# Patient Record
Sex: FEMALE | Race: WHITE | Employment: OTHER | URBAN - METROPOLITAN AREA
[De-identification: names, ages, dates, MRNs, and addresses within clinical notes are randomized per-mention and may not be internally consistent; named-entity substitution may affect disease eponyms.]

---

## 2017-01-11 ENCOUNTER — TELEPHONE (OUTPATIENT)
Dept: INTERNAL MEDICINE CLINIC | Facility: CLINIC | Age: 77
End: 2017-01-11

## 2017-01-12 NOTE — TELEPHONE ENCOUNTER
Eduardo Gordon  Female, 67year old, 09/09/1940  Last Weight:  237 lb (107.502 kg)  Preferred Phone:  [phone]  Home#:  [phone]  Mobile#:  [phone]  Work#:  None  PCP:  Clayton Jaramillo  Next Appt w/ ME:  None  Next Appt Date:  1/18/17 - [facility] yes - Father age 80    Genetic counseling  no        Follow-Up Care Plan    Need for ongoing (adjuvant) treatment for cancer  no                    Cancer Surveillance or other recommended related tests    [address] How Often  or a change in how well you breathe, pneumonitis (inflammation of the lungs), narrowing of or damage to the esophagus, risk of injury to the heart, and in rare cases, nerve or spinal cord injury, hypothyroidism, tissue injury and scarring to the chest area of your cancer treatment. You can share your copy with any of your doctors or nurses.  However, this is not a detailed or comprehensive record of your care.                              12/23/2016    Dear Irma Sun,    Congratulations on completing yo cancer types over the next few years. The [facility] complies with the American Society of Clinical Oncology ([address]) guidelines.    The goal of the [address] [place] [address] is to facilitate Oncologist-to-Primary Care Physician and provider-to-patient communication.

## 2017-01-18 ENCOUNTER — ANTI-COAG VISIT (OUTPATIENT)
Dept: INTERNAL MEDICINE CLINIC | Facility: CLINIC | Age: 77
End: 2017-01-18

## 2017-01-18 DIAGNOSIS — I26.99 PULMONARY EMBOLISM WITH INFARCTION (HCC): Primary | ICD-10-CM

## 2017-01-18 LAB — INR: 2.1 (ref 2–3)

## 2017-01-18 PROCEDURE — 36416 COLLJ CAPILLARY BLOOD SPEC: CPT

## 2017-01-18 PROCEDURE — 85610 PROTHROMBIN TIME: CPT

## 2017-01-27 ENCOUNTER — TELEPHONE (OUTPATIENT)
Dept: PULMONOLOGY | Facility: CLINIC | Age: 77
End: 2017-01-27

## 2017-01-27 NOTE — TELEPHONE ENCOUNTER
Patient on chronic calendar to have CT chest done February 2017 to follow lung nodule. Patient being followed by Dr. Nathan Sofia for Lung CA. Patient just had a CT chest on 12/09/16. Dr. Amor Cortez ordered follow up CT to be done June 2017.  Please advise if ok to

## 2017-01-30 NOTE — TELEPHONE ENCOUNTER
Verbal order from [address] [place] [address] to cancel CT scan of chest due in Feb 2017. No additional orders given.

## 2017-02-09 ENCOUNTER — PATIENT MESSAGE (OUTPATIENT)
Dept: PULMONOLOGY | Facility: CLINIC | Age: 77
End: 2017-02-09

## 2017-02-09 NOTE — TELEPHONE ENCOUNTER
From: Aleishay Marking  To: Anna Scherer MD  Sent: 2/9/2017 10:12 AM CST  Subject: Non-Urgent Medical Question    My , Wil Tineo, was ordered a c=pap machine from [facility]. They called and said medicare will not pay for another machine.  They sa

## 2017-02-15 ENCOUNTER — ANTI-COAG VISIT (OUTPATIENT)
Dept: INTERNAL MEDICINE CLINIC | Facility: CLINIC | Age: 77
End: 2017-02-15

## 2017-02-15 DIAGNOSIS — I26.99 PULMONARY EMBOLISM WITH INFARCTION (HCC): Primary | ICD-10-CM

## 2017-02-15 LAB — INR: 2 (ref 2–3)

## 2017-02-15 PROCEDURE — 85610 PROTHROMBIN TIME: CPT | Performed by: INTERNAL MEDICINE

## 2017-02-15 PROCEDURE — 36416 COLLJ CAPILLARY BLOOD SPEC: CPT | Performed by: INTERNAL MEDICINE

## 2017-03-01 ENCOUNTER — APPOINTMENT (OUTPATIENT)
Dept: LAB | Age: 77
End: 2017-03-01
Attending: INTERNAL MEDICINE
Payer: MEDICARE

## 2017-03-01 ENCOUNTER — HOSPITAL ENCOUNTER (OUTPATIENT)
Dept: GENERAL RADIOLOGY | Age: 77
Discharge: HOME | End: 2017-03-01
Attending: INTERNAL MEDICINE
Payer: MEDICARE

## 2017-03-01 ENCOUNTER — OFFICE VISIT (OUTPATIENT)
Dept: INTERNAL MEDICINE CLINIC | Facility: CLINIC | Age: 77
End: 2017-03-01

## 2017-03-01 VITALS
SYSTOLIC BLOOD PRESSURE: 127 MMHG | WEIGHT: 238 LBS | HEIGHT: 70 IN | HEART RATE: 78 BPM | BODY MASS INDEX: 34.07 KG/M2 | DIASTOLIC BLOOD PRESSURE: 74 MMHG

## 2017-03-01 DIAGNOSIS — Z86.73 PERSONAL HISTORY OF TRANSIENT ISCHEMIC ATTACK (TIA), AND CEREBRAL INFARCTION WITHOUT RESIDUAL DEFICITS: ICD-10-CM

## 2017-03-01 DIAGNOSIS — E66.09 NON MORBID OBESITY DUE TO EXCESS CALORIES: ICD-10-CM

## 2017-03-01 DIAGNOSIS — Z12.11 SCREENING FOR COLON CANCER: ICD-10-CM

## 2017-03-01 DIAGNOSIS — M79.602 PAIN OF LEFT UPPER EXTREMITY: ICD-10-CM

## 2017-03-01 DIAGNOSIS — I70.0 ATHEROSCLEROSIS OF AORTA (HCC): ICD-10-CM

## 2017-03-01 DIAGNOSIS — D35.02 ADRENAL ADENOMA, LEFT: ICD-10-CM

## 2017-03-01 DIAGNOSIS — Z91.81 AT RISK FOR FALLING: ICD-10-CM

## 2017-03-01 DIAGNOSIS — I26.99 RECURRENT PULMONARY EMBOLI (HCC): ICD-10-CM

## 2017-03-01 DIAGNOSIS — M17.0 PRIMARY OSTEOARTHRITIS OF BOTH KNEES: ICD-10-CM

## 2017-03-01 DIAGNOSIS — J44.9 COPD, MILD (HCC): ICD-10-CM

## 2017-03-01 DIAGNOSIS — Z00.00 ENCOUNTER FOR MEDICARE ANNUAL WELLNESS EXAM: Primary | ICD-10-CM

## 2017-03-01 DIAGNOSIS — E04.1 THYROID NODULE: ICD-10-CM

## 2017-03-01 DIAGNOSIS — M79.602 LEFT ARM PAIN: ICD-10-CM

## 2017-03-01 DIAGNOSIS — K21.9 GASTROESOPHAGEAL REFLUX DISEASE, ESOPHAGITIS PRESENCE NOT SPECIFIED: ICD-10-CM

## 2017-03-01 DIAGNOSIS — I10 ESSENTIAL HYPERTENSION WITH GOAL BLOOD PRESSURE LESS THAN 130/80: ICD-10-CM

## 2017-03-01 DIAGNOSIS — Z85.118 HISTORY OF LUNG CANCER: ICD-10-CM

## 2017-03-01 DIAGNOSIS — R11.0 NAUSEA: ICD-10-CM

## 2017-03-01 DIAGNOSIS — J43.9 PULMONARY EMPHYSEMA, UNSPECIFIED EMPHYSEMA TYPE (HCC): ICD-10-CM

## 2017-03-01 DIAGNOSIS — I27.20 PULMONARY HYPERTENSION (HCC): ICD-10-CM

## 2017-03-01 DIAGNOSIS — Z86.718 HISTORY OF DVT (DEEP VEIN THROMBOSIS): ICD-10-CM

## 2017-03-01 LAB
ALBUMIN SERPL BCP-MCNC: 3.7 G/DL (ref 3.5–4.8)
ALBUMIN/GLOB SERPL: 1.2 {RATIO} (ref 1–2)
ALP SERPL-CCNC: 66 U/L (ref 32–100)
ALT SERPL-CCNC: 15 U/L (ref 14–54)
ANION GAP SERPL CALC-SCNC: 7 MMOL/L (ref 0–18)
AST SERPL-CCNC: 23 U/L (ref 15–41)
BACTERIA UR QL AUTO: NEGATIVE /HPF
BILIRUB SERPL-MCNC: 0.7 MG/DL (ref 0.3–1.2)
BILIRUB UR QL: NEGATIVE
BUN SERPL-MCNC: 15 MG/DL (ref 8–20)
BUN/CREAT SERPL: 10.6 (ref 10–20)
CALCIUM SERPL-MCNC: 9.5 MG/DL (ref 8.5–10.5)
CHLORIDE SERPL-SCNC: 109 MMOL/L (ref 95–110)
CLARITY UR: CLEAR
CO2 SERPL-SCNC: 27 MMOL/L (ref 22–32)
COLOR UR: YELLOW
CREAT SERPL-MCNC: 1.42 MG/DL (ref 0.5–1.5)
ERYTHROCYTE [DISTWIDTH] IN BLOOD BY AUTOMATED COUNT: 16.6 % (ref 11–15)
GLOBULIN PLAS-MCNC: 3 G/DL (ref 2.5–3.7)
GLUCOSE SERPL-MCNC: 89 MG/DL (ref 70–99)
GLUCOSE UR-MCNC: NEGATIVE MG/DL
HCT VFR BLD AUTO: 42.1 % (ref 35–48)
HGB BLD-MCNC: 13.3 G/DL (ref 12–16)
HGB UR QL STRIP.AUTO: NEGATIVE
KETONES UR-MCNC: NEGATIVE MG/DL
MCH RBC QN AUTO: 26.4 PG (ref 27–32)
MCHC RBC AUTO-ENTMCNC: 31.6 G/DL (ref 32–37)
MCV RBC AUTO: 83.4 FL (ref 80–100)
NITRITE UR QL STRIP.AUTO: NEGATIVE
OSMOLALITY UR CALC.SUM OF ELEC: 296 MOSM/KG (ref 275–295)
PH UR: 6 [PH] (ref 5–8)
PLATELET # BLD AUTO: 285 K/UL (ref 140–400)
PMV BLD AUTO: 9.2 FL (ref 7.4–10.3)
POTASSIUM SERPL-SCNC: 4.3 MMOL/L (ref 3.3–5.1)
PROT SERPL-MCNC: 6.7 G/DL (ref 5.9–8.4)
PROT UR-MCNC: NEGATIVE MG/DL
RBC # BLD AUTO: 5.04 M/UL (ref 3.7–5.4)
RBC #/AREA URNS AUTO: 1 /HPF
SODIUM SERPL-SCNC: 143 MMOL/L (ref 136–144)
SP GR UR STRIP: 1.02 (ref 1–1.03)
UROBILINOGEN UR STRIP-ACNC: <2
VIT C UR-MCNC: 40 MG/DL
WBC # BLD AUTO: 5.6 K/UL (ref 4–11)
WBC #/AREA URNS AUTO: 8 /HPF

## 2017-03-01 PROCEDURE — 73060 X-RAY EXAM OF HUMERUS: CPT

## 2017-03-01 PROCEDURE — 99213 OFFICE O/P EST LOW 20 MIN: CPT | Performed by: INTERNAL MEDICINE

## 2017-03-01 PROCEDURE — 73564 X-RAY EXAM KNEE 4 OR MORE: CPT

## 2017-03-01 PROCEDURE — 36415 COLL VENOUS BLD VENIPUNCTURE: CPT

## 2017-03-01 PROCEDURE — 81001 URINALYSIS AUTO W/SCOPE: CPT

## 2017-03-01 PROCEDURE — G0439 PPPS, SUBSEQ VISIT: HCPCS | Performed by: INTERNAL MEDICINE

## 2017-03-01 PROCEDURE — 85027 COMPLETE CBC AUTOMATED: CPT

## 2017-03-01 PROCEDURE — 80053 COMPREHEN METABOLIC PANEL: CPT

## 2017-03-01 NOTE — PROGRESS NOTES
HPI:    Patient ID: Nakita Duke is a 67year old female.     HPI      Left arb biceps pain no injury  Off and anon  More whent sitting in chair or deriving  apin acxcruitoating  goea away when she wedges her arm left sie againt the seat  Left breast layo Multiple Vitamins-Minerals (MULTIVITAMIN ADULT OR) Take 1 tablet by mouth daily. Disp:  Rfl:    acetaminophen (TYLENOL) 325 MG Oral Tab Take 500 mg by mouth every 6 (six) hours as needed for Pain.    Disp:  Rfl:      Allergies:  Hydrocodone             Ra Chest CT 12/916  CONCLUSION:   1. There is history of left upper lobe lung cancer, post left upper lobectomy. There are expected postprocedural changes in the left pulmonary hilum and in the left chest wall/axilla.  There is a small left pleural effusion oncology.     2.  Paroxysmal atrial fibrillation    3.  Recurrent venous thromboembolism status post IVC filter–doing well clinically    Recommendations: Lifelong anticoagulation and patient see me in the office at the six-month interval and contact me prom INTERNAL, CANCELED: ORTHOPEDIC -         INTERNAL        Chronic  Generalized osteoarthritis    S/p hip repleamcne     followup with bladimir      (R11.0) Nausea  Plan: GASTRO - INTERNAL, H. PYLORI STOOL AG, EIA        Referral given    (I10) Essential Annual Wellness visit.     Patient Active Problem List:     History of DVT (deep vein thrombosis)     COPD, mild (HCC)     Obesity     Adenocarcinoma, lung (HCC)     Esophageal reflux     Personal history of transient ischemic attack (TIA), and cerebral inf 1-Yes    Does pain affect your day to day activities?: 0-No     Have you had any memory issues?: 0-No    Fall/Risk Scoring: 3    Scoring Interpretation: 0 - 3 No Risk     Depression Screening (PHQ-2/PHQ-9): Over the LAST 2 WEEKS   Little interest or pleasu INFORMATION:   Past Medical History   Diagnosis Date   • Arthritis    • Thyroid condition    • Osteoarthrosis, unspecified whether generalized or localized, unspecified site    • Obesity, unspecified    • Post-menopausal bleeding    • Hiatal hernia    • Ac (107.956 kg)  07/25/16 : 234 lb (106.142 kg)      > BP Readings from Last 3 Encounters:  03/01/17 : 127/74  12/23/16 : 135/72  12/05/16 : 113/71    See above for detail  Right Eye Visual Acuity: Corrected Left Eye Visual Acuity: Corrected   Right Eye Chart applicable    Pap  Every two years There are no preventive care reminders to display for this patient. Update Health Maintenance if applicable    Chlamydia  Annually if high risk No results found for: CHLAMYDIA No flowsheet data found.     Screening Mammogr visit. No flowsheet data found.       SCREENING SCHEDULE - FEMALE      SCREEN COVERAGE SCHEDULE  (If Indicated) LAST DONE   Dexa If at Risk q2 years Every    Lipids All Patients q5 years LDL CHOLESTEROL (mg/dL)   Date Value   06/18/2016 99      Colonoscopy

## 2017-03-04 ENCOUNTER — APPOINTMENT (OUTPATIENT)
Dept: LAB | Age: 77
End: 2017-03-04
Attending: INTERNAL MEDICINE
Payer: MEDICARE

## 2017-03-04 DIAGNOSIS — R11.0 NAUSEA: ICD-10-CM

## 2017-03-04 PROCEDURE — 87338 HPYLORI STOOL AG IA: CPT

## 2017-03-08 LAB — HELICOBACTER PYLORI AG, FECAL: NEGATIVE

## 2017-03-12 PROBLEM — J43.9 PULMONARY EMPHYSEMA (HCC): Status: ACTIVE | Noted: 2017-03-12

## 2017-03-16 ENCOUNTER — ANTI-COAG VISIT (OUTPATIENT)
Dept: INTERNAL MEDICINE CLINIC | Facility: CLINIC | Age: 77
End: 2017-03-16

## 2017-03-16 DIAGNOSIS — I26.99 PULMONARY EMBOLISM WITH INFARCTION (HCC): Primary | ICD-10-CM

## 2017-03-16 LAB — INR: 2.1 (ref 2–3)

## 2017-03-16 PROCEDURE — 36416 COLLJ CAPILLARY BLOOD SPEC: CPT

## 2017-03-16 PROCEDURE — 85610 PROTHROMBIN TIME: CPT

## 2017-03-23 PROBLEM — M79.89 MASS OF SOFT TISSUE OF LEFT UPPER EXTREMITY: Status: ACTIVE | Noted: 2017-03-23

## 2017-03-23 PROBLEM — M25.562 CHRONIC PAIN OF LEFT KNEE: Status: ACTIVE | Noted: 2017-03-23

## 2017-03-23 PROBLEM — M17.12 PRIMARY OSTEOARTHRITIS OF LEFT KNEE: Status: ACTIVE | Noted: 2017-03-23

## 2017-03-23 PROBLEM — G89.29 CHRONIC PAIN OF LEFT KNEE: Status: ACTIVE | Noted: 2017-03-23

## 2017-03-23 PROBLEM — M79.602 LEFT ARM PAIN: Status: ACTIVE | Noted: 2017-03-23

## 2017-03-31 ENCOUNTER — APPOINTMENT (OUTPATIENT)
Dept: HEMATOLOGY/ONCOLOGY | Facility: HOSPITAL | Age: 77
End: 2017-03-31
Attending: INTERNAL MEDICINE
Payer: MEDICARE

## 2017-04-12 ENCOUNTER — ANTI-COAG VISIT (OUTPATIENT)
Dept: INTERNAL MEDICINE CLINIC | Facility: CLINIC | Age: 77
End: 2017-04-12

## 2017-04-12 DIAGNOSIS — I26.99 PULMONARY EMBOLISM WITH INFARCTION (HCC): Primary | ICD-10-CM

## 2017-04-12 PROCEDURE — 85610 PROTHROMBIN TIME: CPT

## 2017-04-12 PROCEDURE — 36416 COLLJ CAPILLARY BLOOD SPEC: CPT

## 2017-05-01 ENCOUNTER — TELEPHONE (OUTPATIENT)
Dept: HEMATOLOGY/ONCOLOGY | Facility: HOSPITAL | Age: 77
End: 2017-05-01

## 2017-05-01 DIAGNOSIS — C34.92 MALIGNANT NEOPLASM OF LEFT LUNG, UNSPECIFIED PART OF LUNG (HCC): Primary | ICD-10-CM

## 2017-05-01 NOTE — TELEPHONE ENCOUNTER
Tara calling for CT order prior to her visit in June. Please contact the patient once order is placed so patient can schedule appt for CT.  [address] [facility]

## 2017-05-01 NOTE — TELEPHONE ENCOUNTER
Called patient to let her know that the order for CT was placed and to have a few days before next MD visit June 1, 2017.

## 2017-05-02 NOTE — TELEPHONE ENCOUNTER
Pharmacy requesting refill. Warfarin Sodium 5 MG Oral Tab TAKE AS DIRECTED BY ANTICOAGULATION CLINIC: 1.5 tab Tue, Sat.& 1 tab the rest of the week, PO in Evening.  PO Disp: 100 tablet Rfl: 1

## 2017-05-08 NOTE — TELEPHONE ENCOUNTER
Patient called in to follow up on medication refill request. States she's down to one and has requested this since last week. Doesn't want to skip a dose. Please advise.

## 2017-05-09 NOTE — TELEPHONE ENCOUNTER
MMP please advise on refill request.  Pt is current with anticoag clinic, next INR 5/10. Current dose is 5mg daily. Rx pended for signature.

## 2017-05-09 NOTE — TELEPHONE ENCOUNTER
Refill Protocol Appointment Criteria  · Appointment scheduled in the past 12 months or in the next 3 months  Recent Visits       Provider Department Primary Dx    2 months ago Carl Rosenthal MD [facility], [facility], [address], [address] [facility] Encounter for [address]

## 2017-05-10 ENCOUNTER — ANTI-COAG VISIT (OUTPATIENT)
Dept: INTERNAL MEDICINE CLINIC | Facility: CLINIC | Age: 77
End: 2017-05-10

## 2017-05-10 DIAGNOSIS — I26.99 PULMONARY EMBOLISM WITH INFARCTION (HCC): Primary | ICD-10-CM

## 2017-05-10 PROCEDURE — 36416 COLLJ CAPILLARY BLOOD SPEC: CPT

## 2017-05-10 PROCEDURE — 85610 PROTHROMBIN TIME: CPT

## 2017-05-10 RX ORDER — WARFARIN SODIUM 5 MG/1
TABLET ORAL
Qty: 90 TABLET | Refills: 1 | Status: SHIPPED | OUTPATIENT
Start: 2017-05-10 | End: 2017-08-11

## 2017-05-12 RX ORDER — WARFARIN SODIUM 5 MG/1
TABLET ORAL
Qty: 100 TABLET | Refills: 1 | Status: SHIPPED | OUTPATIENT
Start: 2017-05-12 | End: 2017-12-14

## 2017-05-12 NOTE — TELEPHONE ENCOUNTER
Spoke to pharmacy, states that they have already received a prescription. on 5/10. It was filled and picked up by the pt. Pharmacy will disregard the 2nd prescription.

## 2017-05-12 NOTE — TELEPHONE ENCOUNTER
Sent per clinical decision with cosign request from Dr. Addis Landrum. Critical med and pt should not miss dose.

## 2017-05-24 ENCOUNTER — HOSPITAL ENCOUNTER (OUTPATIENT)
Dept: CT IMAGING | Age: 77
Discharge: HOME | End: 2017-05-24
Attending: INTERNAL MEDICINE
Payer: MEDICARE

## 2017-05-24 DIAGNOSIS — C34.92 MALIGNANT NEOPLASM OF LEFT LUNG, UNSPECIFIED PART OF LUNG (HCC): ICD-10-CM

## 2017-05-24 PROCEDURE — 71250 CT THORAX DX C-: CPT | Performed by: INTERNAL MEDICINE

## 2017-06-01 ENCOUNTER — OFFICE VISIT (OUTPATIENT)
Dept: HEMATOLOGY/ONCOLOGY | Facility: HOSPITAL | Age: 77
End: 2017-06-01
Attending: INTERNAL MEDICINE
Payer: MEDICARE

## 2017-06-01 VITALS
HEART RATE: 74 BPM | RESPIRATION RATE: 16 BRPM | SYSTOLIC BLOOD PRESSURE: 127 MMHG | WEIGHT: 239 LBS | HEIGHT: 70 IN | BODY MASS INDEX: 34.22 KG/M2 | TEMPERATURE: 98 F | DIASTOLIC BLOOD PRESSURE: 61 MMHG

## 2017-06-01 DIAGNOSIS — I26.99 BILATERAL PULMONARY EMBOLISM (HCC): Primary | ICD-10-CM

## 2017-06-01 DIAGNOSIS — C34.12 MALIGNANT NEOPLASM OF UPPER LOBE OF LEFT LUNG (HCC): ICD-10-CM

## 2017-06-01 DIAGNOSIS — I82.403 DEEP VEIN THROMBOSIS (DVT) OF BOTH LOWER EXTREMITIES, UNSPECIFIED CHRONICITY, UNSPECIFIED VEIN (HCC): ICD-10-CM

## 2017-06-01 PROCEDURE — 99214 OFFICE O/P EST MOD 30 MIN: CPT | Performed by: INTERNAL MEDICINE

## 2017-06-01 PROCEDURE — G0463 HOSPITAL OUTPT CLINIC VISIT: HCPCS | Performed by: INTERNAL MEDICINE

## 2017-06-01 NOTE — PROGRESS NOTES
Cancer Center Progress Note    Patient Name: Clara Dawson   YOB: 1940   Medical Record Number: [id number]   Attending Physician: Ofelia Nick M.D.        Chief Complaint:  Lung cancer    History of Present Illness:  Cancer history:  69-year- SURGICAL HISTORY      Comment Ear Surgery    OTHER SURGICAL HISTORY  7/13    Comment Neg EM Bx    HIP REPLACEMENT SURGERY Right     FOOT/TOES SURGERY PROC UNLISTED Left     Comment Pt had surgery to straighten toes on left foot.     REMOVAL OF LUNG,LOBECTOM Take 1 tablet by mouth daily. , Disp: , Rfl:   •  acetaminophen (TYLENOL) 325 MG Oral Tab, Take 500 mg by mouth every 6 (six) hours as needed for Pain.  , Disp: , Rfl:     Allergies:    Hydrocodone             Rash  Morphine                Other (See Commen survival advantage in this scenario.   --Recommend following on surveillance with next CT 6 months Dec 2017  --Clinical follow up in 3 months    History of recurrent DVT/PE with multiple episodes in the postoperative setting including recently as noted abov

## 2017-06-06 ENCOUNTER — OFFICE VISIT (OUTPATIENT)
Dept: PULMONOLOGY | Facility: CLINIC | Age: 77
End: 2017-06-06

## 2017-06-06 VITALS
OXYGEN SATURATION: 96 % | DIASTOLIC BLOOD PRESSURE: 70 MMHG | HEART RATE: 67 BPM | RESPIRATION RATE: 19 BRPM | HEIGHT: 70 IN | BODY MASS INDEX: 34.5 KG/M2 | WEIGHT: 241 LBS | SYSTOLIC BLOOD PRESSURE: 114 MMHG

## 2017-06-06 DIAGNOSIS — C34.90 ADENOCARCINOMA, LUNG, UNSPECIFIED LATERALITY (HCC): Primary | ICD-10-CM

## 2017-06-06 PROCEDURE — G0463 HOSPITAL OUTPT CLINIC VISIT: HCPCS | Performed by: INTERNAL MEDICINE

## 2017-06-06 PROCEDURE — 99213 OFFICE O/P EST LOW 20 MIN: CPT | Performed by: INTERNAL MEDICINE

## 2017-06-06 NOTE — PROGRESS NOTES
The patient is 67-year-old female who I know well from prior evaluation who comes in now for follow-up. In general, she is doing well. She is status post left upper lobectomy lepidic adenocarcinoma.   Recent CT scan the chest shows no evidence of recurren

## 2017-06-07 ENCOUNTER — ANTI-COAG VISIT (OUTPATIENT)
Dept: INTERNAL MEDICINE CLINIC | Facility: CLINIC | Age: 77
End: 2017-06-07

## 2017-06-07 DIAGNOSIS — I26.99 PULMONARY EMBOLISM WITH INFARCTION (HCC): Primary | ICD-10-CM

## 2017-06-07 PROCEDURE — 36416 COLLJ CAPILLARY BLOOD SPEC: CPT

## 2017-06-07 PROCEDURE — 85610 PROTHROMBIN TIME: CPT

## 2017-06-28 ENCOUNTER — ANTI-COAG VISIT (OUTPATIENT)
Dept: INTERNAL MEDICINE CLINIC | Facility: CLINIC | Age: 77
End: 2017-06-28

## 2017-06-28 DIAGNOSIS — I26.99 PULMONARY EMBOLISM WITH INFARCTION (HCC): ICD-10-CM

## 2017-06-28 PROCEDURE — G0463 HOSPITAL OUTPT CLINIC VISIT: HCPCS

## 2017-06-28 PROCEDURE — 36416 COLLJ CAPILLARY BLOOD SPEC: CPT

## 2017-06-28 PROCEDURE — 85610 PROTHROMBIN TIME: CPT

## 2017-07-12 ENCOUNTER — ANTI-COAG VISIT (OUTPATIENT)
Dept: INTERNAL MEDICINE CLINIC | Facility: CLINIC | Age: 77
End: 2017-07-12

## 2017-07-12 DIAGNOSIS — I26.99 PULMONARY EMBOLISM WITH INFARCTION (HCC): ICD-10-CM

## 2017-07-12 LAB — INR: 2.4 (ref 2–3)

## 2017-07-12 PROCEDURE — 85610 PROTHROMBIN TIME: CPT

## 2017-07-12 PROCEDURE — 36416 COLLJ CAPILLARY BLOOD SPEC: CPT

## 2017-07-14 ENCOUNTER — OFFICE VISIT (OUTPATIENT)
Dept: DERMATOLOGY CLINIC | Facility: CLINIC | Age: 77
End: 2017-07-14

## 2017-07-14 DIAGNOSIS — L72.0 MILIA: ICD-10-CM

## 2017-07-14 DIAGNOSIS — D23.4 BENIGN NEOPLASM OF SCALP AND SKIN OF NECK: ICD-10-CM

## 2017-07-14 DIAGNOSIS — L57.0 AK (ACTINIC KERATOSIS): Primary | ICD-10-CM

## 2017-07-14 DIAGNOSIS — D23.30 BENIGN NEOPLASM OF SKIN OF FACE: ICD-10-CM

## 2017-07-14 DIAGNOSIS — D23.60 BENIGN NEOPLASM OF SKIN OF UPPER LIMB, INCLUDING SHOULDER, UNSPECIFIED LATERALITY: ICD-10-CM

## 2017-07-14 DIAGNOSIS — Z85.828 HISTORY OF SKIN CANCER: ICD-10-CM

## 2017-07-14 DIAGNOSIS — D23.5 BENIGN NEOPLASM OF SKIN OF TRUNK, EXCEPT SCROTUM: ICD-10-CM

## 2017-07-14 PROCEDURE — 17000 DESTRUCT PREMALG LESION: CPT | Performed by: DERMATOLOGY

## 2017-07-14 PROCEDURE — 99202 OFFICE O/P NEW SF 15 MIN: CPT | Performed by: DERMATOLOGY

## 2017-07-14 RX ORDER — AMMONIUM LACTATE 12 G/100G
1 LOTION TOPICAL 2 TIMES DAILY
Qty: 500 G | Refills: 11 | Status: SHIPPED | OUTPATIENT
Start: 2017-07-14 | End: 2017-08-13

## 2017-07-18 ENCOUNTER — OFFICE VISIT (OUTPATIENT)
Dept: GASTROENTEROLOGY | Facility: CLINIC | Age: 77
End: 2017-07-18

## 2017-07-18 VITALS
WEIGHT: 236 LBS | DIASTOLIC BLOOD PRESSURE: 64 MMHG | HEIGHT: 69 IN | HEART RATE: 73 BPM | SYSTOLIC BLOOD PRESSURE: 101 MMHG | BODY MASS INDEX: 34.96 KG/M2

## 2017-07-18 DIAGNOSIS — K21.00 HIATAL HERNIA WITH GERD AND ESOPHAGITIS: ICD-10-CM

## 2017-07-18 DIAGNOSIS — K52.9 CHRONIC DIARRHEA: ICD-10-CM

## 2017-07-18 DIAGNOSIS — Z12.12 ENCOUNTER FOR COLORECTAL CANCER SCREENING: Primary | ICD-10-CM

## 2017-07-18 DIAGNOSIS — K44.9 HIATAL HERNIA WITH GERD AND ESOPHAGITIS: ICD-10-CM

## 2017-07-18 DIAGNOSIS — Z12.11 ENCOUNTER FOR COLORECTAL CANCER SCREENING: Primary | ICD-10-CM

## 2017-07-18 DIAGNOSIS — R11.0 NAUSEA: ICD-10-CM

## 2017-07-18 PROCEDURE — G0463 HOSPITAL OUTPT CLINIC VISIT: HCPCS | Performed by: INTERNAL MEDICINE

## 2017-07-18 PROCEDURE — 99214 OFFICE O/P EST MOD 30 MIN: CPT | Performed by: INTERNAL MEDICINE

## 2017-07-18 NOTE — PATIENT INSTRUCTIONS
1.  Schedule colonoscopy and EGD on the same day with MAC. Hold warfarin for 5 days prior to the procedure if okay with Dr. Mckay Wood    2. Lactose-free diet, caffeine free diet.     3.  If you are still having diarrhea at the time of the procedures, we tiarra

## 2017-07-18 NOTE — PROGRESS NOTES
HPI:    Patient ID: Tacey Edgard is a 67year old female. HPI    Review of Systems   Constitutional: Negative for activity change, appetite change, chills, diaphoresis, fatigue, fever and unexpected weight change.    HENT: Negative for congestion, ear daily. Disp:  Rfl:    Multiple Vitamins-Minerals (MULTIVITAMIN ADULT OR) Take 1 tablet by mouth daily. Disp:  Rfl:    ammonium lactate 12 % External Lotion Apply 1 Application topically 2 (two) times daily.  Disp: 500 g Rfl: 11   acetaminophen (TYLENOL) 325 tenderness. Lymphadenopathy:     She has no cervical adenopathy. Neurological: She is alert and oriented to person, place, and time. Skin: Skin is warm and dry. No rash noted. She is not diaphoretic. No erythema. No pallor.    Numerous actinic keratos

## 2017-07-18 NOTE — H&P
History of present illness: This is a 76-year-old female patient of Dr. Maite Mishra who presents for colorectal cancer screening as well as several other GI complaints. The patient last underwent colonoscopy and EGD in 2003 per Dr. Bing Gage.   At that sanjeev infection, pain, death, as well as the risks of anesthesia and perforation all leading to prolonged hospitalization, surgical intervention, or even death. I also specifically mentioned the miss rate of colonoscopy of 5-10% in the best of all circumstances.

## 2017-07-19 ENCOUNTER — TELEPHONE (OUTPATIENT)
Dept: GASTROENTEROLOGY | Facility: CLINIC | Age: 77
End: 2017-07-19

## 2017-07-19 DIAGNOSIS — Z12.11 COLON CANCER SCREENING: ICD-10-CM

## 2017-07-19 DIAGNOSIS — K21.9 GASTROESOPHAGEAL REFLUX DISEASE, ESOPHAGITIS PRESENCE NOT SPECIFIED: ICD-10-CM

## 2017-07-19 DIAGNOSIS — K44.9 HIATAL HERNIA: ICD-10-CM

## 2017-07-19 DIAGNOSIS — K52.9 CHRONIC DIARRHEA: Primary | ICD-10-CM

## 2017-07-19 DIAGNOSIS — R11.0 NAUSEA: ICD-10-CM

## 2017-07-19 NOTE — TELEPHONE ENCOUNTER
Scheduled for:  Colonoscopy [phone]  Provider Name: Dr Guillermo Paci  Date:  Fri 9/08/17  Location:  [facility]  Sedation:  MAC  Time:  12:00 noon  Prep: miralax/gatoride  Meds/Allergies Reconciled?:  Fentanyl,hydrocodone, morphine,   Diagnosis with codes:  Chronic

## 2017-07-21 NOTE — TELEPHONE ENCOUNTER
Pt is Warfarin he is scheduled for a colonoscopy/EGD on 9/08/17.   Can we hold warfarin 5 days prior to procedure    em

## 2017-07-30 NOTE — PROGRESS NOTES
Desirae Garg is a 67year old female.   HPI:     CC:  Patient presents with:  Full Skin Exam: patient concerned with multiple skin tags around neck x several years,patient states she had a cancerous lesion removed fron nose 10 years ago,patient on coumad Smokeless tobacco: Never Used                      Alcohol use: Yes           0.5 oz/week     Glasses of wine: 1 per week     Comment: 1-2x/month         Current Outpatient Prescriptions:  ammonium lactate 12 % External Lotion Ap straighten toes on left                foot.   No date: HIP REPLACEMENT SURGERY Right  2015: HIP SURGERY Right      Comment: right total hip arthroplasty  No date: HYSTERECTOMY  No date: LAPAROSCOPIC CHOLECYSTECTOMY  No date:       Comment: x7 (Twins x1 as noted. ROS:  Denies any other systemic complaints. No new or changeing lesions other than noted above. No fevers, chills, night sweats, unusual sun sensitivity. No other skin complaints. History, medications, allergies reviewed as noted. Precancerous nature discussed. Lesions treated with cryo- . Biopsy if not resolved. Biopsy not resolved of the next month return for recheck and biopsy. Patient with history of skin cancer. No evidence of recurrence. No new skin cancer.     On exa

## 2017-08-01 ENCOUNTER — TELEPHONE (OUTPATIENT)
Dept: INTERNAL MEDICINE CLINIC | Facility: CLINIC | Age: 77
End: 2017-08-01

## 2017-08-01 DIAGNOSIS — I26.99 PULMONARY EMBOLISM AND INFARCTION (HCC): Primary | ICD-10-CM

## 2017-08-02 ENCOUNTER — ANTI-COAG VISIT (OUTPATIENT)
Dept: INTERNAL MEDICINE CLINIC | Facility: CLINIC | Age: 77
End: 2017-08-02

## 2017-08-02 DIAGNOSIS — I26.99 PULMONARY EMBOLISM WITH INFARCTION (HCC): ICD-10-CM

## 2017-08-02 DIAGNOSIS — I26.99 PULMONARY EMBOLISM AND INFARCTION (HCC): ICD-10-CM

## 2017-08-04 NOTE — TELEPHONE ENCOUNTER
Pt contacted and reviewed Dr. Yosi Taylor message, below. She repeated instructions to demonstrate understanding. She states she wrote it on her calender. She had no further questions or concerns at this time.     Marilyn sheehan was already notified of the pro

## 2017-08-09 ENCOUNTER — ANTI-COAG VISIT (OUTPATIENT)
Dept: INTERNAL MEDICINE CLINIC | Facility: CLINIC | Age: 77
End: 2017-08-09

## 2017-08-09 DIAGNOSIS — I26.99 PULMONARY EMBOLISM AND INFARCTION (HCC): ICD-10-CM

## 2017-08-09 DIAGNOSIS — I26.99 PULMONARY EMBOLISM WITH INFARCTION (HCC): ICD-10-CM

## 2017-08-09 LAB — INR: 2.6 (ref 2–3)

## 2017-08-09 PROCEDURE — 36416 COLLJ CAPILLARY BLOOD SPEC: CPT

## 2017-08-09 PROCEDURE — 85610 PROTHROMBIN TIME: CPT

## 2017-08-11 ENCOUNTER — OFFICE VISIT (OUTPATIENT)
Dept: DERMATOLOGY CLINIC | Facility: CLINIC | Age: 77
End: 2017-08-11

## 2017-08-11 DIAGNOSIS — D23.60 BENIGN NEOPLASM OF SKIN OF UPPER LIMB, INCLUDING SHOULDER, UNSPECIFIED LATERALITY: ICD-10-CM

## 2017-08-11 DIAGNOSIS — D23.5 BENIGN NEOPLASM OF SKIN OF TRUNK, EXCEPT SCROTUM: ICD-10-CM

## 2017-08-11 DIAGNOSIS — D23.4 BENIGN NEOPLASM OF SCALP AND SKIN OF NECK: ICD-10-CM

## 2017-08-11 DIAGNOSIS — D23.30 BENIGN NEOPLASM OF SKIN OF FACE: ICD-10-CM

## 2017-08-11 DIAGNOSIS — L72.0 MILIA: ICD-10-CM

## 2017-08-11 DIAGNOSIS — L57.0 AK (ACTINIC KERATOSIS): Primary | ICD-10-CM

## 2017-08-11 DIAGNOSIS — Z85.828 HISTORY OF SKIN CANCER: ICD-10-CM

## 2017-08-11 PROCEDURE — 99213 OFFICE O/P EST LOW 20 MIN: CPT | Performed by: DERMATOLOGY

## 2017-08-11 PROCEDURE — G0463 HOSPITAL OUTPT CLINIC VISIT: HCPCS | Performed by: DERMATOLOGY

## 2017-08-11 RX ORDER — FUROSEMIDE 20 MG/1
20 TABLET ORAL
COMMUNITY
Start: 2016-03-31 | End: 2017-08-11

## 2017-08-11 RX ORDER — ESOMEPRAZOLE MAGNESIUM 20 MG/1
1 FOR SUSPENSION ORAL
COMMUNITY
End: 2017-08-11

## 2017-08-21 NOTE — PROGRESS NOTES
Gareth Oneill is a 67year old female. HPI:     CC:  Patient presents with:  Actinic Keratosis: LOV 7/14/2017. Pt presenting for f/u with AKs, previously treated at [address] [place] [address] with cryotherapy. Allergies:  Fentanyl; Hydrocodone; Morphine;  Phenol    HIS Alcohol use: Yes           0.5 oz/week     Glasses of wine: 1 per week     Comment: 1-2x/month         Current Outpatient Prescriptions:  Warfarin Sodium 5 MG Oral Tab TAKE AS DIRECTED BY ANTICOAGULATION CLINIC: 1 tablet by mouth daily Disp: 100 t Comment: parotid gland removed left benigh  No date: OTHER SURGICAL HISTORY      Comment: Removal Skin Mole  No date: OTHER SURGICAL HISTORY      Comment: SAB/ D&C  No date: OTHER SURGICAL HISTORY      Comment: Ear Surgery  7/13: OTHER SURGICAL HISTORY scalp, head, neck, face,nails, hair, external eyes, including conjunctival mucosa, eyelids, lips external ears , arms, digits,palms.      Multiple light to medium brown, well marginated, uniformly pigmented, macules and papules 6 mm and less scattered on ex

## 2017-08-23 NOTE — TELEPHONE ENCOUNTER
Please advise on refill request.  Unable to refill per protocol.    Hypertensive Medications  Protocol Criteria:  · Appointment scheduled in the past 6 months or in the next 3 months  · BMP or CMP in the past 12 months  · Creatinine result < 2  Recent Outpa 109 03/01/2017   CO2 27 03/01/2017   GLOBULIN 3.0 03/01/2017   AGRATIO 1.1 06/03/2016   ANIONGAP 7 03/01/2017   OSMOCAL 296 (H) 03/01/2017

## 2017-08-23 NOTE — TELEPHONE ENCOUNTER
From: Debbie Martínez  Sent: 8/21/2017 8:53 AM CDT  Subject: Medication Renewal Request    Debbie Martínez would like a refill of the following medications:  furosemide 20 MG Oral Tab Kevin Forbes MD]    Preferred pharmacy: [address] [place] [address]  [phone] -

## 2017-08-24 RX ORDER — FUROSEMIDE 20 MG/1
20 TABLET ORAL DAILY
Qty: 90 TABLET | Refills: 1 | Status: SHIPPED
Start: 2017-08-24 | End: 2018-06-02

## 2017-08-25 ENCOUNTER — LAB ENCOUNTER (OUTPATIENT)
Dept: LAB | Age: 77
End: 2017-08-25
Attending: INTERNAL MEDICINE
Payer: MEDICARE

## 2017-08-25 DIAGNOSIS — C34.12 MALIGNANT NEOPLASM OF UPPER LOBE OF LEFT LUNG (HCC): ICD-10-CM

## 2017-08-25 LAB
ALBUMIN SERPL BCP-MCNC: 3.7 G/DL (ref 3.5–4.8)
ALBUMIN/GLOB SERPL: 1.4 {RATIO} (ref 1–2)
ALP SERPL-CCNC: 62 U/L (ref 32–100)
ALT SERPL-CCNC: 15 U/L (ref 14–54)
ANION GAP SERPL CALC-SCNC: 6 MMOL/L (ref 0–18)
AST SERPL-CCNC: 23 U/L (ref 15–41)
BASOPHILS # BLD: 0.1 K/UL (ref 0–0.2)
BASOPHILS NFR BLD: 2 %
BILIRUB SERPL-MCNC: 0.9 MG/DL (ref 0.3–1.2)
BUN SERPL-MCNC: 8 MG/DL (ref 8–20)
BUN/CREAT SERPL: 6.7 (ref 10–20)
CALCIUM SERPL-MCNC: 9.3 MG/DL (ref 8.5–10.5)
CHLORIDE SERPL-SCNC: 108 MMOL/L (ref 95–110)
CO2 SERPL-SCNC: 26 MMOL/L (ref 22–32)
CREAT SERPL-MCNC: 1.2 MG/DL (ref 0.5–1.5)
EOSINOPHIL # BLD: 0.1 K/UL (ref 0–0.7)
EOSINOPHIL NFR BLD: 3 %
ERYTHROCYTE [DISTWIDTH] IN BLOOD BY AUTOMATED COUNT: 16.4 % (ref 11–15)
GLOBULIN PLAS-MCNC: 2.6 G/DL (ref 2.5–3.7)
GLUCOSE SERPL-MCNC: 95 MG/DL (ref 70–99)
HCT VFR BLD AUTO: 45.7 % (ref 35–48)
HGB BLD-MCNC: 15.1 G/DL (ref 12–16)
LYMPHOCYTES # BLD: 1.1 K/UL (ref 1–4)
LYMPHOCYTES NFR BLD: 24 %
MCH RBC QN AUTO: 30.1 PG (ref 27–32)
MCHC RBC AUTO-ENTMCNC: 33 G/DL (ref 32–37)
MCV RBC AUTO: 91.1 FL (ref 80–100)
MONOCYTES # BLD: 0.3 K/UL (ref 0–1)
MONOCYTES NFR BLD: 7 %
NEUTROPHILS # BLD AUTO: 3.2 K/UL (ref 1.8–7.7)
NEUTROPHILS NFR BLD: 65 %
OSMOLALITY UR CALC.SUM OF ELEC: 288 MOSM/KG (ref 275–295)
PLATELET # BLD AUTO: 265 K/UL (ref 140–400)
PMV BLD AUTO: 9 FL (ref 7.4–10.3)
POTASSIUM SERPL-SCNC: 4.7 MMOL/L (ref 3.3–5.1)
PROT SERPL-MCNC: 6.3 G/DL (ref 5.9–8.4)
RBC # BLD AUTO: 5.01 M/UL (ref 3.7–5.4)
SODIUM SERPL-SCNC: 140 MMOL/L (ref 136–144)
WBC # BLD AUTO: 4.8 K/UL (ref 4–11)

## 2017-08-25 PROCEDURE — 80053 COMPREHEN METABOLIC PANEL: CPT

## 2017-08-25 PROCEDURE — 36415 COLL VENOUS BLD VENIPUNCTURE: CPT

## 2017-08-25 PROCEDURE — 85025 COMPLETE CBC W/AUTO DIFF WBC: CPT

## 2017-09-01 ENCOUNTER — OFFICE VISIT (OUTPATIENT)
Dept: DERMATOLOGY CLINIC | Facility: CLINIC | Age: 77
End: 2017-09-01

## 2017-09-01 ENCOUNTER — OFFICE VISIT (OUTPATIENT)
Dept: HEMATOLOGY/ONCOLOGY | Facility: HOSPITAL | Age: 77
End: 2017-09-01
Attending: INTERNAL MEDICINE
Payer: MEDICARE

## 2017-09-01 VITALS
HEART RATE: 71 BPM | HEIGHT: 70 IN | SYSTOLIC BLOOD PRESSURE: 140 MMHG | BODY MASS INDEX: 33.64 KG/M2 | TEMPERATURE: 98 F | DIASTOLIC BLOOD PRESSURE: 63 MMHG | WEIGHT: 235 LBS

## 2017-09-01 DIAGNOSIS — L82.1 SEBORRHEIC KERATOSES: Primary | ICD-10-CM

## 2017-09-01 DIAGNOSIS — C34.12 MALIGNANT NEOPLASM OF UPPER LOBE OF LEFT LUNG (HCC): ICD-10-CM

## 2017-09-01 DIAGNOSIS — I82.403 DEEP VEIN THROMBOSIS (DVT) OF BOTH LOWER EXTREMITIES, UNSPECIFIED CHRONICITY, UNSPECIFIED VEIN (HCC): ICD-10-CM

## 2017-09-01 DIAGNOSIS — L82.0 INFLAMED SEBORRHEIC KERATOSIS: ICD-10-CM

## 2017-09-01 DIAGNOSIS — I26.99 BILATERAL PULMONARY EMBOLISM (HCC): Primary | ICD-10-CM

## 2017-09-01 PROCEDURE — 99214 OFFICE O/P EST MOD 30 MIN: CPT | Performed by: INTERNAL MEDICINE

## 2017-09-01 PROCEDURE — G0463 HOSPITAL OUTPT CLINIC VISIT: HCPCS | Performed by: DERMATOLOGY

## 2017-09-01 PROCEDURE — 99213 OFFICE O/P EST LOW 20 MIN: CPT | Performed by: DERMATOLOGY

## 2017-09-01 PROCEDURE — G0463 HOSPITAL OUTPT CLINIC VISIT: HCPCS | Performed by: INTERNAL MEDICINE

## 2017-09-01 NOTE — PROGRESS NOTES
Cancer Center Progress Note    Patient Name: Bethany Rene   YOB: 1940   Medical Record Number: [id number]   Attending Physician: Shelly Gomez M.D.        Chief Complaint:  Lung cancer    History of Present Illness:  Cancer history:  69-year- HYSTERECTOMY  No date: LAPAROSCOPIC CHOLECYSTECTOMY  No date:       Comment: x7 (Twins x1) Max weight 9 1/2 lbs  No date: OTHER SURGICAL HISTORY      Comment: parotid gland removed left benigh  No date: OTHER SURGICAL HISTORY      Comment: Removal Skin daily., Disp: , Rfl:   •  Multiple Vitamins-Minerals (MULTIVITAMIN ADULT OR), Take 1 tablet by mouth daily. , Disp: , Rfl:   •  acetaminophen (TYLENOL) 325 MG Oral Tab, Take 500 mg by mouth every 6 (six) hours as needed for Pain.  , Disp: , Rfl:     [place] lymph node dissection 6/14/16.  --s/p R0 resection (however close margin noted)  --No definitive evidence that chemotherapy would provide a survival advantage in this scenario.   --Recommend following on surveillance with next CT in 3 months Dec 2017  --Cli

## 2017-09-08 ENCOUNTER — ANESTHESIA EVENT (OUTPATIENT)
Dept: ENDOSCOPY | Facility: HOSPITAL | Age: 77
End: 2017-09-08
Payer: MEDICARE

## 2017-09-08 ENCOUNTER — HOSPITAL ENCOUNTER (OUTPATIENT)
Facility: HOSPITAL | Age: 77
Setting detail: HOSPITAL OUTPATIENT SURGERY
Discharge: HOME | End: 2017-09-08
Attending: INTERNAL MEDICINE | Admitting: INTERNAL MEDICINE
Payer: MEDICARE

## 2017-09-08 ENCOUNTER — SURGERY (OUTPATIENT)
Age: 77
End: 2017-09-08

## 2017-09-08 ENCOUNTER — ANESTHESIA (OUTPATIENT)
Dept: ENDOSCOPY | Facility: HOSPITAL | Age: 77
End: 2017-09-08
Payer: MEDICARE

## 2017-09-08 VITALS
DIASTOLIC BLOOD PRESSURE: 77 MMHG | HEART RATE: 65 BPM | SYSTOLIC BLOOD PRESSURE: 146 MMHG | RESPIRATION RATE: 20 BRPM | BODY MASS INDEX: 34.8 KG/M2 | OXYGEN SATURATION: 98 % | WEIGHT: 235 LBS | HEIGHT: 69 IN

## 2017-09-08 DIAGNOSIS — K57.30 DIVERTICULOSIS OF COLON: ICD-10-CM

## 2017-09-08 DIAGNOSIS — K21.9 HIATAL HERNIA WITH GERD: ICD-10-CM

## 2017-09-08 DIAGNOSIS — K62.1 RECTAL POLYP: ICD-10-CM

## 2017-09-08 DIAGNOSIS — K52.9 CHRONIC DIARRHEA: ICD-10-CM

## 2017-09-08 DIAGNOSIS — Z12.11 SCREENING FOR MALIGNANT NEOPLASM OF COLON: ICD-10-CM

## 2017-09-08 DIAGNOSIS — K44.9 HIATAL HERNIA WITH GERD: ICD-10-CM

## 2017-09-08 DIAGNOSIS — K63.5 POLYP OF CECUM: Primary | ICD-10-CM

## 2017-09-08 DIAGNOSIS — K31.7 GASTRIC POLYP: ICD-10-CM

## 2017-09-08 DIAGNOSIS — K29.50 CHRONIC GASTRITIS WITHOUT BLEEDING, UNSPECIFIED GASTRITIS TYPE: ICD-10-CM

## 2017-09-08 DIAGNOSIS — R11.0 NAUSEA: ICD-10-CM

## 2017-09-08 DIAGNOSIS — K64.8 INTERNAL HEMORRHOIDS WITHOUT COMPLICATION: ICD-10-CM

## 2017-09-08 PROBLEM — Z98.890 S/P COLONOSCOPY WITH POLYPECTOMY: Status: ACTIVE | Noted: 2017-09-08

## 2017-09-08 PROBLEM — K29.70 GASTRITIS: Status: ACTIVE | Noted: 2017-09-08

## 2017-09-08 PROCEDURE — 0DBF8ZX EXCISION OF RIGHT LARGE INTESTINE, VIA NATURAL OR ARTIFICIAL OPENING ENDOSCOPIC, DIAGNOSTIC: ICD-10-PCS | Performed by: INTERNAL MEDICINE

## 2017-09-08 PROCEDURE — 0DBP8ZX EXCISION OF RECTUM, VIA NATURAL OR ARTIFICIAL OPENING ENDOSCOPIC, DIAGNOSTIC: ICD-10-PCS | Performed by: INTERNAL MEDICINE

## 2017-09-08 PROCEDURE — 0DBH8ZX EXCISION OF CECUM, VIA NATURAL OR ARTIFICIAL OPENING ENDOSCOPIC, DIAGNOSTIC: ICD-10-PCS | Performed by: INTERNAL MEDICINE

## 2017-09-08 PROCEDURE — 0DB68ZX EXCISION OF STOMACH, VIA NATURAL OR ARTIFICIAL OPENING ENDOSCOPIC, DIAGNOSTIC: ICD-10-PCS | Performed by: INTERNAL MEDICINE

## 2017-09-08 PROCEDURE — 88312 SPECIAL STAINS GROUP 1: CPT | Performed by: INTERNAL MEDICINE

## 2017-09-08 PROCEDURE — 88305 TISSUE EXAM BY PATHOLOGIST: CPT | Performed by: INTERNAL MEDICINE

## 2017-09-08 PROCEDURE — 0DBG8ZX EXCISION OF LEFT LARGE INTESTINE, VIA NATURAL OR ARTIFICIAL OPENING ENDOSCOPIC, DIAGNOSTIC: ICD-10-PCS | Performed by: INTERNAL MEDICINE

## 2017-09-08 RX ORDER — LIDOCAINE HYDROCHLORIDE 10 MG/ML
INJECTION, SOLUTION EPIDURAL; INFILTRATION; INTRACAUDAL; PERINEURAL AS NEEDED
Status: DISCONTINUED | OUTPATIENT
Start: 2017-09-08 | End: 2017-09-08 | Stop reason: SURG

## 2017-09-08 RX ORDER — NALOXONE HYDROCHLORIDE 0.4 MG/ML
80 INJECTION, SOLUTION INTRAMUSCULAR; INTRAVENOUS; SUBCUTANEOUS AS NEEDED
Status: DISCONTINUED | OUTPATIENT
Start: 2017-09-08 | End: 2017-09-08

## 2017-09-08 RX ORDER — SODIUM CHLORIDE, SODIUM LACTATE, POTASSIUM CHLORIDE, CALCIUM CHLORIDE 600; 310; 30; 20 MG/100ML; MG/100ML; MG/100ML; MG/100ML
INJECTION, SOLUTION INTRAVENOUS CONTINUOUS
Status: DISCONTINUED | OUTPATIENT
Start: 2017-09-08 | End: 2017-09-08

## 2017-09-08 RX ORDER — SODIUM CHLORIDE, SODIUM LACTATE, POTASSIUM CHLORIDE, CALCIUM CHLORIDE 600; 310; 30; 20 MG/100ML; MG/100ML; MG/100ML; MG/100ML
INJECTION, SOLUTION INTRAVENOUS CONTINUOUS PRN
Status: DISCONTINUED | OUTPATIENT
Start: 2017-09-08 | End: 2017-09-08 | Stop reason: SURG

## 2017-09-08 RX ADMIN — LIDOCAINE HYDROCHLORIDE 50 MG: 10 INJECTION, SOLUTION EPIDURAL; INFILTRATION; INTRACAUDAL; PERINEURAL at 12:54:00

## 2017-09-08 RX ADMIN — SODIUM CHLORIDE, SODIUM LACTATE, POTASSIUM CHLORIDE, CALCIUM CHLORIDE: 600; 310; 30; 20 INJECTION, SOLUTION INTRAVENOUS at 13:25:00

## 2017-09-08 RX ADMIN — SODIUM CHLORIDE, SODIUM LACTATE, POTASSIUM CHLORIDE, CALCIUM CHLORIDE: 600; 310; 30; 20 INJECTION, SOLUTION INTRAVENOUS at 12:51:00

## 2017-09-08 NOTE — ANESTHESIA PREPROCEDURE EVALUATION
Anesthesia PreOp Note    HPI:     Maksim Boudreaux is a 67year old female who presents for preoperative consultation requested by: Latoya Braga MD    Date of Surgery: 9/8/2017    Procedure(s):  ESOPHAGOGASTRODUODENOSCOPY (EGD)  COLONOSCOPY No date: COLONOSCOPY  No date: EGD  No date: FOOT SURGERY Bilateral      Comment: Bunion Surgery X6  No date: FOOT/TOES SURGERY PROC UNLISTED Left      Comment: Pt had surgery to straighten toes on left                foot.   No date: HIP REPLACEMENT SURGER Phenol                  Anaphylaxis    Family History   Problem Relation Age of Onset   • Cancer Father 80     Lung  -  smoker   • Alzheimer's Disease Gibson Gomez Mother 80   • Bladder Cancer [OTHER] Mother    • Pancreatic Cancer Gibson Gomez Sister 68   • Myocardi GI/Hepatic/Renal    (+) GERD poorly controlled, bowel prep    Endo/Other      Comments: Remote history of PE  Abdominal  - normal exam             Anesthesia Plan:   ASA:  3  Plan:   MAC  Post-op Pain Management: IV analgesics  Discussed plan with:  Surg

## 2017-09-08 NOTE — BRIEF OP NOTE
Pre-Operative Diagnosis: Chronic diarrhea, GERD, history of hiatal hernia     Post-Operative Diagnosis: r/o microscopic colitis, s/p polypectomy x 2, diverticulosis, internal hemorrhoids; gastritis, gastric polyps, small hiatal hernia   Procedure Perfor

## 2017-09-08 NOTE — ANESTHESIA POSTPROCEDURE EVALUATION
Patient: Dario Dawson    Procedure Summary     Date:  09/08/17 Room / Location:  [address] [place] [address] ENDOSCOPY 05 / [address] [place] [address] ENDOSCOPY    Anesthesia Start:  7573 Anesthesia Stop:  1330    Procedures:       ESOPHAGOGASTRODUODENOSCOPY (EGD) (N/A )      COLONOSCOPY (N/A ) Diag

## 2017-09-08 NOTE — H&P
History & Physical Examination    Patient Name: Parker Rodriguez  MRN: [id number]  [facility]: [id number]  YOB: 1940    Diagnosis: chronic diarrhea, screening, GERD      Prescriptions Prior to Admission:  furosemide 20 MG Oral Tab Take 1 tablet (20 mg SURGERY Right      Comment: right total hip arthroplasty  No date: HYSTERECTOMY  No date: LAPAROSCOPIC CHOLECYSTECTOMY  No date:       Comment: x7 (Twins x1) Max weight 9 1/2 lbs  No date: OTHER SURGICAL HISTORY      Comment: parotid gland removed left

## 2017-09-09 NOTE — OPERATIVE REPORT
[facility]    PATIENT'S NAME: Nate Dumont   ATTENDING PHYSICIAN: Vega Vicente MD   OPERATING PHYSICIAN: Vega Vicente MD   PATIENT ACCOUNT#:   [id number]    LOCATION:  [address] was mild to moderate gastric erythema. Biopsies x4 were taken of the gastric antrum, and in the gastric fundus there were fundic glandular-appearing polyps, several, small, less than 1 cm, and multiple biopsies were taken of the gastric fundus.   Retroflex

## 2017-09-09 NOTE — OPERATIVE REPORT
[facility]    PATIENT'S NAME: Jana Jaimes   ATTENDING PHYSICIAN: Carla Cox MD   OPERATING PHYSICIAN: Carla Cox MD   PATIENT ACCOUNT#:   [id number]    LOCATION:  [address] was removed with cold biopsy forceps. Retroflexion in the rectum showed internal hemorrhoids. There were scattered diverticula in the sigmoid colon without inflammatory changes.     IMPRESSION:    1.   Rule out microscopic colitis status post random biops

## 2017-09-11 NOTE — PROGRESS NOTES
Rj Higgins is a 65year old female. HPI:     CC:  Patient presents with:  Derm Problem: Established pt (LOV 8/11/17) requesting to have skin tag removal. Presents with multiple skin tags to neck. Treated with cryotherapy last visit.          Allergies Smokeless tobacco: Never Used                      Alcohol use: Yes           0.5 oz/week     Glasses of wine: 1 per week     Comment: 1-2x/month         Current Outpatient Prescriptions:  furosemide 20 MG Oral Tab Take 1 CHOLECYSTECTOMY  No date:       Comment: x7 (Twins x1) Max weight 9 1/2 lbs  No date: OTHER SURGICAL HISTORY      Comment: parotid gland removed left benigh  No date: OTHER SURGICAL HISTORY      Comment: Removal Skin Mole  No date: OTHER SURGICAL HISTO medications, allergies reviewed as noted. Physical Examination:     Well-developed well-nourished patient alert oriented in no acute distress.   Exam performed, including scalp, head, neck, face,nails, hair, external eyes, including conjunctival mucos kelsie

## 2017-09-12 ENCOUNTER — TELEPHONE (OUTPATIENT)
Dept: GASTROENTEROLOGY | Facility: CLINIC | Age: 77
End: 2017-09-12

## 2017-09-12 NOTE — TELEPHONE ENCOUNTER
Letter and Recall Colonoscopy for 10 years placed in system. No further action required at this time.

## 2017-09-12 NOTE — TELEPHONE ENCOUNTER
Please send letter and recall colonoscopy for 10 years. Jael Crockett close the encounter. Recall colon in 10 years per.  Colon done 9/08/17  Entered into 10 CLN recall  I mailed out colonoscopy results letter to pt     em

## 2017-09-14 ENCOUNTER — ANTI-COAG VISIT (OUTPATIENT)
Dept: INTERNAL MEDICINE CLINIC | Facility: CLINIC | Age: 77
End: 2017-09-14

## 2017-09-14 DIAGNOSIS — I26.99 PULMONARY EMBOLISM AND INFARCTION (HCC): ICD-10-CM

## 2017-09-14 DIAGNOSIS — I26.99 PULMONARY EMBOLISM WITH INFARCTION (HCC): ICD-10-CM

## 2017-09-14 LAB — INR: 2.9 (ref 2–3)

## 2017-09-14 PROCEDURE — 85610 PROTHROMBIN TIME: CPT

## 2017-09-14 PROCEDURE — 36416 COLLJ CAPILLARY BLOOD SPEC: CPT

## 2017-10-03 ENCOUNTER — HOSPITAL ENCOUNTER (OUTPATIENT)
Dept: ULTRASOUND IMAGING | Age: 77
Discharge: HOME | End: 2017-10-03
Attending: OTOLARYNGOLOGY
Payer: MEDICARE

## 2017-10-03 DIAGNOSIS — R49.0 HOARSENESS: ICD-10-CM

## 2017-10-03 PROCEDURE — 76536 US EXAM OF HEAD AND NECK: CPT | Performed by: OTOLARYNGOLOGY

## 2017-10-11 NOTE — PROGRESS NOTES
Please inform us  Thyroid showing left sided thyroid nodules increased in size recommend FNA to left thyroid nodules 3.2cm and 1.4cm

## 2017-10-11 NOTE — PROGRESS NOTES
Per Alfie Nelson PA-C, called and spoke to pt to inform of test results and recommendations. US Thyroid is showing left sided thyroid nodules have Increased in size.  We recommend FNA to left thyroid nodules 3.2cm and 1.4cm   Pt verbalized understanding an

## 2017-10-12 ENCOUNTER — ANTI-COAG VISIT (OUTPATIENT)
Dept: INTERNAL MEDICINE CLINIC | Facility: CLINIC | Age: 77
End: 2017-10-12

## 2017-10-12 DIAGNOSIS — I26.99 PULMONARY EMBOLISM WITH INFARCTION (HCC): ICD-10-CM

## 2017-10-12 DIAGNOSIS — I26.99 PULMONARY EMBOLISM AND INFARCTION (HCC): ICD-10-CM

## 2017-10-12 PROCEDURE — 36416 COLLJ CAPILLARY BLOOD SPEC: CPT

## 2017-10-12 PROCEDURE — 85610 PROTHROMBIN TIME: CPT

## 2017-10-13 RX ORDER — WARFARIN SODIUM 5 MG/1
TABLET ORAL
Qty: 90 TABLET | Refills: 1 | Status: SHIPPED | OUTPATIENT
Start: 2017-10-13 | End: 2018-04-07

## 2017-10-13 NOTE — TELEPHONE ENCOUNTER
Last refilled on 5/12/17 #100 #1RF  Last INR on 10/12/17 2.7  Next check on 11/8/17  Dosage: To take: 5 mg Take 1 of the 5 mg tablets. To take: 7.5 mg Take 1.5 of the 5 mg tablets.      Refill Protocol Appointment Criteria  · Appointment scheduled in th

## 2017-11-08 ENCOUNTER — ANTI-COAG VISIT (OUTPATIENT)
Dept: INTERNAL MEDICINE CLINIC | Facility: CLINIC | Age: 77
End: 2017-11-08

## 2017-11-08 ENCOUNTER — LAB ENCOUNTER (OUTPATIENT)
Dept: LAB | Age: 77
End: 2017-11-08
Attending: INTERNAL MEDICINE
Payer: MEDICARE

## 2017-11-08 ENCOUNTER — TELEPHONE (OUTPATIENT)
Dept: OTHER | Age: 77
End: 2017-11-08

## 2017-11-08 DIAGNOSIS — I26.99 PULMONARY EMBOLISM AND INFARCTION (HCC): ICD-10-CM

## 2017-11-08 DIAGNOSIS — I26.99 BILATERAL PULMONARY EMBOLISM (HCC): ICD-10-CM

## 2017-11-08 DIAGNOSIS — I26.99 PULMONARY EMBOLISM WITH INFARCTION (HCC): ICD-10-CM

## 2017-11-08 DIAGNOSIS — C34.12 MALIGNANT NEOPLASM OF UPPER LOBE OF LEFT LUNG (HCC): ICD-10-CM

## 2017-11-08 PROCEDURE — 36415 COLL VENOUS BLD VENIPUNCTURE: CPT

## 2017-11-08 PROCEDURE — 85610 PROTHROMBIN TIME: CPT

## 2017-11-08 PROCEDURE — 80053 COMPREHEN METABOLIC PANEL: CPT

## 2017-11-08 PROCEDURE — 85025 COMPLETE CBC W/AUTO DIFF WBC: CPT

## 2017-11-08 NOTE — TELEPHONE ENCOUNTER
Pt is having a biopsy of her thyroid on 11/16 and is required to be off Warfarin for 5 days. Pt needs Dr. Ronni Posada approval to stop Warfarin. Also, how long should pt be off Warfarin s/p procedure? Please advise.

## 2017-11-09 ENCOUNTER — OFFICE VISIT (OUTPATIENT)
Dept: OPHTHALMOLOGY | Facility: CLINIC | Age: 77
End: 2017-11-09

## 2017-11-09 DIAGNOSIS — Z83.518 FAMILY HISTORY OF MACULAR DEGENERATION: ICD-10-CM

## 2017-11-09 DIAGNOSIS — H25.13 AGE-RELATED NUCLEAR CATARACT OF BOTH EYES: Primary | ICD-10-CM

## 2017-11-09 DIAGNOSIS — H43.393 FLOATERS IN VISUAL FIELD, BILATERAL: ICD-10-CM

## 2017-11-09 PROCEDURE — 92004 COMPRE OPH EXAM NEW PT 1/>: CPT | Performed by: OPHTHALMOLOGY

## 2017-11-09 PROCEDURE — 92015 DETERMINE REFRACTIVE STATE: CPT | Performed by: OPHTHALMOLOGY

## 2017-11-09 NOTE — PATIENT INSTRUCTIONS
Age-related nuclear cataract of both eyes  Discussed early cataracts with patient. Told patient that cataracts are age appropriate and they are not surgical at this time. No treatment recommended at this time. New glasses Rx given.      Floaters in visua

## 2017-11-09 NOTE — ASSESSMENT & PLAN NOTE
Discussed early cataracts with patient. Told patient that cataracts are age appropriate and they are not surgical at this time. No treatment recommended at this time. New glasses Rx given.

## 2017-11-09 NOTE — ASSESSMENT & PLAN NOTE
Patient's mother, maternal aunt and two brothers had history of macular degeneration. Informed patient that she has healthy eyes; no macular degeneration found today in patients eyes.

## 2017-11-09 NOTE — PROGRESS NOTES
Jeaneth Townsend is a 65year old female. HPI:     HPI     Pt has been seeing an Optometrist at [facility] every year. Pt states that her glasses are from 2015. Pt denies any blurred vision and is a happy with her glasses.   Pt was told she has the beg Glaucoma Mother    • Alzheimer's Disease Linda Tejada Mother 80   • Bladder Cancer [OTHER] Mother    • Macular degeneration Brother    • Pancreatic Cancer [OTHER] Sister 68   • Macular degeneration Brother    • Myocardial Infarction [OTHER] Brother 71   • Macul PERRL    Left PERRL          Visual Fields       Left Right     Full Full          Extraocular Movement       Right Left     Full, Ortho Full, Ortho          Dilation     Both eyes:  1.0% Mydriacyl and 2.5% Ramesh Synephrine @ 3:55 PM            Slit Lamp and Family history of macular degeneration  Patient's mother, maternal aunt and two brothers had history of macular degeneration. Informed patient that she has healthy eyes; no macular degeneration found today in patients eyes.        No orders of the defi

## 2017-11-09 NOTE — TELEPHONE ENCOUNTER
Received a call from patient requesting to know if provider had responded to her message. Patient made aware that Dr. Missy Ortiz has not responded yet. Message routed to provider for review.      Please reply to alejandro: OCTAVIANO Barber

## 2017-11-10 NOTE — TELEPHONE ENCOUNTER
Pt calling back - informed pt of Dr Pauly Lugo note. Patient verbalized understanding and had no further questions.

## 2017-11-16 ENCOUNTER — HOSPITAL ENCOUNTER (OUTPATIENT)
Dept: ULTRASOUND IMAGING | Facility: HOSPITAL | Age: 77
Discharge: HOME | End: 2017-11-16
Attending: PHYSICIAN ASSISTANT
Payer: MEDICARE

## 2017-11-16 VITALS
HEART RATE: 64 BPM | DIASTOLIC BLOOD PRESSURE: 69 MMHG | SYSTOLIC BLOOD PRESSURE: 131 MMHG | OXYGEN SATURATION: 100 % | RESPIRATION RATE: 16 BRPM

## 2017-11-16 DIAGNOSIS — E04.2 MULTIPLE THYROID NODULES: ICD-10-CM

## 2017-11-16 PROCEDURE — 10022 US FNA THYROID SH(CPT=10022/76942): CPT | Performed by: PHYSICIAN ASSISTANT

## 2017-11-16 PROCEDURE — 88172 CYTP DX EVAL FNA 1ST EA SITE: CPT | Performed by: PHYSICIAN ASSISTANT

## 2017-11-16 PROCEDURE — 76942 ECHO GUIDE FOR BIOPSY: CPT | Performed by: PHYSICIAN ASSISTANT

## 2017-11-16 PROCEDURE — 88177 CYTP FNA EVAL EA ADDL: CPT | Performed by: PHYSICIAN ASSISTANT

## 2017-11-16 PROCEDURE — 88173 CYTOPATH EVAL FNA REPORT: CPT | Performed by: PHYSICIAN ASSISTANT

## 2017-11-20 NOTE — PROGRESS NOTES
Please inform one FNA appears benign one insufficient for diagnosis follow up with DEJAH Arango to further discuss

## 2017-11-24 ENCOUNTER — HOSPITAL ENCOUNTER (OUTPATIENT)
Dept: CT IMAGING | Age: 77
Discharge: HOME | End: 2017-11-24
Attending: INTERNAL MEDICINE
Payer: MEDICARE

## 2017-11-24 DIAGNOSIS — C34.90 ADENOCARCINOMA, LUNG, UNSPECIFIED LATERALITY (HCC): ICD-10-CM

## 2017-11-24 PROCEDURE — 71250 CT THORAX DX C-: CPT | Performed by: INTERNAL MEDICINE

## 2017-11-29 ENCOUNTER — ANTI-COAG VISIT (OUTPATIENT)
Dept: INTERNAL MEDICINE CLINIC | Facility: CLINIC | Age: 77
End: 2017-11-29

## 2017-11-29 DIAGNOSIS — I26.99 PULMONARY EMBOLISM WITH INFARCTION (HCC): ICD-10-CM

## 2017-11-29 DIAGNOSIS — I26.99 PULMONARY EMBOLISM AND INFARCTION (HCC): ICD-10-CM

## 2017-11-29 PROCEDURE — 36416 COLLJ CAPILLARY BLOOD SPEC: CPT

## 2017-11-29 PROCEDURE — 85610 PROTHROMBIN TIME: CPT

## 2017-12-01 DIAGNOSIS — R91.1 PULMONARY NODULE: Primary | ICD-10-CM

## 2017-12-08 ENCOUNTER — OFFICE VISIT (OUTPATIENT)
Dept: HEMATOLOGY/ONCOLOGY | Facility: HOSPITAL | Age: 77
End: 2017-12-08
Attending: INTERNAL MEDICINE
Payer: MEDICARE

## 2017-12-08 VITALS
TEMPERATURE: 98 F | RESPIRATION RATE: 18 BRPM | WEIGHT: 237 LBS | HEART RATE: 74 BPM | SYSTOLIC BLOOD PRESSURE: 149 MMHG | DIASTOLIC BLOOD PRESSURE: 64 MMHG | HEIGHT: 70 IN | BODY MASS INDEX: 33.93 KG/M2

## 2017-12-08 DIAGNOSIS — I82.403 DEEP VEIN THROMBOSIS (DVT) OF BOTH LOWER EXTREMITIES, UNSPECIFIED CHRONICITY, UNSPECIFIED VEIN (HCC): ICD-10-CM

## 2017-12-08 DIAGNOSIS — I26.99 BILATERAL PULMONARY EMBOLISM (HCC): ICD-10-CM

## 2017-12-08 DIAGNOSIS — C34.12 MALIGNANT NEOPLASM OF UPPER LOBE OF LEFT LUNG (HCC): Primary | ICD-10-CM

## 2017-12-08 PROCEDURE — G0463 HOSPITAL OUTPT CLINIC VISIT: HCPCS | Performed by: INTERNAL MEDICINE

## 2017-12-08 PROCEDURE — 99214 OFFICE O/P EST MOD 30 MIN: CPT | Performed by: INTERNAL MEDICINE

## 2017-12-08 NOTE — PROGRESS NOTES
Cancer Center Progress Note    Patient Name: Dorota Gilliam   YOB: 1940   Medical Record Number: [id number]   Attending Physician: Saurabh Headley M.D.        Chief Complaint:  Lung cancer    History of Present Illness:  Cancer history:  67-year- foot.  No date: HIP REPLACEMENT SURGERY Right  2015: HIP SURGERY Right      Comment: right total hip arthroplasty  No date: HYSTERECTOMY  No date: LAPAROSCOPIC CHOLECYSTECTOMY  No date:       Comment: x7 (Twins x1) Max weight 9 1/2 lbs  No date: OTHER Disp: 90 tablet, Rfl: 1  •  furosemide 20 MG Oral Tab, Take 1 tablet (20 mg total) by mouth daily. , Disp: 90 tablet, Rfl: 1  •  Warfarin Sodium 5 MG Oral Tab, TAKE AS DIRECTED BY ANTICOAGULATION CLINIC: 1 tablet by mouth daily, Disp: 100 tablet, Rfl: 1  • 12/02/2016     CMP: No results for input(s): GLU, BUN, CREATSERUM, GFRAA, GFRNAA, CA, ALB, NA, K, CL, CO2, ALKPHO, AST, ALT, BILT, TP in the last 72 hours. Radiology:  CT CHEST 11/24/17  1.  Stable postsurgical changes of left upper lobectomy with left

## 2017-12-12 ENCOUNTER — OFFICE VISIT (OUTPATIENT)
Dept: PULMONOLOGY | Facility: CLINIC | Age: 77
End: 2017-12-12

## 2017-12-12 VITALS
OXYGEN SATURATION: 98 % | HEART RATE: 68 BPM | SYSTOLIC BLOOD PRESSURE: 109 MMHG | DIASTOLIC BLOOD PRESSURE: 69 MMHG | RESPIRATION RATE: 19 BRPM | BODY MASS INDEX: 35.1 KG/M2 | HEIGHT: 69 IN | WEIGHT: 237 LBS

## 2017-12-12 DIAGNOSIS — C34.90 ADENOCARCINOMA OF LUNG, UNSPECIFIED LATERALITY (HCC): Primary | ICD-10-CM

## 2017-12-12 PROCEDURE — G0463 HOSPITAL OUTPT CLINIC VISIT: HCPCS | Performed by: INTERNAL MEDICINE

## 2017-12-12 PROCEDURE — 99213 OFFICE O/P EST LOW 20 MIN: CPT | Performed by: INTERNAL MEDICINE

## 2017-12-12 NOTE — PROGRESS NOTES
The patient is a 53-year-old female who I know well from prior evaluation comes in now noting that she is doing well.   She remains on Coumadin for recurrent venous thromboembolism and her most recent CT scan the chest showed no evidence of recurrent lung c

## 2017-12-18 ENCOUNTER — HOSPITAL ENCOUNTER (OUTPATIENT)
Age: 77
Discharge: HOME | End: 2017-12-18
Attending: FAMILY MEDICINE
Payer: MEDICARE

## 2017-12-18 VITALS
DIASTOLIC BLOOD PRESSURE: 77 MMHG | TEMPERATURE: 98 F | WEIGHT: 230 LBS | BODY MASS INDEX: 34.07 KG/M2 | SYSTOLIC BLOOD PRESSURE: 138 MMHG | HEART RATE: 68 BPM | RESPIRATION RATE: 18 BRPM | HEIGHT: 69 IN | OXYGEN SATURATION: 97 %

## 2017-12-18 DIAGNOSIS — N39.0 ACUTE UTI: Primary | ICD-10-CM

## 2017-12-18 PROCEDURE — 87186 SC STD MICRODIL/AGAR DIL: CPT | Performed by: FAMILY MEDICINE

## 2017-12-18 PROCEDURE — 87086 URINE CULTURE/COLONY COUNT: CPT | Performed by: FAMILY MEDICINE

## 2017-12-18 PROCEDURE — 99202 OFFICE O/P NEW SF 15 MIN: CPT

## 2017-12-18 PROCEDURE — 87088 URINE BACTERIA CULTURE: CPT | Performed by: FAMILY MEDICINE

## 2017-12-18 PROCEDURE — 81002 URINALYSIS NONAUTO W/O SCOPE: CPT

## 2017-12-18 PROCEDURE — 99214 OFFICE O/P EST MOD 30 MIN: CPT

## 2017-12-18 RX ORDER — NITROFURANTOIN 25; 75 MG/1; MG/1
100 CAPSULE ORAL 2 TIMES DAILY
Qty: 14 CAPSULE | Refills: 0 | Status: SHIPPED | OUTPATIENT
Start: 2017-12-18 | End: 2017-12-25

## 2017-12-18 NOTE — ED PROVIDER NOTES
Patient presents with:  Urinary Symptoms (urologic)    HPI:     Tracie Raymundo is a 65year old female who presents with a chief complaint of dysuria, frequency, urgency of urination  Onset of symptoms: 6 days  Denies flank pain, abdominal pain, fevers, c Ketone, Urine Negative Negative mg/dL   Specific Gravity, Urine 1.020 1.005 - 1.030   Blood, Urine Moderate (A) Negative   PH, Urine 6.0 5.0 - 8.0   Protein urine 30 mg/dL (A) Negative mg /dL   Urobilinogen urine 0.2 <2.0 mg/dL   Nitrite Urine Negative N

## 2017-12-21 NOTE — ED NOTES
Patient notified of results and change in prescription. Verbalized understanding.   Cipro called in to [id number] [address] in [address] [place] [address] per patient request

## 2017-12-26 ENCOUNTER — ANTI-COAG VISIT (OUTPATIENT)
Dept: INTERNAL MEDICINE CLINIC | Facility: CLINIC | Age: 77
End: 2017-12-26

## 2017-12-26 DIAGNOSIS — I26.99 PULMONARY EMBOLISM AND INFARCTION (HCC): ICD-10-CM

## 2017-12-26 DIAGNOSIS — I26.99 PULMONARY EMBOLISM WITH INFARCTION (HCC): ICD-10-CM

## 2017-12-26 PROCEDURE — 85610 PROTHROMBIN TIME: CPT

## 2017-12-26 PROCEDURE — 36416 COLLJ CAPILLARY BLOOD SPEC: CPT

## 2018-01-23 ENCOUNTER — ANTI-COAG VISIT (OUTPATIENT)
Dept: INTERNAL MEDICINE CLINIC | Facility: CLINIC | Age: 78
End: 2018-01-23

## 2018-01-23 DIAGNOSIS — I26.99 PULMONARY EMBOLISM AND INFARCTION (HCC): ICD-10-CM

## 2018-01-23 DIAGNOSIS — I26.99 PULMONARY EMBOLISM WITH INFARCTION (HCC): ICD-10-CM

## 2018-01-23 LAB — INR: 2.1 (ref 2–3)

## 2018-01-23 PROCEDURE — 36416 COLLJ CAPILLARY BLOOD SPEC: CPT

## 2018-01-23 PROCEDURE — 85610 PROTHROMBIN TIME: CPT

## 2018-02-16 ENCOUNTER — HOSPITAL ENCOUNTER (OUTPATIENT)
Dept: ULTRASOUND IMAGING | Facility: HOSPITAL | Age: 78
Discharge: HOME | End: 2018-02-16
Attending: ORTHOPAEDIC SURGERY
Payer: MEDICARE

## 2018-02-16 DIAGNOSIS — R09.89 PULSE PRESSURE DECREASE: ICD-10-CM

## 2018-02-16 DIAGNOSIS — R09.89 WEAK PULSE: ICD-10-CM

## 2018-02-16 PROCEDURE — 93923 UPR/LXTR ART STDY 3+ LVLS: CPT | Performed by: ORTHOPAEDIC SURGERY

## 2018-02-20 ENCOUNTER — ANTI-COAG VISIT (OUTPATIENT)
Dept: INTERNAL MEDICINE CLINIC | Facility: CLINIC | Age: 78
End: 2018-02-20

## 2018-02-20 DIAGNOSIS — I26.99 PULMONARY EMBOLISM AND INFARCTION (HCC): ICD-10-CM

## 2018-02-20 DIAGNOSIS — I26.99 PULMONARY EMBOLISM WITH INFARCTION (HCC): ICD-10-CM

## 2018-02-20 PROBLEM — M79.661 RIGHT CALF PAIN: Status: ACTIVE | Noted: 2018-02-20

## 2018-02-20 PROBLEM — M25.561 ACUTE PAIN OF RIGHT KNEE: Status: ACTIVE | Noted: 2017-03-23

## 2018-02-20 LAB — INR: 2.2 (ref 2–3)

## 2018-02-20 PROCEDURE — 36416 COLLJ CAPILLARY BLOOD SPEC: CPT

## 2018-02-20 PROCEDURE — 85610 PROTHROMBIN TIME: CPT

## 2018-02-28 ENCOUNTER — HOSPITAL ENCOUNTER (OUTPATIENT)
Dept: MRI IMAGING | Facility: HOSPITAL | Age: 78
Discharge: HOME | End: 2018-02-28
Attending: ORTHOPAEDIC SURGERY
Payer: MEDICARE

## 2018-02-28 ENCOUNTER — HOSPITAL ENCOUNTER (OUTPATIENT)
Dept: GENERAL RADIOLOGY | Facility: HOSPITAL | Age: 78
Discharge: HOME | End: 2018-02-28
Attending: ORTHOPAEDIC SURGERY
Payer: MEDICARE

## 2018-02-28 DIAGNOSIS — M25.561 ACUTE PAIN OF RIGHT KNEE: ICD-10-CM

## 2018-02-28 DIAGNOSIS — M79.661 RIGHT CALF PAIN: ICD-10-CM

## 2018-02-28 PROCEDURE — 73562 X-RAY EXAM OF KNEE 3: CPT | Performed by: ORTHOPAEDIC SURGERY

## 2018-02-28 PROCEDURE — 73721 MRI JNT OF LWR EXTRE W/O DYE: CPT | Performed by: ORTHOPAEDIC SURGERY

## 2018-02-28 PROCEDURE — 73590 X-RAY EXAM OF LOWER LEG: CPT | Performed by: ORTHOPAEDIC SURGERY

## 2018-02-28 PROCEDURE — 73718 MRI LOWER EXTREMITY W/O DYE: CPT | Performed by: ORTHOPAEDIC SURGERY

## 2018-03-06 PROBLEM — M25.561 RIGHT KNEE PAIN: Status: ACTIVE | Noted: 2017-03-23

## 2018-03-06 PROBLEM — M23.91 ACUTE INTERNAL DERANGEMENT OF RIGHT KNEE: Status: ACTIVE | Noted: 2018-03-06

## 2018-03-06 PROBLEM — S83.241A ACUTE MEDIAL MENISCUS TEAR OF RIGHT KNEE: Status: ACTIVE | Noted: 2018-03-06

## 2018-03-20 ENCOUNTER — ANTI-COAG VISIT (OUTPATIENT)
Dept: INTERNAL MEDICINE CLINIC | Facility: CLINIC | Age: 78
End: 2018-03-20

## 2018-03-20 DIAGNOSIS — I26.99 PULMONARY EMBOLISM AND INFARCTION (HCC): ICD-10-CM

## 2018-03-20 DIAGNOSIS — I26.99 PULMONARY EMBOLISM WITH INFARCTION (HCC): ICD-10-CM

## 2018-03-20 LAB
INR: 2.3 (ref 0.8–1.2)
TEST STRIP EXPIRATION DATE: ABNORMAL DATE

## 2018-03-20 PROCEDURE — 85610 PROTHROMBIN TIME: CPT

## 2018-03-20 PROCEDURE — 36416 COLLJ CAPILLARY BLOOD SPEC: CPT

## 2018-03-23 ENCOUNTER — OFFICE VISIT (OUTPATIENT)
Dept: HEMATOLOGY/ONCOLOGY | Facility: HOSPITAL | Age: 78
End: 2018-03-23
Attending: INTERNAL MEDICINE
Payer: MEDICARE

## 2018-03-23 VITALS
HEIGHT: 70 IN | WEIGHT: 234 LBS | HEART RATE: 78 BPM | TEMPERATURE: 98 F | SYSTOLIC BLOOD PRESSURE: 160 MMHG | BODY MASS INDEX: 33.5 KG/M2 | DIASTOLIC BLOOD PRESSURE: 67 MMHG | RESPIRATION RATE: 18 BRPM

## 2018-03-23 DIAGNOSIS — C34.12 MALIGNANT NEOPLASM OF UPPER LOBE OF LEFT LUNG (HCC): Primary | ICD-10-CM

## 2018-03-23 DIAGNOSIS — I82.403 DEEP VEIN THROMBOSIS (DVT) OF BOTH LOWER EXTREMITIES, UNSPECIFIED CHRONICITY, UNSPECIFIED VEIN (HCC): ICD-10-CM

## 2018-03-23 DIAGNOSIS — I26.99 BILATERAL PULMONARY EMBOLISM (HCC): ICD-10-CM

## 2018-03-23 PROCEDURE — 99214 OFFICE O/P EST MOD 30 MIN: CPT | Performed by: INTERNAL MEDICINE

## 2018-03-23 NOTE — PROGRESS NOTES
Cancer Center Progress Note    Patient Name: Eric Navarro   YOB: 1940   Medical Record Number: [id number]   Attending Physician: Adelaida Ewing M.D.        Chief Complaint:  Lung cancer    History of Present Illness:  Cancer history:  67-year- foot.  No date: HIP REPLACEMENT SURGERY Right  2015: HIP SURGERY Right      Comment: right total hip arthroplasty  No date: HYSTERECTOMY  No date: LAPAROSCOPIC CHOLECYSTECTOMY  No date:       Comment: x7 (Twins x1) Max weight 9 1/2 lbs  No date: OTHER Disp: 90 tablet, Rfl: 1  •  furosemide 20 MG Oral Tab, Take 1 tablet (20 mg total) by mouth daily. , Disp: 90 tablet, Rfl: 1  •  Esomeprazole Magnesium 20 MG Oral Capsule Delayed Release, Take 1 capsule (20 mg total) by mouth every morning before breakfast. changes of left upper lobectomy with left lung volume loss. No suspicious new or enlarging lung nodule/mass.   2. Scattered sub-4 mm lateral pulmonary micronodules, unchanged, and likely benign/incidental.  3. Multifocal chronic subpleural reticular scarrin

## 2018-03-27 PROBLEM — M17.11 PRIMARY OSTEOARTHRITIS OF RIGHT KNEE: Status: ACTIVE | Noted: 2018-03-27

## 2018-04-04 ENCOUNTER — TELEPHONE (OUTPATIENT)
Dept: INTERNAL MEDICINE CLINIC | Facility: CLINIC | Age: 78
End: 2018-04-04

## 2018-04-04 NOTE — TELEPHONE ENCOUNTER
Pt is requesting a early appt for a px for her surgery that she is having on 5/9 next available date isn't until 5/21        Please advise

## 2018-04-09 ENCOUNTER — TELEPHONE (OUTPATIENT)
Dept: RADIATION ONCOLOGY | Facility: HOSPITAL | Age: 78
End: 2018-04-09

## 2018-04-09 RX ORDER — WARFARIN SODIUM 5 MG/1
TABLET ORAL
Qty: 90 TABLET | Refills: 1 | Status: SHIPPED | OUTPATIENT
Start: 2018-04-09 | End: 2018-09-11

## 2018-04-09 NOTE — TELEPHONE ENCOUNTER
Patient letting Dr. Real Vidal know she is having knee replacement by Dr. Sammi Araujo at [address] [place] [address] on May 9, 2018.

## 2018-04-16 NOTE — PLAN OF CARE
Vascular & Interventional Radiology   Pre-procedure Instructions      Eric Navarro  [id number]  59/1940  65year old      · You are scheduled to have a IVC Filter Placement on 5/8/18  · Do NOT eat or drink anything after Midnight  · Do NOT take the fol

## 2018-04-24 ENCOUNTER — ANTI-COAG VISIT (OUTPATIENT)
Dept: INTERNAL MEDICINE CLINIC | Facility: CLINIC | Age: 78
End: 2018-04-24

## 2018-04-24 ENCOUNTER — TELEPHONE (OUTPATIENT)
Dept: HEMATOLOGY/ONCOLOGY | Facility: HOSPITAL | Age: 78
End: 2018-04-24

## 2018-04-24 DIAGNOSIS — I26.99 PULMONARY EMBOLISM WITH INFARCTION (HCC): ICD-10-CM

## 2018-04-24 DIAGNOSIS — I26.99 PULMONARY EMBOLISM AND INFARCTION (HCC): ICD-10-CM

## 2018-04-24 PROCEDURE — 36416 COLLJ CAPILLARY BLOOD SPEC: CPT

## 2018-04-24 PROCEDURE — 85610 PROTHROMBIN TIME: CPT

## 2018-04-24 NOTE — TELEPHONE ENCOUNTER
Caroline Buenrostro called and said she told Dr. Ashley Restrepo a couple weeks ago that she was having a knee replacement surgery on 5/9/17, but the surgery is now cancel.  Her  is not well so she is dealing with that, she will inform the doctor when the surgery has been

## 2018-05-08 ENCOUNTER — HOSPITAL ENCOUNTER (OUTPATIENT)
Dept: INTERVENTIONAL RADIOLOGY/VASCULAR | Facility: HOSPITAL | Age: 78
Discharge: HOME | End: 2018-05-08
Attending: RADIOLOGY
Payer: MEDICARE

## 2018-05-22 ENCOUNTER — ANTI-COAG VISIT (OUTPATIENT)
Dept: INTERNAL MEDICINE CLINIC | Facility: CLINIC | Age: 78
End: 2018-05-22

## 2018-05-22 DIAGNOSIS — I26.99 PULMONARY EMBOLISM WITH INFARCTION (HCC): ICD-10-CM

## 2018-05-22 DIAGNOSIS — I26.99 PULMONARY EMBOLISM AND INFARCTION (HCC): ICD-10-CM

## 2018-05-22 PROCEDURE — 85610 PROTHROMBIN TIME: CPT

## 2018-05-22 PROCEDURE — 36416 COLLJ CAPILLARY BLOOD SPEC: CPT

## 2018-05-23 ENCOUNTER — TELEPHONE (OUTPATIENT)
Dept: PULMONOLOGY | Facility: CLINIC | Age: 78
End: 2018-05-23

## 2018-06-03 RX ORDER — FUROSEMIDE 20 MG/1
TABLET ORAL
Qty: 90 TABLET | Refills: 0 | Status: SHIPPED | OUTPATIENT
Start: 2018-06-03 | End: 2018-09-11

## 2018-06-04 NOTE — TELEPHONE ENCOUNTER
3 month refill was approved/sent by MD. Corinna Dickerson call pt to set up f/u appt before next refill is due.

## 2018-06-05 ENCOUNTER — HOSPITAL ENCOUNTER (OUTPATIENT)
Dept: CT IMAGING | Age: 78
Discharge: HOME | End: 2018-06-05
Attending: INTERNAL MEDICINE
Payer: MEDICARE

## 2018-06-05 DIAGNOSIS — C34.12 MALIGNANT NEOPLASM OF UPPER LOBE OF LEFT LUNG (HCC): ICD-10-CM

## 2018-06-05 PROCEDURE — 71250 CT THORAX DX C-: CPT | Performed by: INTERNAL MEDICINE

## 2018-06-12 ENCOUNTER — OFFICE VISIT (OUTPATIENT)
Dept: PULMONOLOGY | Facility: CLINIC | Age: 78
End: 2018-06-12

## 2018-06-12 VITALS
BODY MASS INDEX: 33.93 KG/M2 | WEIGHT: 237 LBS | SYSTOLIC BLOOD PRESSURE: 118 MMHG | RESPIRATION RATE: 18 BRPM | DIASTOLIC BLOOD PRESSURE: 76 MMHG | OXYGEN SATURATION: 95 % | HEART RATE: 71 BPM | HEIGHT: 70 IN

## 2018-06-12 DIAGNOSIS — C34.90 ADENOCARCINOMA OF LUNG, UNSPECIFIED LATERALITY (HCC): Primary | ICD-10-CM

## 2018-06-12 PROCEDURE — 99213 OFFICE O/P EST LOW 20 MIN: CPT | Performed by: INTERNAL MEDICINE

## 2018-06-12 PROCEDURE — G0463 HOSPITAL OUTPT CLINIC VISIT: HCPCS | Performed by: INTERNAL MEDICINE

## 2018-06-12 NOTE — PROGRESS NOTES
The patient is a 39-year-old female who I know well from prior evaluation and comes in now for follow-up. She notes that she is doing well. She has some mild shortness breath with stair climbing.   She had a repeat CT scan of the chest a few days ago jerry

## 2018-06-19 ENCOUNTER — ANTI-COAG VISIT (OUTPATIENT)
Dept: INTERNAL MEDICINE CLINIC | Facility: CLINIC | Age: 78
End: 2018-06-19

## 2018-06-19 DIAGNOSIS — I26.99 PULMONARY EMBOLISM WITH INFARCTION (HCC): ICD-10-CM

## 2018-06-19 DIAGNOSIS — I26.99 PULMONARY EMBOLISM AND INFARCTION (HCC): ICD-10-CM

## 2018-06-19 PROCEDURE — 85610 PROTHROMBIN TIME: CPT

## 2018-06-19 PROCEDURE — 36416 COLLJ CAPILLARY BLOOD SPEC: CPT

## 2018-06-22 ENCOUNTER — OFFICE VISIT (OUTPATIENT)
Dept: HEMATOLOGY/ONCOLOGY | Facility: HOSPITAL | Age: 78
End: 2018-06-22
Attending: INTERNAL MEDICINE
Payer: MEDICARE

## 2018-06-22 VITALS
SYSTOLIC BLOOD PRESSURE: 129 MMHG | BODY MASS INDEX: 33.64 KG/M2 | DIASTOLIC BLOOD PRESSURE: 58 MMHG | TEMPERATURE: 98 F | HEIGHT: 70 IN | HEART RATE: 74 BPM | WEIGHT: 235 LBS | RESPIRATION RATE: 16 BRPM

## 2018-06-22 DIAGNOSIS — C34.12 MALIGNANT NEOPLASM OF UPPER LOBE OF LEFT LUNG (HCC): Primary | ICD-10-CM

## 2018-06-22 DIAGNOSIS — I26.99 BILATERAL PULMONARY EMBOLISM (HCC): ICD-10-CM

## 2018-06-22 DIAGNOSIS — I82.403 DEEP VEIN THROMBOSIS (DVT) OF BOTH LOWER EXTREMITIES, UNSPECIFIED CHRONICITY, UNSPECIFIED VEIN (HCC): ICD-10-CM

## 2018-06-22 PROCEDURE — 99214 OFFICE O/P EST MOD 30 MIN: CPT | Performed by: INTERNAL MEDICINE

## 2018-06-22 NOTE — PROGRESS NOTES
Cancer Center Progress Note    Patient Name: Perla Taylor   YOB: 1940   Medical Record Number: [id number]   Attending Physician: Eric Wynne M.D.        Chief Complaint:  Lung cancer    History of Present Illness:  Cancer history:  67-year- foot.  No date: HIP REPLACEMENT SURGERY Right  2015: HIP SURGERY Right      Comment: right total hip arthroplasty  No date: HYSTERECTOMY  No date: LAPAROSCOPIC CHOLECYSTECTOMY  No date:       Comment: x7 (Twins x1) Max weight 9 1/2 lbs  No date: OTHER Tab, TAKE AS DIRECTED BY ANTICOAGULATION CLINIC.  TAKE ONE TABLET BY MOUTH DAILY IN THE EVENING, Disp: 90 tablet, Rfl: 1  •  Esomeprazole Magnesium 20 MG Oral Capsule Delayed Release, Take 1 capsule (20 mg total) by mouth every morning before breakfast., Modesta Dillon lobectomy surgical changes with residual left hemithorax scarring and trace left effusion. No significant change in appearance. 2. New groundglass nodule in the right apex, favored to be infectious or inflammatory.  This can be reassessed at anticipated

## 2018-07-17 ENCOUNTER — ANTI-COAG VISIT (OUTPATIENT)
Dept: INTERNAL MEDICINE CLINIC | Facility: CLINIC | Age: 78
End: 2018-07-17

## 2018-07-17 DIAGNOSIS — I26.99 PULMONARY EMBOLISM AND INFARCTION (HCC): ICD-10-CM

## 2018-07-17 DIAGNOSIS — I26.99 PULMONARY EMBOLISM WITH INFARCTION (HCC): ICD-10-CM

## 2018-07-17 LAB — INR: 2.9 (ref 2–3)

## 2018-08-06 ENCOUNTER — TELEPHONE (OUTPATIENT)
Dept: INTERNAL MEDICINE CLINIC | Facility: CLINIC | Age: 78
End: 2018-08-06

## 2018-08-06 DIAGNOSIS — T81.718A IATROGENIC PULMONARY EMBOLISM AND INFARCTION, INITIAL ENCOUNTER (HCC): Primary | ICD-10-CM

## 2018-08-06 DIAGNOSIS — I26.99 IATROGENIC PULMONARY EMBOLISM AND INFARCTION, INITIAL ENCOUNTER (HCC): Primary | ICD-10-CM

## 2018-08-14 ENCOUNTER — ANTI-COAG VISIT (OUTPATIENT)
Dept: INTERNAL MEDICINE CLINIC | Facility: CLINIC | Age: 78
End: 2018-08-14
Payer: MEDICARE

## 2018-08-14 DIAGNOSIS — I26.99 PULMONARY EMBOLISM WITH INFARCTION (HCC): ICD-10-CM

## 2018-08-14 DIAGNOSIS — I26.99 PULMONARY EMBOLISM AND INFARCTION (HCC): ICD-10-CM

## 2018-08-14 DIAGNOSIS — I26.99 IATROGENIC PULMONARY EMBOLISM AND INFARCTION, INITIAL ENCOUNTER (HCC): ICD-10-CM

## 2018-08-14 DIAGNOSIS — T81.718A IATROGENIC PULMONARY EMBOLISM AND INFARCTION, INITIAL ENCOUNTER (HCC): ICD-10-CM

## 2018-08-14 LAB — INR: 2.6 (ref 2–3)

## 2018-08-17 ENCOUNTER — HOSPITAL ENCOUNTER (OUTPATIENT)
Dept: ULTRASOUND IMAGING | Age: 78
Discharge: HOME | End: 2018-08-17
Attending: OTOLARYNGOLOGY
Payer: MEDICARE

## 2018-08-17 DIAGNOSIS — E04.1 THYROID NODULE: ICD-10-CM

## 2018-08-17 PROCEDURE — 76536 US EXAM OF HEAD AND NECK: CPT | Performed by: OTOLARYNGOLOGY

## 2018-08-18 NOTE — PROGRESS NOTES
Us thyroid showing multiple nodules appear stable vs slightly smaller keep follow up for exam and further discuss

## 2018-09-11 ENCOUNTER — ANTI-COAG VISIT (OUTPATIENT)
Dept: INTERNAL MEDICINE CLINIC | Facility: CLINIC | Age: 78
End: 2018-09-11
Payer: MEDICARE

## 2018-09-11 ENCOUNTER — OFFICE VISIT (OUTPATIENT)
Dept: INTERNAL MEDICINE CLINIC | Facility: CLINIC | Age: 78
End: 2018-09-11
Payer: MEDICARE

## 2018-09-11 VITALS
DIASTOLIC BLOOD PRESSURE: 64 MMHG | HEIGHT: 70 IN | BODY MASS INDEX: 33.79 KG/M2 | TEMPERATURE: 98 F | WEIGHT: 236 LBS | HEART RATE: 73 BPM | SYSTOLIC BLOOD PRESSURE: 135 MMHG

## 2018-09-11 DIAGNOSIS — I26.99 PULMONARY EMBOLISM AND INFARCTION (HCC): ICD-10-CM

## 2018-09-11 DIAGNOSIS — M17.11 PRIMARY OSTEOARTHRITIS OF RIGHT KNEE: ICD-10-CM

## 2018-09-11 DIAGNOSIS — M17.0 PRIMARY OSTEOARTHRITIS OF BOTH KNEES: ICD-10-CM

## 2018-09-11 DIAGNOSIS — I26.99 IATROGENIC PULMONARY EMBOLISM AND INFARCTION, INITIAL ENCOUNTER (HCC): ICD-10-CM

## 2018-09-11 DIAGNOSIS — I26.99 PULMONARY EMBOLISM WITH INFARCTION (HCC): ICD-10-CM

## 2018-09-11 DIAGNOSIS — I10 ESSENTIAL HYPERTENSION WITH GOAL BLOOD PRESSURE LESS THAN 130/80: Primary | ICD-10-CM

## 2018-09-11 DIAGNOSIS — N18.30 STAGE 3 CHRONIC KIDNEY DISEASE (HCC): ICD-10-CM

## 2018-09-11 DIAGNOSIS — T81.718A IATROGENIC PULMONARY EMBOLISM AND INFARCTION, INITIAL ENCOUNTER (HCC): ICD-10-CM

## 2018-09-11 DIAGNOSIS — Z86.718 HISTORY OF DVT (DEEP VEIN THROMBOSIS): ICD-10-CM

## 2018-09-11 LAB — INR: 2.7 (ref 2–3)

## 2018-09-11 PROCEDURE — 99214 OFFICE O/P EST MOD 30 MIN: CPT | Performed by: INTERNAL MEDICINE

## 2018-09-11 PROCEDURE — G0463 HOSPITAL OUTPT CLINIC VISIT: HCPCS | Performed by: INTERNAL MEDICINE

## 2018-09-11 PROCEDURE — 85610 PROTHROMBIN TIME: CPT

## 2018-09-11 PROCEDURE — 36416 COLLJ CAPILLARY BLOOD SPEC: CPT

## 2018-09-11 RX ORDER — WARFARIN SODIUM 5 MG/1
TABLET ORAL
Qty: 90 TABLET | Refills: 1 | Status: SHIPPED | OUTPATIENT
Start: 2018-09-11 | End: 2019-03-18

## 2018-09-11 RX ORDER — CHOLESTYRAMINE LIGHT 4 G/5.7G
4 POWDER, FOR SUSPENSION ORAL DAILY PRN
Qty: 30 EACH | Refills: 2 | Status: SHIPPED | OUTPATIENT
Start: 2018-09-11 | End: 2018-09-13

## 2018-09-11 NOTE — PROGRESS NOTES
HPI:    Patient ID: Perla Taylor is a 77year old female.     HPI    Follow up      BP normal    Have not taken furosemide for 4 days    /64 (BP Location: Right arm, Patient Position: Sitting, Cuff Size: large)   Pulse 73   Temp 97.7 °F (36.5 °C) ( 90 tablet Rfl: 1   Esomeprazole Magnesium 20 MG Oral Capsule Delayed Release Take 1 capsule (20 mg total) by mouth every morning before breakfast. Disp: 30 capsule Rfl: 0   Multiple Vitamins-Minerals (MULTIVITAMIN ADULT OR) Take 1 tablet by mouth daily.  Kanchan Medina Age of Onset   • Cancer Father 80        Lung  -  smoker   • Macular degeneration Mother    • Glaucoma Mother    • Other (Alzheimer's Disease) Mother 80   • Other (Bladder Cancer) Mother    • Macular degeneration Brother    • Other (Pancreatic Cancer) Sist HORMONE, LIPID PANEL, CBC,         PLATELET; NO DIFFERENTIAL, COMP METABOLIC PANEL        (14), URINE MICROSCOPIC W REFLEX CULTURE        /64 (BP Location: Right arm, Patient Position: Sitting, Cuff Size: large)   Pulse 73   Temp 97.7 °F (36.5 °C) (O

## 2018-09-11 NOTE — PATIENT INSTRUCTIONS
Component      Latest Ref Rng & Units 8/25/2017 3/1/2017 12/2/2016   Glucose      70 - 99 mg/dL 95 89 68 (L)   Sodium      136 - 144 mmol/L 140 143 137   Potassium      3.3 - 5.1 mmol/L 4.7 4.3 4.7   Chloride      95 - 110 mmol/L 108 109 104   Carbon Dioxi

## 2018-09-12 ENCOUNTER — APPOINTMENT (OUTPATIENT)
Dept: LAB | Age: 78
End: 2018-09-12
Attending: INTERNAL MEDICINE
Payer: MEDICARE

## 2018-09-12 DIAGNOSIS — I10 ESSENTIAL HYPERTENSION WITH GOAL BLOOD PRESSURE LESS THAN 130/80: ICD-10-CM

## 2018-09-12 LAB
ALBUMIN SERPL BCP-MCNC: 3.5 G/DL (ref 3.5–4.8)
ALBUMIN/GLOB SERPL: 1.2 {RATIO} (ref 1–2)
ALP SERPL-CCNC: 57 U/L (ref 32–100)
ALT SERPL-CCNC: 15 U/L (ref 14–54)
ANION GAP SERPL CALC-SCNC: 6 MMOL/L (ref 0–18)
AST SERPL-CCNC: 23 U/L (ref 15–41)
BILIRUB SERPL-MCNC: 0.8 MG/DL (ref 0.3–1.2)
BUN SERPL-MCNC: 13 MG/DL (ref 8–20)
BUN/CREAT SERPL: 12.1 (ref 10–20)
CALCIUM SERPL-MCNC: 9.1 MG/DL (ref 8.5–10.5)
CHLORIDE SERPL-SCNC: 108 MMOL/L (ref 95–110)
CHOLEST SERPL-MCNC: 171 MG/DL (ref 110–200)
CO2 SERPL-SCNC: 23 MMOL/L (ref 22–32)
CREAT SERPL-MCNC: 1.07 MG/DL (ref 0.5–1.5)
ERYTHROCYTE [DISTWIDTH] IN BLOOD BY AUTOMATED COUNT: 15.1 % (ref 11–15)
GLOBULIN PLAS-MCNC: 2.9 G/DL (ref 2.5–3.7)
GLUCOSE SERPL-MCNC: 87 MG/DL (ref 70–99)
HCT VFR BLD AUTO: 45.3 % (ref 35–48)
HDLC SERPL-MCNC: 48 MG/DL
HGB BLD-MCNC: 15.1 G/DL (ref 12–16)
LDLC SERPL CALC-MCNC: 106 MG/DL (ref 0–99)
MCH RBC QN AUTO: 32.2 PG (ref 27–32)
MCHC RBC AUTO-ENTMCNC: 33.3 G/DL (ref 32–37)
MCV RBC AUTO: 96.9 FL (ref 80–100)
NONHDLC SERPL-MCNC: 123 MG/DL
OSMOLALITY UR CALC.SUM OF ELEC: 283 MOSM/KG (ref 275–295)
PATIENT FASTING: YES
PLATELET # BLD AUTO: 255 K/UL (ref 140–400)
PMV BLD AUTO: 9 FL (ref 7.4–10.3)
POTASSIUM SERPL-SCNC: 4.4 MMOL/L (ref 3.3–5.1)
PROT SERPL-MCNC: 6.4 G/DL (ref 5.9–8.4)
RBC # BLD AUTO: 4.67 M/UL (ref 3.7–5.4)
RBC #/AREA URNS AUTO: <1 /HPF
SODIUM SERPL-SCNC: 137 MMOL/L (ref 136–144)
TRIGL SERPL-MCNC: 86 MG/DL (ref 1–149)
TSH SERPL-ACNC: 3.67 UIU/ML (ref 0.45–5.33)
WBC # BLD AUTO: 5 K/UL (ref 4–11)
WBC #/AREA URNS AUTO: 2 /HPF

## 2018-09-12 PROCEDURE — 80061 LIPID PANEL: CPT

## 2018-09-12 PROCEDURE — 36415 COLL VENOUS BLD VENIPUNCTURE: CPT

## 2018-09-12 PROCEDURE — 80053 COMPREHEN METABOLIC PANEL: CPT

## 2018-09-12 PROCEDURE — 81015 MICROSCOPIC EXAM OF URINE: CPT

## 2018-09-12 PROCEDURE — 84443 ASSAY THYROID STIM HORMONE: CPT

## 2018-09-12 PROCEDURE — 85027 COMPLETE CBC AUTOMATED: CPT

## 2018-10-09 ENCOUNTER — ANTI-COAG VISIT (OUTPATIENT)
Dept: INTERNAL MEDICINE CLINIC | Facility: CLINIC | Age: 78
End: 2018-10-09
Payer: MEDICARE

## 2018-10-09 DIAGNOSIS — I26.99 PULMONARY EMBOLISM AND INFARCTION (HCC): ICD-10-CM

## 2018-10-09 DIAGNOSIS — Z51.81 ENCOUNTER FOR THERAPEUTIC DRUG MONITORING: Primary | ICD-10-CM

## 2018-10-09 DIAGNOSIS — Z79.01 LONG TERM (CURRENT) USE OF ANTICOAGULANTS: ICD-10-CM

## 2018-10-09 DIAGNOSIS — I26.99 IATROGENIC PULMONARY EMBOLISM AND INFARCTION, INITIAL ENCOUNTER (HCC): ICD-10-CM

## 2018-10-09 DIAGNOSIS — I26.99 PULMONARY EMBOLISM WITH INFARCTION (HCC): ICD-10-CM

## 2018-10-09 DIAGNOSIS — T81.718A IATROGENIC PULMONARY EMBOLISM AND INFARCTION, INITIAL ENCOUNTER (HCC): ICD-10-CM

## 2018-10-09 PROCEDURE — 36416 COLLJ CAPILLARY BLOOD SPEC: CPT

## 2018-10-09 PROCEDURE — 85610 PROTHROMBIN TIME: CPT

## 2018-11-06 ENCOUNTER — LAB ENCOUNTER (OUTPATIENT)
Dept: LAB | Age: 78
End: 2018-11-06
Attending: INTERNAL MEDICINE
Payer: MEDICARE

## 2018-11-06 DIAGNOSIS — I26.99 PULMONARY EMBOLISM AND INFARCTION (HCC): ICD-10-CM

## 2018-11-06 PROCEDURE — 85610 PROTHROMBIN TIME: CPT

## 2018-11-06 PROCEDURE — 36415 COLL VENOUS BLD VENIPUNCTURE: CPT

## 2018-11-07 ENCOUNTER — ANTI-COAG VISIT (OUTPATIENT)
Dept: INTERNAL MEDICINE CLINIC | Facility: CLINIC | Age: 78
End: 2018-11-07

## 2018-11-07 DIAGNOSIS — I26.99 PULMONARY EMBOLISM WITH INFARCTION (HCC): ICD-10-CM

## 2018-11-07 DIAGNOSIS — I26.99 PULMONARY EMBOLISM AND INFARCTION (HCC): ICD-10-CM

## 2018-11-07 DIAGNOSIS — I26.99 IATROGENIC PULMONARY EMBOLISM AND INFARCTION, INITIAL ENCOUNTER (HCC): ICD-10-CM

## 2018-11-07 DIAGNOSIS — T81.718A IATROGENIC PULMONARY EMBOLISM AND INFARCTION, INITIAL ENCOUNTER (HCC): ICD-10-CM

## 2018-11-20 ENCOUNTER — TELEPHONE (OUTPATIENT)
Dept: INTERNAL MEDICINE CLINIC | Facility: CLINIC | Age: 78
End: 2018-11-20

## 2018-11-24 NOTE — TELEPHONE ENCOUNTER
Noted med refilled 9/11/18 with additional refill, verified with [facility] pharmacist, nothing further needed at this time.

## 2018-11-26 ENCOUNTER — OFFICE VISIT (OUTPATIENT)
Dept: INTERNAL MEDICINE CLINIC | Facility: CLINIC | Age: 78
End: 2018-11-26
Payer: MEDICARE

## 2018-11-26 ENCOUNTER — APPOINTMENT (OUTPATIENT)
Dept: LAB | Age: 78
End: 2018-11-26
Attending: INTERNAL MEDICINE
Payer: MEDICARE

## 2018-11-26 VITALS
BODY MASS INDEX: 33.79 KG/M2 | WEIGHT: 236 LBS | SYSTOLIC BLOOD PRESSURE: 114 MMHG | HEIGHT: 70 IN | HEART RATE: 74 BPM | DIASTOLIC BLOOD PRESSURE: 68 MMHG

## 2018-11-26 DIAGNOSIS — R32 URINARY INCONTINENCE, UNSPECIFIED TYPE: Primary | ICD-10-CM

## 2018-11-26 DIAGNOSIS — N18.30 STAGE 3 CHRONIC KIDNEY DISEASE (HCC): ICD-10-CM

## 2018-11-26 DIAGNOSIS — R32 URINARY INCONTINENCE, UNSPECIFIED TYPE: ICD-10-CM

## 2018-11-26 DIAGNOSIS — I10 ESSENTIAL HYPERTENSION WITH GOAL BLOOD PRESSURE LESS THAN 130/80: ICD-10-CM

## 2018-11-26 PROCEDURE — 90653 IIV ADJUVANT VACCINE IM: CPT | Performed by: INTERNAL MEDICINE

## 2018-11-26 PROCEDURE — 81015 MICROSCOPIC EXAM OF URINE: CPT

## 2018-11-26 PROCEDURE — G0008 ADMIN INFLUENZA VIRUS VAC: HCPCS | Performed by: INTERNAL MEDICINE

## 2018-11-26 PROCEDURE — 99214 OFFICE O/P EST MOD 30 MIN: CPT | Performed by: INTERNAL MEDICINE

## 2018-11-26 PROCEDURE — G0463 HOSPITAL OUTPT CLINIC VISIT: HCPCS | Performed by: INTERNAL MEDICINE

## 2018-11-27 ENCOUNTER — OFFICE VISIT (OUTPATIENT)
Dept: OPHTHALMOLOGY | Facility: CLINIC | Age: 78
End: 2018-11-27
Payer: MEDICARE

## 2018-11-27 ENCOUNTER — ANTI-COAG VISIT (OUTPATIENT)
Dept: INTERNAL MEDICINE CLINIC | Facility: CLINIC | Age: 78
End: 2018-11-27
Payer: MEDICARE

## 2018-11-27 DIAGNOSIS — H01.00A BLEPHARITIS OF UPPER AND LOWER EYELIDS OF BOTH EYES, UNSPECIFIED TYPE: ICD-10-CM

## 2018-11-27 DIAGNOSIS — H43.393 VITREOUS FLOATERS OF BOTH EYES: ICD-10-CM

## 2018-11-27 DIAGNOSIS — Z83.511 FAMILY HISTORY OF GLAUCOMA IN MOTHER: ICD-10-CM

## 2018-11-27 DIAGNOSIS — T81.718A IATROGENIC PULMONARY EMBOLISM AND INFARCTION, INITIAL ENCOUNTER (HCC): ICD-10-CM

## 2018-11-27 DIAGNOSIS — Z83.518 FAMILY HISTORY OF MACULAR DEGENERATION: ICD-10-CM

## 2018-11-27 DIAGNOSIS — I26.99 PULMONARY EMBOLISM AND INFARCTION (HCC): ICD-10-CM

## 2018-11-27 DIAGNOSIS — H01.00B BLEPHARITIS OF UPPER AND LOWER EYELIDS OF BOTH EYES, UNSPECIFIED TYPE: ICD-10-CM

## 2018-11-27 DIAGNOSIS — Z51.81 ENCOUNTER FOR THERAPEUTIC DRUG MONITORING: ICD-10-CM

## 2018-11-27 DIAGNOSIS — H25.13 AGE-RELATED NUCLEAR CATARACT OF BOTH EYES: Primary | ICD-10-CM

## 2018-11-27 DIAGNOSIS — I26.99 IATROGENIC PULMONARY EMBOLISM AND INFARCTION, INITIAL ENCOUNTER (HCC): ICD-10-CM

## 2018-11-27 DIAGNOSIS — Z79.01 LONG TERM (CURRENT) USE OF ANTICOAGULANTS: Primary | ICD-10-CM

## 2018-11-27 PROCEDURE — 85610 PROTHROMBIN TIME: CPT

## 2018-11-27 PROCEDURE — 36416 COLLJ CAPILLARY BLOOD SPEC: CPT

## 2018-11-27 PROCEDURE — 92014 COMPRE OPH EXAM EST PT 1/>: CPT | Performed by: OPHTHALMOLOGY

## 2018-11-27 PROCEDURE — 92015 DETERMINE REFRACTIVE STATE: CPT | Performed by: OPHTHALMOLOGY

## 2018-11-28 ENCOUNTER — TELEPHONE (OUTPATIENT)
Dept: PULMONOLOGY | Facility: CLINIC | Age: 78
End: 2018-11-28

## 2018-11-28 PROBLEM — M79.602 LEFT ARM PAIN: Status: RESOLVED | Noted: 2017-03-23 | Resolved: 2018-11-28

## 2018-11-28 PROBLEM — M79.89 MASS OF SOFT TISSUE OF LEFT UPPER EXTREMITY: Status: RESOLVED | Noted: 2017-03-23 | Resolved: 2018-11-28

## 2018-11-29 NOTE — PROGRESS NOTES
HPI:    Patient ID: Rosamaria Russ is a 77year old female.     HPI  Here for follow-up having nocturia in the evening her legs are swollen and there better in the morning and she diuresed at night patient denies having any new shortness of breath chest pa Negative for adenopathy. Does not bruise/bleed easily. Psychiatric/Behavioral: Negative for agitation and behavioral problems. Current Outpatient Medications:  Warfarin Sodium 5 MG Oral Tab TAKE AS DIRECTED BY ANTICOAGULATION CLINIC.  TAKE ONE T lbs   • OTHER SURGICAL HISTORY      parotid gland removed left benigh   • OTHER SURGICAL HISTORY      Removal Skin Mole   • OTHER SURGICAL HISTORY      SAB/ D&C   • OTHER SURGICAL HISTORY      Ear Surgery   • OTHER SURGICAL HISTORY  7/13    Neg EM Bx   • R She has no cervical adenopathy. Neurological: She is alert. Skin: No rash noted. No erythema. Nursing note and vitals reviewed.            ASSESSMENT/PLAN:   (R32) Urinary incontinence, unspecified type  (primary encounter diagnosis)  Plan: aDvid Gutierrez

## 2018-12-13 ENCOUNTER — HOSPITAL ENCOUNTER (OUTPATIENT)
Dept: CT IMAGING | Facility: HOSPITAL | Age: 78
Discharge: HOME | End: 2018-12-13
Attending: INTERNAL MEDICINE
Payer: MEDICARE

## 2018-12-13 DIAGNOSIS — C34.90 ADENOCARCINOMA OF LUNG, UNSPECIFIED LATERALITY (HCC): ICD-10-CM

## 2018-12-13 PROCEDURE — 71250 CT THORAX DX C-: CPT | Performed by: INTERNAL MEDICINE

## 2018-12-17 ENCOUNTER — TELEPHONE (OUTPATIENT)
Dept: PULMONOLOGY | Facility: CLINIC | Age: 78
End: 2018-12-17

## 2018-12-18 ENCOUNTER — OFFICE VISIT (OUTPATIENT)
Dept: HEMATOLOGY/ONCOLOGY | Facility: HOSPITAL | Age: 78
End: 2018-12-18
Attending: INTERNAL MEDICINE
Payer: MEDICARE

## 2018-12-18 ENCOUNTER — OFFICE VISIT (OUTPATIENT)
Dept: PULMONOLOGY | Facility: CLINIC | Age: 78
End: 2018-12-18
Payer: MEDICARE

## 2018-12-18 VITALS
HEART RATE: 72 BPM | BODY MASS INDEX: 33.21 KG/M2 | RESPIRATION RATE: 18 BRPM | DIASTOLIC BLOOD PRESSURE: 66 MMHG | HEIGHT: 70 IN | WEIGHT: 231.94 LBS | SYSTOLIC BLOOD PRESSURE: 147 MMHG | TEMPERATURE: 98 F

## 2018-12-18 VITALS
OXYGEN SATURATION: 98 % | WEIGHT: 233.19 LBS | SYSTOLIC BLOOD PRESSURE: 122 MMHG | HEIGHT: 70 IN | HEART RATE: 70 BPM | BODY MASS INDEX: 33.38 KG/M2 | DIASTOLIC BLOOD PRESSURE: 76 MMHG

## 2018-12-18 DIAGNOSIS — R91.1 PULMONARY NODULE: ICD-10-CM

## 2018-12-18 DIAGNOSIS — C34.12 MALIGNANT NEOPLASM OF UPPER LOBE OF LEFT LUNG (HCC): ICD-10-CM

## 2018-12-18 DIAGNOSIS — I82.403 DEEP VEIN THROMBOSIS (DVT) OF BOTH LOWER EXTREMITIES, UNSPECIFIED CHRONICITY, UNSPECIFIED VEIN (HCC): ICD-10-CM

## 2018-12-18 DIAGNOSIS — I26.99 PULMONARY EMBOLISM WITH INFARCTION (HCC): Primary | ICD-10-CM

## 2018-12-18 DIAGNOSIS — I26.99 BILATERAL PULMONARY EMBOLISM (HCC): Primary | ICD-10-CM

## 2018-12-18 PROCEDURE — G0463 HOSPITAL OUTPT CLINIC VISIT: HCPCS | Performed by: INTERNAL MEDICINE

## 2018-12-18 PROCEDURE — 99213 OFFICE O/P EST LOW 20 MIN: CPT | Performed by: INTERNAL MEDICINE

## 2018-12-18 PROCEDURE — 99214 OFFICE O/P EST MOD 30 MIN: CPT | Performed by: INTERNAL MEDICINE

## 2018-12-18 NOTE — PROGRESS NOTES
The patient is 74-year-old female who I know well from prior evaluation comes in now for follow-up. She has prior history of recurrent venous thromboembolism. 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   Repeat CT scan

## 2018-12-18 NOTE — PROGRESS NOTES
Cancer Center Progress Note    Patient Name: Jennifer Ortez   YOB: 1940   Medical Record Number: [id number]   Attending Physician: Kandi Pete M.D.        Chief Complaint:  Lung cancer    History of Present Illness:  Cancer history:  69-year- toes on left foot.    • HIP REPLACEMENT SURGERY Right    • HIP SURGERY Right     right total hip arthroplasty   • HYSTERECTOMY     • LAPAROSCOPIC CHOLECYSTECTOMY     •       x7 (Twins x1) Max weight 9 1/2 lbs   • OTHER SURGICAL HISTORY      parotid Caffeine Concern: Yes          2 cups coffee daily        Occupational Exposure: Not Asked        Hobby Hazards: Not Asked        Sleep Concern: Not Asked        Stress Concern: Not Asked        Weight Concern: Not Asked        Special Diet: Not Asked No palpable lymphadenopathy. Neck is supple. Lymphatics: There is no palpable peripheral lymphadenopathy   Chest: Symmetric expansion. Nonlabored  Cardiovascular: Good distal pulses  Abdomen: Soft, non tender. No hepatosplenomegaly. No palpable mass.

## 2018-12-19 ENCOUNTER — ANTI-COAG VISIT (OUTPATIENT)
Dept: INTERNAL MEDICINE CLINIC | Facility: CLINIC | Age: 78
End: 2018-12-19
Payer: MEDICARE

## 2018-12-19 DIAGNOSIS — I26.99 PULMONARY EMBOLISM AND INFARCTION (HCC): ICD-10-CM

## 2018-12-19 DIAGNOSIS — T81.718A IATROGENIC PULMONARY EMBOLISM AND INFARCTION, INITIAL ENCOUNTER (HCC): ICD-10-CM

## 2018-12-19 DIAGNOSIS — I26.99 PULMONARY EMBOLISM WITH INFARCTION (HCC): ICD-10-CM

## 2018-12-19 DIAGNOSIS — Z79.01 LONG TERM (CURRENT) USE OF ANTICOAGULANTS: ICD-10-CM

## 2018-12-19 DIAGNOSIS — Z51.81 ENCOUNTER FOR THERAPEUTIC DRUG MONITORING: ICD-10-CM

## 2018-12-19 DIAGNOSIS — I26.99 IATROGENIC PULMONARY EMBOLISM AND INFARCTION, INITIAL ENCOUNTER (HCC): ICD-10-CM

## 2018-12-19 PROCEDURE — 85610 PROTHROMBIN TIME: CPT

## 2018-12-19 PROCEDURE — 36416 COLLJ CAPILLARY BLOOD SPEC: CPT

## 2018-12-24 RX ORDER — WARFARIN SODIUM 5 MG/1
TABLET ORAL
Qty: 90 TABLET | Refills: 1 | Status: CANCELLED | OUTPATIENT
Start: 2018-12-24

## 2018-12-24 NOTE — TELEPHONE ENCOUNTER
The patient has a refill at the pharmacy. [facility] pharmacy stated they will fill and call the patient. Review pended refill request as it does not fall under a protocol.     Last Rx: per 12/9/18 anticoagulation visit INR 2.7\  How to take your Monalisa Person

## 2019-01-15 PROBLEM — M43.16 SPONDYLOLISTHESIS OF LUMBAR REGION: Status: ACTIVE | Noted: 2019-01-15

## 2019-01-15 PROBLEM — M54.42 ACUTE MIDLINE LOW BACK PAIN WITH LEFT-SIDED SCIATICA: Status: ACTIVE | Noted: 2019-01-15

## 2019-01-15 PROBLEM — M51.369 DEGENERATIVE DISC DISEASE, LUMBAR: Status: ACTIVE | Noted: 2019-01-15

## 2019-01-15 PROBLEM — M51.36 DEGENERATIVE DISC DISEASE, LUMBAR: Status: ACTIVE | Noted: 2019-01-15

## 2019-01-16 ENCOUNTER — ANTI-COAG VISIT (OUTPATIENT)
Dept: INTERNAL MEDICINE CLINIC | Facility: CLINIC | Age: 79
End: 2019-01-16
Payer: MEDICARE

## 2019-01-16 DIAGNOSIS — Z79.01 LONG TERM (CURRENT) USE OF ANTICOAGULANTS: ICD-10-CM

## 2019-01-16 DIAGNOSIS — T81.718A IATROGENIC PULMONARY EMBOLISM AND INFARCTION, INITIAL ENCOUNTER (HCC): ICD-10-CM

## 2019-01-16 DIAGNOSIS — I26.99 IATROGENIC PULMONARY EMBOLISM AND INFARCTION, INITIAL ENCOUNTER (HCC): ICD-10-CM

## 2019-01-16 DIAGNOSIS — Z51.81 ENCOUNTER FOR THERAPEUTIC DRUG MONITORING: ICD-10-CM

## 2019-01-16 DIAGNOSIS — I26.99 PULMONARY EMBOLISM WITH INFARCTION (HCC): ICD-10-CM

## 2019-01-16 DIAGNOSIS — I26.99 PULMONARY EMBOLISM AND INFARCTION (HCC): ICD-10-CM

## 2019-01-16 LAB
INR: 2.1 (ref 2–3)
INR: 3.3 (ref 2–3)

## 2019-01-16 PROCEDURE — 85610 PROTHROMBIN TIME: CPT

## 2019-01-16 PROCEDURE — 36416 COLLJ CAPILLARY BLOOD SPEC: CPT

## 2019-01-24 PROBLEM — M48.061 SPINAL STENOSIS OF LUMBAR REGION WITHOUT NEUROGENIC CLAUDICATION: Status: ACTIVE | Noted: 2019-01-24

## 2019-01-24 PROBLEM — M51.26 LUMBAR HERNIATED DISC: Status: ACTIVE | Noted: 2019-01-24

## 2019-02-13 ENCOUNTER — ANTI-COAG VISIT (OUTPATIENT)
Dept: INTERNAL MEDICINE CLINIC | Facility: CLINIC | Age: 79
End: 2019-02-13
Payer: MEDICARE

## 2019-02-13 DIAGNOSIS — I26.99 PULMONARY EMBOLISM AND INFARCTION (HCC): ICD-10-CM

## 2019-02-13 DIAGNOSIS — Z79.01 LONG TERM (CURRENT) USE OF ANTICOAGULANTS: ICD-10-CM

## 2019-02-13 DIAGNOSIS — I26.99 IATROGENIC PULMONARY EMBOLISM AND INFARCTION, INITIAL ENCOUNTER (HCC): ICD-10-CM

## 2019-02-13 DIAGNOSIS — T81.718A IATROGENIC PULMONARY EMBOLISM AND INFARCTION, INITIAL ENCOUNTER (HCC): ICD-10-CM

## 2019-02-13 DIAGNOSIS — Z51.81 ENCOUNTER FOR THERAPEUTIC DRUG MONITORING: ICD-10-CM

## 2019-02-13 DIAGNOSIS — I26.99 PULMONARY EMBOLISM WITH INFARCTION (HCC): ICD-10-CM

## 2019-02-13 LAB — INR: 2 (ref 2–3)

## 2019-02-13 PROCEDURE — 36416 COLLJ CAPILLARY BLOOD SPEC: CPT

## 2019-02-13 PROCEDURE — 85610 PROTHROMBIN TIME: CPT

## 2019-02-27 ENCOUNTER — TELEPHONE (OUTPATIENT)
Dept: ENDOCRINOLOGY CLINIC | Facility: CLINIC | Age: 79
End: 2019-02-27

## 2019-03-08 ENCOUNTER — ANTI-COAG VISIT (OUTPATIENT)
Dept: INTERNAL MEDICINE CLINIC | Facility: CLINIC | Age: 79
End: 2019-03-08
Payer: MEDICARE

## 2019-03-08 DIAGNOSIS — I26.99 PULMONARY EMBOLISM AND INFARCTION (HCC): ICD-10-CM

## 2019-03-08 DIAGNOSIS — I26.99 IATROGENIC PULMONARY EMBOLISM AND INFARCTION, INITIAL ENCOUNTER (HCC): ICD-10-CM

## 2019-03-08 DIAGNOSIS — T81.718A IATROGENIC PULMONARY EMBOLISM AND INFARCTION, INITIAL ENCOUNTER (HCC): ICD-10-CM

## 2019-03-08 DIAGNOSIS — Z51.81 ENCOUNTER FOR THERAPEUTIC DRUG MONITORING: ICD-10-CM

## 2019-03-08 DIAGNOSIS — I26.99 PULMONARY EMBOLISM WITH INFARCTION (HCC): ICD-10-CM

## 2019-03-08 DIAGNOSIS — Z79.01 LONG TERM (CURRENT) USE OF ANTICOAGULANTS: ICD-10-CM

## 2019-03-08 LAB — INR: 1.9 (ref 2–3)

## 2019-03-08 PROCEDURE — 36416 COLLJ CAPILLARY BLOOD SPEC: CPT

## 2019-03-08 PROCEDURE — 85610 PROTHROMBIN TIME: CPT

## 2019-03-14 ENCOUNTER — HOSPITAL ENCOUNTER (OUTPATIENT)
Dept: CT IMAGING | Age: 79
Discharge: HOME | End: 2019-03-14
Attending: INTERNAL MEDICINE
Payer: MEDICARE

## 2019-03-14 DIAGNOSIS — R91.1 PULMONARY NODULE: ICD-10-CM

## 2019-03-14 PROCEDURE — 71250 CT THORAX DX C-: CPT | Performed by: INTERNAL MEDICINE

## 2019-03-18 RX ORDER — WARFARIN SODIUM 5 MG/1
TABLET ORAL
Qty: 90 TABLET | Refills: 3 | Status: SHIPPED | OUTPATIENT
Start: 2019-03-18 | End: 2020-06-29

## 2019-04-05 ENCOUNTER — ANTI-COAG VISIT (OUTPATIENT)
Dept: INTERNAL MEDICINE CLINIC | Facility: CLINIC | Age: 79
End: 2019-04-05
Payer: MEDICARE

## 2019-04-05 DIAGNOSIS — Z51.81 ENCOUNTER FOR THERAPEUTIC DRUG MONITORING: ICD-10-CM

## 2019-04-05 DIAGNOSIS — T81.718A IATROGENIC PULMONARY EMBOLISM AND INFARCTION, INITIAL ENCOUNTER (HCC): ICD-10-CM

## 2019-04-05 DIAGNOSIS — I26.99 IATROGENIC PULMONARY EMBOLISM AND INFARCTION, INITIAL ENCOUNTER (HCC): ICD-10-CM

## 2019-04-05 DIAGNOSIS — I26.99 PULMONARY EMBOLISM WITH INFARCTION (HCC): ICD-10-CM

## 2019-04-05 DIAGNOSIS — I26.99 PULMONARY EMBOLISM AND INFARCTION (HCC): ICD-10-CM

## 2019-04-05 DIAGNOSIS — Z79.01 LONG TERM (CURRENT) USE OF ANTICOAGULANTS: ICD-10-CM

## 2019-04-05 PROCEDURE — 36416 COLLJ CAPILLARY BLOOD SPEC: CPT

## 2019-04-05 PROCEDURE — 85610 PROTHROMBIN TIME: CPT

## 2019-04-16 ENCOUNTER — OFFICE VISIT (OUTPATIENT)
Dept: HEMATOLOGY/ONCOLOGY | Facility: HOSPITAL | Age: 79
End: 2019-04-16
Attending: INTERNAL MEDICINE
Payer: MEDICARE

## 2019-04-16 ENCOUNTER — OFFICE VISIT (OUTPATIENT)
Dept: PULMONOLOGY | Facility: CLINIC | Age: 79
End: 2019-04-16
Payer: MEDICARE

## 2019-04-16 VITALS
BODY MASS INDEX: 32.21 KG/M2 | HEIGHT: 70 IN | OXYGEN SATURATION: 96 % | WEIGHT: 225 LBS | SYSTOLIC BLOOD PRESSURE: 126 MMHG | HEART RATE: 75 BPM | DIASTOLIC BLOOD PRESSURE: 71 MMHG

## 2019-04-16 VITALS
HEART RATE: 75 BPM | HEIGHT: 70 IN | BODY MASS INDEX: 32.21 KG/M2 | DIASTOLIC BLOOD PRESSURE: 71 MMHG | SYSTOLIC BLOOD PRESSURE: 126 MMHG | OXYGEN SATURATION: 96 % | WEIGHT: 225 LBS

## 2019-04-16 DIAGNOSIS — I82.403 DEEP VEIN THROMBOSIS (DVT) OF BOTH LOWER EXTREMITIES, UNSPECIFIED CHRONICITY, UNSPECIFIED VEIN (HCC): Primary | ICD-10-CM

## 2019-04-16 DIAGNOSIS — I26.99 PULMONARY EMBOLISM WITH INFARCTION (HCC): Primary | ICD-10-CM

## 2019-04-16 DIAGNOSIS — C34.12 MALIGNANT NEOPLASM OF UPPER LOBE OF LEFT LUNG (HCC): ICD-10-CM

## 2019-04-16 DIAGNOSIS — R91.1 PULMONARY NODULE: ICD-10-CM

## 2019-04-16 DIAGNOSIS — I26.99 BILATERAL PULMONARY EMBOLISM (HCC): ICD-10-CM

## 2019-04-16 PROCEDURE — 99214 OFFICE O/P EST MOD 30 MIN: CPT | Performed by: INTERNAL MEDICINE

## 2019-04-16 PROCEDURE — G0463 HOSPITAL OUTPT CLINIC VISIT: HCPCS | Performed by: INTERNAL MEDICINE

## 2019-04-16 PROCEDURE — 99213 OFFICE O/P EST LOW 20 MIN: CPT | Performed by: INTERNAL MEDICINE

## 2019-04-16 RX ORDER — ALBUTEROL SULFATE 90 UG/1
AEROSOL, METERED RESPIRATORY (INHALATION)
Qty: 1 INHALER | Refills: 5 | Status: SHIPPED | OUTPATIENT
Start: 2019-04-16 | End: 2022-01-11

## 2019-04-16 NOTE — PROGRESS NOTES
Cancer Center Progress Note    Patient Name: Eran Kay   YOB: 1940   Medical Record Number: [id number]   Attending Physician: Tula Severs, M.D.        Chief Complaint:  Lung cancer    History of Present Illness:  Cancer history:  69-year- toes on left foot.    • HIP REPLACEMENT SURGERY Right    • HIP SURGERY Right     right total hip arthroplasty   • HYSTERECTOMY     • LAPAROSCOPIC CHOLECYSTECTOMY     •       x7 (Twins x1) Max weight 9 1/2 lbs   • OTHER SURGICAL HISTORY      parotid file    Relationships      Social connections:        Talks on phone: Not on file        Gets together: Not on file        Attends Hoahaoism service: Not on file        Active member of club or organization: Not on file        Attends meetings of clubs or Rfl:   •  acetaminophen (TYLENOL) 325 MG Oral Tab, Take 500 mg by mouth every 6 (six) hours as needed for Pain.  , Disp: , Rfl:     Allergies:    Fentanyl                ANAPHYLAXIS  Hydrocodone             RASH  Morphine                OTHER (SEE COMMENTS Chest CT Dec 2018    History of recurrent bilateral DVT/PE with multiple episodes in the postoperative setting including recently as noted above  –Continue lifelong anticoagulation with warfarin   –pulm nodule repeat CT chest in 6 months with return to cli

## 2019-04-16 NOTE — PROGRESS NOTES
The patient is a 67-year-old female who I know well from prior evaluation of comes in now for follow-up. In general, she is doing well except that she still has a chronic uncomfortable awareness of her breathing.   The albuterol may be helped slightly but

## 2019-04-24 ENCOUNTER — ANTI-COAG VISIT (OUTPATIENT)
Dept: INTERNAL MEDICINE CLINIC | Facility: CLINIC | Age: 79
End: 2019-04-24
Payer: MEDICARE

## 2019-04-24 DIAGNOSIS — I26.99 PULMONARY EMBOLISM WITH INFARCTION (HCC): ICD-10-CM

## 2019-04-24 DIAGNOSIS — T81.718A IATROGENIC PULMONARY EMBOLISM AND INFARCTION, INITIAL ENCOUNTER (HCC): ICD-10-CM

## 2019-04-24 DIAGNOSIS — Z51.81 ENCOUNTER FOR THERAPEUTIC DRUG MONITORING: ICD-10-CM

## 2019-04-24 DIAGNOSIS — I26.99 PULMONARY EMBOLISM AND INFARCTION (HCC): ICD-10-CM

## 2019-04-24 DIAGNOSIS — Z79.01 LONG TERM (CURRENT) USE OF ANTICOAGULANTS: ICD-10-CM

## 2019-04-24 DIAGNOSIS — I26.99 IATROGENIC PULMONARY EMBOLISM AND INFARCTION, INITIAL ENCOUNTER (HCC): ICD-10-CM

## 2019-04-24 PROCEDURE — 85610 PROTHROMBIN TIME: CPT

## 2019-04-24 PROCEDURE — 36416 COLLJ CAPILLARY BLOOD SPEC: CPT

## 2019-05-28 ENCOUNTER — ANTI-COAG VISIT (OUTPATIENT)
Dept: INTERNAL MEDICINE CLINIC | Facility: CLINIC | Age: 79
End: 2019-05-28
Payer: MEDICARE

## 2019-05-28 DIAGNOSIS — T81.718A IATROGENIC PULMONARY EMBOLISM AND INFARCTION, INITIAL ENCOUNTER (HCC): ICD-10-CM

## 2019-05-28 DIAGNOSIS — Z79.01 LONG TERM (CURRENT) USE OF ANTICOAGULANTS: ICD-10-CM

## 2019-05-28 DIAGNOSIS — I26.99 IATROGENIC PULMONARY EMBOLISM AND INFARCTION, INITIAL ENCOUNTER (HCC): ICD-10-CM

## 2019-05-28 DIAGNOSIS — I26.99 PULMONARY EMBOLISM WITH INFARCTION (HCC): ICD-10-CM

## 2019-05-28 DIAGNOSIS — I26.99 PULMONARY EMBOLISM AND INFARCTION (HCC): ICD-10-CM

## 2019-05-28 DIAGNOSIS — Z51.81 ENCOUNTER FOR THERAPEUTIC DRUG MONITORING: ICD-10-CM

## 2019-05-28 PROCEDURE — 36416 COLLJ CAPILLARY BLOOD SPEC: CPT

## 2019-05-28 PROCEDURE — 85610 PROTHROMBIN TIME: CPT

## 2019-06-13 ENCOUNTER — HOSPITAL ENCOUNTER (OUTPATIENT)
Age: 79
Discharge: HOME | End: 2019-06-13
Attending: EMERGENCY MEDICINE
Payer: MEDICARE

## 2019-06-13 VITALS
BODY MASS INDEX: 33 KG/M2 | HEART RATE: 76 BPM | RESPIRATION RATE: 18 BRPM | SYSTOLIC BLOOD PRESSURE: 133 MMHG | OXYGEN SATURATION: 97 % | DIASTOLIC BLOOD PRESSURE: 66 MMHG | TEMPERATURE: 98 F | WEIGHT: 230 LBS

## 2019-06-13 DIAGNOSIS — H61.22 IMPACTED CERUMEN OF LEFT EAR: Primary | ICD-10-CM

## 2019-06-13 DIAGNOSIS — J02.9 VIRAL PHARYNGITIS: ICD-10-CM

## 2019-06-13 PROCEDURE — 69210 REMOVE IMPACTED EAR WAX UNI: CPT

## 2019-06-13 PROCEDURE — 87430 STREP A AG IA: CPT

## 2019-06-13 PROCEDURE — 99213 OFFICE O/P EST LOW 20 MIN: CPT

## 2019-06-13 NOTE — ED PROVIDER NOTES
Patient Seen in: [facility] [place]    History   Patient presents with:  Sore Throat    Stated Complaint: sorethroat/ear pain    HPI    78 yo female with left ear pain and sore throat for several days. No fever. No URI symptoms. week      Comment: 1-2x/month    Drug use: No      Review of Systems    Positive for stated complaint: sorethroat/ear pain  Other systems are as noted in HPI. Constitutional and vital signs reviewed.       All other systems reviewed and negative except as [address] [place] [address]  [id number]  [phone]      As needed        Medications Prescribed:  Current Discharge Medication List

## 2019-06-19 ENCOUNTER — OFFICE VISIT (OUTPATIENT)
Dept: INTERNAL MEDICINE CLINIC | Facility: CLINIC | Age: 79
End: 2019-06-19
Payer: MEDICARE

## 2019-06-19 ENCOUNTER — APPOINTMENT (OUTPATIENT)
Dept: LAB | Age: 79
End: 2019-06-19
Attending: INTERNAL MEDICINE
Payer: MEDICARE

## 2019-06-19 VITALS
WEIGHT: 232 LBS | HEART RATE: 73 BPM | BODY MASS INDEX: 33.21 KG/M2 | DIASTOLIC BLOOD PRESSURE: 65 MMHG | HEIGHT: 70 IN | SYSTOLIC BLOOD PRESSURE: 116 MMHG | TEMPERATURE: 99 F

## 2019-06-19 DIAGNOSIS — J43.9 PULMONARY EMPHYSEMA, UNSPECIFIED EMPHYSEMA TYPE (HCC): ICD-10-CM

## 2019-06-19 DIAGNOSIS — M51.36 DEGENERATIVE DISC DISEASE, LUMBAR: ICD-10-CM

## 2019-06-19 DIAGNOSIS — J44.9 COPD, MILD (HCC): ICD-10-CM

## 2019-06-19 DIAGNOSIS — I10 ESSENTIAL HYPERTENSION WITH GOAL BLOOD PRESSURE LESS THAN 130/80: ICD-10-CM

## 2019-06-19 DIAGNOSIS — I10 ESSENTIAL HYPERTENSION WITH GOAL BLOOD PRESSURE LESS THAN 130/80: Primary | ICD-10-CM

## 2019-06-19 LAB
ALBUMIN SERPL-MCNC: 3.5 G/DL (ref 3.4–5)
ALBUMIN/GLOB SERPL: 1 {RATIO} (ref 1–2)
ALP LIVER SERPL-CCNC: 83 U/L (ref 55–142)
ALT SERPL-CCNC: 21 U/L (ref 13–56)
ANION GAP SERPL CALC-SCNC: 4 MMOL/L (ref 0–18)
AST SERPL-CCNC: 20 U/L (ref 15–37)
BILIRUB SERPL-MCNC: 0.5 MG/DL (ref 0.1–2)
BUN BLD-MCNC: 19 MG/DL (ref 7–18)
BUN/CREAT SERPL: 15.2 (ref 10–20)
CALCIUM BLD-MCNC: 9.1 MG/DL (ref 8.5–10.1)
CHLORIDE SERPL-SCNC: 107 MMOL/L (ref 98–112)
CO2 SERPL-SCNC: 29 MMOL/L (ref 21–32)
CREAT BLD-MCNC: 1.25 MG/DL (ref 0.55–1.02)
DEPRECATED RDW RBC AUTO: 53 FL (ref 35.1–46.3)
ERYTHROCYTE [DISTWIDTH] IN BLOOD BY AUTOMATED COUNT: 14.2 % (ref 11–15)
GLOBULIN PLAS-MCNC: 3.4 G/DL (ref 2.8–4.4)
GLUCOSE BLD-MCNC: 93 MG/DL (ref 70–99)
HCT VFR BLD AUTO: 47.8 % (ref 35–48)
HGB BLD-MCNC: 15.7 G/DL (ref 12–16)
M PROTEIN MFR SERPL ELPH: 6.9 G/DL (ref 6.4–8.2)
MCH RBC QN AUTO: 33.3 PG (ref 26–34)
MCHC RBC AUTO-ENTMCNC: 32.8 G/DL (ref 31–37)
MCV RBC AUTO: 101.3 FL (ref 80–100)
OSMOLALITY SERPL CALC.SUM OF ELEC: 292 MOSM/KG (ref 275–295)
PATIENT FASTING: NO
PLATELET # BLD AUTO: 313 10(3)UL (ref 150–450)
POTASSIUM SERPL-SCNC: 4.5 MMOL/L (ref 3.5–5.1)
RBC # BLD AUTO: 4.72 X10(6)UL (ref 3.8–5.3)
SODIUM SERPL-SCNC: 140 MMOL/L (ref 136–145)
WBC # BLD AUTO: 7.7 X10(3) UL (ref 4–11)

## 2019-06-19 PROCEDURE — G0463 HOSPITAL OUTPT CLINIC VISIT: HCPCS | Performed by: INTERNAL MEDICINE

## 2019-06-19 PROCEDURE — 80053 COMPREHEN METABOLIC PANEL: CPT

## 2019-06-19 PROCEDURE — 85027 COMPLETE CBC AUTOMATED: CPT

## 2019-06-19 PROCEDURE — 99214 OFFICE O/P EST MOD 30 MIN: CPT | Performed by: INTERNAL MEDICINE

## 2019-06-19 PROCEDURE — 36415 COLL VENOUS BLD VENIPUNCTURE: CPT

## 2019-06-28 ENCOUNTER — APPOINTMENT (OUTPATIENT)
Dept: LAB | Age: 79
End: 2019-06-28
Attending: INTERNAL MEDICINE
Payer: MEDICARE

## 2019-06-28 DIAGNOSIS — R71.8 ELEVATED MCV: ICD-10-CM

## 2019-06-28 LAB
FOLATE SERPL-MCNC: >20 NG/ML (ref 8.7–?)
VIT B12 SERPL-MCNC: 609 PG/ML (ref 193–986)

## 2019-06-28 PROCEDURE — 36415 COLL VENOUS BLD VENIPUNCTURE: CPT

## 2019-06-28 PROCEDURE — 82607 VITAMIN B-12: CPT

## 2019-06-28 PROCEDURE — 82746 ASSAY OF FOLIC ACID SERUM: CPT

## 2019-06-30 PROBLEM — M23.91 ACUTE INTERNAL DERANGEMENT OF RIGHT KNEE: Status: RESOLVED | Noted: 2018-03-06 | Resolved: 2019-06-30

## 2019-06-30 PROBLEM — K29.70 GASTRITIS: Status: RESOLVED | Noted: 2017-09-08 | Resolved: 2019-06-30

## 2019-06-30 PROBLEM — R11.0 NAUSEA: Status: RESOLVED | Noted: 2017-07-18 | Resolved: 2019-06-30

## 2019-06-30 PROBLEM — H01.00A BLEPHARITIS OF UPPER AND LOWER EYELIDS OF BOTH EYES: Status: RESOLVED | Noted: 2018-11-27 | Resolved: 2019-06-30

## 2019-06-30 PROBLEM — M25.561 RIGHT KNEE PAIN: Status: RESOLVED | Noted: 2017-03-23 | Resolved: 2019-06-30

## 2019-06-30 PROBLEM — I26.99 IATROGENIC PULMONARY EMBOLISM AND INFARCTION, INITIAL ENCOUNTER (HCC): Status: RESOLVED | Noted: 2018-08-06 | Resolved: 2019-06-30

## 2019-06-30 PROBLEM — M79.661 RIGHT CALF PAIN: Status: RESOLVED | Noted: 2018-02-20 | Resolved: 2019-06-30

## 2019-06-30 PROBLEM — H01.00B BLEPHARITIS OF UPPER AND LOWER EYELIDS OF BOTH EYES: Status: RESOLVED | Noted: 2018-11-27 | Resolved: 2019-06-30

## 2019-06-30 PROBLEM — T81.718A IATROGENIC PULMONARY EMBOLISM AND INFARCTION, INITIAL ENCOUNTER (HCC): Status: RESOLVED | Noted: 2018-08-06 | Resolved: 2019-06-30

## 2019-06-30 PROBLEM — K52.9 CHRONIC DIARRHEA: Status: RESOLVED | Noted: 2017-07-18 | Resolved: 2019-06-30

## 2019-06-30 PROBLEM — I26.99 PULMONARY EMBOLISM AND INFARCTION (HCC): Status: RESOLVED | Noted: 2017-08-02 | Resolved: 2019-06-30

## 2019-07-01 NOTE — PROGRESS NOTES
HPI:    Patient ID: Tacey Marking is a 77year old female.     HPI    /65 (BP Location: Right arm, Patient Position: Sitting, Cuff Size: large)   Pulse 73   Temp 98.5 °F (36.9 °C) (Oral)   Ht 5' 10\" (1.778 m)   Wt 232 lb (105.2 kg)   BMI 33.29 kg/m THE EVENING Disp: 90 tablet Rfl: 3   Esomeprazole Magnesium 20 MG Oral Capsule Delayed Release Take 1 capsule (20 mg total) by mouth every morning before breakfast. Disp: 30 capsule Rfl: 0   Multiple Vitamins-Minerals (MULTIVITAMIN ADULT OR) Take 1 tablet ENDOSCOPY   • EGD     • ESOPHAGOGASTRODUODENOSCOPY (EGD) N/A 9/8/2017    Performed by Tang Ramirez MD at [address] [place] [address] ENDOSCOPY   • FOOT SURGERY Bilateral     Bunion Surgery X6   • FOOT/TOES SURGERY PROC UNLISTED Left     Pt had surgery to straighten exhibits no discharge. No scleral icterus. Neck: Neck supple. No thyromegaly present. Cardiovascular: Normal rate, regular rhythm, normal heart sounds and intact distal pulses. Exam reveals no gallop. No murmur heard.   Pulmonary/Chest: Effort normal. (14)      Meds This Visit:  Requested Prescriptions      No prescriptions requested or ordered in this encounter       Imaging & Referrals:  None        PW#[id number]

## 2019-07-02 ENCOUNTER — ANTI-COAG VISIT (OUTPATIENT)
Dept: INTERNAL MEDICINE CLINIC | Facility: CLINIC | Age: 79
End: 2019-07-02
Payer: MEDICARE

## 2019-07-02 DIAGNOSIS — T81.718A IATROGENIC PULMONARY EMBOLISM AND INFARCTION, INITIAL ENCOUNTER (HCC): ICD-10-CM

## 2019-07-02 DIAGNOSIS — Z51.81 ENCOUNTER FOR THERAPEUTIC DRUG MONITORING: ICD-10-CM

## 2019-07-02 DIAGNOSIS — I26.99 IATROGENIC PULMONARY EMBOLISM AND INFARCTION, INITIAL ENCOUNTER (HCC): ICD-10-CM

## 2019-07-02 DIAGNOSIS — I26.99 PULMONARY EMBOLISM AND INFARCTION (HCC): ICD-10-CM

## 2019-07-02 DIAGNOSIS — Z79.01 LONG TERM (CURRENT) USE OF ANTICOAGULANTS: ICD-10-CM

## 2019-07-02 DIAGNOSIS — I26.99 PULMONARY EMBOLISM WITH INFARCTION (HCC): ICD-10-CM

## 2019-07-02 LAB — INR: 2.4 (ref 2–3)

## 2019-07-02 PROCEDURE — 36416 COLLJ CAPILLARY BLOOD SPEC: CPT

## 2019-07-02 PROCEDURE — 85610 PROTHROMBIN TIME: CPT

## 2019-07-29 ENCOUNTER — TELEPHONE (OUTPATIENT)
Dept: INTERNAL MEDICINE CLINIC | Facility: CLINIC | Age: 79
End: 2019-07-29

## 2019-07-29 DIAGNOSIS — Z79.01 LONG TERM (CURRENT) USE OF ANTICOAGULANTS: ICD-10-CM

## 2019-07-29 DIAGNOSIS — Z51.81 ENCOUNTER FOR THERAPEUTIC DRUG MONITORING: ICD-10-CM

## 2019-07-29 DIAGNOSIS — T81.718A IATROGENIC PULMONARY EMBOLISM AND INFARCTION, INITIAL ENCOUNTER (HCC): Primary | ICD-10-CM

## 2019-07-29 DIAGNOSIS — I26.99 IATROGENIC PULMONARY EMBOLISM AND INFARCTION, INITIAL ENCOUNTER (HCC): Primary | ICD-10-CM

## 2019-07-29 NOTE — TELEPHONE ENCOUNTER
Anticoag referral expires soon 8/6/19. Next INR 7/30/19. Order pended. Please sign, thank you.     Dx: Iatrogenic pulmonary embolism and infarction, [facility] (T81.718A,I26.99)

## 2019-07-30 ENCOUNTER — ANTI-COAG VISIT (OUTPATIENT)
Dept: INTERNAL MEDICINE CLINIC | Facility: CLINIC | Age: 79
End: 2019-07-30
Payer: MEDICARE

## 2019-07-30 ENCOUNTER — TELEPHONE (OUTPATIENT)
Dept: INTERNAL MEDICINE CLINIC | Facility: CLINIC | Age: 79
End: 2019-07-30

## 2019-07-30 DIAGNOSIS — Z51.81 ENCOUNTER FOR THERAPEUTIC DRUG MONITORING: ICD-10-CM

## 2019-07-30 DIAGNOSIS — Z79.01 LONG TERM (CURRENT) USE OF ANTICOAGULANTS: ICD-10-CM

## 2019-07-30 DIAGNOSIS — T81.718A IATROGENIC PULMONARY EMBOLISM AND INFARCTION, INITIAL ENCOUNTER (HCC): ICD-10-CM

## 2019-07-30 DIAGNOSIS — I26.99 PULMONARY EMBOLISM WITH INFARCTION (HCC): ICD-10-CM

## 2019-07-30 DIAGNOSIS — I26.99 PULMONARY EMBOLISM AND INFARCTION (HCC): ICD-10-CM

## 2019-07-30 DIAGNOSIS — I26.99 IATROGENIC PULMONARY EMBOLISM AND INFARCTION, INITIAL ENCOUNTER (HCC): ICD-10-CM

## 2019-07-30 LAB — INR: 2.7 (ref 2–3)

## 2019-07-30 PROCEDURE — 85610 PROTHROMBIN TIME: CPT

## 2019-07-30 PROCEDURE — 36416 COLLJ CAPILLARY BLOOD SPEC: CPT

## 2019-08-28 ENCOUNTER — ANTI-COAG VISIT (OUTPATIENT)
Dept: INTERNAL MEDICINE CLINIC | Facility: CLINIC | Age: 79
End: 2019-08-28
Payer: MEDICARE

## 2019-08-28 DIAGNOSIS — I26.99 PULMONARY EMBOLISM WITH INFARCTION (HCC): ICD-10-CM

## 2019-08-28 DIAGNOSIS — I26.99 IATROGENIC PULMONARY EMBOLISM AND INFARCTION, INITIAL ENCOUNTER (HCC): ICD-10-CM

## 2019-08-28 DIAGNOSIS — Z79.01 LONG TERM (CURRENT) USE OF ANTICOAGULANTS: ICD-10-CM

## 2019-08-28 DIAGNOSIS — T81.718A IATROGENIC PULMONARY EMBOLISM AND INFARCTION, INITIAL ENCOUNTER (HCC): ICD-10-CM

## 2019-08-28 DIAGNOSIS — Z51.81 ENCOUNTER FOR THERAPEUTIC DRUG MONITORING: ICD-10-CM

## 2019-08-28 LAB — INR: 2.4 (ref 2–3)

## 2019-08-28 PROCEDURE — 36416 COLLJ CAPILLARY BLOOD SPEC: CPT

## 2019-08-28 PROCEDURE — 85610 PROTHROMBIN TIME: CPT

## 2019-09-05 ENCOUNTER — TELEPHONE (OUTPATIENT)
Dept: NEPHROLOGY | Facility: CLINIC | Age: 79
End: 2019-09-05

## 2019-09-16 ENCOUNTER — HOSPITAL ENCOUNTER (OUTPATIENT)
Dept: CT IMAGING | Facility: HOSPITAL | Age: 79
Discharge: HOME | End: 2019-09-16
Attending: INTERNAL MEDICINE
Payer: MEDICARE

## 2019-09-16 DIAGNOSIS — R91.1 PULMONARY NODULE: ICD-10-CM

## 2019-09-16 PROCEDURE — 71250 CT THORAX DX C-: CPT | Performed by: INTERNAL MEDICINE

## 2019-09-23 ENCOUNTER — LAB ENCOUNTER (OUTPATIENT)
Dept: LAB | Age: 79
End: 2019-09-23
Attending: INTERNAL MEDICINE
Payer: MEDICARE

## 2019-09-23 ENCOUNTER — OFFICE VISIT (OUTPATIENT)
Dept: INTERNAL MEDICINE CLINIC | Facility: CLINIC | Age: 79
End: 2019-09-23
Payer: MEDICARE

## 2019-09-23 ENCOUNTER — APPOINTMENT (OUTPATIENT)
Dept: LAB | Age: 79
End: 2019-09-23
Attending: INTERNAL MEDICINE
Payer: MEDICARE

## 2019-09-23 VITALS
BODY MASS INDEX: 33.21 KG/M2 | TEMPERATURE: 98 F | HEART RATE: 79 BPM | WEIGHT: 232 LBS | HEIGHT: 70 IN | SYSTOLIC BLOOD PRESSURE: 101 MMHG | DIASTOLIC BLOOD PRESSURE: 64 MMHG

## 2019-09-23 DIAGNOSIS — J43.9 PULMONARY EMPHYSEMA, UNSPECIFIED EMPHYSEMA TYPE (HCC): ICD-10-CM

## 2019-09-23 DIAGNOSIS — N18.30 STAGE 3 CHRONIC KIDNEY DISEASE (HCC): ICD-10-CM

## 2019-09-23 DIAGNOSIS — J44.9 COPD, MILD (HCC): ICD-10-CM

## 2019-09-23 DIAGNOSIS — I10 ESSENTIAL HYPERTENSION WITH GOAL BLOOD PRESSURE LESS THAN 130/80: Primary | ICD-10-CM

## 2019-09-23 DIAGNOSIS — I10 ESSENTIAL HYPERTENSION WITH GOAL BLOOD PRESSURE LESS THAN 130/80: ICD-10-CM

## 2019-09-23 LAB
ANION GAP SERPL CALC-SCNC: 5 MMOL/L (ref 0–18)
BASOPHILS # BLD AUTO: 0.11 X10(3) UL (ref 0–0.2)
BASOPHILS NFR BLD AUTO: 1.6 %
BUN BLD-MCNC: 21 MG/DL (ref 7–18)
BUN/CREAT SERPL: 15.4 (ref 10–20)
CALCIUM BLD-MCNC: 8.8 MG/DL (ref 8.5–10.1)
CHLORIDE SERPL-SCNC: 107 MMOL/L (ref 98–112)
CO2 SERPL-SCNC: 28 MMOL/L (ref 21–32)
CREAT BLD-MCNC: 1.36 MG/DL (ref 0.55–1.02)
DEPRECATED RDW RBC AUTO: 51.5 FL (ref 35.1–46.3)
EOSINOPHIL # BLD AUTO: 0.14 X10(3) UL (ref 0–0.7)
EOSINOPHIL NFR BLD AUTO: 2 %
ERYTHROCYTE [DISTWIDTH] IN BLOOD BY AUTOMATED COUNT: 13.7 % (ref 11–15)
GLUCOSE BLD-MCNC: 96 MG/DL (ref 70–99)
HCT VFR BLD AUTO: 46 % (ref 35–48)
HGB BLD-MCNC: 15.2 G/DL (ref 12–16)
IMM GRANULOCYTES # BLD AUTO: 0.02 X10(3) UL (ref 0–1)
IMM GRANULOCYTES NFR BLD: 0.3 %
LYMPHOCYTES # BLD AUTO: 1.16 X10(3) UL (ref 1–4)
LYMPHOCYTES NFR BLD AUTO: 16.5 %
MCH RBC QN AUTO: 33.6 PG (ref 26–34)
MCHC RBC AUTO-ENTMCNC: 33 G/DL (ref 31–37)
MCV RBC AUTO: 101.5 FL (ref 80–100)
MONOCYTES # BLD AUTO: 0.44 X10(3) UL (ref 0.1–1)
MONOCYTES NFR BLD AUTO: 6.3 %
NEUTROPHILS # BLD AUTO: 5.17 X10 (3) UL (ref 1.5–7.7)
NEUTROPHILS # BLD AUTO: 5.17 X10(3) UL (ref 1.5–7.7)
NEUTROPHILS NFR BLD AUTO: 73.3 %
OSMOLALITY SERPL CALC.SUM OF ELEC: 293 MOSM/KG (ref 275–295)
PATIENT FASTING: NO
PLATELET # BLD AUTO: 295 10(3)UL (ref 150–450)
POTASSIUM SERPL-SCNC: 4.9 MMOL/L (ref 3.5–5.1)
RBC # BLD AUTO: 4.53 X10(6)UL (ref 3.8–5.3)
SODIUM SERPL-SCNC: 140 MMOL/L (ref 136–145)
WBC # BLD AUTO: 7 X10(3) UL (ref 4–11)

## 2019-09-23 PROCEDURE — 36415 COLL VENOUS BLD VENIPUNCTURE: CPT

## 2019-09-23 PROCEDURE — 90662 IIV NO PRSV INCREASED AG IM: CPT | Performed by: INTERNAL MEDICINE

## 2019-09-23 PROCEDURE — 93010 ELECTROCARDIOGRAM REPORT: CPT | Performed by: INTERNAL MEDICINE

## 2019-09-23 PROCEDURE — G0463 HOSPITAL OUTPT CLINIC VISIT: HCPCS | Performed by: INTERNAL MEDICINE

## 2019-09-23 PROCEDURE — 99214 OFFICE O/P EST MOD 30 MIN: CPT | Performed by: INTERNAL MEDICINE

## 2019-09-23 PROCEDURE — 80048 BASIC METABOLIC PNL TOTAL CA: CPT

## 2019-09-23 PROCEDURE — G0008 ADMIN INFLUENZA VIRUS VAC: HCPCS | Performed by: INTERNAL MEDICINE

## 2019-09-23 PROCEDURE — 93005 ELECTROCARDIOGRAM TRACING: CPT

## 2019-09-23 PROCEDURE — 85025 COMPLETE CBC W/AUTO DIFF WBC: CPT

## 2019-09-23 NOTE — PROGRESS NOTES
HPI:    Patient ID: Tracie Raymundo is a [de-identified]year old female.     HPI    Follow up    HTN  Long standing history of hypertension     sympotms  :        Headache no  dizziness        no                             Blurred vision no  palpitaionsSyncope no  Maryam Psychiatric/Behavioral: Negative for agitation and behavioral problems. Current Outpatient Medications:  TURMERIC OR Take by mouth.  Disp:  Rfl:    Albuterol Sulfate  (90 Base) MCG/ACT Inhalation Aero Soln inhale 2 puff by inhalation rout knee 3/27/2018   • Pulmonary embolism (HCC)    • S/P colonoscopy with polypectomy 9/8/2017   • Spinal stenosis of lumbar region without neurogenic claudication 1/24/2019   • Spondylolisthesis of lumbar region 1/15/2019   • Thyroid condition       Past Surg PHYSICAL EXAM:    Physical Exam   Constitutional: She appears well-nourished. No distress. HENT:   Head: Normocephalic and atraumatic.    Right Ear: External ear normal.   Left Ear: External ear normal.   Nose: Nose normal.   Mouth/Throat: Oropharynx HIGH DOSE PRSV FREE        FY#[id number]

## 2019-09-25 ENCOUNTER — ANTI-COAG VISIT (OUTPATIENT)
Dept: INTERNAL MEDICINE CLINIC | Facility: CLINIC | Age: 79
End: 2019-09-25
Payer: MEDICARE

## 2019-09-25 DIAGNOSIS — T81.718A IATROGENIC PULMONARY EMBOLISM AND INFARCTION, INITIAL ENCOUNTER (HCC): ICD-10-CM

## 2019-09-25 DIAGNOSIS — Z51.81 ENCOUNTER FOR THERAPEUTIC DRUG MONITORING: ICD-10-CM

## 2019-09-25 DIAGNOSIS — Z79.01 LONG TERM (CURRENT) USE OF ANTICOAGULANTS: ICD-10-CM

## 2019-09-25 DIAGNOSIS — I26.99 PULMONARY EMBOLISM WITH INFARCTION (HCC): ICD-10-CM

## 2019-09-25 DIAGNOSIS — I26.99 IATROGENIC PULMONARY EMBOLISM AND INFARCTION, INITIAL ENCOUNTER (HCC): ICD-10-CM

## 2019-09-25 LAB — INR: 2.8 (ref 2–3)

## 2019-09-25 PROCEDURE — 85610 PROTHROMBIN TIME: CPT

## 2019-09-25 PROCEDURE — 36416 COLLJ CAPILLARY BLOOD SPEC: CPT

## 2019-09-30 ENCOUNTER — OFFICE VISIT (OUTPATIENT)
Dept: PODIATRY CLINIC | Facility: CLINIC | Age: 79
End: 2019-09-30
Payer: MEDICARE

## 2019-09-30 DIAGNOSIS — M79.675 PAIN IN TOES OF BOTH FEET: Primary | ICD-10-CM

## 2019-09-30 DIAGNOSIS — M79.674 PAIN IN TOES OF BOTH FEET: Primary | ICD-10-CM

## 2019-09-30 DIAGNOSIS — B35.1 ONYCHOMYCOSIS: ICD-10-CM

## 2019-09-30 PROCEDURE — 99203 OFFICE O/P NEW LOW 30 MIN: CPT | Performed by: PODIATRIST

## 2019-09-30 PROCEDURE — G0463 HOSPITAL OUTPT CLINIC VISIT: HCPCS | Performed by: PODIATRIST

## 2019-09-30 PROCEDURE — 11721 DEBRIDE NAIL 6 OR MORE: CPT | Performed by: PODIATRIST

## 2019-09-30 NOTE — PROGRESS NOTES
HPI:    Patient ID: Samuel Wood is a [de-identified]year old female. This pleasant 79-year-old female presents as a new patient to me and states that she is self-referred. She is having a lot of pain associated with her toenails.   She states that they are [place] and change associated with chronic long-standing mycosis. There are no signs of infection there is no open or draining noted. I discussed and reviewed the etiology, progression, and the options of care.   Careful and complete debridement of all 9 toenails

## 2019-10-08 DIAGNOSIS — R91.1 PULMONARY NODULE: Primary | ICD-10-CM

## 2019-10-15 ENCOUNTER — OFFICE VISIT (OUTPATIENT)
Dept: HEMATOLOGY/ONCOLOGY | Facility: HOSPITAL | Age: 79
End: 2019-10-15
Attending: INTERNAL MEDICINE
Payer: MEDICARE

## 2019-10-15 VITALS
HEART RATE: 75 BPM | DIASTOLIC BLOOD PRESSURE: 58 MMHG | TEMPERATURE: 98 F | BODY MASS INDEX: 33.46 KG/M2 | SYSTOLIC BLOOD PRESSURE: 138 MMHG | RESPIRATION RATE: 18 BRPM | HEIGHT: 70 IN | OXYGEN SATURATION: 96 % | WEIGHT: 233.69 LBS

## 2019-10-15 DIAGNOSIS — I82.403 DEEP VEIN THROMBOSIS (DVT) OF BOTH LOWER EXTREMITIES, UNSPECIFIED CHRONICITY, UNSPECIFIED VEIN (HCC): Primary | ICD-10-CM

## 2019-10-15 DIAGNOSIS — C34.12 MALIGNANT NEOPLASM OF UPPER LOBE OF LEFT LUNG (HCC): ICD-10-CM

## 2019-10-15 DIAGNOSIS — R91.1 PULMONARY NODULE: ICD-10-CM

## 2019-10-15 DIAGNOSIS — I26.99 BILATERAL PULMONARY EMBOLISM (HCC): ICD-10-CM

## 2019-10-15 PROCEDURE — 99214 OFFICE O/P EST MOD 30 MIN: CPT | Performed by: INTERNAL MEDICINE

## 2019-10-15 NOTE — PROGRESS NOTES
Cancer Center Progress Note    Patient Name: Winston Molina   YOB: 1940   Medical Record Number: [id number]   Attending Physician: Jimmy Luis M.D.        Chief Complaint:  Lung cancer    History of Present Illness:  Cancer history:  69-year- 3/23/2017   • Primary osteoarthritis of right knee 3/27/2018   • Pulmonary embolism ([address]    • S/P colonoscopy with polypectomy 9/8/2017   • Spinal stenosis of lumbar region without neurogenic claudication 1/24/2019   • Spondylolisthesis of lumbar region 1 education: Not on file      Highest education level: Not on file    Occupational History      Not on file    Social Needs      Financial resource strain: Not on file      Food insecurity:        Worry: Not on file        Inability: Not on file      Transpo defibrillator: No        Breast feeding: Not Asked        Reaction to local anesthetic: No    Social History Narrative      Not on file        Current Medications:    Current Outpatient Medications:   •  TURMERIC OR, Take by mouth., Disp: , Rfl:   •  Peña Lab Results  Component Value Date   RBC 4.78 12/02/2016   HGB 13.0 12/02/2016   HCT 40.0 12/02/2016   MCV 83.8 12/02/2016   MCH 27.2 12/02/2016   MCHC 32.4 12/02/2016   RDW 16.9* 12/02/2016   WBC 7.1 12/02/2016    12/02/2016     CMP: No results fo

## 2019-10-23 ENCOUNTER — ANTI-COAG VISIT (OUTPATIENT)
Dept: INTERNAL MEDICINE CLINIC | Facility: CLINIC | Age: 79
End: 2019-10-23
Payer: MEDICARE

## 2019-10-23 DIAGNOSIS — T81.718A IATROGENIC PULMONARY EMBOLISM AND INFARCTION, INITIAL ENCOUNTER (HCC): ICD-10-CM

## 2019-10-23 DIAGNOSIS — Z51.81 ENCOUNTER FOR THERAPEUTIC DRUG MONITORING: ICD-10-CM

## 2019-10-23 DIAGNOSIS — I26.99 PULMONARY EMBOLISM WITH INFARCTION (HCC): ICD-10-CM

## 2019-10-23 DIAGNOSIS — I26.99 IATROGENIC PULMONARY EMBOLISM AND INFARCTION, INITIAL ENCOUNTER (HCC): ICD-10-CM

## 2019-10-23 DIAGNOSIS — Z79.01 LONG TERM (CURRENT) USE OF ANTICOAGULANTS: ICD-10-CM

## 2019-10-23 PROCEDURE — 36416 COLLJ CAPILLARY BLOOD SPEC: CPT

## 2019-10-23 PROCEDURE — 85610 PROTHROMBIN TIME: CPT

## 2019-11-20 ENCOUNTER — ANTI-COAG VISIT (OUTPATIENT)
Dept: INTERNAL MEDICINE CLINIC | Facility: CLINIC | Age: 79
End: 2019-11-20
Payer: MEDICARE

## 2019-11-20 DIAGNOSIS — T81.718A IATROGENIC PULMONARY EMBOLISM AND INFARCTION, INITIAL ENCOUNTER (HCC): ICD-10-CM

## 2019-11-20 DIAGNOSIS — Z79.01 LONG TERM (CURRENT) USE OF ANTICOAGULANTS: ICD-10-CM

## 2019-11-20 DIAGNOSIS — Z51.81 ENCOUNTER FOR THERAPEUTIC DRUG MONITORING: ICD-10-CM

## 2019-11-20 DIAGNOSIS — I26.99 IATROGENIC PULMONARY EMBOLISM AND INFARCTION, INITIAL ENCOUNTER (HCC): ICD-10-CM

## 2019-11-20 DIAGNOSIS — I26.99 PULMONARY EMBOLISM WITH INFARCTION (HCC): ICD-10-CM

## 2019-11-20 PROCEDURE — 36416 COLLJ CAPILLARY BLOOD SPEC: CPT

## 2019-11-20 PROCEDURE — 85610 PROTHROMBIN TIME: CPT

## 2019-12-12 ENCOUNTER — ANTI-COAG VISIT (OUTPATIENT)
Dept: INTERNAL MEDICINE CLINIC | Facility: CLINIC | Age: 79
End: 2019-12-12
Payer: MEDICARE

## 2019-12-12 DIAGNOSIS — Z51.81 ENCOUNTER FOR THERAPEUTIC DRUG MONITORING: ICD-10-CM

## 2019-12-12 DIAGNOSIS — Z79.01 LONG TERM (CURRENT) USE OF ANTICOAGULANTS: ICD-10-CM

## 2019-12-12 DIAGNOSIS — T81.718A IATROGENIC PULMONARY EMBOLISM AND INFARCTION, INITIAL ENCOUNTER (HCC): ICD-10-CM

## 2019-12-12 DIAGNOSIS — I26.99 PULMONARY EMBOLISM WITH INFARCTION (HCC): ICD-10-CM

## 2019-12-12 DIAGNOSIS — I26.99 IATROGENIC PULMONARY EMBOLISM AND INFARCTION, INITIAL ENCOUNTER (HCC): ICD-10-CM

## 2019-12-12 PROCEDURE — 36416 COLLJ CAPILLARY BLOOD SPEC: CPT

## 2019-12-12 PROCEDURE — 85610 PROTHROMBIN TIME: CPT

## 2019-12-18 ENCOUNTER — OFFICE VISIT (OUTPATIENT)
Dept: INTERNAL MEDICINE CLINIC | Facility: CLINIC | Age: 79
End: 2019-12-18
Payer: MEDICARE

## 2019-12-18 VITALS
DIASTOLIC BLOOD PRESSURE: 69 MMHG | WEIGHT: 232 LBS | SYSTOLIC BLOOD PRESSURE: 130 MMHG | BODY MASS INDEX: 33.21 KG/M2 | HEIGHT: 70 IN | HEART RATE: 76 BPM | TEMPERATURE: 98 F

## 2019-12-18 DIAGNOSIS — J44.9 COPD, MILD (HCC): ICD-10-CM

## 2019-12-18 DIAGNOSIS — J43.9 PULMONARY EMPHYSEMA, UNSPECIFIED EMPHYSEMA TYPE (HCC): ICD-10-CM

## 2019-12-18 DIAGNOSIS — R32 URINARY INCONTINENCE, UNSPECIFIED TYPE: ICD-10-CM

## 2019-12-18 DIAGNOSIS — I10 ESSENTIAL HYPERTENSION: Primary | ICD-10-CM

## 2019-12-18 DIAGNOSIS — N18.30 STAGE 3 CHRONIC KIDNEY DISEASE (HCC): ICD-10-CM

## 2019-12-18 PROCEDURE — G0463 HOSPITAL OUTPT CLINIC VISIT: HCPCS | Performed by: INTERNAL MEDICINE

## 2019-12-18 PROCEDURE — 99214 OFFICE O/P EST MOD 30 MIN: CPT | Performed by: INTERNAL MEDICINE

## 2019-12-19 ENCOUNTER — APPOINTMENT (OUTPATIENT)
Dept: LAB | Age: 79
End: 2019-12-19
Attending: INTERNAL MEDICINE
Payer: MEDICARE

## 2019-12-19 DIAGNOSIS — R32 URINARY INCONTINENCE, UNSPECIFIED TYPE: ICD-10-CM

## 2019-12-19 PROCEDURE — 80048 BASIC METABOLIC PNL TOTAL CA: CPT

## 2019-12-19 PROCEDURE — 36415 COLL VENOUS BLD VENIPUNCTURE: CPT

## 2019-12-20 NOTE — PROGRESS NOTES
HPI:    Patient ID: Catracho Minor is a [de-identified]year old female.     HPI     Follow up    HTN  Long standing history of hypertension     sympotms  :        Headache no  dizziness        no                             Blurred vision no  palpitaionsSyncope no  Ch Medication Sig Dispense Refill   • TURMERIC OR Take by mouth.      • Albuterol Sulfate  (90 Base) MCG/ACT Inhalation Aero Soln inhale 2 puff by inhalation route  every 4 - 6 hours as needed 1 Inhaler 5   • WARFARIN SODIUM 5 MG Oral Tab TAKE AS DIRE neurogenic claudication 1/24/2019   • Spondylolisthesis of lumbar region 1/15/2019   • Thyroid condition       Past Surgical History:   Procedure Laterality Date   • CHOLECYSTECTOMY     • COLONOSCOPY     • COLONOSCOPY N/A 9/8/2017    Performed by Blake Vaughan Right Ear: External ear normal.   Left Ear: External ear normal.   Nose: Nose normal.   Mouth/Throat: Oropharynx is clear and moist. No oropharyngeal exudate. Eyes: Pupils are equal, round, and reactive to light.  Conjunctivae and EOM are normal. Right ordered in this encounter       Imaging & Referrals:  OP REFERRAL TO UROGYNECOLOGY CLINIC        EF#[id number]

## 2019-12-27 ENCOUNTER — ANTI-COAG VISIT (OUTPATIENT)
Dept: INTERNAL MEDICINE CLINIC | Facility: CLINIC | Age: 79
End: 2019-12-27
Payer: MEDICARE

## 2019-12-27 DIAGNOSIS — Z51.81 ENCOUNTER FOR THERAPEUTIC DRUG MONITORING: ICD-10-CM

## 2019-12-27 DIAGNOSIS — T81.718A IATROGENIC PULMONARY EMBOLISM AND INFARCTION, INITIAL ENCOUNTER (HCC): ICD-10-CM

## 2019-12-27 DIAGNOSIS — Z79.01 LONG TERM (CURRENT) USE OF ANTICOAGULANTS: ICD-10-CM

## 2019-12-27 DIAGNOSIS — I26.99 PULMONARY EMBOLISM WITH INFARCTION (HCC): ICD-10-CM

## 2019-12-27 DIAGNOSIS — I26.99 IATROGENIC PULMONARY EMBOLISM AND INFARCTION, INITIAL ENCOUNTER (HCC): ICD-10-CM

## 2019-12-27 PROCEDURE — 36416 COLLJ CAPILLARY BLOOD SPEC: CPT

## 2019-12-27 PROCEDURE — 85610 PROTHROMBIN TIME: CPT

## 2020-01-10 ENCOUNTER — ANTI-COAG VISIT (OUTPATIENT)
Dept: INTERNAL MEDICINE CLINIC | Facility: CLINIC | Age: 80
End: 2020-01-10
Payer: MEDICARE

## 2020-01-10 DIAGNOSIS — I26.99 IATROGENIC PULMONARY EMBOLISM AND INFARCTION, INITIAL ENCOUNTER (HCC): ICD-10-CM

## 2020-01-10 DIAGNOSIS — T81.718A IATROGENIC PULMONARY EMBOLISM AND INFARCTION, INITIAL ENCOUNTER (HCC): ICD-10-CM

## 2020-01-10 DIAGNOSIS — I26.99 PULMONARY EMBOLISM WITH INFARCTION (HCC): ICD-10-CM

## 2020-01-10 DIAGNOSIS — Z51.81 ENCOUNTER FOR THERAPEUTIC DRUG MONITORING: ICD-10-CM

## 2020-01-10 DIAGNOSIS — Z79.01 LONG TERM (CURRENT) USE OF ANTICOAGULANTS: ICD-10-CM

## 2020-01-10 LAB
INR: 2.7 (ref 2–3)
TEST STRIP EXPIRATION DATE: NORMAL DATE

## 2020-01-10 PROCEDURE — 36416 COLLJ CAPILLARY BLOOD SPEC: CPT

## 2020-01-10 PROCEDURE — 85610 PROTHROMBIN TIME: CPT

## 2020-01-27 ENCOUNTER — HOSPITAL ENCOUNTER (OUTPATIENT)
Age: 80
Discharge: HOME | End: 2020-01-27
Attending: EMERGENCY MEDICINE
Payer: MEDICARE

## 2020-01-27 VITALS
TEMPERATURE: 98 F | HEIGHT: 70 IN | DIASTOLIC BLOOD PRESSURE: 69 MMHG | WEIGHT: 230 LBS | HEART RATE: 85 BPM | SYSTOLIC BLOOD PRESSURE: 144 MMHG | BODY MASS INDEX: 32.93 KG/M2 | OXYGEN SATURATION: 95 % | RESPIRATION RATE: 18 BRPM

## 2020-01-27 DIAGNOSIS — B02.9 HERPES ZOSTER WITHOUT COMPLICATION: Primary | ICD-10-CM

## 2020-01-27 PROCEDURE — 99213 OFFICE O/P EST LOW 20 MIN: CPT

## 2020-01-27 PROCEDURE — 99214 OFFICE O/P EST MOD 30 MIN: CPT

## 2020-01-27 RX ORDER — LIDOCAINE 50 MG/G
1 OINTMENT TOPICAL EVERY 6 HOURS PRN
Qty: 50 G | Refills: 2 | Status: SHIPPED | OUTPATIENT
Start: 2020-01-27 | End: 2020-12-29 | Stop reason: ALTCHOICE

## 2020-01-27 RX ORDER — PREDNISONE 20 MG/1
40 TABLET ORAL DAILY
Qty: 10 TABLET | Refills: 0 | Status: SHIPPED | OUTPATIENT
Start: 2020-01-27 | End: 2020-02-01

## 2020-01-27 RX ORDER — ACYCLOVIR 400 MG/1
400 TABLET ORAL
Qty: 35 TABLET | Refills: 0 | Status: SHIPPED | OUTPATIENT
Start: 2020-01-27 | End: 2020-02-03

## 2020-01-27 NOTE — ED PROVIDER NOTES
Patient presents with:  Back Pain    Stated Complaint: back pain    HPI  Patient complains of skin rash and back pain for couple days. Located l flank around to front. Describes as red bumps very painful. Possible exposures include:  none.    No fever or foot.   • HIP REPLACEMENT SURGERY Right    • HIP SURGERY Right 2015    right total hip arthroplasty   • HYSTERECTOMY     • LAPAROSCOPIC CHOLECYSTECTOMY     •       x7 (Twins x1) Max weight 9 1/2 lbs   • OTHER SURGICAL HISTORY      parotid gland removed Use      Smoking status: Never Smoker      Smokeless tobacco: Never Used    Alcohol use:  Yes      Alcohol/week: 0.8 standard drinks      Types: 1 Glasses of wine per week      Comment: 1-2x/month    Drug use: No      Review of Systems    Positive for state

## 2020-01-27 NOTE — ED INITIAL ASSESSMENT (HPI)
LEFT THORACIC BACK PAIN FOR 5 DAYS  \"SHARP, SHOOTING\" PAIN  +RASH NOTED TO LEFT THORACIC AREA AND RADIATES TO LEFT ABDOMEN  NO FEVER

## 2020-02-07 ENCOUNTER — ANTI-COAG VISIT (OUTPATIENT)
Dept: INTERNAL MEDICINE CLINIC | Facility: CLINIC | Age: 80
End: 2020-02-07
Payer: MEDICARE

## 2020-02-07 DIAGNOSIS — I26.99 PULMONARY EMBOLISM WITH INFARCTION (HCC): ICD-10-CM

## 2020-02-07 DIAGNOSIS — I26.99 IATROGENIC PULMONARY EMBOLISM AND INFARCTION, INITIAL ENCOUNTER (HCC): ICD-10-CM

## 2020-02-07 DIAGNOSIS — Z79.01 LONG TERM (CURRENT) USE OF ANTICOAGULANTS: ICD-10-CM

## 2020-02-07 DIAGNOSIS — Z51.81 ENCOUNTER FOR THERAPEUTIC DRUG MONITORING: ICD-10-CM

## 2020-02-07 DIAGNOSIS — T81.718A IATROGENIC PULMONARY EMBOLISM AND INFARCTION, INITIAL ENCOUNTER (HCC): ICD-10-CM

## 2020-02-07 LAB
INR: 3.5 (ref 0.8–1.2)
TEST STRIP EXPIRATION DATE: ABNORMAL DATE

## 2020-02-07 PROCEDURE — 85610 PROTHROMBIN TIME: CPT

## 2020-02-07 PROCEDURE — 36416 COLLJ CAPILLARY BLOOD SPEC: CPT

## 2020-02-21 ENCOUNTER — TELEPHONE (OUTPATIENT)
Dept: INTERNAL MEDICINE CLINIC | Facility: CLINIC | Age: 80
End: 2020-02-21

## 2020-02-21 ENCOUNTER — ANTI-COAG VISIT (OUTPATIENT)
Dept: INTERNAL MEDICINE CLINIC | Facility: CLINIC | Age: 80
End: 2020-02-21
Payer: MEDICARE

## 2020-02-21 DIAGNOSIS — I26.99 PULMONARY EMBOLISM WITH INFARCTION (HCC): ICD-10-CM

## 2020-02-21 DIAGNOSIS — I26.99 IATROGENIC PULMONARY EMBOLISM AND INFARCTION, INITIAL ENCOUNTER (HCC): ICD-10-CM

## 2020-02-21 DIAGNOSIS — Z51.81 ENCOUNTER FOR THERAPEUTIC DRUG MONITORING: ICD-10-CM

## 2020-02-21 DIAGNOSIS — Z79.01 LONG TERM (CURRENT) USE OF ANTICOAGULANTS: ICD-10-CM

## 2020-02-21 DIAGNOSIS — T81.718A IATROGENIC PULMONARY EMBOLISM AND INFARCTION, INITIAL ENCOUNTER (HCC): ICD-10-CM

## 2020-02-21 LAB — INR: 4.9 (ref 0.8–1.2)

## 2020-02-21 PROCEDURE — 85610 PROTHROMBIN TIME: CPT

## 2020-02-21 PROCEDURE — 36416 COLLJ CAPILLARY BLOOD SPEC: CPT

## 2020-02-21 NOTE — TELEPHONE ENCOUNTER
inr 4.9   goal 2.0-3.0    2/21  . Patient taking pain medication consistently for shingles. Understands per protocol to hold 2 doses and decrease weekly dose by 50%, actual 43.8%. Lab in 5-7 days but pt see md on Monday and so we will recheck then.  LM

## 2020-02-24 ENCOUNTER — OFFICE VISIT (OUTPATIENT)
Dept: INTERNAL MEDICINE CLINIC | Facility: CLINIC | Age: 80
End: 2020-02-24
Payer: MEDICARE

## 2020-02-24 ENCOUNTER — TELEPHONE (OUTPATIENT)
Dept: INTERNAL MEDICINE CLINIC | Facility: CLINIC | Age: 80
End: 2020-02-24

## 2020-02-24 VITALS
BODY MASS INDEX: 32.78 KG/M2 | WEIGHT: 229 LBS | HEART RATE: 73 BPM | HEIGHT: 70 IN | TEMPERATURE: 98 F | SYSTOLIC BLOOD PRESSURE: 129 MMHG | DIASTOLIC BLOOD PRESSURE: 68 MMHG

## 2020-02-24 DIAGNOSIS — N18.30 STAGE 3 CHRONIC KIDNEY DISEASE (HCC): ICD-10-CM

## 2020-02-24 DIAGNOSIS — B02.29 POST HERPETIC NEURALGIA: Primary | ICD-10-CM

## 2020-02-24 DIAGNOSIS — I10 ESSENTIAL HYPERTENSION WITH GOAL BLOOD PRESSURE LESS THAN 130/80: ICD-10-CM

## 2020-02-24 DIAGNOSIS — I10 ESSENTIAL HYPERTENSION: ICD-10-CM

## 2020-02-24 DIAGNOSIS — B02.8 HERPES ZOSTER WITH COMPLICATION: ICD-10-CM

## 2020-02-24 PROCEDURE — 99214 OFFICE O/P EST MOD 30 MIN: CPT | Performed by: INTERNAL MEDICINE

## 2020-02-24 PROCEDURE — G0463 HOSPITAL OUTPT CLINIC VISIT: HCPCS | Performed by: INTERNAL MEDICINE

## 2020-02-24 RX ORDER — GABAPENTIN 100 MG/1
100 CAPSULE ORAL 3 TIMES DAILY
Qty: 90 CAPSULE | Refills: 3 | Status: SHIPPED | OUTPATIENT
Start: 2020-02-24 | End: 2020-09-16

## 2020-02-24 NOTE — TELEPHONE ENCOUNTER
Patient cancelled her appointment for today [facility]24/2020 [facility], however, is requesting call back from nurse at the [facility] regarding appointment.

## 2020-02-27 ENCOUNTER — ANTI-COAG VISIT (OUTPATIENT)
Dept: INTERNAL MEDICINE CLINIC | Facility: CLINIC | Age: 80
End: 2020-02-27
Payer: MEDICARE

## 2020-02-27 DIAGNOSIS — T81.718A IATROGENIC PULMONARY EMBOLISM AND INFARCTION, INITIAL ENCOUNTER (HCC): ICD-10-CM

## 2020-02-27 DIAGNOSIS — Z51.81 ENCOUNTER FOR THERAPEUTIC DRUG MONITORING: ICD-10-CM

## 2020-02-27 DIAGNOSIS — I26.99 PULMONARY EMBOLISM WITH INFARCTION (HCC): ICD-10-CM

## 2020-02-27 DIAGNOSIS — I26.99 IATROGENIC PULMONARY EMBOLISM AND INFARCTION, INITIAL ENCOUNTER (HCC): ICD-10-CM

## 2020-02-27 DIAGNOSIS — Z79.01 LONG TERM (CURRENT) USE OF ANTICOAGULANTS: ICD-10-CM

## 2020-02-27 LAB
INR: 1.2 (ref 0.8–1.2)
TEST STRIP EXPIRATION DATE: NORMAL DATE

## 2020-02-27 PROCEDURE — 36416 COLLJ CAPILLARY BLOOD SPEC: CPT

## 2020-02-27 PROCEDURE — 85610 PROTHROMBIN TIME: CPT

## 2020-03-05 ENCOUNTER — TELEPHONE (OUTPATIENT)
Dept: INTERNAL MEDICINE CLINIC | Facility: CLINIC | Age: 80
End: 2020-03-05

## 2020-03-05 ENCOUNTER — ANTI-COAG VISIT (OUTPATIENT)
Dept: INTERNAL MEDICINE CLINIC | Facility: CLINIC | Age: 80
End: 2020-03-05
Payer: MEDICARE

## 2020-03-05 DIAGNOSIS — Z79.01 LONG TERM (CURRENT) USE OF ANTICOAGULANTS: ICD-10-CM

## 2020-03-05 DIAGNOSIS — I26.99 IATROGENIC PULMONARY EMBOLISM AND INFARCTION, INITIAL ENCOUNTER (HCC): ICD-10-CM

## 2020-03-05 DIAGNOSIS — T81.718A IATROGENIC PULMONARY EMBOLISM AND INFARCTION, INITIAL ENCOUNTER (HCC): ICD-10-CM

## 2020-03-05 DIAGNOSIS — I26.99 PULMONARY EMBOLISM WITH INFARCTION (HCC): ICD-10-CM

## 2020-03-05 DIAGNOSIS — Z51.81 ENCOUNTER FOR THERAPEUTIC DRUG MONITORING: ICD-10-CM

## 2020-03-05 LAB
INR: 1.4 (ref 0.8–1.2)
TEST STRIP EXPIRATION DATE: ABNORMAL DATE

## 2020-03-05 PROCEDURE — 36416 COLLJ CAPILLARY BLOOD SPEC: CPT

## 2020-03-05 PROCEDURE — 85610 PROTHROMBIN TIME: CPT

## 2020-03-05 NOTE — TELEPHONE ENCOUNTER
Today's INR 1.4  Goal 2-3  Protocol increase by 20% recheck in 1-2 weeks  Actual increase 22.2% rechecking in 1 week.

## 2020-03-05 NOTE — PROGRESS NOTES
HPI:    Patient ID: Tracie Raymundo is a [de-identified]year old female. HPI    followu p  Urgent care  1/27  Dx with shingle  Patient complains of skin rash and back pain for couple days. Located l flank around to front. Describes as red bumps very painful.  Pos FOOT/TOES SURGERY PROC UNLISTED Left       Pt had surgery to straighten toes on left foot.    • HIP REPLACEMENT SURGERY Right     • HIP SURGERY Right 2015     right total hip arthroplasty   • HYSTERECTOMY       • LAPAROSCOPIC CHOLECYSTECTOMY       •    70   • Macular degeneration Maternal Aunt     • Glaucoma Maternal Aunt     • Diabetes Neg           Social History    Tobacco Use      Smoking status: Never Smoker      Smokeless tobacco: Never Used    Alcohol use:  Yes      Alcohol/week: 0.8 standard drink Refills: 0     lidocaine 5 % External Ointment  Apply 1 Application topically every 6 (six) hours as needed (Pain).   Qty: 50 g Refills: 2                    /68 (BP Location: Right arm, Patient Position: Sitting, Cuff Size: large)   Pulse 73   Temp 9 Ointment Apply 1 Application topically every 6 (six) hours as needed (Pain).  (Patient not taking: Reported on 2/24/2020 ) 50 g 2     Allergies:  Fentanyl                ANAPHYLAXIS  Hydrocodone             RASH  Morphine                OTHER (SEE COMMENTS) foot.   • HIP REPLACEMENT SURGERY Right    • HIP SURGERY Right 2015    right total hip arthroplasty   • HYSTERECTOMY     • LAPAROSCOPIC CHOLECYSTECTOMY     •       x7 (Twins x1) Max weight 9 1/2 lbs   • OTHER SURGICAL HISTORY      parotid gland removed with goal blood pressure less than 130/80  Plan: /68 (BP Location: Right arm, Patient Position: Sitting, Cuff Size: large)   Pulse 73   Temp 97.5 °F (36.4 °C) (Oral)   Ht 5' 10\" (1.778 m)   Wt 229 lb (103.9 kg)   BMI 32.86 kg/m²   controlledCPM    (

## 2020-03-06 ENCOUNTER — TELEPHONE (OUTPATIENT)
Dept: INTERNAL MEDICINE CLINIC | Facility: CLINIC | Age: 80
End: 2020-03-06

## 2020-03-06 NOTE — TELEPHONE ENCOUNTER
Patient states her symptoms have gone away for shingles but still has some pain. She states she is taking 2 100mg 3 times a day and still is taking tylenol. Patient requesting another medication, unsure what to take. Please advise.     gabapentin 100 MG

## 2020-03-06 NOTE — TELEPHONE ENCOUNTER
Patient informed of provider's response/rRx sent as per below, and pt agrees. Advised to call back if still no improvement in pain and pt agrees.

## 2020-03-12 ENCOUNTER — TELEPHONE (OUTPATIENT)
Dept: PULMONOLOGY | Facility: CLINIC | Age: 80
End: 2020-03-12

## 2020-03-13 ENCOUNTER — ANTI-COAG VISIT (OUTPATIENT)
Dept: INTERNAL MEDICINE CLINIC | Facility: CLINIC | Age: 80
End: 2020-03-13
Payer: MEDICARE

## 2020-03-13 ENCOUNTER — TELEPHONE (OUTPATIENT)
Dept: INTERNAL MEDICINE CLINIC | Facility: CLINIC | Age: 80
End: 2020-03-13

## 2020-03-13 DIAGNOSIS — I26.99 PULMONARY EMBOLISM WITH INFARCTION (HCC): ICD-10-CM

## 2020-03-13 DIAGNOSIS — I26.99 IATROGENIC PULMONARY EMBOLISM AND INFARCTION, INITIAL ENCOUNTER (HCC): ICD-10-CM

## 2020-03-13 DIAGNOSIS — T81.718A IATROGENIC PULMONARY EMBOLISM AND INFARCTION, INITIAL ENCOUNTER (HCC): ICD-10-CM

## 2020-03-13 DIAGNOSIS — Z79.01 LONG TERM (CURRENT) USE OF ANTICOAGULANTS: ICD-10-CM

## 2020-03-13 DIAGNOSIS — Z51.81 ENCOUNTER FOR THERAPEUTIC DRUG MONITORING: ICD-10-CM

## 2020-03-13 LAB
INR: 1.3 (ref 0.8–1.2)
TEST STRIP EXPIRATION DATE: ABNORMAL DATE

## 2020-03-13 PROCEDURE — 85610 PROTHROMBIN TIME: CPT

## 2020-03-13 PROCEDURE — 36416 COLLJ CAPILLARY BLOOD SPEC: CPT

## 2020-03-13 NOTE — TELEPHONE ENCOUNTER
Today's INR 1.3  Goal 2.0-3.0    Understands per protocol to increase dose by 20% actual increase 18.2% and recheck INR in 1 week.

## 2020-03-20 ENCOUNTER — TELEPHONE (OUTPATIENT)
Dept: INTERNAL MEDICINE CLINIC | Facility: CLINIC | Age: 80
End: 2020-03-20

## 2020-03-20 ENCOUNTER — ANTI-COAG VISIT (OUTPATIENT)
Dept: INTERNAL MEDICINE CLINIC | Facility: CLINIC | Age: 80
End: 2020-03-20
Payer: MEDICARE

## 2020-03-20 DIAGNOSIS — Z79.01 LONG TERM (CURRENT) USE OF ANTICOAGULANTS: ICD-10-CM

## 2020-03-20 DIAGNOSIS — I26.99 IATROGENIC PULMONARY EMBOLISM AND INFARCTION, INITIAL ENCOUNTER (HCC): ICD-10-CM

## 2020-03-20 DIAGNOSIS — I26.99 PULMONARY EMBOLISM WITH INFARCTION (HCC): ICD-10-CM

## 2020-03-20 DIAGNOSIS — Z51.81 ENCOUNTER FOR THERAPEUTIC DRUG MONITORING: ICD-10-CM

## 2020-03-20 DIAGNOSIS — T81.718A IATROGENIC PULMONARY EMBOLISM AND INFARCTION, INITIAL ENCOUNTER (HCC): ICD-10-CM

## 2020-03-20 LAB
INR: 1.5 (ref 0.8–1.2)
TEST STRIP EXPIRATION DATE: ABNORMAL DATE

## 2020-03-20 PROCEDURE — 36416 COLLJ CAPILLARY BLOOD SPEC: CPT

## 2020-03-20 PROCEDURE — 85610 PROTHROMBIN TIME: CPT

## 2020-03-20 NOTE — TELEPHONE ENCOUNTER
Today's INR 1.5  Goal 2.0-3.0    Per protocol to increase dose by 20% and repeat INR in 1-2 weeks. Actual increase 23.1% and recheck INR in 2 weeks.

## 2020-04-03 ENCOUNTER — ANTI-COAG VISIT (OUTPATIENT)
Dept: INTERNAL MEDICINE CLINIC | Facility: CLINIC | Age: 80
End: 2020-04-03
Payer: MEDICARE

## 2020-04-03 DIAGNOSIS — I26.99 IATROGENIC PULMONARY EMBOLISM AND INFARCTION, INITIAL ENCOUNTER (HCC): ICD-10-CM

## 2020-04-03 DIAGNOSIS — Z79.01 LONG TERM (CURRENT) USE OF ANTICOAGULANTS: ICD-10-CM

## 2020-04-03 DIAGNOSIS — T81.718A IATROGENIC PULMONARY EMBOLISM AND INFARCTION, INITIAL ENCOUNTER (HCC): ICD-10-CM

## 2020-04-03 DIAGNOSIS — I26.99 PULMONARY EMBOLISM WITH INFARCTION (HCC): ICD-10-CM

## 2020-04-03 DIAGNOSIS — Z51.81 ENCOUNTER FOR THERAPEUTIC DRUG MONITORING: ICD-10-CM

## 2020-04-03 PROCEDURE — 85610 PROTHROMBIN TIME: CPT

## 2020-04-03 PROCEDURE — 36416 COLLJ CAPILLARY BLOOD SPEC: CPT

## 2020-04-06 ENCOUNTER — HOSPITAL ENCOUNTER (OUTPATIENT)
Dept: CT IMAGING | Facility: HOSPITAL | Age: 80
Discharge: HOME | End: 2020-04-06
Attending: INTERNAL MEDICINE
Payer: MEDICARE

## 2020-04-06 DIAGNOSIS — R91.1 PULMONARY NODULE: ICD-10-CM

## 2020-04-06 PROCEDURE — 71250 CT THORAX DX C-: CPT | Performed by: INTERNAL MEDICINE

## 2020-04-08 ENCOUNTER — TELEPHONE (OUTPATIENT)
Dept: PULMONOLOGY | Facility: CLINIC | Age: 80
End: 2020-04-08

## 2020-04-08 DIAGNOSIS — R91.1 PULMONARY NODULE: Primary | ICD-10-CM

## 2020-04-08 NOTE — TELEPHONE ENCOUNTER
Spoke to patient. Patient informed of Dr. Oriana Torres message below. Patient verbalized understanding. Order added to chronic calender.

## 2020-04-08 NOTE — TELEPHONE ENCOUNTER
----- Message from Olivia Donohue MD sent at 4/7/2020  8:17 PM CDT -----  RN, please call the patient to let her know that her CT scan the chest is stable. She has 2 tiny pulmonary nodules which have not grown.   Please add to the calendar a repeat super

## 2020-04-13 ENCOUNTER — TELEPHONE (OUTPATIENT)
Dept: HEMATOLOGY/ONCOLOGY | Facility: HOSPITAL | Age: 80
End: 2020-04-13

## 2020-04-13 NOTE — TELEPHONE ENCOUNTER
Let msg with Ms Alexia Us r/t stable CT scan that was reviewed with Dr Flory Adam. Dr Flory Adam ordered next surveillance scan in 6 months. Ok to reschedule Dr Katie Skinner appt back post-CT in 6 months as long as no urgent concerns or Qs.  Ok to call CC back for any clarific

## 2020-04-15 ENCOUNTER — APPOINTMENT (OUTPATIENT)
Dept: HEMATOLOGY/ONCOLOGY | Facility: HOSPITAL | Age: 80
End: 2020-04-15
Attending: INTERNAL MEDICINE
Payer: MEDICARE

## 2020-04-15 RX ORDER — GABAPENTIN 300 MG/1
CAPSULE ORAL
Qty: 90 CAPSULE | Refills: 0 | Status: SHIPPED | OUTPATIENT
Start: 2020-04-15 | End: 2020-07-15

## 2020-04-20 ENCOUNTER — ANTI-COAG VISIT (OUTPATIENT)
Dept: INTERNAL MEDICINE CLINIC | Facility: CLINIC | Age: 80
End: 2020-04-20
Payer: MEDICARE

## 2020-04-20 DIAGNOSIS — Z51.81 ENCOUNTER FOR THERAPEUTIC DRUG MONITORING: ICD-10-CM

## 2020-04-20 DIAGNOSIS — I26.99 PULMONARY EMBOLISM WITH INFARCTION (HCC): ICD-10-CM

## 2020-04-20 DIAGNOSIS — Z79.01 LONG TERM (CURRENT) USE OF ANTICOAGULANTS: ICD-10-CM

## 2020-04-20 DIAGNOSIS — I26.99 IATROGENIC PULMONARY EMBOLISM AND INFARCTION, INITIAL ENCOUNTER (HCC): ICD-10-CM

## 2020-04-20 DIAGNOSIS — T81.718A IATROGENIC PULMONARY EMBOLISM AND INFARCTION, INITIAL ENCOUNTER (HCC): ICD-10-CM

## 2020-04-20 PROCEDURE — 36416 COLLJ CAPILLARY BLOOD SPEC: CPT

## 2020-04-20 PROCEDURE — 85610 PROTHROMBIN TIME: CPT

## 2020-05-11 ENCOUNTER — ANTI-COAG VISIT (OUTPATIENT)
Dept: INTERNAL MEDICINE CLINIC | Facility: CLINIC | Age: 80
End: 2020-05-11
Payer: MEDICARE

## 2020-05-11 DIAGNOSIS — T81.718A IATROGENIC PULMONARY EMBOLISM AND INFARCTION, INITIAL ENCOUNTER (HCC): ICD-10-CM

## 2020-05-11 DIAGNOSIS — I26.99 IATROGENIC PULMONARY EMBOLISM AND INFARCTION, INITIAL ENCOUNTER (HCC): ICD-10-CM

## 2020-05-11 DIAGNOSIS — I26.99 PULMONARY EMBOLISM WITH INFARCTION (HCC): ICD-10-CM

## 2020-05-11 DIAGNOSIS — Z79.01 LONG TERM (CURRENT) USE OF ANTICOAGULANTS: ICD-10-CM

## 2020-05-11 DIAGNOSIS — Z51.81 ENCOUNTER FOR THERAPEUTIC DRUG MONITORING: ICD-10-CM

## 2020-05-11 PROCEDURE — 36416 COLLJ CAPILLARY BLOOD SPEC: CPT

## 2020-05-11 PROCEDURE — 85610 PROTHROMBIN TIME: CPT

## 2020-06-08 ENCOUNTER — ANTI-COAG VISIT (OUTPATIENT)
Dept: INTERNAL MEDICINE CLINIC | Facility: CLINIC | Age: 80
End: 2020-06-08
Payer: MEDICARE

## 2020-06-08 DIAGNOSIS — I26.99 PULMONARY EMBOLISM WITH INFARCTION (HCC): ICD-10-CM

## 2020-06-08 DIAGNOSIS — I26.99 IATROGENIC PULMONARY EMBOLISM AND INFARCTION, INITIAL ENCOUNTER (HCC): ICD-10-CM

## 2020-06-08 DIAGNOSIS — Z51.81 ENCOUNTER FOR THERAPEUTIC DRUG MONITORING: ICD-10-CM

## 2020-06-08 DIAGNOSIS — Z79.01 LONG TERM (CURRENT) USE OF ANTICOAGULANTS: ICD-10-CM

## 2020-06-08 DIAGNOSIS — T81.718A IATROGENIC PULMONARY EMBOLISM AND INFARCTION, INITIAL ENCOUNTER (HCC): ICD-10-CM

## 2020-06-08 PROCEDURE — 85610 PROTHROMBIN TIME: CPT

## 2020-06-08 PROCEDURE — 36416 COLLJ CAPILLARY BLOOD SPEC: CPT

## 2020-06-30 RX ORDER — WARFARIN SODIUM 5 MG/1
TABLET ORAL
Qty: 90 TABLET | Refills: 3 | OUTPATIENT
Start: 2020-06-30 | End: 2020-07-15

## 2020-07-06 ENCOUNTER — TELEPHONE (OUTPATIENT)
Dept: INTERNAL MEDICINE CLINIC | Facility: CLINIC | Age: 80
End: 2020-07-06

## 2020-07-06 ENCOUNTER — ANTI-COAG VISIT (OUTPATIENT)
Dept: INTERNAL MEDICINE CLINIC | Facility: CLINIC | Age: 80
End: 2020-07-06
Payer: MEDICARE

## 2020-07-06 DIAGNOSIS — I26.99 PULMONARY EMBOLISM WITH INFARCTION (HCC): ICD-10-CM

## 2020-07-06 DIAGNOSIS — T81.718A IATROGENIC PULMONARY EMBOLISM AND INFARCTION, INITIAL ENCOUNTER (HCC): ICD-10-CM

## 2020-07-06 DIAGNOSIS — Z79.01 LONG TERM (CURRENT) USE OF ANTICOAGULANTS: ICD-10-CM

## 2020-07-06 DIAGNOSIS — Z51.81 ENCOUNTER FOR THERAPEUTIC DRUG MONITORING: ICD-10-CM

## 2020-07-06 DIAGNOSIS — I26.99 IATROGENIC PULMONARY EMBOLISM AND INFARCTION, INITIAL ENCOUNTER (HCC): Primary | ICD-10-CM

## 2020-07-06 DIAGNOSIS — I26.99 IATROGENIC PULMONARY EMBOLISM AND INFARCTION, INITIAL ENCOUNTER (HCC): ICD-10-CM

## 2020-07-06 DIAGNOSIS — T81.718A IATROGENIC PULMONARY EMBOLISM AND INFARCTION, INITIAL ENCOUNTER (HCC): Primary | ICD-10-CM

## 2020-07-06 LAB
INR: 1.8 (ref 0.8–1.2)
TEST STRIP EXPIRATION DATE: ABNORMAL DATE

## 2020-07-06 PROCEDURE — 85610 PROTHROMBIN TIME: CPT

## 2020-07-06 PROCEDURE — 36416 COLLJ CAPILLARY BLOOD SPEC: CPT

## 2020-07-16 ENCOUNTER — PATIENT MESSAGE (OUTPATIENT)
Dept: INTERNAL MEDICINE CLINIC | Facility: CLINIC | Age: 80
End: 2020-07-16

## 2020-07-16 RX ORDER — WARFARIN SODIUM 5 MG/1
TABLET ORAL
Qty: 90 TABLET | Refills: 3 | OUTPATIENT
Start: 2020-07-16 | End: 2020-07-16

## 2020-07-16 RX ORDER — WARFARIN SODIUM 5 MG/1
TABLET ORAL
Qty: 90 TABLET | Refills: 3 | Status: SHIPPED | OUTPATIENT
Start: 2020-07-16 | End: 2020-09-18

## 2020-07-16 RX ORDER — GABAPENTIN 300 MG/1
300 CAPSULE ORAL 3 TIMES DAILY
Qty: 90 CAPSULE | Refills: 0 | Status: SHIPPED | OUTPATIENT
Start: 2020-07-16 | End: 2020-09-18

## 2020-07-27 ENCOUNTER — ANTI-COAG VISIT (OUTPATIENT)
Dept: INTERNAL MEDICINE CLINIC | Facility: CLINIC | Age: 80
End: 2020-07-27
Payer: MEDICARE

## 2020-07-27 DIAGNOSIS — Z79.01 LONG TERM (CURRENT) USE OF ANTICOAGULANTS: ICD-10-CM

## 2020-07-27 DIAGNOSIS — Z51.81 ENCOUNTER FOR THERAPEUTIC DRUG MONITORING: ICD-10-CM

## 2020-07-27 DIAGNOSIS — I26.99 PULMONARY EMBOLISM WITH INFARCTION (HCC): ICD-10-CM

## 2020-07-27 DIAGNOSIS — I26.99 IATROGENIC PULMONARY EMBOLISM AND INFARCTION, INITIAL ENCOUNTER (HCC): ICD-10-CM

## 2020-07-27 DIAGNOSIS — T81.718A IATROGENIC PULMONARY EMBOLISM AND INFARCTION, INITIAL ENCOUNTER (HCC): ICD-10-CM

## 2020-07-27 LAB
INR: 2.5 (ref 0.8–1.2)
TEST STRIP EXPIRATION DATE: ABNORMAL DATE

## 2020-07-27 PROCEDURE — 85610 PROTHROMBIN TIME: CPT

## 2020-07-27 PROCEDURE — 36416 COLLJ CAPILLARY BLOOD SPEC: CPT

## 2020-08-24 ENCOUNTER — ANTI-COAG VISIT (OUTPATIENT)
Dept: INTERNAL MEDICINE CLINIC | Facility: CLINIC | Age: 80
End: 2020-08-24
Payer: MEDICARE

## 2020-08-24 DIAGNOSIS — Z79.01 LONG TERM (CURRENT) USE OF ANTICOAGULANTS: ICD-10-CM

## 2020-08-24 DIAGNOSIS — I26.99 IATROGENIC PULMONARY EMBOLISM AND INFARCTION, INITIAL ENCOUNTER (HCC): ICD-10-CM

## 2020-08-24 DIAGNOSIS — T81.718A IATROGENIC PULMONARY EMBOLISM AND INFARCTION, INITIAL ENCOUNTER (HCC): ICD-10-CM

## 2020-08-24 DIAGNOSIS — I26.99 PULMONARY EMBOLISM WITH INFARCTION (HCC): ICD-10-CM

## 2020-08-24 DIAGNOSIS — Z51.81 ENCOUNTER FOR THERAPEUTIC DRUG MONITORING: ICD-10-CM

## 2020-08-24 LAB
INR: 3.3 (ref 0.8–1.2)
TEST STRIP EXPIRATION DATE: ABNORMAL DATE

## 2020-08-24 PROCEDURE — 85610 PROTHROMBIN TIME: CPT

## 2020-08-24 PROCEDURE — 36416 COLLJ CAPILLARY BLOOD SPEC: CPT

## 2020-09-08 ENCOUNTER — ANTI-COAG VISIT (OUTPATIENT)
Dept: INTERNAL MEDICINE CLINIC | Facility: CLINIC | Age: 80
End: 2020-09-08
Payer: MEDICARE

## 2020-09-08 DIAGNOSIS — Z51.81 ENCOUNTER FOR THERAPEUTIC DRUG MONITORING: ICD-10-CM

## 2020-09-08 DIAGNOSIS — Z79.01 LONG TERM (CURRENT) USE OF ANTICOAGULANTS: ICD-10-CM

## 2020-09-08 DIAGNOSIS — T81.718A IATROGENIC PULMONARY EMBOLISM AND INFARCTION, INITIAL ENCOUNTER (HCC): ICD-10-CM

## 2020-09-08 DIAGNOSIS — I26.99 IATROGENIC PULMONARY EMBOLISM AND INFARCTION, INITIAL ENCOUNTER (HCC): ICD-10-CM

## 2020-09-08 DIAGNOSIS — I26.99 PULMONARY EMBOLISM WITH INFARCTION (HCC): ICD-10-CM

## 2020-09-08 LAB
INR: 2.7 (ref 0.8–1.2)
TEST STRIP EXPIRATION DATE: ABNORMAL DATE

## 2020-09-08 PROCEDURE — 85610 PROTHROMBIN TIME: CPT

## 2020-09-08 PROCEDURE — 36416 COLLJ CAPILLARY BLOOD SPEC: CPT

## 2020-09-10 ENCOUNTER — TELEPHONE (OUTPATIENT)
Dept: INTERNAL MEDICINE CLINIC | Facility: CLINIC | Age: 80
End: 2020-09-10

## 2020-09-10 NOTE — TELEPHONE ENCOUNTER
Corry Romanugh  You 3 hours ago (12:25 PM)         I do not want gabapentin removed from my medication list!

## 2020-09-15 ENCOUNTER — PATIENT MESSAGE (OUTPATIENT)
Dept: PULMONOLOGY | Facility: CLINIC | Age: 80
End: 2020-09-15

## 2020-09-15 ENCOUNTER — TELEPHONE (OUTPATIENT)
Dept: INTERNAL MEDICINE CLINIC | Facility: CLINIC | Age: 80
End: 2020-09-15

## 2020-09-16 ENCOUNTER — TELEPHONE (OUTPATIENT)
Dept: PULMONOLOGY | Facility: CLINIC | Age: 80
End: 2020-09-16

## 2020-09-16 NOTE — TELEPHONE ENCOUNTER
From: Jeaneth Townsend  To: Chago Miller MD  Sent: 9/15/2020 4:15 PM CDT  Subject: Non-Urgent Medical Question    I have to schedule my CT scan and I do not know the phone number of the scheduling department.  Please provide me with the phone number, th

## 2020-09-16 NOTE — TELEPHONE ENCOUNTER
Pt is due for upcoming Ct Chest in October 2020. Letter mailed to pt and sent via [address] [place] [address] [address].

## 2020-09-16 NOTE — TELEPHONE ENCOUNTER
Advised patient that medication list  Is updated. Patient takes Gabapentin 300 mg by mouth three times a day.

## 2020-09-18 ENCOUNTER — TELEPHONE (OUTPATIENT)
Dept: INTERNAL MEDICINE CLINIC | Facility: CLINIC | Age: 80
End: 2020-09-18

## 2020-09-18 NOTE — TELEPHONE ENCOUNTER
Pharmacy note: Patient requested we contact you to have their prescription(s) filled for a 90-day supply. Fax new prescription(s) for    gabapentin 300 MG Oral Cap, Take 1 capsule (300 mg total) by mouth 3 (three) times daily. , Disp: 90 capsule, Rfl: 0    Warfarin Sodium 5 MG Oral Tab, Take up to 7.5mg as directed by Coumadin Clinic., Disp: 90 tablet, Rfl: 3    FUROSEMIDE 20 MG Oral Tab Qty90 Rfl. 0  Take one tablet (20MG) by mouth daily

## 2020-09-25 ENCOUNTER — HOSPITAL ENCOUNTER (OUTPATIENT)
Dept: CT IMAGING | Facility: HOSPITAL | Age: 80
Discharge: HOME | End: 2020-09-25
Attending: INTERNAL MEDICINE
Payer: MEDICARE

## 2020-09-25 DIAGNOSIS — R91.1 PULMONARY NODULE: ICD-10-CM

## 2020-09-25 PROCEDURE — 71250 CT THORAX DX C-: CPT | Performed by: INTERNAL MEDICINE

## 2020-09-30 ENCOUNTER — LAB ENCOUNTER (OUTPATIENT)
Dept: LAB | Age: 80
End: 2020-09-30
Attending: INTERNAL MEDICINE
Payer: MEDICARE

## 2020-09-30 ENCOUNTER — OFFICE VISIT (OUTPATIENT)
Dept: INTERNAL MEDICINE CLINIC | Facility: CLINIC | Age: 80
End: 2020-09-30
Payer: MEDICARE

## 2020-09-30 VITALS
HEIGHT: 70 IN | BODY MASS INDEX: 32.93 KG/M2 | SYSTOLIC BLOOD PRESSURE: 120 MMHG | TEMPERATURE: 97 F | HEART RATE: 84 BPM | DIASTOLIC BLOOD PRESSURE: 74 MMHG | WEIGHT: 230 LBS

## 2020-09-30 DIAGNOSIS — H25.13 AGE-RELATED NUCLEAR CATARACT OF BOTH EYES: ICD-10-CM

## 2020-09-30 DIAGNOSIS — Z78.0 POSTMENOPAUSAL: ICD-10-CM

## 2020-09-30 DIAGNOSIS — E78.5 HYPERLIPIDEMIA, UNSPECIFIED HYPERLIPIDEMIA TYPE: ICD-10-CM

## 2020-09-30 DIAGNOSIS — Z12.31 VISIT FOR SCREENING MAMMOGRAM: ICD-10-CM

## 2020-09-30 DIAGNOSIS — I10 ESSENTIAL HYPERTENSION: ICD-10-CM

## 2020-09-30 DIAGNOSIS — Z00.00 ENCOUNTER FOR ANNUAL HEALTH EXAMINATION: ICD-10-CM

## 2020-09-30 DIAGNOSIS — M17.12 PRIMARY OSTEOARTHRITIS OF LEFT KNEE: ICD-10-CM

## 2020-09-30 DIAGNOSIS — I10 ESSENTIAL HYPERTENSION WITH GOAL BLOOD PRESSURE LESS THAN 130/80: ICD-10-CM

## 2020-09-30 DIAGNOSIS — Z85.118 HISTORY OF LUNG CANCER: ICD-10-CM

## 2020-09-30 DIAGNOSIS — J43.9 PULMONARY EMPHYSEMA, UNSPECIFIED EMPHYSEMA TYPE (HCC): ICD-10-CM

## 2020-09-30 DIAGNOSIS — Z86.711 HISTORY OF PULMONARY EMBOLISM: ICD-10-CM

## 2020-09-30 DIAGNOSIS — Z00.00 MEDICARE ANNUAL WELLNESS VISIT, SUBSEQUENT: Primary | ICD-10-CM

## 2020-09-30 DIAGNOSIS — J44.9 COPD, MILD (HCC): ICD-10-CM

## 2020-09-30 DIAGNOSIS — N18.30 STAGE 3 CHRONIC KIDNEY DISEASE (HCC): ICD-10-CM

## 2020-09-30 DIAGNOSIS — R32 URINARY INCONTINENCE, UNSPECIFIED TYPE: ICD-10-CM

## 2020-09-30 PROBLEM — Z83.518 FAMILY HISTORY OF MACULAR DEGENERATION: Status: RESOLVED | Noted: 2017-11-09 | Resolved: 2020-09-30

## 2020-09-30 PROBLEM — K57.30 DIVERTICULOSIS LARGE INTESTINE W/O PERFORATION OR ABSCESS W/O BLEEDING: Status: RESOLVED | Noted: 2017-09-08 | Resolved: 2020-09-30

## 2020-09-30 PROBLEM — M51.36 DEGENERATIVE DISC DISEASE, LUMBAR: Status: RESOLVED | Noted: 2019-01-15 | Resolved: 2020-09-30

## 2020-09-30 PROBLEM — K64.8 INTERNAL HEMORRHOIDS: Status: RESOLVED | Noted: 2017-09-08 | Resolved: 2020-09-30

## 2020-09-30 PROBLEM — Z98.890 S/P COLONOSCOPY WITH POLYPECTOMY: Status: RESOLVED | Noted: 2017-09-08 | Resolved: 2020-09-30

## 2020-09-30 PROBLEM — M43.16 SPONDYLOLISTHESIS OF LUMBAR REGION: Status: RESOLVED | Noted: 2019-01-15 | Resolved: 2020-09-30

## 2020-09-30 PROBLEM — Z79.01 LONG TERM (CURRENT) USE OF ANTICOAGULANTS: Status: RESOLVED | Noted: 2019-07-29 | Resolved: 2020-09-30

## 2020-09-30 PROBLEM — Z83.511 FAMILY HISTORY OF GLAUCOMA IN MOTHER: Status: RESOLVED | Noted: 2018-11-27 | Resolved: 2020-09-30

## 2020-09-30 PROBLEM — H43.393 VITREOUS FLOATERS OF BOTH EYES: Status: RESOLVED | Noted: 2017-11-09 | Resolved: 2020-09-30

## 2020-09-30 PROBLEM — M48.061 SPINAL STENOSIS OF LUMBAR REGION WITHOUT NEUROGENIC CLAUDICATION: Status: RESOLVED | Noted: 2019-01-24 | Resolved: 2020-09-30

## 2020-09-30 PROBLEM — S83.241A ACUTE MEDIAL MENISCUS TEAR OF RIGHT KNEE: Status: RESOLVED | Noted: 2018-03-06 | Resolved: 2020-09-30

## 2020-09-30 PROBLEM — M51.26 LUMBAR HERNIATED DISC: Status: RESOLVED | Noted: 2019-01-24 | Resolved: 2020-09-30

## 2020-09-30 PROBLEM — T81.718A IATROGENIC PULMONARY EMBOLISM AND INFARCTION, INITIAL ENCOUNTER (HCC): Status: RESOLVED | Noted: 2019-07-29 | Resolved: 2020-09-30

## 2020-09-30 PROBLEM — M51.369 DEGENERATIVE DISC DISEASE, LUMBAR: Status: RESOLVED | Noted: 2019-01-15 | Resolved: 2020-09-30

## 2020-09-30 PROBLEM — I26.99 IATROGENIC PULMONARY EMBOLISM AND INFARCTION, INITIAL ENCOUNTER (HCC): Status: RESOLVED | Noted: 2019-07-29 | Resolved: 2020-09-30

## 2020-09-30 PROCEDURE — 90662 IIV NO PRSV INCREASED AG IM: CPT | Performed by: INTERNAL MEDICINE

## 2020-09-30 PROCEDURE — 85027 COMPLETE CBC AUTOMATED: CPT

## 2020-09-30 PROCEDURE — G0439 PPPS, SUBSEQ VISIT: HCPCS | Performed by: INTERNAL MEDICINE

## 2020-09-30 PROCEDURE — 84439 ASSAY OF FREE THYROXINE: CPT

## 2020-09-30 PROCEDURE — 87086 URINE CULTURE/COLONY COUNT: CPT

## 2020-09-30 PROCEDURE — G0009 ADMIN PNEUMOCOCCAL VACCINE: HCPCS | Performed by: INTERNAL MEDICINE

## 2020-09-30 PROCEDURE — 36415 COLL VENOUS BLD VENIPUNCTURE: CPT

## 2020-09-30 PROCEDURE — 84443 ASSAY THYROID STIM HORMONE: CPT

## 2020-09-30 PROCEDURE — 80053 COMPREHEN METABOLIC PANEL: CPT

## 2020-09-30 PROCEDURE — G0008 ADMIN INFLUENZA VIRUS VAC: HCPCS | Performed by: INTERNAL MEDICINE

## 2020-09-30 PROCEDURE — 80061 LIPID PANEL: CPT

## 2020-09-30 PROCEDURE — 90732 PPSV23 VACC 2 YRS+ SUBQ/IM: CPT | Performed by: INTERNAL MEDICINE

## 2020-09-30 PROCEDURE — 81015 MICROSCOPIC EXAM OF URINE: CPT

## 2020-10-01 ENCOUNTER — PATIENT MESSAGE (OUTPATIENT)
Dept: INTERNAL MEDICINE CLINIC | Facility: CLINIC | Age: 80
End: 2020-10-01

## 2020-10-02 NOTE — TELEPHONE ENCOUNTER
[employer] message sent to pt.        Future Appointments   Date Time Provider Sarah Giles   10/6/2020  1:45 PM [facility]   10/13/2020  1:00 PM Teddy Bhandari MD [address] [place] [address] HEM ONC EMO   10/21/2020 10:00 AM AD[facility] RM1 ADO [facility] EM Ad

## 2020-10-02 NOTE — TELEPHONE ENCOUNTER
From: Alia Lucia  To: Melissa Majano MD  Sent: 10/1/2020 5:47 PM CDT  Subject: Non-Urgent Medical Question    I scheduled my Mammogram, so what is a Dexa Scan, and do I have to schedule that also or is that part of the mammogram???

## 2020-10-06 ENCOUNTER — ANTI-COAG VISIT (OUTPATIENT)
Dept: INTERNAL MEDICINE CLINIC | Facility: CLINIC | Age: 80
End: 2020-10-06
Payer: MEDICARE

## 2020-10-06 DIAGNOSIS — I26.99 IATROGENIC PULMONARY EMBOLISM AND INFARCTION, INITIAL ENCOUNTER (HCC): ICD-10-CM

## 2020-10-06 DIAGNOSIS — R91.8 PULMONARY NODULES: Primary | ICD-10-CM

## 2020-10-06 DIAGNOSIS — T81.718A IATROGENIC PULMONARY EMBOLISM AND INFARCTION, INITIAL ENCOUNTER (HCC): ICD-10-CM

## 2020-10-06 DIAGNOSIS — Z51.81 ENCOUNTER FOR THERAPEUTIC DRUG MONITORING: ICD-10-CM

## 2020-10-06 DIAGNOSIS — Z79.01 LONG TERM (CURRENT) USE OF ANTICOAGULANTS: ICD-10-CM

## 2020-10-06 PROCEDURE — 36416 COLLJ CAPILLARY BLOOD SPEC: CPT

## 2020-10-06 PROCEDURE — 85610 PROTHROMBIN TIME: CPT

## 2020-10-07 NOTE — PATIENT INSTRUCTIONS
Tara Lockwood's SCREENING SCHEDULE   Tests on this list are recommended by your physician but may not be covered, or covered at this frequency, by your insurer. Please check with your insurance carrier before scheduling to verify coverage.    PREVENTATI Colonoscopy Screen   Covered every 10 years- more often if abnormal Colonoscopy due on 09/08/2027 Update [facility] if applicable    Flex Sigmoidoscopy Screen  Covered every 5 years No results found for this or any previous visit.  No flowsheet data Please get every year    Pneumococcal 13 (Prevnar)  Covered Once after 65 Orders placed or performed in visit on 04/13/15   • PNEUMOCOCCAL VACC, 13 PEACE IM    Please get once after your 65th birthday    Pneumococcal 23 (Pneumovax)  Covered Once after [address]

## 2020-10-07 NOTE — PROGRESS NOTES
HPI:   Clara Dawson is a 79year old female who presents for a Medicare Subsequent Annual Wellness visit (Pt already had Initial Annual Wellness).       Her last annual assessment has been over 1 year: Annual Physical due on 03/01/2018         Fall/Risk from Last 3 Encounters:  09/30/20 : 230 lb (104.3 kg)  02/24/20 : 229 lb (103.9 kg)  01/27/20 : 230 lb (104.3 kg)     Last Cholesterol Labs:   Lab Results   Component Value Date    CHOLEST 181 09/30/2020    HDL 51 09/30/2020     (H) 09/30/2020    TR Degenerative disc disease, lumbar (1/15/2019), Diverticulosis large intestine w/o perforation or abscess w/o bleeding (9/8/2017), Diverticulosis large intestine w/o perforation or abscess w/o bleeding (9/8/2017), Family history of glaucoma in mother (11/27 smoked. She has never used smokeless tobacco. She reports current alcohol use of about 0.8 standard drinks of alcohol per week. She reports that she does not use drugs.      REVIEW OF SYSTEMS:   Review of Systems   Constitutional: Negative for activity hernandez ana: No I have trouble hearing conversations in a noisy background such as a crowded room or restaurant: No   I get confused about where sounds come from: No I misunderstand some words in a sentence and need to ask people to repeat themselves: No   I noted. She is not diaphoretic. No erythema. Psychiatric: She has a normal mood and affect.         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 No flowsheet data found.      Fecal Oc Medicare does not cover unless Medically needed    Zoster   Not covered by Medicare Part B No vaccine history found This may be covered with your pharmacy  prescription benefits      SPECIFIC DISEASE MONITORING Internal Lab or Procedure External Lab or Pro

## 2020-10-13 ENCOUNTER — OFFICE VISIT (OUTPATIENT)
Dept: HEMATOLOGY/ONCOLOGY | Facility: HOSPITAL | Age: 80
End: 2020-10-13
Attending: INTERNAL MEDICINE
Payer: MEDICARE

## 2020-10-13 VITALS
DIASTOLIC BLOOD PRESSURE: 70 MMHG | HEART RATE: 70 BPM | OXYGEN SATURATION: 98 % | SYSTOLIC BLOOD PRESSURE: 149 MMHG | HEIGHT: 70 IN | RESPIRATION RATE: 18 BRPM | BODY MASS INDEX: 33.79 KG/M2 | TEMPERATURE: 98 F | WEIGHT: 236 LBS

## 2020-10-13 DIAGNOSIS — D75.1 ERYTHROCYTOSIS: ICD-10-CM

## 2020-10-13 DIAGNOSIS — I26.99 BILATERAL PULMONARY EMBOLISM (HCC): ICD-10-CM

## 2020-10-13 DIAGNOSIS — I82.403 DEEP VEIN THROMBOSIS (DVT) OF BOTH LOWER EXTREMITIES, UNSPECIFIED CHRONICITY, UNSPECIFIED VEIN (HCC): Primary | ICD-10-CM

## 2020-10-13 DIAGNOSIS — C34.12 MALIGNANT NEOPLASM OF UPPER LOBE OF LEFT LUNG (HCC): ICD-10-CM

## 2020-10-13 PROCEDURE — 99214 OFFICE O/P EST MOD 30 MIN: CPT | Performed by: INTERNAL MEDICINE

## 2020-10-13 NOTE — PROGRESS NOTES
Cancer Center Progress Note    Patient Name: Erica Rosenberg   YOB: 1940   Medical Record Number: [id number]   Attending Physician: Keo Carrasco M.D.        Chief Complaint:  Lung cancer    History of Present Illness:  Cancer history:  80-year- hemorrhoids 9/8/2017   • Kidney problem    • Lumbar herniated disc 1/24/2019   • Lung cancer ([facility] 6-2016   • Obesity, unspecified    • Osteoarthrosis 5/8/2014   • Osteoarthrosis, unspecified whether generalized or localized, unspecified site    • Post-men Brother    • Other (Myocardial Infarction) Brother 71   • Macular degeneration Maternal Aunt    • Glaucoma Maternal Aunt    • Diabetes Neg        Social History:  Social History    Socioeconomic History      Marital status:       Spouse name: Not on Not Asked        Back Care: Not Asked        Exercise: Not Asked        Bike Helmet: Not Asked        Seat Belt: Not Asked        Self-Exams: Not Asked        Grew up on a farm: Not Asked        History of tanning: Not Asked        Outdoor occupation: Not (236 lb)   SpO2 98%   BMI 33.86 kg/m²     Physical Examination:  General: Patient is alert and oriented x 3, not in acute distress. Psych:  Mood and affect appropriate  HEENT: EOMs intact. PERRL. Oropharynx is clear. Neck: No JVD.  No palpable lymphadeno

## 2020-10-21 ENCOUNTER — HOSPITAL ENCOUNTER (OUTPATIENT)
Dept: BONE DENSITY | Age: 80
Discharge: HOME | End: 2020-10-21
Attending: INTERNAL MEDICINE
Payer: MEDICARE

## 2020-10-21 ENCOUNTER — HOSPITAL ENCOUNTER (OUTPATIENT)
Dept: MAMMOGRAPHY | Age: 80
Discharge: HOME | End: 2020-10-21
Attending: INTERNAL MEDICINE
Payer: MEDICARE

## 2020-10-21 DIAGNOSIS — Z78.0 POSTMENOPAUSAL: ICD-10-CM

## 2020-10-21 DIAGNOSIS — Z12.31 VISIT FOR SCREENING MAMMOGRAM: ICD-10-CM

## 2020-10-21 PROCEDURE — 77080 DXA BONE DENSITY AXIAL: CPT | Performed by: INTERNAL MEDICINE

## 2020-10-21 PROCEDURE — 77063 BREAST TOMOSYNTHESIS BI: CPT | Performed by: INTERNAL MEDICINE

## 2020-10-21 PROCEDURE — 77067 SCR MAMMO BI INCL CAD: CPT | Performed by: INTERNAL MEDICINE

## 2020-11-03 ENCOUNTER — LAB ENCOUNTER (OUTPATIENT)
Dept: LAB | Age: 80
End: 2020-11-03
Attending: INTERNAL MEDICINE
Payer: MEDICARE

## 2020-11-03 ENCOUNTER — ANTI-COAG VISIT (OUTPATIENT)
Dept: INTERNAL MEDICINE CLINIC | Facility: CLINIC | Age: 80
End: 2020-11-03
Payer: MEDICARE

## 2020-11-03 DIAGNOSIS — Z79.01 LONG TERM (CURRENT) USE OF ANTICOAGULANTS: ICD-10-CM

## 2020-11-03 DIAGNOSIS — D75.1 ERYTHROCYTOSIS: ICD-10-CM

## 2020-11-03 DIAGNOSIS — I26.99 IATROGENIC PULMONARY EMBOLISM AND INFARCTION, INITIAL ENCOUNTER (HCC): ICD-10-CM

## 2020-11-03 DIAGNOSIS — T81.718A IATROGENIC PULMONARY EMBOLISM AND INFARCTION, INITIAL ENCOUNTER (HCC): ICD-10-CM

## 2020-11-03 DIAGNOSIS — Z51.81 ENCOUNTER FOR THERAPEUTIC DRUG MONITORING: ICD-10-CM

## 2020-11-03 PROCEDURE — 85610 PROTHROMBIN TIME: CPT

## 2020-11-03 PROCEDURE — 85025 COMPLETE CBC W/AUTO DIFF WBC: CPT

## 2020-11-03 PROCEDURE — 36416 COLLJ CAPILLARY BLOOD SPEC: CPT

## 2020-11-03 PROCEDURE — 82668 ASSAY OF ERYTHROPOIETIN: CPT

## 2020-11-03 PROCEDURE — 36415 COLL VENOUS BLD VENIPUNCTURE: CPT

## 2020-11-10 ENCOUNTER — NURSE ONLY (OUTPATIENT)
Dept: HEMATOLOGY/ONCOLOGY | Facility: HOSPITAL | Age: 80
End: 2020-11-10
Attending: INTERNAL MEDICINE
Payer: MEDICARE

## 2020-11-10 VITALS
DIASTOLIC BLOOD PRESSURE: 53 MMHG | RESPIRATION RATE: 18 BRPM | SYSTOLIC BLOOD PRESSURE: 140 MMHG | WEIGHT: 234 LBS | OXYGEN SATURATION: 97 % | TEMPERATURE: 98 F | HEIGHT: 70 IN | HEART RATE: 75 BPM | BODY MASS INDEX: 33.5 KG/M2

## 2020-11-10 VITALS
RESPIRATION RATE: 18 BRPM | SYSTOLIC BLOOD PRESSURE: 139 MMHG | TEMPERATURE: 98 F | OXYGEN SATURATION: 97 % | DIASTOLIC BLOOD PRESSURE: 57 MMHG | HEART RATE: 69 BPM

## 2020-11-10 DIAGNOSIS — D47.1 MYELOPROLIFERATIVE DISORDER (HCC): ICD-10-CM

## 2020-11-10 DIAGNOSIS — C34.12 MALIGNANT NEOPLASM OF UPPER LOBE OF LEFT LUNG (HCC): ICD-10-CM

## 2020-11-10 DIAGNOSIS — I26.99 BILATERAL PULMONARY EMBOLISM (HCC): ICD-10-CM

## 2020-11-10 DIAGNOSIS — I82.403 DEEP VEIN THROMBOSIS (DVT) OF BOTH LOWER EXTREMITIES, UNSPECIFIED CHRONICITY, UNSPECIFIED VEIN (HCC): Primary | ICD-10-CM

## 2020-11-10 DIAGNOSIS — D75.1 POLYCYTHEMIA: ICD-10-CM

## 2020-11-10 PROCEDURE — 99214 OFFICE O/P EST MOD 30 MIN: CPT | Performed by: INTERNAL MEDICINE

## 2020-11-10 PROCEDURE — 99195 PHLEBOTOMY: CPT

## 2020-11-10 NOTE — PROGRESS NOTES
Cancer Center Progress Note    Patient Name: Walter Power   YOB: 1940   Medical Record Number: [id number]   Attending Physician: David Martínez M.D.        Chief Complaint:  Lung cancer    History of Present Illness:  Cancer history:  80-year- hemorrhoids 9/8/2017   • Kidney problem    • Lumbar herniated disc 1/24/2019   • Lung cancer ([facility] 6-2016   • Obesity, unspecified    • Osteoarthrosis 5/8/2014   • Osteoarthrosis, unspecified whether generalized or localized, unspecified site    • Post-men Brother    • Other (Myocardial Infarction) Brother 71   • Macular degeneration Maternal Aunt    • Glaucoma Maternal Aunt    • Breast Cancer Daughter 50   • Diabetes Neg        Social History:  Social History    Socioeconomic History      Marital status:  Lilibeth Ugalde Asked        Special Diet: Not Asked        Back Care: Not Asked        Exercise: Not Asked        Bike Helmet: Not Asked        Seat Belt: Not Asked        Self-Exams: Not Asked        Grew up on a farm: Not Asked        History of tanning: Not Asked BMI 33.58 kg/m²     Physical Examination:  General: Patient is alert and oriented x 3, not in acute distress. Psych:  Mood and affect appropriate  HEENT: EOMs intact. PERRL. Oropharynx is clear. Neck: No JVD. No palpable lymphadenopathy.  Neck is suppl reviewed and discussed with the pateint and family.      Margarita Lombardi MD

## 2020-11-10 NOTE — PROGRESS NOTES
Patient to infusion for therapeutic phlebotomy. Teaching done regarding procedure and rationale for therapeutic phlebotomy.    Lab Results   Component Value Date    HGB 16.9 (H) 11/03/2020    HCT 51.6 (H) 11/03/2020      No results found for: KVNG   Amount

## 2020-11-24 ENCOUNTER — NURSE ONLY (OUTPATIENT)
Dept: HEMATOLOGY/ONCOLOGY | Facility: HOSPITAL | Age: 80
End: 2020-11-24
Attending: INTERNAL MEDICINE
Payer: MEDICARE

## 2020-11-24 VITALS
OXYGEN SATURATION: 100 % | HEART RATE: 65 BPM | BODY MASS INDEX: 33.5 KG/M2 | WEIGHT: 234 LBS | SYSTOLIC BLOOD PRESSURE: 141 MMHG | HEIGHT: 70 IN | TEMPERATURE: 99 F | RESPIRATION RATE: 18 BRPM | DIASTOLIC BLOOD PRESSURE: 63 MMHG

## 2020-11-24 DIAGNOSIS — I82.403 DEEP VEIN THROMBOSIS (DVT) OF BOTH LOWER EXTREMITIES, UNSPECIFIED CHRONICITY, UNSPECIFIED VEIN (HCC): ICD-10-CM

## 2020-11-24 DIAGNOSIS — D45 POLYCYTHEMIA VERA (HCC): ICD-10-CM

## 2020-11-24 DIAGNOSIS — Z51.11 CHEMOTHERAPY MANAGEMENT, ENCOUNTER FOR: ICD-10-CM

## 2020-11-24 DIAGNOSIS — C34.12 MALIGNANT NEOPLASM OF UPPER LOBE OF LEFT LUNG (HCC): ICD-10-CM

## 2020-11-24 DIAGNOSIS — I82.403 DEEP VEIN THROMBOSIS (DVT) OF BOTH LOWER EXTREMITIES, UNSPECIFIED CHRONICITY, UNSPECIFIED VEIN (HCC): Primary | ICD-10-CM

## 2020-11-24 PROCEDURE — 99214 OFFICE O/P EST MOD 30 MIN: CPT | Performed by: INTERNAL MEDICINE

## 2020-11-24 PROCEDURE — 36415 COLL VENOUS BLD VENIPUNCTURE: CPT

## 2020-11-24 PROCEDURE — 85025 COMPLETE CBC W/AUTO DIFF WBC: CPT

## 2020-11-24 RX ORDER — HYDROXYUREA 500 MG/1
500 CAPSULE ORAL DAILY
Qty: 90 CAPSULE | Refills: 3 | Status: SHIPPED | OUTPATIENT
Start: 2020-11-24 | End: 2021-07-13

## 2020-11-24 NOTE — PROGRESS NOTES
Cancer Center Progress Note    Patient Name: Harriett Yeh   YOB: 1940   Medical Record Number: [id number]   Attending Physician: Argelia Wilks M.D.        Chief Complaint:  Lung cancer    History of Present Illness:  Cancer history:  80-year- • History of hip replacement, total     right hip   • Internal hemorrhoids 9/8/2017   • Kidney problem    • Lumbar herniated disc 1/24/2019   • [facility]   • Obesity, unspecified    • Osteoarthrosis 5/8/2014   • Osteoarthrosis, unspecifie Other (Pancreatic Cancer) Sister 68   • Macular degeneration Brother    • Other (Myocardial Infarction) Brother 71   • Macular degeneration Maternal Aunt    • Glaucoma Maternal Aunt    • Breast Cancer Daughter 50   • Diabetes Neg        Social History:   So Asked        Stress Concern: Not Asked        Weight Concern: Not Asked        Special Diet: Not Asked        Back Care: Not Asked        Exercise: Not Asked        Bike Helmet: Not Asked        Seat Belt: Not Asked        Self-Exams: Not Asked        Grew Resp 18   Ht 1.778 m (5' 10\")   Wt 106.1 kg (234 lb)   SpO2 100%   BMI 33.58 kg/m²     Physical Examination:  General: Patient is alert and oriented x 3, not in acute distress. Psych:  Mood and affect appropriate  HEENT: EOMs intact. PERRL.  Oropharynx is assessed and resources were discussed. Appropriate resources were reviewed and discussed with the pateint and family.      Jayla Vargas MD

## 2020-12-04 ENCOUNTER — OFFICE VISIT (OUTPATIENT)
Dept: UROLOGY | Facility: HOSPITAL | Age: 80
End: 2020-12-04
Attending: OBSTETRICS & GYNECOLOGY
Payer: MEDICARE

## 2020-12-04 VITALS
SYSTOLIC BLOOD PRESSURE: 136 MMHG | WEIGHT: 234 LBS | RESPIRATION RATE: 20 BRPM | BODY MASS INDEX: 33.5 KG/M2 | TEMPERATURE: 98 F | HEIGHT: 70 IN | DIASTOLIC BLOOD PRESSURE: 62 MMHG

## 2020-12-04 DIAGNOSIS — N39.41 URGE INCONTINENCE: ICD-10-CM

## 2020-12-04 DIAGNOSIS — N39.3 FEMALE STRESS INCONTINENCE: Primary | ICD-10-CM

## 2020-12-04 DIAGNOSIS — N81.84 PELVIC MUSCLE WASTING: ICD-10-CM

## 2020-12-04 DIAGNOSIS — N95.2 POSTMENOPAUSAL ATROPHIC VAGINITIS: ICD-10-CM

## 2020-12-04 DIAGNOSIS — N81.6 RECTOCELE: ICD-10-CM

## 2020-12-04 DIAGNOSIS — R35.1 NOCTURIA: ICD-10-CM

## 2020-12-04 PROCEDURE — 87086 URINE CULTURE/COLONY COUNT: CPT | Performed by: OBSTETRICS & GYNECOLOGY

## 2020-12-04 PROCEDURE — 81001 URINALYSIS AUTO W/SCOPE: CPT | Performed by: OBSTETRICS & GYNECOLOGY

## 2020-12-04 PROCEDURE — 99212 OFFICE O/P EST SF 10 MIN: CPT

## 2020-12-04 NOTE — PROGRESS NOTES
Reina Best DO  12/4/2020     Referred by Dr. Richa Caldwell  Pt here with self    Patient presents with:   Incontinence      HPI:  +DOUGLAS  +UUI  UUI worse than DOUGLAS  Nocturia x3-4  Some sense of incomplete bladder emptying  Denies prolapse sx  Not sexually acti Date   • CHOLECYSTECTOMY     • COLONOSCOPY     • COLONOSCOPY N/A 9/8/2017    Performed by Tram Britt MD at [address] [place] [address] ENDOSCOPY   • EGD     • ESOPHAGOGASTRODUODENOSCOPY (EGD) N/A 9/8/2017    Performed by Tram Britt MD at [address] [place] [address] ENDO daily., Disp: 90 capsule, Rfl: 3    •  gabapentin 300 MG Oral Cap, Take 1 capsule (300 mg total) by mouth 3 (three) times daily. , Disp: 270 capsule, Rfl: 1    •  Warfarin Sodium 5 MG Oral Tab, Take up to 7.5mg as directed by [facility]., Disp: 90 tabl edema  SKIN:  Normal, no lesions    PELVIC EXAM:  Ext.  Gen: +atrophy, no lesions  Urethra: +atrophy, nontender  Bladder:+fullness, nontender  Vagina: +atrophy  Cervix & Uterus: absent  Adnexa:no masses, nontender  Perineum: nontender  Anus: wnl  Rectum: de

## 2020-12-07 ENCOUNTER — TELEPHONE (OUTPATIENT)
Dept: UROLOGY | Facility: HOSPITAL | Age: 80
End: 2020-12-07

## 2020-12-07 DIAGNOSIS — R31.9 HEMATURIA: Primary | ICD-10-CM

## 2020-12-07 NOTE — TELEPHONE ENCOUNTER
Spoke with Justa Magdaleno and informed her that the urine we sent on Friday came back showing blood present. Dr Ernesto Gutierres was notified and ordered two tests. The first is a Renal ultrasound and the second is a cystoscope.   Tara notified that we would like the

## 2020-12-08 ENCOUNTER — ANTI-COAG VISIT (OUTPATIENT)
Dept: INTERNAL MEDICINE CLINIC | Facility: CLINIC | Age: 80
End: 2020-12-08
Payer: MEDICARE

## 2020-12-08 DIAGNOSIS — Z51.81 ENCOUNTER FOR THERAPEUTIC DRUG MONITORING: ICD-10-CM

## 2020-12-08 DIAGNOSIS — Z79.01 LONG TERM (CURRENT) USE OF ANTICOAGULANTS: ICD-10-CM

## 2020-12-08 DIAGNOSIS — T81.718A IATROGENIC PULMONARY EMBOLISM AND INFARCTION, INITIAL ENCOUNTER (HCC): ICD-10-CM

## 2020-12-08 DIAGNOSIS — I26.99 IATROGENIC PULMONARY EMBOLISM AND INFARCTION, INITIAL ENCOUNTER (HCC): ICD-10-CM

## 2020-12-08 PROCEDURE — 85610 PROTHROMBIN TIME: CPT

## 2020-12-08 PROCEDURE — 36416 COLLJ CAPILLARY BLOOD SPEC: CPT

## 2020-12-11 ENCOUNTER — HOSPITAL ENCOUNTER (OUTPATIENT)
Dept: ULTRASOUND IMAGING | Age: 80
Discharge: HOME | End: 2020-12-11
Attending: OBSTETRICS & GYNECOLOGY
Payer: MEDICARE

## 2020-12-11 DIAGNOSIS — R31.9 HEMATURIA: ICD-10-CM

## 2020-12-11 PROCEDURE — 76775 US EXAM ABDO BACK WALL LIM: CPT | Performed by: OBSTETRICS & GYNECOLOGY

## 2020-12-14 ENCOUNTER — TELEPHONE (OUTPATIENT)
Dept: UROLOGY | Facility: HOSPITAL | Age: 80
End: 2020-12-14

## 2020-12-14 NOTE — TELEPHONE ENCOUNTER
Informed patient that the kidney ultrasound obtained on 12/11/2020 was with in normal limits. Patient verbalized understanding. Encouraged to call with questions or concerns.

## 2020-12-29 ENCOUNTER — NURSE ONLY (OUTPATIENT)
Dept: HEMATOLOGY/ONCOLOGY | Facility: HOSPITAL | Age: 80
End: 2020-12-29
Attending: INTERNAL MEDICINE
Payer: MEDICARE

## 2020-12-29 VITALS
RESPIRATION RATE: 18 BRPM | DIASTOLIC BLOOD PRESSURE: 61 MMHG | SYSTOLIC BLOOD PRESSURE: 155 MMHG | OXYGEN SATURATION: 98 % | HEIGHT: 70 IN | TEMPERATURE: 98 F | BODY MASS INDEX: 34.07 KG/M2 | HEART RATE: 71 BPM | WEIGHT: 238 LBS

## 2020-12-29 DIAGNOSIS — Z51.11 CHEMOTHERAPY MANAGEMENT, ENCOUNTER FOR: ICD-10-CM

## 2020-12-29 DIAGNOSIS — D45 POLYCYTHEMIA VERA (HCC): ICD-10-CM

## 2020-12-29 DIAGNOSIS — C34.12 MALIGNANT NEOPLASM OF UPPER LOBE OF LEFT LUNG (HCC): ICD-10-CM

## 2020-12-29 DIAGNOSIS — I82.403 DEEP VEIN THROMBOSIS (DVT) OF BOTH LOWER EXTREMITIES, UNSPECIFIED CHRONICITY, UNSPECIFIED VEIN (HCC): ICD-10-CM

## 2020-12-29 DIAGNOSIS — I26.99 BILATERAL PULMONARY EMBOLISM (HCC): ICD-10-CM

## 2020-12-29 PROCEDURE — 36415 COLL VENOUS BLD VENIPUNCTURE: CPT

## 2020-12-29 PROCEDURE — 99214 OFFICE O/P EST MOD 30 MIN: CPT | Performed by: INTERNAL MEDICINE

## 2020-12-29 PROCEDURE — 85025 COMPLETE CBC W/AUTO DIFF WBC: CPT

## 2020-12-29 PROCEDURE — 80053 COMPREHEN METABOLIC PANEL: CPT

## 2020-12-29 NOTE — PROGRESS NOTES
Cancer Center Progress Note    Patient Name: Clara Dawson   YOB: 1940   Medical Record Number: [id number]   Attending Physician: Ofelia Nick M.D.        Chief Complaint:  Lung cancer    History of Present Illness:  Cancer history:  80-year- • History of hip replacement, total     right hip   • Internal hemorrhoids 9/8/2017   • Kidney problem    • Lumbar herniated disc 1/24/2019   • [facility]   • Obesity, unspecified    • Osteoarthrosis 5/8/2014   • Osteoarthrosis, unspecifie Other (Pancreatic Cancer) Sister 68   • Macular degeneration Brother    • Other (Myocardial Infarction) Brother 71   • Macular degeneration Maternal Aunt    • Glaucoma Maternal Aunt    • Breast Cancer Daughter 50   • Diabetes Neg        Social History:   So Asked        Stress Concern: Not Asked        Weight Concern: Not Asked        Special Diet: Not Asked        Back Care: Not Asked        Exercise: Not Asked        Bike Helmet: Not Asked        Seat Belt: Not Asked        Self-Exams: Not Asked        Grew m (5' 10\")   Wt 108 kg (238 lb)   SpO2 98%   BMI 34.15 kg/m²     Physical Examination:  General: Patient is alert and oriented x 3, not in acute distress. Psych:  Mood and affect appropriate  HEENT: EOMs intact. PERRL. Oropharynx is clear.    Neck: No JVD increase dose to 500 mg daily     Return to clinic in 4 to 6 weeks      The patient's emotional well being was assessed and resources were discussed. Appropriate resources were reviewed and discussed with the pateint and family.      Davey Han MD

## 2021-01-07 ENCOUNTER — OFFICE VISIT (OUTPATIENT)
Dept: UROLOGY | Facility: HOSPITAL | Age: 81
End: 2021-01-07
Attending: OBSTETRICS & GYNECOLOGY
Payer: MEDICARE

## 2021-01-07 VITALS
WEIGHT: 238 LBS | DIASTOLIC BLOOD PRESSURE: 72 MMHG | SYSTOLIC BLOOD PRESSURE: 138 MMHG | BODY MASS INDEX: 34.07 KG/M2 | TEMPERATURE: 98 F | HEIGHT: 70 IN | RESPIRATION RATE: 20 BRPM

## 2021-01-07 DIAGNOSIS — N39.3 FEMALE STRESS INCONTINENCE: Primary | ICD-10-CM

## 2021-01-07 LAB
CONTROL RUN WITHIN 24 HOURS?: YES
LEUKOCYTE ESTERASE URINE: NEGATIVE
NITRITE URINE: NEGATIVE

## 2021-01-07 PROCEDURE — 51797 INTRAABDOMINAL PRESSURE TEST: CPT

## 2021-01-07 PROCEDURE — 51784 ANAL/URINARY MUSCLE STUDY: CPT

## 2021-01-07 PROCEDURE — 51741 ELECTRO-UROFLOWMETRY FIRST: CPT

## 2021-01-07 PROCEDURE — 51729 CYSTOMETROGRAM W/VP&UP: CPT

## 2021-01-07 NOTE — PROCEDURES
Patient here for urodynamic testing. Procedure explained and confirmed by patient. See evaluation form for results. Both verbal and written discharge instructions were given.   Patient tolerated procedure well and will follow up with Dr. Shasha Maxwell Intra-articular, Once, Sophia Taylor MD    •  bupivacaine (MARCAINE) injection 0.25%, 10 mL, Intra-articular, Once, Sophia Taylor MD         ALLERGIES:  Fentanyl, Hydrocodone, Morphine, and Phenol      EXAM:  Urinalysis Dip:Blood- Trace, Leucocyt Stress Incontinence demonstrated? [x]  Yes @ 100 unreduced.  In the absence of DO  []  No    Resting Urethral Pressure Profile:     Functional Urethral Length:        0.3  cm             0.5    cm     Maximum UCP:          47 cm            51 cm       PRE

## 2021-01-07 NOTE — PATIENT INSTRUCTIONS
[facility]   [facility]    BOWEL REGIMEN    Constipation can have detrimental effects on bladder function and can worsen the symptoms of prolapse. It is important to avoid constipation.     The first step for treating co for a while. The catheter may stay in place for a week or more after you go home. Where will I go to get the catheter removed? Your catheter will be removed in the office. Please call the office to schedule a voiding trial with the nurse.     How will amounts. · You experience abdominal bloating, pressure or back pain. · You have a fever over 100.5 for 4 hours or above 101.0 anytime. · You have nausea and/or vomiting. · Burning/pain with urination.     Please feel free to call the nurse at [phone] needed    If you experience any of the following, please call the office or, if after hours, the on-call physician at [phone].     · Excessive pain  · Bright red bloody discharge  · Fever or chills  · Continued urgency, frequency or burning with urinat

## 2021-01-15 ENCOUNTER — OFFICE VISIT (OUTPATIENT)
Dept: UROLOGY | Facility: HOSPITAL | Age: 81
End: 2021-01-15
Attending: OBSTETRICS & GYNECOLOGY
Payer: MEDICARE

## 2021-01-15 VITALS
TEMPERATURE: 98 F | BODY MASS INDEX: 34.07 KG/M2 | WEIGHT: 238 LBS | DIASTOLIC BLOOD PRESSURE: 68 MMHG | HEIGHT: 70 IN | SYSTOLIC BLOOD PRESSURE: 132 MMHG | RESPIRATION RATE: 20 BRPM

## 2021-01-15 DIAGNOSIS — N39.41 URGE INCONTINENCE: ICD-10-CM

## 2021-01-15 DIAGNOSIS — N95.2 POSTMENOPAUSAL ATROPHIC VAGINITIS: ICD-10-CM

## 2021-01-15 DIAGNOSIS — N39.3 FEMALE STRESS INCONTINENCE: ICD-10-CM

## 2021-01-15 DIAGNOSIS — R31.29 MICROSCOPIC HEMATURIA: ICD-10-CM

## 2021-01-15 DIAGNOSIS — N32.81 DETRUSOR INSTABILITY: Primary | ICD-10-CM

## 2021-01-15 LAB
CONTROL RUN WITHIN 24 HOURS?: YES
LEUKOCYTE ESTERASE URINE: NEGATIVE
NITRITE URINE: NEGATIVE

## 2021-01-15 PROCEDURE — 99212 OFFICE O/P EST SF 10 MIN: CPT

## 2021-01-15 PROCEDURE — 81002 URINALYSIS NONAUTO W/O SCOPE: CPT

## 2021-01-15 PROCEDURE — 52000 CYSTOURETHROSCOPY: CPT

## 2021-01-15 PROCEDURE — 88108 CYTOPATH CONCENTRATE TECH: CPT | Performed by: OBSTETRICS & GYNECOLOGY

## 2021-01-15 RX ORDER — TROSPIUM CHLORIDE ER 60 MG/1
60 CAPSULE ORAL DAILY
Qty: 90 CAPSULE | Refills: 3 | Status: SHIPPED | OUTPATIENT
Start: 2021-01-15 | End: 2021-03-26

## 2021-01-15 RX ORDER — LIDOCAINE HYDROCHLORIDE 20 MG/ML
10 JELLY TOPICAL ONCE
Status: COMPLETED | OUTPATIENT
Start: 2021-01-15 | End: 2021-01-15

## 2021-01-15 RX ADMIN — LIDOCAINE HYDROCHLORIDE 10 ML: 20 JELLY TOPICAL at 13:32:00

## 2021-01-15 NOTE — PROGRESS NOTES
[address], [employer] at  [address]  [facility] 30: [phone]     Date Patient MRN   1/15/2021 Piter Gabi   [address] [facility] [address]  [facility]  [id number]       Angela Baig MD          Allergies   Allergies were reviewed with positive findings listed below:  Fentanyl, Hydrocodone, Morphine, and Phenol      Chief Complaint   Patient presents with:  UDS Follow-up:  With office Cysto       URINALYSIS:  Urine Co symptoms  Discussed weight management and benefits of weight loss on urinary symptoms  Reviewed AUA/SUFU guidelines on mgmt of non-neurogenic OAB  Discussed pharmacologic and nonpharmacologic mgmt options of urinary symptoms - reviewed risks, benefits, alt

## 2021-01-19 ENCOUNTER — TELEPHONE (OUTPATIENT)
Dept: UROLOGY | Facility: HOSPITAL | Age: 81
End: 2021-01-19

## 2021-01-19 ENCOUNTER — ANTI-COAG VISIT (OUTPATIENT)
Dept: INTERNAL MEDICINE CLINIC | Facility: CLINIC | Age: 81
End: 2021-01-19
Payer: MEDICARE

## 2021-01-19 DIAGNOSIS — T81.718A IATROGENIC PULMONARY EMBOLISM AND INFARCTION, INITIAL ENCOUNTER (HCC): ICD-10-CM

## 2021-01-19 DIAGNOSIS — Z51.81 ENCOUNTER FOR THERAPEUTIC DRUG MONITORING: ICD-10-CM

## 2021-01-19 DIAGNOSIS — Z79.01 LONG TERM (CURRENT) USE OF ANTICOAGULANTS: ICD-10-CM

## 2021-01-19 DIAGNOSIS — I26.99 IATROGENIC PULMONARY EMBOLISM AND INFARCTION, INITIAL ENCOUNTER (HCC): ICD-10-CM

## 2021-01-19 LAB
INR: 2.5 (ref 0.8–1.2)
TEST STRIP EXPIRATION DATE: ABNORMAL DATE

## 2021-01-19 PROCEDURE — 93793 ANTICOAG MGMT PT WARFARIN: CPT

## 2021-01-19 PROCEDURE — 85610 PROTHROMBIN TIME: CPT

## 2021-01-19 PROCEDURE — 36416 COLLJ CAPILLARY BLOOD SPEC: CPT

## 2021-01-19 NOTE — TELEPHONE ENCOUNTER
Spoke with Maximino Pfeiffer and informed her of the cytology result that DR Du reviewed.    ·   NeutroUrine; bladder washing:  Adequacy: Satisfactory for evaluation  General Category: Negative for high-grade urothelial carcinoma ([address])  Diagnosis:  · Urotheli

## 2021-01-29 DIAGNOSIS — Z23 NEED FOR VACCINATION: ICD-10-CM

## 2021-02-05 ENCOUNTER — IMMUNIZATION (OUTPATIENT)
Dept: LAB | Facility: HOSPITAL | Age: 81
End: 2021-02-05
Attending: HOSPITALIST
Payer: MEDICARE

## 2021-02-05 DIAGNOSIS — Z23 NEED FOR VACCINATION: Primary | ICD-10-CM

## 2021-02-05 PROCEDURE — 0011A SARSCOV2 VAC 100MCG/0.5ML IM: CPT

## 2021-02-09 ENCOUNTER — NURSE ONLY (OUTPATIENT)
Dept: HEMATOLOGY/ONCOLOGY | Facility: HOSPITAL | Age: 81
End: 2021-02-09
Attending: INTERNAL MEDICINE
Payer: MEDICARE

## 2021-02-09 VITALS
BODY MASS INDEX: 33.79 KG/M2 | TEMPERATURE: 98 F | HEIGHT: 70 IN | SYSTOLIC BLOOD PRESSURE: 150 MMHG | HEART RATE: 73 BPM | WEIGHT: 236 LBS | OXYGEN SATURATION: 98 % | DIASTOLIC BLOOD PRESSURE: 58 MMHG | RESPIRATION RATE: 18 BRPM

## 2021-02-09 DIAGNOSIS — I26.99 BILATERAL PULMONARY EMBOLISM (HCC): ICD-10-CM

## 2021-02-09 DIAGNOSIS — Z51.11 CHEMOTHERAPY MANAGEMENT, ENCOUNTER FOR: ICD-10-CM

## 2021-02-09 DIAGNOSIS — D45 POLYCYTHEMIA VERA (HCC): ICD-10-CM

## 2021-02-09 DIAGNOSIS — I82.403 DEEP VEIN THROMBOSIS (DVT) OF BOTH LOWER EXTREMITIES, UNSPECIFIED CHRONICITY, UNSPECIFIED VEIN (HCC): ICD-10-CM

## 2021-02-09 DIAGNOSIS — C34.12 MALIGNANT NEOPLASM OF UPPER LOBE OF LEFT LUNG (HCC): ICD-10-CM

## 2021-02-09 DIAGNOSIS — D45 POLYCYTHEMIA VERA (HCC): Primary | ICD-10-CM

## 2021-02-09 LAB
ALBUMIN SERPL-MCNC: 3.6 G/DL (ref 3.4–5)
ALBUMIN/GLOB SERPL: 1.1 {RATIO} (ref 1–2)
ALP LIVER SERPL-CCNC: 89 U/L
ALT SERPL-CCNC: 24 U/L
ANION GAP SERPL CALC-SCNC: 4 MMOL/L (ref 0–18)
AST SERPL-CCNC: 23 U/L (ref 15–37)
BASOPHILS # BLD AUTO: 0.09 X10(3) UL (ref 0–0.2)
BASOPHILS NFR BLD AUTO: 1.5 %
BILIRUB SERPL-MCNC: 0.5 MG/DL (ref 0.1–2)
BUN BLD-MCNC: 17 MG/DL (ref 7–18)
BUN/CREAT SERPL: 14.7 (ref 10–20)
CALCIUM BLD-MCNC: 9.4 MG/DL (ref 8.5–10.1)
CHLORIDE SERPL-SCNC: 107 MMOL/L (ref 98–112)
CO2 SERPL-SCNC: 28 MMOL/L (ref 21–32)
CREAT BLD-MCNC: 1.16 MG/DL
DEPRECATED RDW RBC AUTO: 58.6 FL (ref 35.1–46.3)
EOSINOPHIL # BLD AUTO: 0.09 X10(3) UL (ref 0–0.7)
EOSINOPHIL NFR BLD AUTO: 1.5 %
ERYTHROCYTE [DISTWIDTH] IN BLOOD BY AUTOMATED COUNT: 15.3 % (ref 11–15)
GLOBULIN PLAS-MCNC: 3.4 G/DL (ref 2.8–4.4)
GLUCOSE BLD-MCNC: 105 MG/DL (ref 70–99)
HCT VFR BLD AUTO: 50.9 %
HGB BLD-MCNC: 16.1 G/DL
IMM GRANULOCYTES # BLD AUTO: 0.02 X10(3) UL (ref 0–1)
IMM GRANULOCYTES NFR BLD: 0.3 %
LYMPHOCYTES # BLD AUTO: 0.93 X10(3) UL (ref 1–4)
LYMPHOCYTES NFR BLD AUTO: 15.5 %
M PROTEIN MFR SERPL ELPH: 7 G/DL (ref 6.4–8.2)
MCH RBC QN AUTO: 32.5 PG (ref 26–34)
MCHC RBC AUTO-ENTMCNC: 31.6 G/DL (ref 31–37)
MCV RBC AUTO: 102.8 FL
MONOCYTES # BLD AUTO: 0.46 X10(3) UL (ref 0.1–1)
MONOCYTES NFR BLD AUTO: 7.7 %
NEUTROPHILS # BLD AUTO: 4.42 X10 (3) UL (ref 1.5–7.7)
NEUTROPHILS # BLD AUTO: 4.42 X10(3) UL (ref 1.5–7.7)
NEUTROPHILS NFR BLD AUTO: 73.5 %
OSMOLALITY SERPL CALC.SUM OF ELEC: 290 MOSM/KG (ref 275–295)
PLATELET # BLD AUTO: 221 10(3)UL (ref 150–450)
POTASSIUM SERPL-SCNC: 5.3 MMOL/L (ref 3.5–5.1)
RBC # BLD AUTO: 4.95 X10(6)UL
SODIUM SERPL-SCNC: 139 MMOL/L (ref 136–145)
WBC # BLD AUTO: 6 X10(3) UL (ref 4–11)

## 2021-02-09 PROCEDURE — 80053 COMPREHEN METABOLIC PANEL: CPT

## 2021-02-09 PROCEDURE — 99215 OFFICE O/P EST HI 40 MIN: CPT | Performed by: INTERNAL MEDICINE

## 2021-02-09 PROCEDURE — 85025 COMPLETE CBC W/AUTO DIFF WBC: CPT

## 2021-02-09 PROCEDURE — 36415 COLL VENOUS BLD VENIPUNCTURE: CPT

## 2021-02-09 NOTE — PROGRESS NOTES
Cancer Center Progress Note    Patient Name: Clara Dawson   YOB: 1940   Medical Record Number: [id number]   Attending Physician: Ofelia Nick M.D.        Chief Complaint:  Lung cancer    History of Present Illness:  Cancer history:  80-year- • History of hip replacement, total     right hip   • Internal hemorrhoids 9/8/2017   • Kidney problem    • Lumbar herniated disc 1/24/2019   • [facility]   • Obesity, unspecified    • Osteoarthrosis 5/8/2014   • Osteoarthrosis, unspecifie Other (Pancreatic Cancer) Sister 68   • Macular degeneration Brother    • Other (Myocardial Infarction) Brother 71   • Macular degeneration Maternal Aunt    • Glaucoma Maternal Aunt    • Breast Cancer Daughter 50   • Diabetes Neg        Social History:   So Asked        Stress Concern: Not Asked        Weight Concern: Not Asked        Special Diet: Not Asked        Back Care: Not Asked        Exercise: Not Asked        Bike Helmet: Not Asked        Seat Belt: Not Asked        Self-Exams: Not Asked        Grew Signs:   /58 (BP Location: Left arm, Patient Position: Sitting, Cuff Size: adult)   Pulse 73   Temp 98 °F (36.7 °C) (Oral)   Resp 18   Ht 1.778 m (5' 10\")   Wt 107 kg (236 lb)   SpO2 98%   BMI 33.86 kg/m²     Physical Examination:  General: Patient i placed to start hydroxyurea as of November 2020 she is high risk based on age 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   Based on recent labs we will increase dose to 500 mg daily 1000 mg MF    Return to clinic in 4 to 6 weeks    Medical decision making:

## 2021-02-17 ENCOUNTER — TELEPHONE (OUTPATIENT)
Dept: PULMONOLOGY | Facility: CLINIC | Age: 81
End: 2021-02-17

## 2021-02-26 ENCOUNTER — VIRTUAL PHONE E/M (OUTPATIENT)
Dept: UROLOGY | Facility: HOSPITAL | Age: 81
End: 2021-02-26
Attending: OBSTETRICS & GYNECOLOGY
Payer: MEDICARE

## 2021-02-26 VITALS — BODY MASS INDEX: 34 KG/M2 | WEIGHT: 236 LBS

## 2021-02-26 DIAGNOSIS — R35.1 NOCTURIA: ICD-10-CM

## 2021-02-26 DIAGNOSIS — N39.41 URGE INCONTINENCE: ICD-10-CM

## 2021-02-26 DIAGNOSIS — N32.81 DETRUSOR INSTABILITY: Primary | ICD-10-CM

## 2021-02-26 NOTE — PROGRESS NOTES
Patient to follow up nocturia, UUI  Given circumstances surrounding COVID-19 this visit is being conducted as a televisit with pt's consent.     She is currently using trospium XR 60mg daily    She reports minimal improvement and reports no dry mouth or wor

## 2021-02-26 NOTE — PATIENT INSTRUCTIONS
[facility]   [facility]    BOWEL REGIMEN    Constipation can have detrimental effects on bladder function and can worsen the symptoms of prolapse. It is important to avoid constipation.     The first step for treating co combined with pelvic floor exercises and possibly medication, can give you complete dryness. This does not work over night, you must be patient with yourself. This condition occurs over a period of time and will improve gradually with persistence.   It is muscles and timed voiding protocol or your symptoms may return,  Please discuss this with your health care provider so that (s)he may monitor your progress.

## 2021-03-02 ENCOUNTER — ANTI-COAG VISIT (OUTPATIENT)
Dept: INTERNAL MEDICINE CLINIC | Facility: CLINIC | Age: 81
End: 2021-03-02
Payer: MEDICARE

## 2021-03-02 DIAGNOSIS — Z51.81 ENCOUNTER FOR THERAPEUTIC DRUG MONITORING: ICD-10-CM

## 2021-03-02 DIAGNOSIS — Z79.01 LONG TERM (CURRENT) USE OF ANTICOAGULANTS: ICD-10-CM

## 2021-03-02 DIAGNOSIS — T81.718A IATROGENIC PULMONARY EMBOLISM AND INFARCTION, INITIAL ENCOUNTER (HCC): ICD-10-CM

## 2021-03-02 DIAGNOSIS — I26.99 IATROGENIC PULMONARY EMBOLISM AND INFARCTION, INITIAL ENCOUNTER (HCC): ICD-10-CM

## 2021-03-02 LAB
INR: 2.2 (ref 0.8–1.2)
TEST STRIP EXPIRATION DATE: ABNORMAL DATE

## 2021-03-02 PROCEDURE — 85610 PROTHROMBIN TIME: CPT

## 2021-03-02 PROCEDURE — 93793 ANTICOAG MGMT PT WARFARIN: CPT

## 2021-03-02 PROCEDURE — 36416 COLLJ CAPILLARY BLOOD SPEC: CPT

## 2021-03-05 RX ORDER — WARFARIN SODIUM 5 MG/1
TABLET ORAL
Qty: 135 TABLET | Refills: 0 | Status: SHIPPED | OUTPATIENT
Start: 2021-03-05 | End: 2021-04-27

## 2021-03-05 RX ORDER — GABAPENTIN 300 MG/1
CAPSULE ORAL
Qty: 270 CAPSULE | Refills: 1 | Status: SHIPPED | OUTPATIENT
Start: 2021-03-05 | End: 2021-11-16

## 2021-03-06 ENCOUNTER — IMMUNIZATION (OUTPATIENT)
Dept: LAB | Facility: HOSPITAL | Age: 81
End: 2021-03-06
Attending: EMERGENCY MEDICINE
Payer: MEDICARE

## 2021-03-06 DIAGNOSIS — Z23 NEED FOR VACCINATION: Primary | ICD-10-CM

## 2021-03-06 PROCEDURE — 0012A SARSCOV2 VAC 100MCG/0.5ML IM: CPT

## 2021-03-26 ENCOUNTER — VIRTUAL PHONE E/M (OUTPATIENT)
Dept: UROLOGY | Facility: HOSPITAL | Age: 81
End: 2021-03-26
Attending: STUDENT IN AN ORGANIZED HEALTH CARE EDUCATION/TRAINING PROGRAM
Payer: MEDICARE

## 2021-03-26 VITALS — WEIGHT: 236 LBS | BODY MASS INDEX: 33.79 KG/M2 | HEIGHT: 70 IN

## 2021-03-26 DIAGNOSIS — N32.81 DETRUSOR INSTABILITY: Primary | ICD-10-CM

## 2021-03-26 DIAGNOSIS — R35.1 NOCTURIA: ICD-10-CM

## 2021-03-26 DIAGNOSIS — N39.41 URGE INCONTINENCE: ICD-10-CM

## 2021-03-26 PROCEDURE — 99212 OFFICE O/P EST SF 10 MIN: CPT

## 2021-03-26 RX ORDER — TROSPIUM CHLORIDE ER 60 MG/1
60 CAPSULE ORAL DAILY
Qty: 90 CAPSULE | Refills: 3 | Status: SHIPPED | OUTPATIENT
Start: 2021-03-26 | End: 2021-10-18 | Stop reason: ALTCHOICE

## 2021-03-26 NOTE — PROGRESS NOTES
Patient presents to follow up UUI  Given circumstances surrounding COVID-19 this visit is being conducted as a televisit with pt's consent.     She is currently using Trospium 60 mg XR daily    She reports positive improvement and reports no dry mouth or co

## 2021-04-05 ENCOUNTER — TELEPHONE (OUTPATIENT)
Dept: UROLOGY | Facility: HOSPITAL | Age: 81
End: 2021-04-05

## 2021-04-05 ENCOUNTER — APPOINTMENT (OUTPATIENT)
Dept: HEMATOLOGY/ONCOLOGY | Facility: HOSPITAL | Age: 81
End: 2021-04-05
Attending: INTERNAL MEDICINE
Payer: MEDICARE

## 2021-04-05 NOTE — TELEPHONE ENCOUNTER
Tara called the office this afternoon with c/o her RX for Trospium 60mg ER not being covered through here insurance.   Reviewed chart and patient has been on this medication since 1/15/21 with notable improvement  I called Julian and began the pre author

## 2021-04-06 ENCOUNTER — HOSPITAL ENCOUNTER (OUTPATIENT)
Dept: CT IMAGING | Age: 81
Discharge: HOME | End: 2021-04-06
Attending: INTERNAL MEDICINE
Payer: MEDICARE

## 2021-04-06 DIAGNOSIS — R91.8 PULMONARY NODULES: ICD-10-CM

## 2021-04-06 PROCEDURE — 71250 CT THORAX DX C-: CPT | Performed by: INTERNAL MEDICINE

## 2021-04-07 ENCOUNTER — NURSE ONLY (OUTPATIENT)
Dept: HEMATOLOGY/ONCOLOGY | Facility: HOSPITAL | Age: 81
End: 2021-04-07
Attending: INTERNAL MEDICINE
Payer: MEDICARE

## 2021-04-07 VITALS
SYSTOLIC BLOOD PRESSURE: 136 MMHG | BODY MASS INDEX: 33.93 KG/M2 | HEIGHT: 70 IN | HEART RATE: 72 BPM | TEMPERATURE: 99 F | RESPIRATION RATE: 18 BRPM | OXYGEN SATURATION: 97 % | WEIGHT: 237 LBS | DIASTOLIC BLOOD PRESSURE: 56 MMHG

## 2021-04-07 DIAGNOSIS — Z51.11 CHEMOTHERAPY MANAGEMENT, ENCOUNTER FOR: ICD-10-CM

## 2021-04-07 DIAGNOSIS — I82.403 DEEP VEIN THROMBOSIS (DVT) OF BOTH LOWER EXTREMITIES, UNSPECIFIED CHRONICITY, UNSPECIFIED VEIN (HCC): ICD-10-CM

## 2021-04-07 DIAGNOSIS — D45 POLYCYTHEMIA VERA (HCC): ICD-10-CM

## 2021-04-07 DIAGNOSIS — I26.99 BILATERAL PULMONARY EMBOLISM (HCC): ICD-10-CM

## 2021-04-07 DIAGNOSIS — D45 POLYCYTHEMIA VERA (HCC): Primary | ICD-10-CM

## 2021-04-07 DIAGNOSIS — C34.12 MALIGNANT NEOPLASM OF UPPER LOBE OF LEFT LUNG (HCC): ICD-10-CM

## 2021-04-07 PROCEDURE — 80053 COMPREHEN METABOLIC PANEL: CPT

## 2021-04-07 PROCEDURE — 85025 COMPLETE CBC W/AUTO DIFF WBC: CPT

## 2021-04-07 PROCEDURE — 36415 COLL VENOUS BLD VENIPUNCTURE: CPT

## 2021-04-07 PROCEDURE — 99215 OFFICE O/P EST HI 40 MIN: CPT | Performed by: INTERNAL MEDICINE

## 2021-04-07 NOTE — PROGRESS NOTES
Cancer Center Progress Note    Patient Name: Rj Higgins   YOB: 1940   Medical Record Number: [id number]   Attending Physician: Kacie Toledo M.D.        Chief Complaint:  Lung cancer    History of Present Illness:  Cancer history:  80-year- • History of hip replacement, total     right hip   • Internal hemorrhoids 9/8/2017   • Kidney problem    • Lumbar herniated disc 1/24/2019   • [facility]   • Obesity, unspecified    • Osteoarthrosis 5/8/2014   • Osteoarthrosis, unspecifie Other (Pancreatic Cancer) Sister 68   • Macular degeneration Brother    • Other (Myocardial Infarction) Brother 71   • Macular degeneration Maternal Aunt    • Glaucoma Maternal Aunt    • Breast Cancer Daughter 50   • Diabetes Neg        Social History:   So Activity:       Days of Exercise per Week:       Minutes of Exercise per Session:   Stress:       Feeling of Stress :   Social Connections:       Frequency of Communication with Friends and Family:       Frequency of Social Gatherings with Friends and Fami Location: Left arm, Patient Position: Sitting, Cuff Size: adult)   Pulse 72   Temp 98.6 °F (37 °C) (Oral)   Resp 18   SpO2 97%     Physical Examination:  General: Patient is alert and oriented x 3, not in acute distress.   Psych:  Mood and affect appropriat mediastinal lymph node dissection 6/14/16.  --s/p R0 resection (however close margin noted). She did not receive any adjuvant treatment.   --Doing well at this time  --repeat chest imaging spring 2022    History of recurrent bilateral DVT/PE with multiple

## 2021-04-13 ENCOUNTER — ANTI-COAG VISIT (OUTPATIENT)
Dept: INTERNAL MEDICINE CLINIC | Facility: CLINIC | Age: 81
End: 2021-04-13
Payer: MEDICARE

## 2021-04-13 DIAGNOSIS — T81.718A IATROGENIC PULMONARY EMBOLISM AND INFARCTION, INITIAL ENCOUNTER (HCC): ICD-10-CM

## 2021-04-13 DIAGNOSIS — Z79.01 LONG TERM (CURRENT) USE OF ANTICOAGULANTS: ICD-10-CM

## 2021-04-13 DIAGNOSIS — I26.99 IATROGENIC PULMONARY EMBOLISM AND INFARCTION, INITIAL ENCOUNTER (HCC): ICD-10-CM

## 2021-04-13 DIAGNOSIS — Z51.81 ENCOUNTER FOR THERAPEUTIC DRUG MONITORING: ICD-10-CM

## 2021-04-13 PROCEDURE — 36416 COLLJ CAPILLARY BLOOD SPEC: CPT

## 2021-04-13 PROCEDURE — 93793 ANTICOAG MGMT PT WARFARIN: CPT

## 2021-04-13 PROCEDURE — 85610 PROTHROMBIN TIME: CPT

## 2021-04-20 ENCOUNTER — TELEPHONE (OUTPATIENT)
Dept: UROLOGY | Facility: HOSPITAL | Age: 81
End: 2021-04-20

## 2021-04-20 NOTE — TELEPHONE ENCOUNTER
TC from pt regarding coverage for Trospium  Pt states [employer] was too expensive so she picked it up from [facility] pt's pharmacy to see if we could get better coverage.  Pharmacist said the pt can contact her insurance and see if they can get better cover

## 2021-04-21 NOTE — TELEPHONE ENCOUNTER
TC to pt to discuss coverage for Trospium XR 60 mg  Pt states she picked it up from her [facility] 28 and was happy with the price  Reports medication is working well, minimal UI complaints, No dry mouth/constipation  Discussed calling her insurance

## 2021-04-27 RX ORDER — WARFARIN SODIUM 5 MG/1
TABLET ORAL
Qty: 135 TABLET | Refills: 0 | Status: SHIPPED | OUTPATIENT
Start: 2021-04-27 | End: 2021-12-15

## 2021-05-04 ENCOUNTER — TELEPHONE (OUTPATIENT)
Dept: UROLOGY | Facility: HOSPITAL | Age: 81
End: 2021-05-04

## 2021-05-04 NOTE — TELEPHONE ENCOUNTER
Called pt Julian @ [phone] trying to help pt w/ getting coverage of her trospium. Julian said trospium was denied 4/5/21. Placed an appeal w/ the [employer] and denial dept. Will hear back in 7-10 days.  Further records can be faxed to [phone] to s

## 2021-05-06 NOTE — TELEPHONE ENCOUNTER
Phoned pt w/ appeal approval. Ref # [id number], good from 1/1/21-12/31/21. Pt verbalized an understanding and agrees w/ plan. All questions answered.

## 2021-05-18 ENCOUNTER — TELEPHONE (OUTPATIENT)
Dept: HEMATOLOGY/ONCOLOGY | Facility: HOSPITAL | Age: 81
End: 2021-05-18

## 2021-05-18 NOTE — TELEPHONE ENCOUNTER
Essence Coffey called saying she missed a few days of taking her hydroxyurea, and asking if she needs to take double to make up for this.

## 2021-05-18 NOTE — TELEPHONE ENCOUNTER
Call returned. She has been without her hydrea since Friday. She normally takes hydrea 500 mg daily , except on Mondays and Fridays she takes 1000 mg daily. I explained not to double up today but resume her schedule today.   It should not cause harm and

## 2021-05-25 ENCOUNTER — ANTI-COAG VISIT (OUTPATIENT)
Dept: INTERNAL MEDICINE CLINIC | Facility: CLINIC | Age: 81
End: 2021-05-25
Payer: MEDICARE

## 2021-05-25 DIAGNOSIS — T81.718A IATROGENIC PULMONARY EMBOLISM AND INFARCTION, INITIAL ENCOUNTER (HCC): ICD-10-CM

## 2021-05-25 DIAGNOSIS — I26.99 IATROGENIC PULMONARY EMBOLISM AND INFARCTION, INITIAL ENCOUNTER (HCC): ICD-10-CM

## 2021-05-25 DIAGNOSIS — Z79.01 LONG TERM (CURRENT) USE OF ANTICOAGULANTS: ICD-10-CM

## 2021-05-25 DIAGNOSIS — Z51.81 ENCOUNTER FOR THERAPEUTIC DRUG MONITORING: ICD-10-CM

## 2021-05-25 PROCEDURE — 93793 ANTICOAG MGMT PT WARFARIN: CPT

## 2021-05-25 PROCEDURE — 85610 PROTHROMBIN TIME: CPT

## 2021-06-08 ENCOUNTER — NURSE ONLY (OUTPATIENT)
Dept: HEMATOLOGY/ONCOLOGY | Facility: HOSPITAL | Age: 81
End: 2021-06-08
Attending: INTERNAL MEDICINE
Payer: MEDICARE

## 2021-06-08 VITALS
BODY MASS INDEX: 34.36 KG/M2 | DIASTOLIC BLOOD PRESSURE: 59 MMHG | SYSTOLIC BLOOD PRESSURE: 109 MMHG | RESPIRATION RATE: 18 BRPM | OXYGEN SATURATION: 96 % | TEMPERATURE: 99 F | HEIGHT: 70 IN | WEIGHT: 240 LBS | HEART RATE: 72 BPM

## 2021-06-08 DIAGNOSIS — I82.403 DEEP VEIN THROMBOSIS (DVT) OF BOTH LOWER EXTREMITIES, UNSPECIFIED CHRONICITY, UNSPECIFIED VEIN (HCC): ICD-10-CM

## 2021-06-08 DIAGNOSIS — Z51.11 CHEMOTHERAPY MANAGEMENT, ENCOUNTER FOR: ICD-10-CM

## 2021-06-08 DIAGNOSIS — D45 POLYCYTHEMIA VERA (HCC): ICD-10-CM

## 2021-06-08 DIAGNOSIS — D45 POLYCYTHEMIA VERA (HCC): Primary | ICD-10-CM

## 2021-06-08 DIAGNOSIS — I26.99 BILATERAL PULMONARY EMBOLISM (HCC): ICD-10-CM

## 2021-06-08 PROCEDURE — 99215 OFFICE O/P EST HI 40 MIN: CPT | Performed by: INTERNAL MEDICINE

## 2021-06-08 PROCEDURE — 80053 COMPREHEN METABOLIC PANEL: CPT

## 2021-06-08 PROCEDURE — 85060 BLOOD SMEAR INTERPRETATION: CPT

## 2021-06-08 PROCEDURE — 85025 COMPLETE CBC W/AUTO DIFF WBC: CPT

## 2021-06-08 PROCEDURE — 36415 COLL VENOUS BLD VENIPUNCTURE: CPT

## 2021-06-08 NOTE — PROGRESS NOTES
Cancer Center Progress Note    Patient Name: Perla Taylor   YOB: 1940   Medical Record Number: [id number]   Attending Physician: Eric Wynne M.D.        Chief Complaint:  Lung cancer    History of Present Illness:  Cancer history:  80-year- • History of hip replacement, total     right hip   • Internal hemorrhoids 9/8/2017   • Kidney problem    • Lumbar herniated disc 1/24/2019   • [facility]   • Obesity, unspecified    • Osteoarthrosis 5/8/2014   • Osteoarthrosis, unspecifie (Myocardial Infarction) Brother 71   • Macular degeneration Maternal Aunt    • Glaucoma Maternal Aunt    • Breast Cancer Daughter 50   • Diabetes Neg        Social History:  Social History    Socioeconomic History      Marital status:       Spouse n Stress:       Feeling of Stress :   Social Connections:       Frequency of Communication with Friends and Family:       Frequency of Social Gatherings with Friends and Family:       Attends Synagogue Services:       Active Member of Clubs or Organization °F (37.2 °C) (Oral)   Resp 18   Ht 1.778 m (5' 10\")   Wt 108.9 kg (240 lb)   SpO2 96%   BMI 34.44 kg/m²     Physical Examination:  General: Patient is alert and oriented x 3, not in acute distress. Psych:  Mood and affect appropriate  HEENT: EOMs intact. thoracotomy with left upper lobectomy and mediastinal lymph node dissection 6/14/16.  --s/p R0 resection (however close margin noted). She did not receive any adjuvant treatment.   --Doing well at this time  --repeat chest imaging spring 2022    History of

## 2021-06-18 ENCOUNTER — VIRTUAL PHONE E/M (OUTPATIENT)
Dept: UROLOGY | Facility: HOSPITAL | Age: 81
End: 2021-06-18
Attending: STUDENT IN AN ORGANIZED HEALTH CARE EDUCATION/TRAINING PROGRAM
Payer: MEDICARE

## 2021-06-18 DIAGNOSIS — N39.3 FEMALE STRESS INCONTINENCE: ICD-10-CM

## 2021-06-18 DIAGNOSIS — N39.41 URGE URINARY INCONTINENCE: Primary | ICD-10-CM

## 2021-06-18 DIAGNOSIS — N32.81 DETRUSOR INSTABILITY: ICD-10-CM

## 2021-06-18 DIAGNOSIS — R35.1 NOCTURIA: ICD-10-CM

## 2021-06-18 NOTE — PROGRESS NOTES
Patient presents to follow up DO/UUI, nocturia  Given circumstances surrounding COVID-19 this visit is being conducted as a televisit with pt's consent.     She is currently using Trospium XR 60 mg     She reports 50% improvement  + dry mouth, bothersome to

## 2021-06-21 ENCOUNTER — LAB ENCOUNTER (OUTPATIENT)
Dept: LAB | Age: 81
End: 2021-06-21
Attending: STUDENT IN AN ORGANIZED HEALTH CARE EDUCATION/TRAINING PROGRAM
Payer: MEDICARE

## 2021-06-21 DIAGNOSIS — N39.41 URGE URINARY INCONTINENCE: ICD-10-CM

## 2021-06-21 DIAGNOSIS — D45 POLYCYTHEMIA VERA (HCC): ICD-10-CM

## 2021-06-21 DIAGNOSIS — I82.403 DEEP VEIN THROMBOSIS (DVT) OF BOTH LOWER EXTREMITIES, UNSPECIFIED CHRONICITY, UNSPECIFIED VEIN (HCC): ICD-10-CM

## 2021-06-21 PROCEDURE — 87147 CULTURE TYPE IMMUNOLOGIC: CPT

## 2021-06-21 PROCEDURE — 87086 URINE CULTURE/COLONY COUNT: CPT

## 2021-06-21 PROCEDURE — 85025 COMPLETE CBC W/AUTO DIFF WBC: CPT

## 2021-06-21 PROCEDURE — 80053 COMPREHEN METABOLIC PANEL: CPT

## 2021-06-21 PROCEDURE — 36415 COLL VENOUS BLD VENIPUNCTURE: CPT

## 2021-06-29 ENCOUNTER — ANTI-COAG VISIT (OUTPATIENT)
Dept: INTERNAL MEDICINE CLINIC | Facility: CLINIC | Age: 81
End: 2021-06-29
Payer: MEDICARE

## 2021-06-29 DIAGNOSIS — T81.718A IATROGENIC PULMONARY EMBOLISM AND INFARCTION, INITIAL ENCOUNTER (HCC): ICD-10-CM

## 2021-06-29 DIAGNOSIS — Z79.01 LONG TERM (CURRENT) USE OF ANTICOAGULANTS: ICD-10-CM

## 2021-06-29 DIAGNOSIS — I26.99 IATROGENIC PULMONARY EMBOLISM AND INFARCTION, INITIAL ENCOUNTER (HCC): ICD-10-CM

## 2021-06-29 DIAGNOSIS — Z51.81 ENCOUNTER FOR THERAPEUTIC DRUG MONITORING: ICD-10-CM

## 2021-06-29 PROCEDURE — 93793 ANTICOAG MGMT PT WARFARIN: CPT

## 2021-06-29 PROCEDURE — 85610 PROTHROMBIN TIME: CPT

## 2021-07-09 ENCOUNTER — OFFICE VISIT (OUTPATIENT)
Dept: DERMATOLOGY CLINIC | Facility: CLINIC | Age: 81
End: 2021-07-09
Payer: MEDICARE

## 2021-07-09 DIAGNOSIS — L82.1 SEBORRHEIC KERATOSES: ICD-10-CM

## 2021-07-09 DIAGNOSIS — D23.9 BENIGN NEOPLASM OF SKIN, UNSPECIFIED LOCATION: ICD-10-CM

## 2021-07-09 DIAGNOSIS — L57.0 AK (ACTINIC KERATOSIS): Primary | ICD-10-CM

## 2021-07-09 DIAGNOSIS — L82.0 INFLAMED SEBORRHEIC KERATOSIS: ICD-10-CM

## 2021-07-09 DIAGNOSIS — L72.0 MILIA: ICD-10-CM

## 2021-07-09 PROCEDURE — 99203 OFFICE O/P NEW LOW 30 MIN: CPT | Performed by: DERMATOLOGY

## 2021-07-09 PROCEDURE — 17000 DESTRUCT PREMALG LESION: CPT | Performed by: DERMATOLOGY

## 2021-07-13 ENCOUNTER — TELEPHONE (OUTPATIENT)
Dept: INTERNAL MEDICINE CLINIC | Facility: CLINIC | Age: 81
End: 2021-07-13

## 2021-07-13 DIAGNOSIS — I26.99 IATROGENIC PULMONARY EMBOLISM AND INFARCTION, INITIAL ENCOUNTER (HCC): Primary | ICD-10-CM

## 2021-07-13 DIAGNOSIS — T81.718A IATROGENIC PULMONARY EMBOLISM AND INFARCTION, INITIAL ENCOUNTER (HCC): Primary | ICD-10-CM

## 2021-07-13 DIAGNOSIS — D45 POLYCYTHEMIA VERA (HCC): Primary | ICD-10-CM

## 2021-07-13 DIAGNOSIS — Z79.01 LONG TERM (CURRENT) USE OF ANTICOAGULANTS: ICD-10-CM

## 2021-07-13 DIAGNOSIS — Z51.81 ENCOUNTER FOR THERAPEUTIC DRUG MONITORING: ICD-10-CM

## 2021-07-13 RX ORDER — HYDROXYUREA 500 MG/1
CAPSULE ORAL
Qty: 132 CAPSULE | Refills: 3 | Status: SHIPPED | OUTPATIENT
Start: 2021-07-13

## 2021-07-13 RX ORDER — HYDROXYUREA 500 MG/1
500 CAPSULE ORAL DAILY
Qty: 90 CAPSULE | Refills: 3 | Status: CANCELLED | OUTPATIENT
Start: 2021-07-13

## 2021-07-13 NOTE — TELEPHONE ENCOUNTER
Updated scrip for hydrea sent to mail order pharmacy [facility]) as per Dr Astrid Burden recommendations in recent note and per patient's request.  Re ordered cbc and cmp and lab appt made for September appt explaining to patient that the labs she had drawn in early J

## 2021-07-13 NOTE — TELEPHONE ENCOUNTER
Anticoag referral expires soon. Order pended. Please sign, thank you.       Dx: Iatrogenic pulmonary embolism and infarction, [facility] (T81.718A,I26.99)  Long term (current) use of anticoagulants (Z79.01)  Encounter for therapeutic drug monit

## 2021-07-13 NOTE — TELEPHONE ENCOUNTER
Patient need a refill on her Hydroxyurea 500 mg oral cap and she need to clarify the dose she is taking with a RN.

## 2021-07-18 NOTE — PROGRESS NOTES
Eran Kay is a 79year old female. HPI:     CC:  Patient presents with:  Lesion: LOV 9/1 2017 Patient present with dark raised lesion on under R eye . Patient c/o having lesion and states it has changed size . Patient has hx of AK         Allergies: ENDOSCOPY   • EGD     • FOOT SURGERY Bilateral     Bunion Surgery X6   • FOOT/TOES SURGERY PROC UNLISTED Left     Pt had surgery to straighten toes on left foot.    • HIP REPLACEMENT SURGERY Right    • HIP SURGERY Right 2015    right total hip arthroplasty as needed 1 Inhaler 5   • Esomeprazole Magnesium 20 MG Oral Capsule Delayed Release Take 1 capsule (20 mg total) by mouth every morning before breakfast. 30 capsule 0   • Multiple Vitamins-Minerals (MULTIVITAMIN ADULT OR) Take 1 tablet by mouth daily. embolism ([facility] [address]    • S/P colonoscopy with polypectomy 9/8/2017   • Spinal stenosis of lumbar region without neurogenic claudication 1/24/2019   • Spondylolisthesis of lumbar region 1/15/2019   • Thyroid condition      Past Surgical History:   Procedure Late Not Asked        Weight Concern: Not Asked        Special Diet: Not Asked        Back Care: Not Asked        Exercise: Not Asked        Bike Helmet: Not Asked        Seat Belt: Not Asked        Self-Exams: Not Asked        [place] up [place] a farm: Not Asked Patient presents with:  Lesion: LOV 9/1 2017 Patient present with dark raised lesion on under R eye . Patient c/o having lesion and states it has changed size . Patient has hx of AK     lov 2017  Concerned in particular with lesion at right orbital area prescriptions requested or ordered in this encounter         Ak (actinic keratosis)  (primary encounter diagnosis)  Inflamed seborrheic keratosis  Seborrheic keratoses  Milia  Benign neoplasm of skin, unspecified location    See details on map.       Remark minutes     The patient indicates understanding of these issues and agrees to the plan. The patient is asked to return as noted in follow-up/ above. This note was generated using Dragon voice recognition software.   Please contact me regarding any confu

## 2021-07-20 ENCOUNTER — ANTI-COAG VISIT (OUTPATIENT)
Dept: INTERNAL MEDICINE CLINIC | Facility: CLINIC | Age: 81
End: 2021-07-20
Payer: MEDICARE

## 2021-07-20 DIAGNOSIS — I26.99 IATROGENIC PULMONARY EMBOLISM AND INFARCTION, INITIAL ENCOUNTER (HCC): ICD-10-CM

## 2021-07-20 DIAGNOSIS — T81.718A IATROGENIC PULMONARY EMBOLISM AND INFARCTION, INITIAL ENCOUNTER (HCC): ICD-10-CM

## 2021-07-20 DIAGNOSIS — Z79.01 LONG TERM (CURRENT) USE OF ANTICOAGULANTS: ICD-10-CM

## 2021-07-20 DIAGNOSIS — Z51.81 ENCOUNTER FOR THERAPEUTIC DRUG MONITORING: ICD-10-CM

## 2021-07-20 LAB
INR: 2.3 (ref 0.8–1.2)
TEST STRIP EXPIRATION DATE: ABNORMAL DATE

## 2021-07-20 PROCEDURE — 93793 ANTICOAG MGMT PT WARFARIN: CPT

## 2021-07-20 PROCEDURE — 85610 PROTHROMBIN TIME: CPT

## 2021-08-17 ENCOUNTER — ANTI-COAG VISIT (OUTPATIENT)
Dept: INTERNAL MEDICINE CLINIC | Facility: CLINIC | Age: 81
End: 2021-08-17
Payer: MEDICARE

## 2021-08-17 DIAGNOSIS — I26.99 IATROGENIC PULMONARY EMBOLISM AND INFARCTION, INITIAL ENCOUNTER (HCC): ICD-10-CM

## 2021-08-17 DIAGNOSIS — Z51.81 ENCOUNTER FOR THERAPEUTIC DRUG MONITORING: ICD-10-CM

## 2021-08-17 DIAGNOSIS — Z79.01 LONG TERM (CURRENT) USE OF ANTICOAGULANTS: ICD-10-CM

## 2021-08-17 DIAGNOSIS — T81.718A IATROGENIC PULMONARY EMBOLISM AND INFARCTION, INITIAL ENCOUNTER (HCC): ICD-10-CM

## 2021-08-17 LAB
INR: 1.7 (ref 0.8–1.2)
TEST STRIP EXPIRATION DATE: ABNORMAL DATE

## 2021-08-17 PROCEDURE — 85610 PROTHROMBIN TIME: CPT

## 2021-08-17 PROCEDURE — 93793 ANTICOAG MGMT PT WARFARIN: CPT

## 2021-09-08 ENCOUNTER — OFFICE VISIT (OUTPATIENT)
Dept: HEMATOLOGY/ONCOLOGY | Facility: HOSPITAL | Age: 81
End: 2021-09-08
Attending: INTERNAL MEDICINE
Payer: MEDICARE

## 2021-09-08 VITALS
HEIGHT: 70 IN | WEIGHT: 240 LBS | HEART RATE: 71 BPM | DIASTOLIC BLOOD PRESSURE: 62 MMHG | SYSTOLIC BLOOD PRESSURE: 151 MMHG | TEMPERATURE: 98 F | BODY MASS INDEX: 34.36 KG/M2 | RESPIRATION RATE: 18 BRPM | OXYGEN SATURATION: 97 %

## 2021-09-08 DIAGNOSIS — D45 POLYCYTHEMIA VERA (HCC): ICD-10-CM

## 2021-09-08 DIAGNOSIS — Z51.11 CHEMOTHERAPY MANAGEMENT, ENCOUNTER FOR: ICD-10-CM

## 2021-09-08 DIAGNOSIS — I26.99 BILATERAL PULMONARY EMBOLISM (HCC): ICD-10-CM

## 2021-09-08 DIAGNOSIS — D45 POLYCYTHEMIA VERA (HCC): Primary | ICD-10-CM

## 2021-09-08 LAB
ALBUMIN SERPL-MCNC: 3.6 G/DL (ref 3.4–5)
ALBUMIN/GLOB SERPL: 1.1 {RATIO} (ref 1–2)
ALP LIVER SERPL-CCNC: 70 U/L
ALT SERPL-CCNC: 23 U/L
ANION GAP SERPL CALC-SCNC: 3 MMOL/L (ref 0–18)
AST SERPL-CCNC: 20 U/L (ref 15–37)
BASOPHILS # BLD AUTO: 0.05 X10(3) UL (ref 0–0.2)
BASOPHILS NFR BLD AUTO: 0.9 %
BILIRUB SERPL-MCNC: 0.6 MG/DL (ref 0.1–2)
BUN BLD-MCNC: 20 MG/DL (ref 7–18)
BUN/CREAT SERPL: 19 (ref 10–20)
CALCIUM BLD-MCNC: 8.8 MG/DL (ref 8.5–10.1)
CHLORIDE SERPL-SCNC: 104 MMOL/L (ref 98–112)
CO2 SERPL-SCNC: 29 MMOL/L (ref 21–32)
CREAT BLD-MCNC: 1.05 MG/DL
DEPRECATED RDW RBC AUTO: 49.7 FL (ref 35.1–46.3)
EOSINOPHIL # BLD AUTO: 0.07 X10(3) UL (ref 0–0.7)
EOSINOPHIL NFR BLD AUTO: 1.3 %
ERYTHROCYTE [DISTWIDTH] IN BLOOD BY AUTOMATED COUNT: 12.1 % (ref 11–15)
GLOBULIN PLAS-MCNC: 3.2 G/DL (ref 2.8–4.4)
GLUCOSE BLD-MCNC: 91 MG/DL (ref 70–99)
HCT VFR BLD AUTO: 39.1 %
HGB BLD-MCNC: 13.4 G/DL
IMM GRANULOCYTES # BLD AUTO: 0.01 X10(3) UL (ref 0–1)
IMM GRANULOCYTES NFR BLD: 0.2 %
LYMPHOCYTES # BLD AUTO: 0.96 X10(3) UL (ref 1–4)
LYMPHOCYTES NFR BLD AUTO: 18.1 %
M PROTEIN MFR SERPL ELPH: 6.8 G/DL (ref 6.4–8.2)
MCH RBC QN AUTO: 38.1 PG (ref 26–34)
MCHC RBC AUTO-ENTMCNC: 34.3 G/DL (ref 31–37)
MCV RBC AUTO: 111.1 FL
MONOCYTES # BLD AUTO: 0.41 X10(3) UL (ref 0.1–1)
MONOCYTES NFR BLD AUTO: 7.7 %
NEUTROPHILS # BLD AUTO: 3.81 X10 (3) UL (ref 1.5–7.7)
NEUTROPHILS # BLD AUTO: 3.81 X10(3) UL (ref 1.5–7.7)
NEUTROPHILS NFR BLD AUTO: 71.8 %
OSMOLALITY SERPL CALC.SUM OF ELEC: 284 MOSM/KG (ref 275–295)
PLATELET # BLD AUTO: 237 10(3)UL (ref 150–450)
POTASSIUM SERPL-SCNC: 5 MMOL/L (ref 3.5–5.1)
RBC # BLD AUTO: 3.52 X10(6)UL
SODIUM SERPL-SCNC: 136 MMOL/L (ref 136–145)
WBC # BLD AUTO: 5.3 X10(3) UL (ref 4–11)

## 2021-09-08 PROCEDURE — 99215 OFFICE O/P EST HI 40 MIN: CPT | Performed by: INTERNAL MEDICINE

## 2021-09-08 PROCEDURE — 85025 COMPLETE CBC W/AUTO DIFF WBC: CPT

## 2021-09-08 PROCEDURE — 36415 COLL VENOUS BLD VENIPUNCTURE: CPT

## 2021-09-08 PROCEDURE — 80053 COMPREHEN METABOLIC PANEL: CPT

## 2021-09-08 NOTE — PROGRESS NOTES
Cancer Center Progress Note    Patient Name: Olivia Cooley   YOB: 1940   Medical Record Number: [id number]   Attending Physician: Patricia Mojica M.D.        Chief Complaint:  Lung cancer    History of Present Illness:  Cancer history:  80-year- • History of hip replacement, total     right hip   • Internal hemorrhoids 9/8/2017   • Kidney problem    • Lumbar herniated disc 1/24/2019   • [facility] 6-2016   • Obesity, unspecified    • Osteoarthrosis 5/8/2014   • Osteoarthrosis, unspecifie (Myocardial Infarction) Brother 71   • Macular degeneration Maternal Aunt    • Glaucoma Maternal Aunt    • Breast Cancer Daughter 50   • Diabetes Neg        Social History:  Social History    Socioeconomic History      Marital status:       Spouse n Stress:       Feeling of Stress :   Social Connections:       Frequency of Communication with Friends and Family:       Frequency of Social Gatherings with Friends and Family:       Attends Mandaen Services:       Active Member of Clubs or Organization Location: Left arm, Patient Position: Sitting, Cuff Size: large)   Pulse 71   Temp 98.2 °F (36.8 °C) (Oral)   Resp 18   Ht 1.778 m (5' 10\")   Wt 108.9 kg (240 lb)   SpO2 97%   BMI 34.44 kg/m²     Physical Examination:  General: Patient is alert and orient adenocarcinoma of the lung (well differentiated pT2aN0 4.3 cm no LVI) stage Ib. S/p left lateral thoracotomy with left upper lobectomy and mediastinal lymph node dissection 6/14/16.  --s/p R0 resection (however close margin noted).   She did not receive an

## 2021-09-14 ENCOUNTER — ANTI-COAG VISIT (OUTPATIENT)
Dept: INTERNAL MEDICINE CLINIC | Facility: CLINIC | Age: 81
End: 2021-09-14
Payer: MEDICARE

## 2021-09-14 ENCOUNTER — OFFICE VISIT (OUTPATIENT)
Dept: INTERNAL MEDICINE CLINIC | Facility: CLINIC | Age: 81
End: 2021-09-14
Payer: MEDICARE

## 2021-09-14 VITALS
SYSTOLIC BLOOD PRESSURE: 114 MMHG | WEIGHT: 237 LBS | DIASTOLIC BLOOD PRESSURE: 69 MMHG | BODY MASS INDEX: 33.93 KG/M2 | HEIGHT: 70 IN | HEART RATE: 79 BPM

## 2021-09-14 DIAGNOSIS — Z51.81 ENCOUNTER FOR THERAPEUTIC DRUG MONITORING: ICD-10-CM

## 2021-09-14 DIAGNOSIS — T81.718A IATROGENIC PULMONARY EMBOLISM AND INFARCTION, INITIAL ENCOUNTER (HCC): ICD-10-CM

## 2021-09-14 DIAGNOSIS — I26.99 IATROGENIC PULMONARY EMBOLISM AND INFARCTION, INITIAL ENCOUNTER (HCC): ICD-10-CM

## 2021-09-14 DIAGNOSIS — R60.0 LOCALIZED EDEMA: ICD-10-CM

## 2021-09-14 DIAGNOSIS — J43.9 PULMONARY EMPHYSEMA, UNSPECIFIED EMPHYSEMA TYPE (HCC): ICD-10-CM

## 2021-09-14 DIAGNOSIS — I10 PRIMARY HYPERTENSION: Primary | ICD-10-CM

## 2021-09-14 DIAGNOSIS — R06.00 DYSPNEA, UNSPECIFIED TYPE: ICD-10-CM

## 2021-09-14 DIAGNOSIS — Z79.01 LONG TERM (CURRENT) USE OF ANTICOAGULANTS: ICD-10-CM

## 2021-09-14 DIAGNOSIS — E78.5 HYPERLIPIDEMIA, UNSPECIFIED HYPERLIPIDEMIA TYPE: ICD-10-CM

## 2021-09-14 LAB
INR: 2.4 (ref 0.8–1.2)
TEST STRIP EXPIRATION DATE: ABNORMAL DATE

## 2021-09-14 PROCEDURE — 93793 ANTICOAG MGMT PT WARFARIN: CPT

## 2021-09-14 PROCEDURE — 85610 PROTHROMBIN TIME: CPT

## 2021-09-14 PROCEDURE — 99214 OFFICE O/P EST MOD 30 MIN: CPT | Performed by: INTERNAL MEDICINE

## 2021-09-15 NOTE — PROGRESS NOTES
HPI:    Patient ID: Dario Dawson is a 79year old female.     HPI    HTN  Long standing history of hypertension     sympotms  :        Headache no  dizziness        no                             Blurred vision no  palpitaionsSyncope no  Chest pain  no Psychiatric/Behavioral: Negative for agitation and behavioral problems. Current Outpatient Medications   Medication Sig Dispense Refill   • hydroxyurea 500 MG Oral Cap Take one 500 mg tablet on Sunday, Tuesday, Wednesday, Thursday, Saturday.     Lj Saunders glaucoma in mother 11/27/2018   • Family history of macular degeneration 11/9/2017   • Gastric polyps 9/8/2017   • Gastric polyps 9/8/2017   • Hiatal hernia    • History of hip replacement, total     right hip   • Internal hemorrhoids 9/8/2017   • Kidney p Macular degeneration Brother    • Other (Pancreatic Cancer) Sister 68   • Macular degeneration Brother    • Other (Myocardial Infarction) Brother 71   • Macular degeneration Maternal Aunt    • Glaucoma Maternal Aunt    • Breast Cancer Daughter 50   • Diabe (ALINITY)        evalaution initiated    (R06.00) Dyspnea, unspecified type  Plan: CARD ECHO 2D DOPPLER (CPT=93306)        Chronic  Will also make an appt with cardiolgy dR Yates    Patient voiced understanding  and agrees with plan  Medications and most

## 2021-09-18 ENCOUNTER — LAB ENCOUNTER (OUTPATIENT)
Dept: LAB | Facility: HOSPITAL | Age: 81
End: 2021-09-18
Attending: INTERNAL MEDICINE
Payer: MEDICARE

## 2021-09-18 DIAGNOSIS — R60.0 LOCALIZED EDEMA: ICD-10-CM

## 2021-09-18 DIAGNOSIS — J43.9 PULMONARY EMPHYSEMA, UNSPECIFIED EMPHYSEMA TYPE (HCC): ICD-10-CM

## 2021-09-18 DIAGNOSIS — E78.5 HYPERLIPIDEMIA, UNSPECIFIED HYPERLIPIDEMIA TYPE: ICD-10-CM

## 2021-09-18 LAB
CHOLEST SERPL-MCNC: 185 MG/DL (ref ?–200)
HDLC SERPL-MCNC: 52 MG/DL (ref 40–59)
LDLC SERPL CALC-MCNC: 117 MG/DL (ref ?–100)
NONHDLC SERPL-MCNC: 133 MG/DL (ref ?–130)
PATIENT FASTING Y/N/NP: YES
T4 FREE SERPL-MCNC: 0.9 NG/DL (ref 0.8–1.7)
TRIGL SERPL-MCNC: 90 MG/DL (ref 30–149)
TSI SER-ACNC: 2.61 MIU/ML (ref 0.36–3.74)
VLDLC SERPL CALC-MCNC: 16 MG/DL (ref 0–30)

## 2021-09-18 PROCEDURE — 93010 ELECTROCARDIOGRAM REPORT: CPT | Performed by: INTERNAL MEDICINE

## 2021-09-18 PROCEDURE — 84439 ASSAY OF FREE THYROXINE: CPT

## 2021-09-18 PROCEDURE — 80061 LIPID PANEL: CPT

## 2021-09-18 PROCEDURE — 84443 ASSAY THYROID STIM HORMONE: CPT

## 2021-09-18 PROCEDURE — 93005 ELECTROCARDIOGRAM TRACING: CPT

## 2021-09-18 PROCEDURE — 81015 MICROSCOPIC EXAM OF URINE: CPT

## 2021-09-18 PROCEDURE — 36415 COLL VENOUS BLD VENIPUNCTURE: CPT

## 2021-09-20 LAB — SARS-COV-2 RNA RESP QL NAA+PROBE: NOT DETECTED

## 2021-09-21 ENCOUNTER — HOSPITAL ENCOUNTER (OUTPATIENT)
Dept: RESPIRATORY THERAPY | Facility: HOSPITAL | Age: 81
Discharge: HOME | End: 2021-09-21
Attending: INTERNAL MEDICINE
Payer: MEDICARE

## 2021-09-21 PROCEDURE — 94729 DIFFUSING CAPACITY: CPT | Performed by: INTERNAL MEDICINE

## 2021-09-21 PROCEDURE — 94726 PLETHYSMOGRAPHY LUNG VOLUMES: CPT | Performed by: INTERNAL MEDICINE

## 2021-09-21 PROCEDURE — 94060 EVALUATION OF WHEEZING: CPT | Performed by: INTERNAL MEDICINE

## 2021-09-22 ENCOUNTER — TELEPHONE (OUTPATIENT)
Dept: PULMONOLOGY | Facility: CLINIC | Age: 81
End: 2021-09-22

## 2021-09-27 ENCOUNTER — HOSPITAL ENCOUNTER (OUTPATIENT)
Dept: CV DIAGNOSTICS | Facility: HOSPITAL | Age: 81
Discharge: HOME | End: 2021-09-27
Attending: INTERNAL MEDICINE
Payer: MEDICARE

## 2021-09-27 DIAGNOSIS — J43.9 PULMONARY EMPHYSEMA, UNSPECIFIED EMPHYSEMA TYPE (HCC): ICD-10-CM

## 2021-09-27 DIAGNOSIS — R06.00 DYSPNEA, UNSPECIFIED TYPE: ICD-10-CM

## 2021-09-27 PROCEDURE — 93306 TTE W/DOPPLER COMPLETE: CPT | Performed by: INTERNAL MEDICINE

## 2021-09-27 NOTE — ADDENDUM NOTE
Encounter addended by: Lowell Austin MD on: 9/27/2021 5:29 PM   Actions taken: Clinical Note Signed, Charge Capture section accepted

## 2021-10-04 ENCOUNTER — HOSPITAL ENCOUNTER (OUTPATIENT)
Dept: CT IMAGING | Age: 81
Discharge: HOME | End: 2021-10-04
Attending: INTERNAL MEDICINE
Payer: MEDICARE

## 2021-10-04 DIAGNOSIS — R91.1 PULMONARY NODULE: ICD-10-CM

## 2021-10-04 PROCEDURE — 71250 CT THORAX DX C-: CPT | Performed by: INTERNAL MEDICINE

## 2021-10-08 ENCOUNTER — TELEPHONE (OUTPATIENT)
Dept: UROLOGY | Facility: HOSPITAL | Age: 81
End: 2021-10-08

## 2021-10-08 ENCOUNTER — LAB ENCOUNTER (OUTPATIENT)
Dept: LAB | Age: 81
End: 2021-10-08
Attending: STUDENT IN AN ORGANIZED HEALTH CARE EDUCATION/TRAINING PROGRAM
Payer: MEDICARE

## 2021-10-08 DIAGNOSIS — R35.1 NOCTURIA: Primary | ICD-10-CM

## 2021-10-08 DIAGNOSIS — R35.1 NOCTURIA: ICD-10-CM

## 2021-10-08 PROCEDURE — 87086 URINE CULTURE/COLONY COUNT: CPT

## 2021-10-08 PROCEDURE — 87077 CULTURE AEROBIC IDENTIFY: CPT

## 2021-10-08 NOTE — TELEPHONE ENCOUNTER
patient called with increased nocturia, leaking more, wants to change medications, denies constipation, no dysuria, Coretta NICOLAS notified, Mary Smith get a urine culture, can schedule phone visit to discuss options, patient notified, will order culture, apt

## 2021-10-11 ENCOUNTER — TELEPHONE (OUTPATIENT)
Dept: UROLOGY | Facility: HOSPITAL | Age: 81
End: 2021-10-11

## 2021-10-11 DIAGNOSIS — N30.00 ACUTE CYSTITIS WITHOUT HEMATURIA: Primary | ICD-10-CM

## 2021-10-11 RX ORDER — CEPHALEXIN 500 MG/1
500 CAPSULE ORAL 2 TIMES DAILY
Qty: 14 CAPSULE | Refills: 0 | Status: SHIPPED | OUTPATIENT
Start: 2021-10-11 | End: 2021-10-18

## 2021-10-11 NOTE — TELEPHONE ENCOUNTER
TC to pt w/ test results  Pt called last week, spoke w/ RN  Having increased urgency, frequency, felt Trospium was not controlling bladder sxs  Ucx shows <10,000 GBS  Will rx keflex 500mg bid x 7 days  Encouraged PO hydration  Plan to follow up as schedule

## 2021-10-12 ENCOUNTER — ANTI-COAG VISIT (OUTPATIENT)
Dept: INTERNAL MEDICINE CLINIC | Facility: CLINIC | Age: 81
End: 2021-10-12
Payer: MEDICARE

## 2021-10-12 DIAGNOSIS — Z79.01 LONG TERM (CURRENT) USE OF ANTICOAGULANTS: ICD-10-CM

## 2021-10-12 DIAGNOSIS — I26.99 IATROGENIC PULMONARY EMBOLISM AND INFARCTION, INITIAL ENCOUNTER (HCC): ICD-10-CM

## 2021-10-12 DIAGNOSIS — T81.718A IATROGENIC PULMONARY EMBOLISM AND INFARCTION, INITIAL ENCOUNTER (HCC): ICD-10-CM

## 2021-10-12 DIAGNOSIS — Z51.81 ENCOUNTER FOR THERAPEUTIC DRUG MONITORING: ICD-10-CM

## 2021-10-12 PROCEDURE — 93793 ANTICOAG MGMT PT WARFARIN: CPT

## 2021-10-12 PROCEDURE — 85610 PROTHROMBIN TIME: CPT

## 2021-10-18 ENCOUNTER — VIRTUAL PHONE E/M (OUTPATIENT)
Dept: UROLOGY | Facility: HOSPITAL | Age: 81
End: 2021-10-18
Attending: STUDENT IN AN ORGANIZED HEALTH CARE EDUCATION/TRAINING PROGRAM
Payer: MEDICARE

## 2021-10-18 DIAGNOSIS — R35.0 URINARY FREQUENCY: ICD-10-CM

## 2021-10-18 DIAGNOSIS — N39.41 URGE URINARY INCONTINENCE: ICD-10-CM

## 2021-10-18 DIAGNOSIS — R35.1 NOCTURIA: ICD-10-CM

## 2021-10-18 DIAGNOSIS — R39.15 URINARY URGENCY: ICD-10-CM

## 2021-10-18 DIAGNOSIS — N32.81 DETRUSOR INSTABILITY: Primary | ICD-10-CM

## 2021-10-18 RX ORDER — MIRABEGRON 50 MG/1
50 TABLET, FILM COATED, EXTENDED RELEASE ORAL DAILY
Qty: 90 TABLET | Refills: 3 | Status: SHIPPED | OUTPATIENT
Start: 2021-10-18 | End: 2021-11-17

## 2021-10-18 NOTE — PROGRESS NOTES
Patient presents to follow up DO/UUI  Given circumstances surrounding COVID-19 this visit is being conducted as a televisit with pt's consent.     She is currently using Trospium XR 60 mg   +dry mouth, denies constipation  Reports some initial improvement i

## 2021-10-22 ENCOUNTER — TELEPHONE (OUTPATIENT)
Dept: UROLOGY | Facility: HOSPITAL | Age: 81
End: 2021-10-22

## 2021-10-22 NOTE — TELEPHONE ENCOUNTER
TC from [facility] with c/o Myrbetriq 50 mg was greater than $1000.00. Pt refused Rx d/t affordability. Patient informed that we will do a prior authorization for tier exemption  to attempt to decrease the cost of medication. Pt verbalized understanding.

## 2021-10-28 ENCOUNTER — ORDER TRANSCRIPTION (OUTPATIENT)
Dept: SLEEP CENTER | Age: 81
End: 2021-10-28

## 2021-10-28 DIAGNOSIS — R00.0 TACHYCARDIA: ICD-10-CM

## 2021-10-28 DIAGNOSIS — Z82.49 FH: PULMONARY EMBOLISM: Primary | ICD-10-CM

## 2021-11-01 ENCOUNTER — TELEPHONE (OUTPATIENT)
Dept: UROLOGY | Facility: HOSPITAL | Age: 81
End: 2021-11-01

## 2021-11-01 NOTE — TELEPHONE ENCOUNTER
Called insurance to inquire about tier exemption status case #[id number] and it was reported that it has been denied

## 2021-11-01 NOTE — TELEPHONE ENCOUNTER
Spoke with Dread Mcelroy and informed her that the tier exemption that we filled on her behalf for a decrease in cost for Myrbetriq has been denied.   Spoke with Dread Mcelroy about alternative options such as PTNS and Pelvic Floor PT and at this time she does not want

## 2021-11-16 ENCOUNTER — LAB ENCOUNTER (OUTPATIENT)
Dept: LAB | Age: 81
End: 2021-11-16
Attending: INTERNAL MEDICINE
Payer: MEDICARE

## 2021-11-16 ENCOUNTER — OFFICE VISIT (OUTPATIENT)
Dept: INTERNAL MEDICINE CLINIC | Facility: CLINIC | Age: 81
End: 2021-11-16
Payer: MEDICARE

## 2021-11-16 ENCOUNTER — TELEPHONE (OUTPATIENT)
Dept: INTERNAL MEDICINE CLINIC | Facility: CLINIC | Age: 81
End: 2021-11-16

## 2021-11-16 ENCOUNTER — ANTI-COAG VISIT (OUTPATIENT)
Dept: INTERNAL MEDICINE CLINIC | Facility: CLINIC | Age: 81
End: 2021-11-16
Payer: MEDICARE

## 2021-11-16 VITALS
HEIGHT: 70 IN | BODY MASS INDEX: 33.93 KG/M2 | HEART RATE: 79 BPM | SYSTOLIC BLOOD PRESSURE: 131 MMHG | DIASTOLIC BLOOD PRESSURE: 73 MMHG | WEIGHT: 237 LBS

## 2021-11-16 DIAGNOSIS — I10 PRIMARY HYPERTENSION: ICD-10-CM

## 2021-11-16 DIAGNOSIS — E55.9 VITAMIN D DEFICIENCY: ICD-10-CM

## 2021-11-16 DIAGNOSIS — Z00.00 MEDICARE ANNUAL WELLNESS VISIT, SUBSEQUENT: Primary | ICD-10-CM

## 2021-11-16 DIAGNOSIS — R20.2 PARESTHESIA: ICD-10-CM

## 2021-11-16 DIAGNOSIS — E78.5 HYPERLIPIDEMIA, UNSPECIFIED HYPERLIPIDEMIA TYPE: ICD-10-CM

## 2021-11-16 DIAGNOSIS — Z00.00 ENCOUNTER FOR ANNUAL HEALTH EXAMINATION: ICD-10-CM

## 2021-11-16 DIAGNOSIS — Z79.01 LONG TERM (CURRENT) USE OF ANTICOAGULANTS: ICD-10-CM

## 2021-11-16 DIAGNOSIS — M62.838 MUSCLE SPASM: ICD-10-CM

## 2021-11-16 DIAGNOSIS — I26.99 IATROGENIC PULMONARY EMBOLISM AND INFARCTION, INITIAL ENCOUNTER (HCC): ICD-10-CM

## 2021-11-16 DIAGNOSIS — Z51.81 ENCOUNTER FOR THERAPEUTIC DRUG MONITORING: ICD-10-CM

## 2021-11-16 DIAGNOSIS — N18.31 STAGE 3A CHRONIC KIDNEY DISEASE (HCC): ICD-10-CM

## 2021-11-16 DIAGNOSIS — T81.718A IATROGENIC PULMONARY EMBOLISM AND INFARCTION, INITIAL ENCOUNTER (HCC): ICD-10-CM

## 2021-11-16 DIAGNOSIS — Z12.31 VISIT FOR SCREENING MAMMOGRAM: ICD-10-CM

## 2021-11-16 PROCEDURE — 87086 URINE CULTURE/COLONY COUNT: CPT

## 2021-11-16 PROCEDURE — 93793 ANTICOAG MGMT PT WARFARIN: CPT

## 2021-11-16 PROCEDURE — 82306 VITAMIN D 25 HYDROXY: CPT

## 2021-11-16 PROCEDURE — 82746 ASSAY OF FOLIC ACID SERUM: CPT

## 2021-11-16 PROCEDURE — G0439 PPPS, SUBSEQ VISIT: HCPCS | Performed by: INTERNAL MEDICINE

## 2021-11-16 PROCEDURE — 87147 CULTURE TYPE IMMUNOLOGIC: CPT

## 2021-11-16 PROCEDURE — 82607 VITAMIN B-12: CPT

## 2021-11-16 PROCEDURE — 81001 URINALYSIS AUTO W/SCOPE: CPT

## 2021-11-16 PROCEDURE — 84439 ASSAY OF FREE THYROXINE: CPT

## 2021-11-16 PROCEDURE — 36415 COLL VENOUS BLD VENIPUNCTURE: CPT

## 2021-11-16 PROCEDURE — 85610 PROTHROMBIN TIME: CPT

## 2021-11-16 PROCEDURE — G0008 ADMIN INFLUENZA VIRUS VAC: HCPCS | Performed by: INTERNAL MEDICINE

## 2021-11-16 PROCEDURE — 90662 IIV NO PRSV INCREASED AG IM: CPT | Performed by: INTERNAL MEDICINE

## 2021-11-16 PROCEDURE — 84443 ASSAY THYROID STIM HORMONE: CPT

## 2021-11-16 NOTE — TELEPHONE ENCOUNTER
Today's INR= 3.0  Goal= 2.0-3.0    Per protocol, patient to continue current dose and recheck INR in 4 weeks.      Outside of protocol- patient request to decrease dose slightly as INR has been at the higher end of goal range the past few visits and she cross

## 2021-11-21 PROBLEM — T81.718A IATROGENIC PULMONARY EMBOLISM AND INFARCTION, INITIAL ENCOUNTER (HCC): Status: RESOLVED | Noted: 2021-07-13 | Resolved: 2021-11-21

## 2021-11-21 PROBLEM — M54.42 ACUTE MIDLINE LOW BACK PAIN WITH LEFT-SIDED SCIATICA: Status: RESOLVED | Noted: 2019-01-15 | Resolved: 2021-11-21

## 2021-11-21 PROBLEM — I26.99 IATROGENIC PULMONARY EMBOLISM AND INFARCTION, INITIAL ENCOUNTER (HCC): Status: RESOLVED | Noted: 2021-07-13 | Resolved: 2021-11-21

## 2021-11-21 PROBLEM — M17.11 PRIMARY OSTEOARTHRITIS OF RIGHT KNEE: Status: RESOLVED | Noted: 2018-03-27 | Resolved: 2021-11-21

## 2021-11-21 PROBLEM — Z79.01 LONG TERM (CURRENT) USE OF ANTICOAGULANTS: Status: RESOLVED | Noted: 2021-07-13 | Resolved: 2021-11-21

## 2021-11-22 NOTE — PATIENT INSTRUCTIONS
Tara Lockwood's SCREENING SCHEDULE   Tests on this list are recommended by your physician but may not be covered, or covered at this frequency, by your insurer. Please check with your insurance carrier before scheduling to verify coverage.    Dennise Ford 10/21/2020      No recommendations at this time   Pap and Pelvic    Pap   Covered every 2 years for women at normal risk;  Annually if at high risk -  No recommendations at this time    Chlamydia Annually if high risk -  No recommendations at this time   Sc [place])  www. putitinwriting. org  This link also has information from the [address] [place] [address] regarding Advance Directives.

## 2021-11-22 NOTE — PROGRESS NOTES
HPI:   Gareth Oneill is a 79year old female who presents for a Medicare Subsequent Annual Wellness visit (Pt already had Initial Annual Wellness).       Her last annual assessment has been over 1 year: Annual Physical due on 09/30/2021         She has be 237.0 09/08/2021        ALLERGIES:   She is allergic to fentanyl, hydrocodone, morphine, and phenol. CURRENT MEDICATIONS:   hydroxyurea 500 MG Oral Cap, Take one 500 mg tablet on Sunday, Tuesday, Wednesday, Thursday, Saturday.     Take two 500 mg tablets (6-2016), Obesity, unspecified, Osteoarthrosis (5/8/2014), Osteoarthrosis, unspecified whether generalized or localized, unspecified site, Post-menopausal bleeding, Primary osteoarthritis of left knee (3/23/2017), Primary osteoarthritis of right knee (3/27 of breath and wheezing. Cardiovascular: Negative for chest pain, palpitations and leg swelling. Gastrointestinal: Negative for abdominal pain, blood in stool, constipation, diarrhea, nausea and vomiting.    Genitourinary: Negative for difficulty urinat (or don't speak clearly): No People get annoyed because I misunderstand what they say: No   I misunderstand what others are saying and make inappropriate responses: No I avoid social activities because I cannot hear well and fear I will reply improperly: [facility] SCREENING BILAT (CPT=77067)    . Breast exam.  Bilateral No discrete palpable masses or tenderness  No nipple discharge  And no axillary adenopathy.  Self breast exam advised      (R20.2) Paresthesia  Plan: VITAMIN N08, FOLIC ACID SERUM(FOLATE)        Ch Sugar /  Glucose    One screening every 12 months if never tested or if previously tested but not diagnosed with pre-diabetes   One screening every 6 months if diagnosed with pre-diabetes Lab Results   Component Value Date    GLU 91 09/08/2021        Cardi baseline mammogram covered for patients aged 32-38 -    No recommendations at this time    Immunizations    Influenza Covered once per flu season  Please get every year 11/16/2021  No recommendations at this time    Pneumococcal Each vaccine Kirti GUZMAN file in [address] She has never smoked tobacco.    CAGE screening score of 0 on 11/9/2021, showing low risk of alcohol abuse.         Patient Care Team: Patient Care Team:  Herve Lind MD as PCP - General (Internal Medicine)  Wing Mallory MD as Francheska Artist for Pain.       methylPREDNISolone acetate (DEPO-medrol) 80 MG/ML injection 80 mg  Lidocaine HCl (PF) (XYLOCAINE) 1 % injection SOLN 2 mL  bupivacaine (MARCAINE) injection 0.25%       MEDICAL INFORMATION:   She  has a past medical history of Acid reflux, Ac removal of lung,lobectomy (Left, 2016); hip surgery (Right, 2015); colonoscopy; egd; and colonoscopy (N/A, 9/8/2017).     Her family history includes Alzheimer's Disease (age of onset: 80) in her mother; Bladder Cancer in her mother; Breast Cancer (age of o the speaker in a large room such as at a meeting or place of [employer]: No   Many people I talk to seem to mumble (or don't speak clearly): No People get annoyed because I misunderstand what they say: No   I misunderstand what others are saying and make inap FLU VACC HIGH DOSE PRSV FREE         Diet assessment: good     PLAN:  The patient indicates understanding of these issues and agrees to the plan. Reinforced healthy diet, lifestyle, and exercise. No follow-ups on file.      Melissa Majano MD, 11/21/ • Anyone with a family history -     Colorectal Cancer Screening  Covered for ages 52-80; only need ONE of the following:    Colonoscopy   Covered every 10 years    Covered every 2 years if patient is at high risk or previous colonoscopy was abnormal 09/ Chronic Obstructive Pulmonary Disease (COPD)    Spirometry Annually Spirometry date: 09/21/2021

## 2021-12-07 ENCOUNTER — OFFICE VISIT (OUTPATIENT)
Dept: HEMATOLOGY/ONCOLOGY | Facility: HOSPITAL | Age: 81
End: 2021-12-07
Attending: INTERNAL MEDICINE
Payer: MEDICARE

## 2021-12-07 VITALS
TEMPERATURE: 98 F | WEIGHT: 233 LBS | RESPIRATION RATE: 18 BRPM | DIASTOLIC BLOOD PRESSURE: 60 MMHG | SYSTOLIC BLOOD PRESSURE: 155 MMHG | OXYGEN SATURATION: 97 % | HEIGHT: 70 IN | HEART RATE: 67 BPM | BODY MASS INDEX: 33.36 KG/M2

## 2021-12-07 DIAGNOSIS — D45 POLYCYTHEMIA VERA (HCC): Primary | ICD-10-CM

## 2021-12-07 DIAGNOSIS — Z51.11 CHEMOTHERAPY MANAGEMENT, ENCOUNTER FOR: ICD-10-CM

## 2021-12-07 DIAGNOSIS — I26.99 BILATERAL PULMONARY EMBOLISM (HCC): ICD-10-CM

## 2021-12-07 DIAGNOSIS — D45 POLYCYTHEMIA VERA (HCC): ICD-10-CM

## 2021-12-07 DIAGNOSIS — I82.403 DEEP VEIN THROMBOSIS (DVT) OF BOTH LOWER EXTREMITIES, UNSPECIFIED CHRONICITY, UNSPECIFIED VEIN (HCC): ICD-10-CM

## 2021-12-07 PROCEDURE — 80053 COMPREHEN METABOLIC PANEL: CPT

## 2021-12-07 PROCEDURE — 85025 COMPLETE CBC W/AUTO DIFF WBC: CPT

## 2021-12-07 PROCEDURE — 99215 OFFICE O/P EST HI 40 MIN: CPT | Performed by: INTERNAL MEDICINE

## 2021-12-07 PROCEDURE — 36415 COLL VENOUS BLD VENIPUNCTURE: CPT

## 2021-12-07 NOTE — PROGRESS NOTES
Cancer Center Progress Note    Patient Name: Tracie Raymundo   YOB: 1940   Medical Record Number: [id number]   Attending Physician: Jostin Pace M.D.        Chief Complaint:  Lung cancer    History of Present Illness:  Cancer history:  81-year- • History of hip replacement, total     right hip   • Internal hemorrhoids 9/8/2017   • Kidney problem    • Long term (current) use of anticoagulants 7/13/2021   • Lumbar herniated disc 1/24/2019   • [facility] 6-2016   • Obesity, unspecified Sister 68   • Macular degeneration Brother    • Other (Myocardial Infarction) Brother 71   • Macular degeneration Maternal Aunt    • Glaucoma Maternal Aunt    • Breast Cancer Daughter 50   • Diabetes Neg        Social History:  Social History    Socioecono ergocalciferol 1.25 MG (43754 UT) Oral Cap, Take 1 capsule (50,000 Units total) by mouth every 7 days. , Disp: 12 capsule, Rfl: 0  •  hydroxyurea 500 MG Oral Cap, Take one 500 mg tablet on Sunday, Tuesday, Wednesday, Thursday, Saturday.     Take two 500 mg t mass.  Extremities: No edema. Neurological: 5/5 motor x4.         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 Labs adequate on Hydrea 500 mg daily 1000 mg MF    Return to clinic in 3 months    Medical decision making: High risk.  myeloproliferat

## 2021-12-14 ENCOUNTER — TELEPHONE (OUTPATIENT)
Dept: INTERNAL MEDICINE CLINIC | Facility: CLINIC | Age: 81
End: 2021-12-14

## 2021-12-14 ENCOUNTER — ANTI-COAG VISIT (OUTPATIENT)
Dept: INTERNAL MEDICINE CLINIC | Facility: CLINIC | Age: 81
End: 2021-12-14
Payer: MEDICARE

## 2021-12-14 DIAGNOSIS — Z51.81 ENCOUNTER FOR THERAPEUTIC DRUG MONITORING: ICD-10-CM

## 2021-12-14 DIAGNOSIS — I26.99 IATROGENIC PULMONARY EMBOLISM AND INFARCTION, INITIAL ENCOUNTER (HCC): ICD-10-CM

## 2021-12-14 DIAGNOSIS — T81.718A IATROGENIC PULMONARY EMBOLISM AND INFARCTION, INITIAL ENCOUNTER (HCC): ICD-10-CM

## 2021-12-14 DIAGNOSIS — Z79.01 LONG TERM (CURRENT) USE OF ANTICOAGULANTS: ICD-10-CM

## 2021-12-14 PROCEDURE — 93793 ANTICOAG MGMT PT WARFARIN: CPT

## 2021-12-14 PROCEDURE — 85610 PROTHROMBIN TIME: CPT

## 2021-12-14 NOTE — TELEPHONE ENCOUNTER
Today's INR= 2.9  Goal= 2.0-3.0    Per protocol, patient to continue current dose and recheck INR in 4 weeks.  Outside of protocol- patient request to decrease dose slightly as INR has been at the higher end of goal range and decreased just a little from th

## 2021-12-15 RX ORDER — WARFARIN SODIUM 5 MG/1
TABLET ORAL
Qty: 135 TABLET | Refills: 0 | Status: SHIPPED
Start: 2021-12-15 | End: 2022-03-18

## 2021-12-15 NOTE — TELEPHONE ENCOUNTER
Please review. Protocol Failed / No Protocol. Requested Prescriptions   Pending Prescriptions Disp Refills    WARFARIN 5 MG Oral Tab [Pharmacy Med Name: WARFARIN SODIUM 5 MG Tablet] 135 tablet 0     Sig: TAKE UP TO 7.5MG (1 AND 1/2 TABLETS) AS DIRECTED BY COUMADIN CLINIC.         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    [address] [place] [address] for Pelvic Medicine Sandra Cordero PA-C    Virtual Phone E/M    3 months ago Primary hypertension    Anna Manzano MD    Office Visit            Future Appointments         Provider Department Appt Notes    In 2 days Negro Chris,  [facility] for Pelvic Medicine 6 month phone f/u ([phone])    In 2 weeks ADO DEXA RM1; ADO MARCIAL [address] [facility] normal mammogram    In 3 weeks [facility]     In 3 weeks Pebbles Luna, MD [facility] [address] Follow up, MyChart request    In 2 months [address] Hematology Oncology cbc, cmp PIV caf    In 2 months Lisandra Shay 19 Hematology Oncology 3 M f/u caf

## 2021-12-17 ENCOUNTER — VIRTUAL PHONE E/M (OUTPATIENT)
Dept: UROLOGY | Facility: HOSPITAL | Age: 81
End: 2021-12-17
Attending: OBSTETRICS & GYNECOLOGY
Payer: MEDICARE

## 2021-12-17 VITALS — BODY MASS INDEX: 36.57 KG/M2 | HEIGHT: 67 IN | WEIGHT: 233 LBS

## 2021-12-17 DIAGNOSIS — N39.41 URGE URINARY INCONTINENCE: ICD-10-CM

## 2021-12-17 DIAGNOSIS — N32.81 DETRUSOR INSTABILITY: Primary | ICD-10-CM

## 2021-12-17 NOTE — PROGRESS NOTES
Patient to follow up DO/UUI  Given circumstances surrounding COVID-19 this visit is being conducted as a televisit with pt's consent.     She is currently using no tx  Trospium not helpful  myrbetriq too expensive  Doesn't want PT    No keen on botox risks

## 2021-12-23 ENCOUNTER — TELEPHONE (OUTPATIENT)
Dept: INTERNAL MEDICINE CLINIC | Facility: CLINIC | Age: 81
End: 2021-12-23

## 2021-12-23 NOTE — TELEPHONE ENCOUNTER
Patient is calling to request information on her medication refill as pharmacy notes they have been unsuccessful in getting the prescription confirmed. Please call [employer] at [phone]      Please advise.      warfarin

## 2021-12-23 NOTE — TELEPHONE ENCOUNTER
I spoke to the pharmacist [facility]  @ [address] regading refill on patients warfarin gave a verbal order for 5mg take up to 7.5 daily , #135x 0rf      I called the patient to inform her its ordered ,she is not completely out of medication . She verbalized an understanding

## 2021-12-29 ENCOUNTER — TELEPHONE (OUTPATIENT)
Dept: INTERNAL MEDICINE CLINIC | Facility: CLINIC | Age: 81
End: 2021-12-29

## 2021-12-29 NOTE — TELEPHONE ENCOUNTER
[employer] agent Kalyani Barron tried to transfer call, states patient was breathing heavy but pt hung up phone    Attempted to call patient back\. left message on both home and cell number     Tried to call  Ed at [phone] call would not connect   Called lakia

## 2021-12-30 ENCOUNTER — HOSPITAL ENCOUNTER (OUTPATIENT)
Age: 81
Discharge: HOME | End: 2021-12-30
Payer: MEDICARE

## 2021-12-30 ENCOUNTER — APPOINTMENT (OUTPATIENT)
Dept: GENERAL RADIOLOGY | Age: 81
End: 2021-12-30
Attending: NURSE PRACTITIONER
Payer: MEDICARE

## 2021-12-30 VITALS
HEIGHT: 70 IN | BODY MASS INDEX: 31.5 KG/M2 | TEMPERATURE: 97 F | OXYGEN SATURATION: 100 % | SYSTOLIC BLOOD PRESSURE: 183 MMHG | DIASTOLIC BLOOD PRESSURE: 67 MMHG | WEIGHT: 220 LBS | RESPIRATION RATE: 26 BRPM | HEART RATE: 88 BPM

## 2021-12-30 DIAGNOSIS — U07.1 COVID-19: ICD-10-CM

## 2021-12-30 DIAGNOSIS — Z20.822 EXPOSURE TO COVID-19 VIRUS: Primary | ICD-10-CM

## 2021-12-30 LAB — SARS-COV-2 RNA RESP QL NAA+PROBE: DETECTED

## 2021-12-30 PROCEDURE — 93000 ELECTROCARDIOGRAM COMPLETE: CPT | Performed by: NURSE PRACTITIONER

## 2021-12-30 PROCEDURE — 99204 OFFICE O/P NEW MOD 45 MIN: CPT | Performed by: NURSE PRACTITIONER

## 2021-12-30 PROCEDURE — 71046 X-RAY EXAM CHEST 2 VIEWS: CPT | Performed by: NURSE PRACTITIONER

## 2021-12-30 PROCEDURE — U0002 COVID-19 LAB TEST NON-CDC: HCPCS | Performed by: NURSE PRACTITIONER

## 2021-12-30 NOTE — ED PROVIDER NOTES
Patient Seen in: [facility]    History   Patient presents with:  Cough/URI    Stated Complaint: [phone] cough, sob    HPI    Rosa Villela is a 79year old female who presents to immediate care requesting testing for COVID-19.   Patient Pulmonary embolism ([address]    • S/P colonoscopy with polypectomy 9/8/2017   • Spinal stenosis of lumbar region without neurogenic claudication 1/24/2019   • Spondylolisthesis of lumbar region 1/15/2019   • Thyroid condition        Past Surgical History:   Pr Pain.         Family History   Problem Relation Age of Onset   • Cancer Father 80        Lung  -  smoker   • Macular degeneration Mother    • Glaucoma Mother    • Other (Alzheimer's Disease) Mother 80   • Other (Bladder Cancer) Mother    • Cornelius Mondragon meningeal signs. Chest: There is no tenderness to the chest wall. No CVA tenderness bilaterally. Respiratory: Respiratory effort was normal.  There is no rales, wheezes, or rhonchi. There is no stridor.   Air entry is equal.  Cardiovascular: Regular rat 12/30/2021 at 3:58 PM            Patient's O2 saturation levels are 99 to 100% on room air throughout her stay. Patient has clear lung sounds bilaterally.   Patient has a pulse oximeter at home and will monitor her pulse ox, she was given instructions to g

## 2022-01-11 ENCOUNTER — ANTI-COAG VISIT (OUTPATIENT)
Dept: INTERNAL MEDICINE CLINIC | Facility: CLINIC | Age: 82
End: 2022-01-11
Payer: MEDICARE

## 2022-01-11 ENCOUNTER — TELEPHONE (OUTPATIENT)
Dept: INTERNAL MEDICINE CLINIC | Facility: CLINIC | Age: 82
End: 2022-01-11

## 2022-01-11 ENCOUNTER — OFFICE VISIT (OUTPATIENT)
Dept: PULMONOLOGY | Facility: CLINIC | Age: 82
End: 2022-01-11
Payer: MEDICARE

## 2022-01-11 VITALS
BODY MASS INDEX: 33.07 KG/M2 | HEIGHT: 70 IN | WEIGHT: 231 LBS | DIASTOLIC BLOOD PRESSURE: 81 MMHG | HEART RATE: 76 BPM | OXYGEN SATURATION: 97 % | SYSTOLIC BLOOD PRESSURE: 148 MMHG | RESPIRATION RATE: 18 BRPM

## 2022-01-11 DIAGNOSIS — Z51.81 ENCOUNTER FOR THERAPEUTIC DRUG MONITORING: ICD-10-CM

## 2022-01-11 DIAGNOSIS — C34.90 ADENOCARCINOMA OF LUNG, UNSPECIFIED LATERALITY (HCC): Primary | ICD-10-CM

## 2022-01-11 DIAGNOSIS — I26.99 IATROGENIC PULMONARY EMBOLISM AND INFARCTION, INITIAL ENCOUNTER (HCC): ICD-10-CM

## 2022-01-11 DIAGNOSIS — T81.718A IATROGENIC PULMONARY EMBOLISM AND INFARCTION, INITIAL ENCOUNTER (HCC): ICD-10-CM

## 2022-01-11 DIAGNOSIS — Z79.01 LONG TERM (CURRENT) USE OF ANTICOAGULANTS: ICD-10-CM

## 2022-01-11 LAB — INR: 3.3 (ref 0.8–1.2)

## 2022-01-11 PROCEDURE — 93793 ANTICOAG MGMT PT WARFARIN: CPT

## 2022-01-11 PROCEDURE — 94761 N-INVAS EAR/PLS OXIMETRY MLT: CPT | Performed by: INTERNAL MEDICINE

## 2022-01-11 PROCEDURE — 99213 OFFICE O/P EST LOW 20 MIN: CPT | Performed by: INTERNAL MEDICINE

## 2022-01-11 PROCEDURE — 85610 PROTHROMBIN TIME: CPT

## 2022-01-11 RX ORDER — ALBUTEROL SULFATE 90 UG/1
AEROSOL, METERED RESPIRATORY (INHALATION)
Qty: 1 EACH | Refills: 5 | Status: SHIPPED | OUTPATIENT
Start: 2022-01-11

## 2022-01-11 NOTE — PROGRESS NOTES
The patient is an 57-year-old female who I know well from prior evaluation who has dyspnea.   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 4.  History of pulmonary nodule–serial CT scan.

## 2022-01-11 NOTE — TELEPHONE ENCOUNTER
Today's INR= 3.3  Goal= 2.0-3.0    Recently positive for COVID- feeling better this week. INR continues to be elevated- weekly dose was decreased at last visit. COVID may also have made INR increase.      Per protocol, weekly dose decrease 5-10%- actual dec

## 2022-01-28 ENCOUNTER — TELEPHONE (OUTPATIENT)
Dept: PULMONOLOGY | Facility: CLINIC | Age: 82
End: 2022-01-28

## 2022-02-08 ENCOUNTER — TELEPHONE (OUTPATIENT)
Dept: INTERNAL MEDICINE CLINIC | Facility: CLINIC | Age: 82
End: 2022-02-08

## 2022-02-08 ENCOUNTER — ANTI-COAG VISIT (OUTPATIENT)
Dept: INTERNAL MEDICINE CLINIC | Facility: CLINIC | Age: 82
End: 2022-02-08
Payer: MEDICARE

## 2022-02-08 DIAGNOSIS — Z79.01 LONG TERM (CURRENT) USE OF ANTICOAGULANTS: ICD-10-CM

## 2022-02-08 DIAGNOSIS — T81.718A IATROGENIC PULMONARY EMBOLISM AND INFARCTION, INITIAL ENCOUNTER (HCC): ICD-10-CM

## 2022-02-08 DIAGNOSIS — Z51.81 ENCOUNTER FOR THERAPEUTIC DRUG MONITORING: ICD-10-CM

## 2022-02-08 DIAGNOSIS — I26.99 IATROGENIC PULMONARY EMBOLISM AND INFARCTION, INITIAL ENCOUNTER (HCC): ICD-10-CM

## 2022-02-08 LAB — INR: 1.4 (ref 0.8–1.2)

## 2022-02-08 PROCEDURE — 85610 PROTHROMBIN TIME: CPT

## 2022-02-08 PROCEDURE — 93793 ANTICOAG MGMT PT WARFARIN: CPT

## 2022-02-08 NOTE — TELEPHONE ENCOUNTER
Today\"s INR 1.4  Goal= 2.0-3.0    Recently positive for COVID- feeling better. Weekly dose was decreased at the past 2 visits due to elevated INR. Per protocol, patient to take an extra dose or weekly dose increase 10-20% and recheck INR in 3-5 days. Outside of protocol- weekly dose increase 7%. Insists to return in 4 weeks.

## 2022-03-08 ENCOUNTER — ANTI-COAG VISIT (OUTPATIENT)
Dept: INTERNAL MEDICINE CLINIC | Facility: CLINIC | Age: 82
End: 2022-03-08
Payer: MEDICARE

## 2022-03-08 ENCOUNTER — NURSE ONLY (OUTPATIENT)
Dept: HEMATOLOGY/ONCOLOGY | Facility: HOSPITAL | Age: 82
End: 2022-03-08
Attending: INTERNAL MEDICINE
Payer: MEDICARE

## 2022-03-08 VITALS
SYSTOLIC BLOOD PRESSURE: 152 MMHG | OXYGEN SATURATION: 97 % | HEIGHT: 70 IN | RESPIRATION RATE: 18 BRPM | BODY MASS INDEX: 32.78 KG/M2 | DIASTOLIC BLOOD PRESSURE: 58 MMHG | HEART RATE: 83 BPM | TEMPERATURE: 98 F | WEIGHT: 229 LBS

## 2022-03-08 DIAGNOSIS — I26.99 IATROGENIC PULMONARY EMBOLISM AND INFARCTION, INITIAL ENCOUNTER (HCC): ICD-10-CM

## 2022-03-08 DIAGNOSIS — T81.718A IATROGENIC PULMONARY EMBOLISM AND INFARCTION, INITIAL ENCOUNTER (HCC): ICD-10-CM

## 2022-03-08 DIAGNOSIS — I26.99 BILATERAL PULMONARY EMBOLISM (HCC): ICD-10-CM

## 2022-03-08 DIAGNOSIS — Z51.11 CHEMOTHERAPY MANAGEMENT, ENCOUNTER FOR: ICD-10-CM

## 2022-03-08 DIAGNOSIS — Z79.01 LONG TERM (CURRENT) USE OF ANTICOAGULANTS: ICD-10-CM

## 2022-03-08 DIAGNOSIS — I82.403 DEEP VEIN THROMBOSIS (DVT) OF BOTH LOWER EXTREMITIES, UNSPECIFIED CHRONICITY, UNSPECIFIED VEIN (HCC): ICD-10-CM

## 2022-03-08 DIAGNOSIS — Z51.81 ENCOUNTER FOR THERAPEUTIC DRUG MONITORING: ICD-10-CM

## 2022-03-08 DIAGNOSIS — D45 POLYCYTHEMIA VERA (HCC): Primary | ICD-10-CM

## 2022-03-08 DIAGNOSIS — D45 POLYCYTHEMIA VERA (HCC): ICD-10-CM

## 2022-03-08 LAB
ALBUMIN SERPL-MCNC: 3.4 G/DL (ref 3.4–5)
ALBUMIN/GLOB SERPL: 1 {RATIO} (ref 1–2)
ALP LIVER SERPL-CCNC: 79 U/L
ALT SERPL-CCNC: 24 U/L
ANION GAP SERPL CALC-SCNC: 2 MMOL/L (ref 0–18)
AST SERPL-CCNC: 23 U/L (ref 15–37)
BASOPHILS # BLD AUTO: 0.05 X10(3) UL (ref 0–0.2)
BASOPHILS NFR BLD AUTO: 1.3 %
BILIRUB SERPL-MCNC: 0.5 MG/DL (ref 0.1–2)
BUN BLD-MCNC: 19 MG/DL (ref 7–18)
BUN/CREAT SERPL: 14.3 (ref 10–20)
CALCIUM BLD-MCNC: 8.9 MG/DL (ref 8.5–10.1)
CHLORIDE SERPL-SCNC: 110 MMOL/L (ref 98–112)
CO2 SERPL-SCNC: 27 MMOL/L (ref 21–32)
CREAT BLD-MCNC: 1.33 MG/DL
DEPRECATED RDW RBC AUTO: 52.9 FL (ref 35.1–46.3)
EOSINOPHIL # BLD AUTO: 0.06 X10(3) UL (ref 0–0.7)
EOSINOPHIL NFR BLD AUTO: 1.5 %
ERYTHROCYTE [DISTWIDTH] IN BLOOD BY AUTOMATED COUNT: 12.5 % (ref 11–15)
GLOBULIN PLAS-MCNC: 3.3 G/DL (ref 2.8–4.4)
GLUCOSE BLD-MCNC: 115 MG/DL (ref 70–99)
HCT VFR BLD AUTO: 40.1 %
HGB BLD-MCNC: 13.3 G/DL
IMM GRANULOCYTES # BLD AUTO: 0.01 X10(3) UL (ref 0–1)
IMM GRANULOCYTES NFR BLD: 0.3 %
INR: 2.6 (ref 0.8–1.2)
LYMPHOCYTES # BLD AUTO: 0.91 X10(3) UL (ref 1–4)
LYMPHOCYTES NFR BLD AUTO: 23.5 %
MCH RBC QN AUTO: 38.1 PG (ref 26–34)
MCHC RBC AUTO-ENTMCNC: 33.2 G/DL (ref 31–37)
MCV RBC AUTO: 114.9 FL
MONOCYTES # BLD AUTO: 0.27 X10(3) UL (ref 0.1–1)
MONOCYTES NFR BLD AUTO: 7 %
NEUTROPHILS # BLD AUTO: 2.58 X10 (3) UL (ref 1.5–7.7)
NEUTROPHILS # BLD AUTO: 2.58 X10(3) UL (ref 1.5–7.7)
NEUTROPHILS NFR BLD AUTO: 66.4 %
OSMOLALITY SERPL CALC.SUM OF ELEC: 291 MOSM/KG (ref 275–295)
PLATELET # BLD AUTO: 228 10(3)UL (ref 150–450)
POTASSIUM SERPL-SCNC: 5.2 MMOL/L (ref 3.5–5.1)
PROT SERPL-MCNC: 6.7 G/DL (ref 6.4–8.2)
RBC # BLD AUTO: 3.49 X10(6)UL
SODIUM SERPL-SCNC: 139 MMOL/L (ref 136–145)
TEST STRIP EXPIRATION DATE: ABNORMAL DATE
WBC # BLD AUTO: 3.9 X10(3) UL (ref 4–11)

## 2022-03-08 PROCEDURE — 80053 COMPREHEN METABOLIC PANEL: CPT

## 2022-03-08 PROCEDURE — 36415 COLL VENOUS BLD VENIPUNCTURE: CPT

## 2022-03-08 PROCEDURE — 85610 PROTHROMBIN TIME: CPT

## 2022-03-08 PROCEDURE — 99215 OFFICE O/P EST HI 40 MIN: CPT | Performed by: INTERNAL MEDICINE

## 2022-03-08 PROCEDURE — 93793 ANTICOAG MGMT PT WARFARIN: CPT

## 2022-03-08 PROCEDURE — 85025 COMPLETE CBC W/AUTO DIFF WBC: CPT

## 2022-03-18 RX ORDER — WARFARIN SODIUM 5 MG/1
TABLET ORAL
Qty: 135 TABLET | Refills: 0 | Status: SHIPPED | OUTPATIENT
Start: 2022-03-18 | End: 2023-02-21

## 2022-03-18 NOTE — TELEPHONE ENCOUNTER
Please review; protocol failed. Requested Prescriptions   Pending Prescriptions Disp Refills    WARFARIN 5 MG Oral Tab [Pharmacy Med Name: WARFARIN SODIUM 5 MG Tablet] 135 tablet 0     Sig: TAKE UP TO 7.5MG (1 AND 1/2 TABLETS) DAILY AS DIRECTED BY COUMADIN CLINIC        There is no refill protocol information for this order           Future Appointments         Provider Department Appt Notes    In 2 weeks  [facility], [place]     In 1 month [address] Hematology Oncology cbc, cmp PIV cl    In 1 month Eli Bhandari MD [facility] Hematology Oncology FOLLOW UP VISIT. CL  2M    In 3 months Robert Reyes MD [place], [address] 6 mos            Recent Outpatient Visits              [facility]    Nurse Only    1 week ago Polycythemia [facility])    [facility] Hematology Oncology Marcell Moreno MD    Office Visit    2 months ago Adenocarcinoma of lung, unspecified laterality [facility])    [place], [facility], Robert Rodriguez MD    Office Visit    3 months ago [facility] [address] [facility] Uro[facility] Arleth Baez DO    Virtual Phone E/M    3 months ago Polycythemia [facility])    [facility] Hematology Oncology    Nurse Only

## 2022-04-05 ENCOUNTER — ANTI-COAG VISIT (OUTPATIENT)
Dept: INTERNAL MEDICINE CLINIC | Facility: CLINIC | Age: 82
End: 2022-04-05
Payer: MEDICARE

## 2022-04-05 DIAGNOSIS — Z51.81 ENCOUNTER FOR THERAPEUTIC DRUG MONITORING: ICD-10-CM

## 2022-04-05 DIAGNOSIS — I26.99 IATROGENIC PULMONARY EMBOLISM AND INFARCTION, INITIAL ENCOUNTER (HCC): ICD-10-CM

## 2022-04-05 DIAGNOSIS — T81.718A IATROGENIC PULMONARY EMBOLISM AND INFARCTION, INITIAL ENCOUNTER (HCC): ICD-10-CM

## 2022-04-05 DIAGNOSIS — Z79.01 LONG TERM (CURRENT) USE OF ANTICOAGULANTS: ICD-10-CM

## 2022-04-05 LAB
INR: 1.7 (ref 0.8–1.2)
TEST STRIP EXPIRATION DATE: ABNORMAL DATE

## 2022-04-05 PROCEDURE — 93793 ANTICOAG MGMT PT WARFARIN: CPT

## 2022-04-05 PROCEDURE — 85610 PROTHROMBIN TIME: CPT

## 2022-05-03 ENCOUNTER — OFFICE VISIT (OUTPATIENT)
Dept: INTERNAL MEDICINE CLINIC | Facility: CLINIC | Age: 82
End: 2022-05-03
Payer: MEDICARE

## 2022-05-03 ENCOUNTER — LAB ENCOUNTER (OUTPATIENT)
Dept: LAB | Age: 82
End: 2022-05-03
Attending: INTERNAL MEDICINE
Payer: MEDICARE

## 2022-05-03 ENCOUNTER — ANTI-COAG VISIT (OUTPATIENT)
Dept: INTERNAL MEDICINE CLINIC | Facility: CLINIC | Age: 82
End: 2022-05-03
Payer: MEDICARE

## 2022-05-03 VITALS
HEIGHT: 70 IN | HEART RATE: 68 BPM | TEMPERATURE: 98 F | DIASTOLIC BLOOD PRESSURE: 76 MMHG | RESPIRATION RATE: 20 BRPM | BODY MASS INDEX: 31.92 KG/M2 | SYSTOLIC BLOOD PRESSURE: 148 MMHG | WEIGHT: 223 LBS

## 2022-05-03 DIAGNOSIS — R20.2 PARESTHESIA: ICD-10-CM

## 2022-05-03 DIAGNOSIS — J43.9 PULMONARY EMPHYSEMA, UNSPECIFIED EMPHYSEMA TYPE (HCC): ICD-10-CM

## 2022-05-03 DIAGNOSIS — N18.31 STAGE 3A CHRONIC KIDNEY DISEASE (HCC): ICD-10-CM

## 2022-05-03 DIAGNOSIS — M62.838 MUSCLE SPASM: ICD-10-CM

## 2022-05-03 DIAGNOSIS — E55.9 VITAMIN D DEFICIENCY: ICD-10-CM

## 2022-05-03 DIAGNOSIS — K21.9 GASTROESOPHAGEAL REFLUX DISEASE WITHOUT ESOPHAGITIS: ICD-10-CM

## 2022-05-03 DIAGNOSIS — E78.5 HYPERLIPIDEMIA, UNSPECIFIED HYPERLIPIDEMIA TYPE: ICD-10-CM

## 2022-05-03 DIAGNOSIS — Z79.01 LONG TERM (CURRENT) USE OF ANTICOAGULANTS: ICD-10-CM

## 2022-05-03 DIAGNOSIS — T81.718A IATROGENIC PULMONARY EMBOLISM AND INFARCTION, INITIAL ENCOUNTER (HCC): ICD-10-CM

## 2022-05-03 DIAGNOSIS — R30.0 BURNING WITH URINATION: ICD-10-CM

## 2022-05-03 DIAGNOSIS — Z51.81 ENCOUNTER FOR THERAPEUTIC DRUG MONITORING: ICD-10-CM

## 2022-05-03 DIAGNOSIS — I26.99 IATROGENIC PULMONARY EMBOLISM AND INFARCTION, INITIAL ENCOUNTER (HCC): ICD-10-CM

## 2022-05-03 DIAGNOSIS — I10 ESSENTIAL HYPERTENSION: Primary | ICD-10-CM

## 2022-05-03 LAB
ALBUMIN SERPL-MCNC: 3.8 G/DL (ref 3.4–5)
ALBUMIN/GLOB SERPL: 1.2 {RATIO} (ref 1–2)
ALP LIVER SERPL-CCNC: 63 U/L
ALT SERPL-CCNC: 26 U/L
ANION GAP SERPL CALC-SCNC: 5 MMOL/L (ref 0–18)
AST SERPL-CCNC: 26 U/L (ref 15–37)
BILIRUB SERPL-MCNC: 0.5 MG/DL (ref 0.1–2)
BILIRUBIN: NEGATIVE
BUN BLD-MCNC: 18 MG/DL (ref 7–18)
BUN/CREAT SERPL: 14.8 (ref 10–20)
CALCIUM BLD-MCNC: 9.1 MG/DL (ref 8.5–10.1)
CHLORIDE SERPL-SCNC: 105 MMOL/L (ref 98–112)
CO2 SERPL-SCNC: 28 MMOL/L (ref 21–32)
CREAT BLD-MCNC: 1.22 MG/DL
DEPRECATED RDW RBC AUTO: 52.4 FL (ref 35.1–46.3)
ERYTHROCYTE [DISTWIDTH] IN BLOOD BY AUTOMATED COUNT: 12.3 % (ref 11–15)
FASTING STATUS PATIENT QL REPORTED: NO
FOLATE SERPL-MCNC: >20 NG/ML (ref 8.7–?)
GLOBULIN PLAS-MCNC: 3.3 G/DL (ref 2.8–4.4)
GLUCOSE (URINE DIPSTICK): NEGATIVE MG/DL
GLUCOSE BLD-MCNC: 85 MG/DL (ref 70–99)
HCT VFR BLD AUTO: 40.3 %
HGB BLD-MCNC: 13.3 G/DL
INR: 2 (ref 0.8–1.2)
KETONES (URINE DIPSTICK): NEGATIVE MG/DL
LEUKOCYTES: NEGATIVE
MCH RBC QN AUTO: 37.8 PG (ref 26–34)
MCHC RBC AUTO-ENTMCNC: 33 G/DL (ref 31–37)
MCV RBC AUTO: 114.5 FL
MULTISTIX LOT#: ABNORMAL NUMERIC
NITRITE, URINE: NEGATIVE
OSMOLALITY SERPL CALC.SUM OF ELEC: 287 MOSM/KG (ref 275–295)
PH, URINE: 6 (ref 4.5–8)
PLATELET # BLD AUTO: 245 10(3)UL (ref 150–450)
POTASSIUM SERPL-SCNC: 4.8 MMOL/L (ref 3.5–5.1)
PROT SERPL-MCNC: 7.1 G/DL (ref 6.4–8.2)
PROTEIN (URINE DIPSTICK): NEGATIVE MG/DL
RBC # BLD AUTO: 3.52 X10(6)UL
SODIUM SERPL-SCNC: 138 MMOL/L (ref 136–145)
SPECIFIC GRAVITY: 1.01 (ref 1–1.03)
T4 FREE SERPL-MCNC: 1 NG/DL (ref 0.8–1.7)
TEST STRIP EXPIRATION DATE: ABNORMAL DATE
TSI SER-ACNC: 1.8 MIU/ML (ref 0.36–3.74)
URINE-COLOR: YELLOW
UROBILINOGEN,SEMI-QN: 0.2 MG/DL (ref 0–1.9)
VIT B12 SERPL-MCNC: 491 PG/ML (ref 193–986)
VIT D+METAB SERPL-MCNC: 39.3 NG/ML (ref 30–100)
WBC # BLD AUTO: 5.2 X10(3) UL (ref 4–11)

## 2022-05-03 PROCEDURE — 82746 ASSAY OF FOLIC ACID SERUM: CPT

## 2022-05-03 PROCEDURE — 85610 PROTHROMBIN TIME: CPT

## 2022-05-03 PROCEDURE — 81002 URINALYSIS NONAUTO W/O SCOPE: CPT | Performed by: INTERNAL MEDICINE

## 2022-05-03 PROCEDURE — 84439 ASSAY OF FREE THYROXINE: CPT

## 2022-05-03 PROCEDURE — 82306 VITAMIN D 25 HYDROXY: CPT

## 2022-05-03 PROCEDURE — 82607 VITAMIN B-12: CPT

## 2022-05-03 PROCEDURE — 84443 ASSAY THYROID STIM HORMONE: CPT

## 2022-05-03 PROCEDURE — 85027 COMPLETE CBC AUTOMATED: CPT

## 2022-05-03 PROCEDURE — 99214 OFFICE O/P EST MOD 30 MIN: CPT | Performed by: INTERNAL MEDICINE

## 2022-05-03 PROCEDURE — 93793 ANTICOAG MGMT PT WARFARIN: CPT

## 2022-05-03 PROCEDURE — 36415 COLL VENOUS BLD VENIPUNCTURE: CPT

## 2022-05-03 PROCEDURE — 80053 COMPREHEN METABOLIC PANEL: CPT

## 2022-05-03 RX ORDER — SUCRALFATE 1 G/1
1 TABLET ORAL
Qty: 360 TABLET | Refills: 0 | Status: SHIPPED | OUTPATIENT
Start: 2022-05-03

## 2022-05-03 RX ORDER — FUROSEMIDE 20 MG/1
20 TABLET ORAL DAILY
COMMUNITY
Start: 2021-12-01 | End: 2022-05-03 | Stop reason: ALTCHOICE

## 2022-05-04 RX ORDER — HYDROXYUREA 500 MG/1
CAPSULE ORAL
Qty: 117 CAPSULE | Refills: 0 | Status: SHIPPED | OUTPATIENT
Start: 2022-05-04 | End: 2022-09-28

## 2022-05-09 ENCOUNTER — LAB ENCOUNTER (OUTPATIENT)
Dept: LAB | Age: 82
End: 2022-05-09
Attending: ANESTHESIOLOGY
Payer: MEDICARE

## 2022-05-09 DIAGNOSIS — Z51.11 CHEMOTHERAPY MANAGEMENT, ENCOUNTER FOR: ICD-10-CM

## 2022-05-09 DIAGNOSIS — D45 POLYCYTHEMIA VERA (HCC): ICD-10-CM

## 2022-05-09 LAB
ALBUMIN SERPL-MCNC: 3.5 G/DL (ref 3.4–5)
ALBUMIN/GLOB SERPL: 1 {RATIO} (ref 1–2)
ALP LIVER SERPL-CCNC: 58 U/L
ALT SERPL-CCNC: 24 U/L
ANION GAP SERPL CALC-SCNC: 5 MMOL/L (ref 0–18)
AST SERPL-CCNC: 20 U/L (ref 15–37)
BASOPHILS # BLD AUTO: 0.04 X10(3) UL (ref 0–0.2)
BASOPHILS NFR BLD AUTO: 0.9 %
BILIRUB SERPL-MCNC: 0.8 MG/DL (ref 0.1–2)
BUN BLD-MCNC: 19 MG/DL (ref 7–18)
BUN/CREAT SERPL: 16 (ref 10–20)
CALCIUM BLD-MCNC: 9.1 MG/DL (ref 8.5–10.1)
CHLORIDE SERPL-SCNC: 110 MMOL/L (ref 98–112)
CO2 SERPL-SCNC: 26 MMOL/L (ref 21–32)
CREAT BLD-MCNC: 1.19 MG/DL
DEPRECATED RDW RBC AUTO: 52.9 FL (ref 35.1–46.3)
EOSINOPHIL # BLD AUTO: 0.04 X10(3) UL (ref 0–0.7)
EOSINOPHIL NFR BLD AUTO: 0.9 %
ERYTHROCYTE [DISTWIDTH] IN BLOOD BY AUTOMATED COUNT: 12.4 % (ref 11–15)
FASTING STATUS PATIENT QL REPORTED: NO
GLOBULIN PLAS-MCNC: 3.5 G/DL (ref 2.8–4.4)
GLUCOSE BLD-MCNC: 112 MG/DL (ref 70–99)
HCT VFR BLD AUTO: 39.7 %
HGB BLD-MCNC: 13 G/DL
IMM GRANULOCYTES # BLD AUTO: 0.01 X10(3) UL (ref 0–1)
IMM GRANULOCYTES NFR BLD: 0.2 %
LYMPHOCYTES # BLD AUTO: 0.9 X10(3) UL (ref 1–4)
LYMPHOCYTES NFR BLD AUTO: 19.5 %
MCH RBC QN AUTO: 37.6 PG (ref 26–34)
MCHC RBC AUTO-ENTMCNC: 32.7 G/DL (ref 31–37)
MCV RBC AUTO: 114.7 FL
MONOCYTES # BLD AUTO: 0.34 X10(3) UL (ref 0.1–1)
MONOCYTES NFR BLD AUTO: 7.4 %
NEUTROPHILS # BLD AUTO: 3.28 X10 (3) UL (ref 1.5–7.7)
NEUTROPHILS # BLD AUTO: 3.28 X10(3) UL (ref 1.5–7.7)
NEUTROPHILS NFR BLD AUTO: 71.1 %
OSMOLALITY SERPL CALC.SUM OF ELEC: 295 MOSM/KG (ref 275–295)
PLATELET # BLD AUTO: 244 10(3)UL (ref 150–450)
POTASSIUM SERPL-SCNC: 4.2 MMOL/L (ref 3.5–5.1)
PROT SERPL-MCNC: 7 G/DL (ref 6.4–8.2)
RBC # BLD AUTO: 3.46 X10(6)UL
SODIUM SERPL-SCNC: 141 MMOL/L (ref 136–145)
WBC # BLD AUTO: 4.6 X10(3) UL (ref 4–11)

## 2022-05-09 PROCEDURE — 80053 COMPREHEN METABOLIC PANEL: CPT

## 2022-05-09 PROCEDURE — 85025 COMPLETE CBC W/AUTO DIFF WBC: CPT

## 2022-05-09 PROCEDURE — 36415 COLL VENOUS BLD VENIPUNCTURE: CPT

## 2022-05-10 ENCOUNTER — APPOINTMENT (OUTPATIENT)
Dept: HEMATOLOGY/ONCOLOGY | Facility: HOSPITAL | Age: 82
End: 2022-05-10
Attending: INTERNAL MEDICINE
Payer: MEDICARE

## 2022-05-10 VITALS
DIASTOLIC BLOOD PRESSURE: 49 MMHG | BODY MASS INDEX: 31.78 KG/M2 | RESPIRATION RATE: 18 BRPM | TEMPERATURE: 99 F | WEIGHT: 222 LBS | SYSTOLIC BLOOD PRESSURE: 129 MMHG | HEART RATE: 79 BPM | HEIGHT: 70 IN | OXYGEN SATURATION: 97 %

## 2022-05-10 DIAGNOSIS — Z51.11 CHEMOTHERAPY MANAGEMENT, ENCOUNTER FOR: ICD-10-CM

## 2022-05-10 DIAGNOSIS — D45 POLYCYTHEMIA VERA (HCC): Primary | ICD-10-CM

## 2022-05-10 DIAGNOSIS — C34.12 MALIGNANT NEOPLASM OF UPPER LOBE OF LEFT LUNG (HCC): ICD-10-CM

## 2022-05-10 DIAGNOSIS — I26.99 BILATERAL PULMONARY EMBOLISM (HCC): ICD-10-CM

## 2022-05-10 PROCEDURE — 99215 OFFICE O/P EST HI 40 MIN: CPT | Performed by: INTERNAL MEDICINE

## 2022-05-18 ENCOUNTER — HOSPITAL ENCOUNTER (OUTPATIENT)
Dept: CT IMAGING | Age: 82
Discharge: HOME | End: 2022-05-18
Attending: INTERNAL MEDICINE
Payer: MEDICARE

## 2022-05-18 DIAGNOSIS — R91.1 PULMONARY NODULE: ICD-10-CM

## 2022-05-18 PROCEDURE — 71250 CT THORAX DX C-: CPT | Performed by: INTERNAL MEDICINE

## 2022-05-27 DIAGNOSIS — R91.8 PULMONARY NODULES: Primary | ICD-10-CM

## 2022-06-07 ENCOUNTER — ANTI-COAG VISIT (OUTPATIENT)
Dept: ANTICOAGULATION | Facility: CLINIC | Age: 82
End: 2022-06-07
Payer: MEDICARE

## 2022-06-07 DIAGNOSIS — Z79.01 LONG TERM (CURRENT) USE OF ANTICOAGULANTS: ICD-10-CM

## 2022-06-07 DIAGNOSIS — Z51.81 ENCOUNTER FOR THERAPEUTIC DRUG MONITORING: ICD-10-CM

## 2022-06-07 DIAGNOSIS — T81.718A IATROGENIC PULMONARY EMBOLISM AND INFARCTION, INITIAL ENCOUNTER (HCC): ICD-10-CM

## 2022-06-07 DIAGNOSIS — I26.99 IATROGENIC PULMONARY EMBOLISM AND INFARCTION, INITIAL ENCOUNTER (HCC): ICD-10-CM

## 2022-06-07 LAB
INR: 1.3 (ref 0.8–1.2)
TEST STRIP EXPIRATION DATE: ABNORMAL DATE

## 2022-06-07 PROCEDURE — 93793 ANTICOAG MGMT PT WARFARIN: CPT | Performed by: INTERNAL MEDICINE

## 2022-06-07 PROCEDURE — 85610 PROTHROMBIN TIME: CPT | Performed by: INTERNAL MEDICINE

## 2022-07-05 ENCOUNTER — ANTI-COAG VISIT (OUTPATIENT)
Dept: ANTICOAGULATION | Facility: CLINIC | Age: 82
End: 2022-07-05
Payer: MEDICARE

## 2022-07-05 DIAGNOSIS — T81.718A IATROGENIC PULMONARY EMBOLISM AND INFARCTION, INITIAL ENCOUNTER (HCC): ICD-10-CM

## 2022-07-05 DIAGNOSIS — I26.99 IATROGENIC PULMONARY EMBOLISM AND INFARCTION, INITIAL ENCOUNTER (HCC): ICD-10-CM

## 2022-07-05 DIAGNOSIS — Z51.81 ENCOUNTER FOR THERAPEUTIC DRUG MONITORING: ICD-10-CM

## 2022-07-05 DIAGNOSIS — Z79.01 LONG TERM (CURRENT) USE OF ANTICOAGULANTS: ICD-10-CM

## 2022-07-05 LAB
INR: 1.4 (ref 0.8–1.2)
TEST STRIP EXPIRATION DATE: ABNORMAL DATE

## 2022-07-05 PROCEDURE — 85610 PROTHROMBIN TIME: CPT | Performed by: INTERNAL MEDICINE

## 2022-07-05 PROCEDURE — 93793 ANTICOAG MGMT PT WARFARIN: CPT | Performed by: INTERNAL MEDICINE

## 2022-07-12 ENCOUNTER — OFFICE VISIT (OUTPATIENT)
Dept: PULMONOLOGY | Facility: CLINIC | Age: 82
End: 2022-07-12
Payer: MEDICARE

## 2022-07-12 VITALS
OXYGEN SATURATION: 97 % | DIASTOLIC BLOOD PRESSURE: 74 MMHG | WEIGHT: 221.38 LBS | SYSTOLIC BLOOD PRESSURE: 142 MMHG | BODY MASS INDEX: 32 KG/M2 | RESPIRATION RATE: 19 BRPM | HEART RATE: 75 BPM

## 2022-07-12 DIAGNOSIS — I26.99 PULMONARY EMBOLISM AND INFARCTION (HCC): Primary | ICD-10-CM

## 2022-07-12 PROCEDURE — 99213 OFFICE O/P EST LOW 20 MIN: CPT | Performed by: INTERNAL MEDICINE

## 2022-07-15 ENCOUNTER — TELEPHONE (OUTPATIENT)
Dept: INTERNAL MEDICINE CLINIC | Facility: CLINIC | Age: 82
End: 2022-07-15

## 2022-07-15 NOTE — TELEPHONE ENCOUNTER
April Thomas from Dr. Conor Crandall office requesting information from patient's last visit with Dr. Michelle Gill.

## 2022-07-21 NOTE — TELEPHONE ENCOUNTER
Spoke with Gayathri from Dr. Wing Matson office who confirmed his specialty is podiatry ([facility]). Transferred to [facility], left message to call back and transfer to triage. Used patient's first name and date of Addie's incoming call as identifying information, since line was not stated to be confidential.    Message left on Addie's voicemail to confirm the location and fax number to send Information to, and to clarify what specific information was needed. Dr. Madan Norton, his specialty is podiatry.

## 2022-08-02 ENCOUNTER — TELEPHONE (OUTPATIENT)
Dept: INTERNAL MEDICINE CLINIC | Facility: CLINIC | Age: 82
End: 2022-08-02

## 2022-08-02 DIAGNOSIS — I26.99 IATROGENIC PULMONARY EMBOLISM AND INFARCTION, INITIAL ENCOUNTER (HCC): Primary | ICD-10-CM

## 2022-08-02 DIAGNOSIS — Z51.81 MONITORING FOR LONG-TERM ANTICOAGULANT USE: ICD-10-CM

## 2022-08-02 DIAGNOSIS — T81.718A IATROGENIC PULMONARY EMBOLISM AND INFARCTION, INITIAL ENCOUNTER (HCC): Primary | ICD-10-CM

## 2022-08-02 DIAGNOSIS — Z79.01 MONITORING FOR LONG-TERM ANTICOAGULANT USE: ICD-10-CM

## 2022-08-02 NOTE — TELEPHONE ENCOUNTER
Jean calling CS, stated pt calling for PT/INR order but no order placed     Please advise,Pt last seen at Coumadin clinic 7/5/22

## 2022-08-03 NOTE — TELEPHONE ENCOUNTER
Patient's annual referral orders/standing lab orders have  on 22. Pended new referral order- please sign.

## 2022-08-03 NOTE — TELEPHONE ENCOUNTER
RN spoke with patient- she called yesterday because she had to cancel her appointment and was calling to reschedule. [facility] RN scheduled appointment for 8/9. Referral orders pending MD signature.

## 2022-08-04 PROBLEM — T81.718A IATROGENIC PULMONARY EMBOLISM AND INFARCTION, INITIAL ENCOUNTER (HCC): Status: ACTIVE | Noted: 2022-08-04

## 2022-08-04 PROBLEM — Z79.01 MONITORING FOR LONG-TERM ANTICOAGULANT USE: Status: ACTIVE | Noted: 2022-08-04

## 2022-08-04 PROBLEM — Z51.81 MONITORING FOR LONG-TERM ANTICOAGULANT USE: Status: ACTIVE | Noted: 2022-08-04

## 2022-08-04 PROBLEM — I26.99 IATROGENIC PULMONARY EMBOLISM AND INFARCTION, INITIAL ENCOUNTER (HCC): Status: ACTIVE | Noted: 2022-08-04

## 2022-08-09 ENCOUNTER — ANTI-COAG VISIT (OUTPATIENT)
Dept: ANTICOAGULATION | Facility: CLINIC | Age: 82
End: 2022-08-09
Payer: MEDICARE

## 2022-08-09 ENCOUNTER — OFFICE VISIT (OUTPATIENT)
Dept: HEMATOLOGY/ONCOLOGY | Facility: HOSPITAL | Age: 82
End: 2022-08-09
Attending: INTERNAL MEDICINE
Payer: MEDICARE

## 2022-08-09 VITALS
BODY MASS INDEX: 31.92 KG/M2 | TEMPERATURE: 98 F | DIASTOLIC BLOOD PRESSURE: 61 MMHG | RESPIRATION RATE: 18 BRPM | HEART RATE: 75 BPM | WEIGHT: 223 LBS | HEIGHT: 70 IN | OXYGEN SATURATION: 97 % | SYSTOLIC BLOOD PRESSURE: 142 MMHG

## 2022-08-09 DIAGNOSIS — Z51.11 CHEMOTHERAPY MANAGEMENT, ENCOUNTER FOR: ICD-10-CM

## 2022-08-09 DIAGNOSIS — D45 POLYCYTHEMIA VERA (HCC): Primary | ICD-10-CM

## 2022-08-09 DIAGNOSIS — Z79.01 MONITORING FOR LONG-TERM ANTICOAGULANT USE: ICD-10-CM

## 2022-08-09 DIAGNOSIS — I82.403 DEEP VEIN THROMBOSIS (DVT) OF BOTH LOWER EXTREMITIES, UNSPECIFIED CHRONICITY, UNSPECIFIED VEIN (HCC): ICD-10-CM

## 2022-08-09 DIAGNOSIS — C34.12 MALIGNANT NEOPLASM OF UPPER LOBE OF LEFT LUNG (HCC): ICD-10-CM

## 2022-08-09 DIAGNOSIS — T81.718A IATROGENIC PULMONARY EMBOLISM AND INFARCTION, INITIAL ENCOUNTER (HCC): ICD-10-CM

## 2022-08-09 DIAGNOSIS — D45 POLYCYTHEMIA VERA (HCC): ICD-10-CM

## 2022-08-09 DIAGNOSIS — Z51.81 MONITORING FOR LONG-TERM ANTICOAGULANT USE: ICD-10-CM

## 2022-08-09 DIAGNOSIS — I26.99 IATROGENIC PULMONARY EMBOLISM AND INFARCTION, INITIAL ENCOUNTER (HCC): ICD-10-CM

## 2022-08-09 LAB
ALBUMIN SERPL-MCNC: 3.4 G/DL (ref 3.4–5)
ALBUMIN/GLOB SERPL: 1 {RATIO} (ref 1–2)
ALP LIVER SERPL-CCNC: 77 U/L
ALT SERPL-CCNC: 23 U/L
ANION GAP SERPL CALC-SCNC: 5 MMOL/L (ref 0–18)
AST SERPL-CCNC: 20 U/L (ref 15–37)
BASOPHILS # BLD AUTO: 0.04 X10(3) UL (ref 0–0.2)
BASOPHILS NFR BLD AUTO: 0.7 %
BILIRUB SERPL-MCNC: 0.7 MG/DL (ref 0.1–2)
BUN BLD-MCNC: 21 MG/DL (ref 7–18)
BUN/CREAT SERPL: 16.8 (ref 10–20)
CALCIUM BLD-MCNC: 9.2 MG/DL (ref 8.5–10.1)
CHLORIDE SERPL-SCNC: 107 MMOL/L (ref 98–112)
CO2 SERPL-SCNC: 27 MMOL/L (ref 21–32)
CREAT BLD-MCNC: 1.25 MG/DL
DEPRECATED RDW RBC AUTO: 51.5 FL (ref 35.1–46.3)
EOSINOPHIL # BLD AUTO: 0.07 X10(3) UL (ref 0–0.7)
EOSINOPHIL NFR BLD AUTO: 1.2 %
ERYTHROCYTE [DISTWIDTH] IN BLOOD BY AUTOMATED COUNT: 12.3 % (ref 11–15)
GFR SERPLBLD BASED ON 1.73 SQ M-ARVRAT: 43 ML/MIN/1.73M2 (ref 60–?)
GLOBULIN PLAS-MCNC: 3.3 G/DL (ref 2.8–4.4)
GLUCOSE BLD-MCNC: 94 MG/DL (ref 70–99)
HCT VFR BLD AUTO: 40.1 %
HGB BLD-MCNC: 13.2 G/DL
IMM GRANULOCYTES # BLD AUTO: 0.02 X10(3) UL (ref 0–1)
IMM GRANULOCYTES NFR BLD: 0.3 %
INR: 1.7 (ref 2–3)
LYMPHOCYTES # BLD AUTO: 1.09 X10(3) UL (ref 1–4)
LYMPHOCYTES NFR BLD AUTO: 18.1 %
MCH RBC QN AUTO: 37 PG (ref 26–34)
MCHC RBC AUTO-ENTMCNC: 32.9 G/DL (ref 31–37)
MCV RBC AUTO: 112.3 FL
MONOCYTES # BLD AUTO: 0.37 X10(3) UL (ref 0.1–1)
MONOCYTES NFR BLD AUTO: 6.1 %
NEUTROPHILS # BLD AUTO: 4.44 X10 (3) UL (ref 1.5–7.7)
NEUTROPHILS # BLD AUTO: 4.44 X10(3) UL (ref 1.5–7.7)
NEUTROPHILS NFR BLD AUTO: 73.6 %
OSMOLALITY SERPL CALC.SUM OF ELEC: 291 MOSM/KG (ref 275–295)
PLATELET # BLD AUTO: 244 10(3)UL (ref 150–450)
POTASSIUM SERPL-SCNC: 5.2 MMOL/L (ref 3.5–5.1)
PROT SERPL-MCNC: 6.7 G/DL (ref 6.4–8.2)
RBC # BLD AUTO: 3.57 X10(6)UL
SODIUM SERPL-SCNC: 139 MMOL/L (ref 136–145)
WBC # BLD AUTO: 6 X10(3) UL (ref 4–11)

## 2022-08-09 PROCEDURE — 93793 ANTICOAG MGMT PT WARFARIN: CPT | Performed by: INTERNAL MEDICINE

## 2022-08-09 PROCEDURE — 85025 COMPLETE CBC W/AUTO DIFF WBC: CPT

## 2022-08-09 PROCEDURE — 36415 COLL VENOUS BLD VENIPUNCTURE: CPT

## 2022-08-09 PROCEDURE — 85610 PROTHROMBIN TIME: CPT | Performed by: INTERNAL MEDICINE

## 2022-08-09 PROCEDURE — 80053 COMPREHEN METABOLIC PANEL: CPT

## 2022-08-09 PROCEDURE — 99215 OFFICE O/P EST HI 40 MIN: CPT | Performed by: INTERNAL MEDICINE

## 2022-08-23 ENCOUNTER — HOSPITAL ENCOUNTER (OUTPATIENT)
Age: 82
Discharge: HOME | End: 2022-08-23
Payer: MEDICARE

## 2022-08-23 ENCOUNTER — APPOINTMENT (OUTPATIENT)
Dept: GENERAL RADIOLOGY | Age: 82
End: 2022-08-23
Payer: MEDICARE

## 2022-08-23 ENCOUNTER — ANTI-COAG VISIT (OUTPATIENT)
Dept: ANTICOAGULATION | Facility: CLINIC | Age: 82
End: 2022-08-23
Payer: MEDICARE

## 2022-08-23 VITALS
HEART RATE: 81 BPM | TEMPERATURE: 97 F | OXYGEN SATURATION: 100 % | BODY MASS INDEX: 31.5 KG/M2 | SYSTOLIC BLOOD PRESSURE: 152 MMHG | HEIGHT: 70 IN | DIASTOLIC BLOOD PRESSURE: 67 MMHG | RESPIRATION RATE: 18 BRPM | WEIGHT: 220 LBS

## 2022-08-23 DIAGNOSIS — M79.603 ARM PAIN: Primary | ICD-10-CM

## 2022-08-23 DIAGNOSIS — I26.99 IATROGENIC PULMONARY EMBOLISM AND INFARCTION, INITIAL ENCOUNTER (HCC): ICD-10-CM

## 2022-08-23 DIAGNOSIS — Z51.81 MONITORING FOR LONG-TERM ANTICOAGULANT USE: ICD-10-CM

## 2022-08-23 DIAGNOSIS — T81.718A IATROGENIC PULMONARY EMBOLISM AND INFARCTION, INITIAL ENCOUNTER (HCC): ICD-10-CM

## 2022-08-23 DIAGNOSIS — Z79.01 MONITORING FOR LONG-TERM ANTICOAGULANT USE: ICD-10-CM

## 2022-08-23 DIAGNOSIS — M19.021 OSTEOARTHRITIS OF RIGHT ELBOW, UNSPECIFIED OSTEOARTHRITIS TYPE: ICD-10-CM

## 2022-08-23 LAB
INR: 3.1 (ref 0.8–1.2)
TEST STRIP EXPIRATION DATE: ABNORMAL DATE

## 2022-08-23 PROCEDURE — 85610 PROTHROMBIN TIME: CPT | Performed by: INTERNAL MEDICINE

## 2022-08-23 PROCEDURE — 73060 X-RAY EXAM OF HUMERUS: CPT

## 2022-08-23 PROCEDURE — 99213 OFFICE O/P EST LOW 20 MIN: CPT

## 2022-08-23 PROCEDURE — 73080 X-RAY EXAM OF ELBOW: CPT

## 2022-08-23 PROCEDURE — 93793 ANTICOAG MGMT PT WARFARIN: CPT | Performed by: INTERNAL MEDICINE

## 2022-08-23 NOTE — ED INITIAL ASSESSMENT (HPI)
Pt presents to the IC with c/o right upper arm pain x 1 month. Pt states she was going up the stairs and tripped & tried to brace herself with her right arm.

## 2022-08-24 ENCOUNTER — TELEPHONE (OUTPATIENT)
Dept: PHYSICAL MEDICINE AND REHAB | Facility: CLINIC | Age: 82
End: 2022-08-24

## 2022-08-26 ENCOUNTER — OFFICE VISIT (OUTPATIENT)
Dept: INTERNAL MEDICINE CLINIC | Facility: CLINIC | Age: 82
End: 2022-08-26
Payer: MEDICARE

## 2022-08-26 VITALS
BODY MASS INDEX: 31.92 KG/M2 | HEIGHT: 70 IN | WEIGHT: 223 LBS | HEART RATE: 75 BPM | SYSTOLIC BLOOD PRESSURE: 126 MMHG | DIASTOLIC BLOOD PRESSURE: 72 MMHG

## 2022-08-26 DIAGNOSIS — M25.562 CHRONIC PAIN OF LEFT KNEE: Primary | ICD-10-CM

## 2022-08-26 DIAGNOSIS — G89.29 CHRONIC PAIN OF LEFT KNEE: Primary | ICD-10-CM

## 2022-08-26 PROCEDURE — 1125F AMNT PAIN NOTED PAIN PRSNT: CPT | Performed by: NURSE PRACTITIONER

## 2022-08-26 PROCEDURE — 99203 OFFICE O/P NEW LOW 30 MIN: CPT | Performed by: NURSE PRACTITIONER

## 2022-08-26 RX ORDER — LIDOCAINE 4 G/G
1 PATCH TOPICAL EVERY 24 HOURS
Qty: 15 PATCH | Refills: 0 | Status: SHIPPED | OUTPATIENT
Start: 2022-08-26

## 2022-09-06 ENCOUNTER — ANTI-COAG VISIT (OUTPATIENT)
Dept: ANTICOAGULATION | Facility: CLINIC | Age: 82
End: 2022-09-06
Payer: MEDICARE

## 2022-09-06 DIAGNOSIS — T81.718A IATROGENIC PULMONARY EMBOLISM AND INFARCTION, INITIAL ENCOUNTER (HCC): ICD-10-CM

## 2022-09-06 DIAGNOSIS — Z51.81 MONITORING FOR LONG-TERM ANTICOAGULANT USE: ICD-10-CM

## 2022-09-06 DIAGNOSIS — I26.99 IATROGENIC PULMONARY EMBOLISM AND INFARCTION, INITIAL ENCOUNTER (HCC): ICD-10-CM

## 2022-09-06 DIAGNOSIS — Z79.01 MONITORING FOR LONG-TERM ANTICOAGULANT USE: ICD-10-CM

## 2022-09-06 LAB
INR: 2.1 (ref 0.8–1.2)
TEST STRIP EXPIRATION DATE: ABNORMAL DATE

## 2022-09-06 PROCEDURE — 93793 ANTICOAG MGMT PT WARFARIN: CPT | Performed by: INTERNAL MEDICINE

## 2022-09-06 PROCEDURE — 85610 PROTHROMBIN TIME: CPT | Performed by: INTERNAL MEDICINE

## 2022-09-28 RX ORDER — HYDROXYUREA 500 MG/1
CAPSULE ORAL
Qty: 117 CAPSULE | Refills: 0 | Status: SHIPPED | OUTPATIENT
Start: 2022-09-28 | End: 2023-02-22

## 2022-10-04 ENCOUNTER — ANTI-COAG VISIT (OUTPATIENT)
Dept: ANTICOAGULATION | Facility: CLINIC | Age: 82
End: 2022-10-04
Payer: MEDICARE

## 2022-10-04 DIAGNOSIS — T81.718A IATROGENIC PULMONARY EMBOLISM AND INFARCTION, INITIAL ENCOUNTER (HCC): ICD-10-CM

## 2022-10-04 DIAGNOSIS — Z51.81 MONITORING FOR LONG-TERM ANTICOAGULANT USE: ICD-10-CM

## 2022-10-04 DIAGNOSIS — Z79.01 MONITORING FOR LONG-TERM ANTICOAGULANT USE: ICD-10-CM

## 2022-10-04 DIAGNOSIS — I26.99 IATROGENIC PULMONARY EMBOLISM AND INFARCTION, INITIAL ENCOUNTER (HCC): ICD-10-CM

## 2022-10-04 LAB — INR: 2.4 (ref 0.8–1.2)

## 2022-10-04 PROCEDURE — 93793 ANTICOAG MGMT PT WARFARIN: CPT | Performed by: FAMILY MEDICINE

## 2022-10-04 PROCEDURE — 85610 PROTHROMBIN TIME: CPT | Performed by: FAMILY MEDICINE

## 2022-10-11 ENCOUNTER — TELEPHONE (OUTPATIENT)
Dept: INTERNAL MEDICINE CLINIC | Facility: CLINIC | Age: 82
End: 2022-10-11

## 2022-10-11 RX ORDER — WARFARIN SODIUM 5 MG/1
TABLET ORAL
Qty: 120 TABLET | Refills: 1 | Status: SHIPPED | OUTPATIENT
Start: 2022-10-11

## 2022-10-11 NOTE — TELEPHONE ENCOUNTER
Refill passed per [facility]madin [facility] protocol. Requested Prescriptions   Pending Prescriptions Disp Refills    warfarin 5 MG Oral Tab 120 tablet 1     Sig: Take as directed by the coumadin clinic. Take one tablet (5 mg) on Mon, Wed, Fri and Sun. Take one and a half tablets (7.5 mg) on Tue, Thur, and Sat.         Rx Em Warfarin Protocol Passed - 10/11/2022 12:34 PM        Passed - Appointment in past 12 or next 3 months       Recent Outpatient Visits              1 month ago Chronic pain of left knee    [facility], [facility], [facility]    Office Visit    2 months ago [facility])    [facility] Hematology Oncology    Nurse Only    2 months ago [facility])    [facility] Hematology Oncology Denver Lopes, MD    Office Visit    3 months ago Pulmonary embolism and infarction [facility])    [place] [address] [place], [place] [address]Seamus Wava Nan, MD    Office Visit    5 months ago [facility])    [facility] Hematology Oncology Denver Lopes, MD    Office Visit     Future Appointments         Provider Department Appt Notes    In 3 weeks Nicole Barney RN [facility], [facility], [facility]     In 4 weeks EM Lisandra [address]52 - Infusion cbc, cmp PIV    In 4 weeks Dirk Bhandari, [address] Hematology Oncology 3 f/u caf    In 9 months Nichole Reyes MD [facility] [address] [place] [address] yearly               Passed - INR 3.9 or less past 6 weeks     INR   Date Value Ref Range Status   10/04/2022 2.4 (A) 0.8 - 1.2 Final                     Passed - CMP within past 12 months     Recent Results (from the past 4380 hour(s))   COMP METABOLIC PANEL (14)    Collection Time: 08/09/22 11:28 AM   Result Value Ref Range    Glucose 94 70 - 99 mg/dL    Sodium 139 136 - 145 mmol/L    Potassium 5.2 (H) 3.5 - 5.1 mmol/L    Chloride 107 98 - 112 mmol/L    CO2 27.0 21.0 - 32.0 mmol/L    Anion Gap 5 0 - 18 mmol/L    BUN 21 (H) 7 - 18 mg/dL    Creatinine 1.25 (H) 0.55 - 1.02 mg/dL    BUN/CREA Ratio 16.8 10.0 - 20.0    Calcium, Total 9.2 8.5 - 10.1 mg/dL    Calculated Osmolality 291 275 - 295 mOsm/kg    eGFR-Cr 43 (L) >=60 mL/min/1.73m2    ALT 23 13 - 56 U/L    AST 20 15 - 37 U/L    Alkaline Phosphatase 77 55 - 142 U/L    Bilirubin, Total 0.7 0.1 - 2.0 mg/dL    Total Protein 6.7 6.4 - 8.2 g/dL    Albumin 3.4 3.4 - 5.0 g/dL    Globulin  3.3 2.8 - 4.4 g/dL    A/G Ratio 1.0 1.0 - 2.0    Patient Fasting for CMP?  Patient not present                      Passed - AST < 111 (less than 3 Xs the upper limit)     Lab Results   Component Value Date    AST 20 08/09/2022                       Passed - ALT < 168 (less than 3Xs the upper limit)     Lab Results   Component Value Date    ALT 23 08/09/2022                       Passed - CBC within the past 12 months     Recent Results (from the past 8760 hour(s))   CBC W/ DIFFERENTIAL    Collection Time: 08/09/22 11:28 AM   Result Value Ref Range    WBC 6.0 4.0 - 11.0 x10(3) uL    RBC 3.57 (L) 3.80 - 5.30 x10(6)uL    HGB 13.2 12.0 - 16.0 g/dL    HCT 40.1 35.0 - 48.0 %    .3 (H) 80.0 - 100.0 fL    MCH 37.0 (H) 26.0 - 34.0 pg    MCHC 32.9 31.0 - 37.0 g/dL    RDW-SD 51.5 (H) 35.1 - 46.3 fL    RDW 12.3 11.0 - 15.0 %    .0 150.0 - 450.0 10(3)uL    Neutrophil Absolute Prelim 4.44 1.50 - 7.70 x10 (3) uL    Neutrophil Absolute 4.44 1.50 - 7.70 x10(3) uL    Lymphocyte Absolute 1.09 1.00 - 4.00 x10(3) uL    Monocyte Absolute 0.37 0.10 - 1.00 x10(3) uL    Eosinophil Absolute 0.07 0.00 - 0.70 x10(3) uL    Basophil Absolute 0.04 0.00 - 0.20 x10(3) uL    Immature Granulocyte Absolute 0.02 0.00 - 1.00 x10(3) uL    Neutrophil % 73.6 %    Lymphocyte % 18.1 %    Monocyte % 6.1 %    Eosinophil % 1.2 %    Basophil % 0.7 %    Immature Granulocyte % 0.3 %     *Note: Due to a large number of results and/or encounters for the requested time period, some results have not been displayed. A complete set of results can be found in Results Review.                    Passed - Hgb >/= 10g/dl within past 12 months     HGB   Date Value Ref Range Status   08/09/2022 13.2 12.0 - 16.0 g/dL Final                     Passed - Platelets >/= 831 past 12 months     PLT   Date Value Ref Range Status   08/09/2022 244.0 150.0 - 450.0 10(3)uL Final

## 2022-11-01 ENCOUNTER — ANTI-COAG VISIT (OUTPATIENT)
Dept: ANTICOAGULATION | Facility: CLINIC | Age: 82
End: 2022-11-01
Payer: MEDICARE

## 2022-11-01 DIAGNOSIS — I26.99 IATROGENIC PULMONARY EMBOLISM AND INFARCTION, INITIAL ENCOUNTER (HCC): ICD-10-CM

## 2022-11-01 DIAGNOSIS — Z79.01 MONITORING FOR LONG-TERM ANTICOAGULANT USE: ICD-10-CM

## 2022-11-01 DIAGNOSIS — Z51.81 MONITORING FOR LONG-TERM ANTICOAGULANT USE: ICD-10-CM

## 2022-11-01 DIAGNOSIS — T81.718A IATROGENIC PULMONARY EMBOLISM AND INFARCTION, INITIAL ENCOUNTER (HCC): ICD-10-CM

## 2022-11-01 LAB — INR: 2.6 (ref 0.8–1.2)

## 2022-11-01 PROCEDURE — 93793 ANTICOAG MGMT PT WARFARIN: CPT | Performed by: FAMILY MEDICINE

## 2022-11-01 PROCEDURE — 85610 PROTHROMBIN TIME: CPT | Performed by: FAMILY MEDICINE

## 2022-11-08 ENCOUNTER — APPOINTMENT (OUTPATIENT)
Dept: HEMATOLOGY/ONCOLOGY | Facility: HOSPITAL | Age: 82
End: 2022-11-08
Attending: INTERNAL MEDICINE
Payer: MEDICARE

## 2022-11-11 ENCOUNTER — APPOINTMENT (OUTPATIENT)
Dept: HEMATOLOGY/ONCOLOGY | Facility: HOSPITAL | Age: 82
End: 2022-11-11
Attending: INTERNAL MEDICINE
Payer: MEDICARE

## 2022-11-15 ENCOUNTER — NURSE ONLY (OUTPATIENT)
Dept: HEMATOLOGY/ONCOLOGY | Facility: HOSPITAL | Age: 82
End: 2022-11-15
Attending: INTERNAL MEDICINE
Payer: MEDICARE

## 2022-11-15 VITALS
BODY MASS INDEX: 32.5 KG/M2 | SYSTOLIC BLOOD PRESSURE: 139 MMHG | HEART RATE: 73 BPM | TEMPERATURE: 98 F | OXYGEN SATURATION: 96 % | RESPIRATION RATE: 18 BRPM | DIASTOLIC BLOOD PRESSURE: 57 MMHG | WEIGHT: 227 LBS | HEIGHT: 70 IN

## 2022-11-15 DIAGNOSIS — Z51.11 CHEMOTHERAPY MANAGEMENT, ENCOUNTER FOR: ICD-10-CM

## 2022-11-15 DIAGNOSIS — I82.403 DEEP VEIN THROMBOSIS (DVT) OF BOTH LOWER EXTREMITIES, UNSPECIFIED CHRONICITY, UNSPECIFIED VEIN (HCC): ICD-10-CM

## 2022-11-15 DIAGNOSIS — D45 POLYCYTHEMIA VERA (HCC): Primary | ICD-10-CM

## 2022-11-15 DIAGNOSIS — D45 POLYCYTHEMIA VERA (HCC): ICD-10-CM

## 2022-11-15 LAB
ALBUMIN SERPL-MCNC: 3.6 G/DL (ref 3.4–5)
ALBUMIN/GLOB SERPL: 1.1 {RATIO} (ref 1–2)
ALP LIVER SERPL-CCNC: 71 U/L
ALT SERPL-CCNC: 23 U/L
ANION GAP SERPL CALC-SCNC: 4 MMOL/L (ref 0–18)
AST SERPL-CCNC: 18 U/L (ref 15–37)
BASOPHILS # BLD AUTO: 0.05 X10(3) UL (ref 0–0.2)
BASOPHILS NFR BLD AUTO: 1 %
BILIRUB SERPL-MCNC: 0.7 MG/DL (ref 0.1–2)
BUN BLD-MCNC: 26 MG/DL (ref 7–18)
BUN/CREAT SERPL: 20.5 (ref 10–20)
CALCIUM BLD-MCNC: 8.9 MG/DL (ref 8.5–10.1)
CHLORIDE SERPL-SCNC: 109 MMOL/L (ref 98–112)
CO2 SERPL-SCNC: 29 MMOL/L (ref 21–32)
CREAT BLD-MCNC: 1.27 MG/DL
DEPRECATED RDW RBC AUTO: 51.9 FL (ref 35.1–46.3)
EOSINOPHIL # BLD AUTO: 0.07 X10(3) UL (ref 0–0.7)
EOSINOPHIL NFR BLD AUTO: 1.4 %
ERYTHROCYTE [DISTWIDTH] IN BLOOD BY AUTOMATED COUNT: 12.5 % (ref 11–15)
GFR SERPLBLD BASED ON 1.73 SQ M-ARVRAT: 42 ML/MIN/1.73M2 (ref 60–?)
GLOBULIN PLAS-MCNC: 3.3 G/DL (ref 2.8–4.4)
GLUCOSE BLD-MCNC: 94 MG/DL (ref 70–99)
HCT VFR BLD AUTO: 40.3 %
HGB BLD-MCNC: 13.6 G/DL
IMM GRANULOCYTES # BLD AUTO: 0.02 X10(3) UL (ref 0–1)
IMM GRANULOCYTES NFR BLD: 0.4 %
LYMPHOCYTES # BLD AUTO: 1.17 X10(3) UL (ref 1–4)
LYMPHOCYTES NFR BLD AUTO: 23 %
MCH RBC QN AUTO: 37.9 PG (ref 26–34)
MCHC RBC AUTO-ENTMCNC: 33.7 G/DL (ref 31–37)
MCV RBC AUTO: 112.3 FL
MONOCYTES # BLD AUTO: 0.34 X10(3) UL (ref 0.1–1)
MONOCYTES NFR BLD AUTO: 6.7 %
NEUTROPHILS # BLD AUTO: 3.44 X10 (3) UL (ref 1.5–7.7)
NEUTROPHILS # BLD AUTO: 3.44 X10(3) UL (ref 1.5–7.7)
NEUTROPHILS NFR BLD AUTO: 67.5 %
OSMOLALITY SERPL CALC.SUM OF ELEC: 299 MOSM/KG (ref 275–295)
PLATELET # BLD AUTO: 212 10(3)UL (ref 150–450)
POTASSIUM SERPL-SCNC: 4.6 MMOL/L (ref 3.5–5.1)
PROT SERPL-MCNC: 6.9 G/DL (ref 6.4–8.2)
RBC # BLD AUTO: 3.59 X10(6)UL
SODIUM SERPL-SCNC: 142 MMOL/L (ref 136–145)
WBC # BLD AUTO: 5.1 X10(3) UL (ref 4–11)

## 2022-11-15 PROCEDURE — 80053 COMPREHEN METABOLIC PANEL: CPT

## 2022-11-15 PROCEDURE — 36415 COLL VENOUS BLD VENIPUNCTURE: CPT

## 2022-11-15 PROCEDURE — 85025 COMPLETE CBC W/AUTO DIFF WBC: CPT

## 2022-11-15 PROCEDURE — 99215 OFFICE O/P EST HI 40 MIN: CPT | Performed by: INTERNAL MEDICINE

## 2022-11-18 ENCOUNTER — HOSPITAL ENCOUNTER (OUTPATIENT)
Dept: CT IMAGING | Age: 82
Discharge: HOME | End: 2022-11-18
Attending: INTERNAL MEDICINE
Payer: MEDICARE

## 2022-11-18 DIAGNOSIS — R91.8 PULMONARY NODULES: ICD-10-CM

## 2022-11-18 PROCEDURE — 71250 CT THORAX DX C-: CPT | Performed by: INTERNAL MEDICINE

## 2022-11-22 ENCOUNTER — TELEPHONE (OUTPATIENT)
Dept: PULMONOLOGY | Facility: CLINIC | Age: 82
End: 2022-11-22

## 2022-11-22 DIAGNOSIS — R91.1 PULMONARY NODULE: Primary | ICD-10-CM

## 2022-11-22 NOTE — TELEPHONE ENCOUNTER
----- Message from Padmaja Carranza MD sent at 11/18/2022  3:15 PM CST -----  RN, please call the patient to let her know that her CT scan of the chest is stable. The small right pulmonary nodule has not grown.   Please add to the calendar a repeat noncontrast super dimension CT scan of the chest at the 6-month interval.  [place]

## 2022-11-22 NOTE — TELEPHONE ENCOUNTER
Spoke with patient and informed of Dr. Ioana Rodriguez message/order below. Patient verbalized understanding. Dr. Jayson Triana- Please review/sign pended order.

## 2022-11-29 ENCOUNTER — ANTI-COAG VISIT (OUTPATIENT)
Dept: ANTICOAGULATION | Facility: CLINIC | Age: 82
End: 2022-11-29
Payer: MEDICARE

## 2022-11-29 DIAGNOSIS — T81.718A IATROGENIC PULMONARY EMBOLISM AND INFARCTION, INITIAL ENCOUNTER (HCC): ICD-10-CM

## 2022-11-29 DIAGNOSIS — Z79.01 MONITORING FOR LONG-TERM ANTICOAGULANT USE: ICD-10-CM

## 2022-11-29 DIAGNOSIS — I26.99 IATROGENIC PULMONARY EMBOLISM AND INFARCTION, INITIAL ENCOUNTER (HCC): ICD-10-CM

## 2022-11-29 DIAGNOSIS — Z51.81 MONITORING FOR LONG-TERM ANTICOAGULANT USE: ICD-10-CM

## 2022-11-29 LAB — INR: 3.1 (ref 0.8–1.2)

## 2022-11-29 PROCEDURE — 85610 PROTHROMBIN TIME: CPT | Performed by: FAMILY MEDICINE

## 2022-11-29 PROCEDURE — 93793 ANTICOAG MGMT PT WARFARIN: CPT | Performed by: FAMILY MEDICINE

## 2022-12-05 ENCOUNTER — TELEPHONE (OUTPATIENT)
Dept: INTERNAL MEDICINE CLINIC | Facility: CLINIC | Age: 82
End: 2022-12-05

## 2022-12-05 NOTE — TELEPHONE ENCOUNTER
Patient called because [facility] has been calling waiting for  to fill out form for her coumadin due to dental work. I told her I don't see that we've received anything. She asked if I can call to follow up    I called [facility] and spoke to [facility] she said the forms were faxed twice to  and spoke to someone on Friday and they confirmed the form was received. There is no documentation of this call.      Clinical staff please assist with forms

## 2022-12-08 ENCOUNTER — TELEPHONE (OUTPATIENT)
Dept: INTERNAL MEDICINE CLINIC | Facility: CLINIC | Age: 82
End: 2022-12-08

## 2022-12-08 NOTE — TELEPHONE ENCOUNTER
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 S/p left lateral thoracotomy with left upper lobectomy and mediastinal lymph node dissection 6/14/16.  --s/p R0 resection (however close margin noted). She did not receive any adjuvant treatment. --Doing well at this time  --repeat chest imaging spring 2022     History of recurrent bilateral DVT/PE with multiple episodes in the postoperative setting including recently as noted above  -Continue lifelong anticoagulation with warfarin   -pulm nodules repeat CT chest due in spring 2022 followed by pulm     Polycythemia vera JAK2 V6 17 F+ diagnosed fall 2020  -Orders placed to start hydroxyurea as of November 2020 she is high risk based on age 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 Labs adequate on Hydrea 500 mg daily 1000 mg MF     Return to clinic in 3 months     Medical decision making: High risk. myeloproliferative neoplasm requiringmyelosuppressive therapy and monitoring        The patient's emotional well being was assessed and resources were discussed. Appropriate resources were reviewed and discussed with the pateint and family.       Sebastián Up MD

## 2022-12-08 NOTE — TELEPHONE ENCOUNTER
Gilberto Hoff from [address] [place] [address] would like status of medical clearance for patient. Please fax to # [phone]. They faxed form as well but willing to except clearance from us.  Please advise

## 2022-12-08 NOTE — TELEPHONE ENCOUNTER
Called Raynard Felty to inform the form was forwarded to Dr Manny Llaa per Dr Lacho Marinper request . Provided phone and fax number to Dr Manny Lala .

## 2022-12-08 NOTE — TELEPHONE ENCOUNTER
Spoke to Amanda ARCHER at ext [phone] who provided the fax number to Dr So Christianson [phone] . Form from [address] [place] [address]  forwarded to Dr So Christianson (confirmation received) .

## 2022-12-14 ENCOUNTER — TELEPHONE (OUTPATIENT)
Dept: INTERNAL MEDICINE CLINIC | Facility: CLINIC | Age: 82
End: 2022-12-14

## 2022-12-14 NOTE — TELEPHONE ENCOUNTER
Patient requesting to speak to [facility] from coumadin clinic regarding her coumadin, please advise.

## 2022-12-14 NOTE — TELEPHONE ENCOUNTER
Spoke with Juan Carlos Johns. She is having a dental procedure tomorrow and has been holding warfarin for 4 days already and will hold one more dose tonight. Advised to resume current warfarin dose post procedure and we can check INR early next week. States she would like to keep appointment scheduled for 12/27.

## 2022-12-15 ENCOUNTER — TELEPHONE (OUTPATIENT)
Dept: HEMATOLOGY/ONCOLOGY | Facility: HOSPITAL | Age: 82
End: 2022-12-15

## 2022-12-15 NOTE — TELEPHONE ENCOUNTER
I called Trena Miranda. She had her tooth extraction today. It is \"bleeding a little. \" I explained that she is being managed by the Coumadin Clinic. They will be able to tell her when to resume her coumadin. Leonor Smart RN for Dr Ailyn Tirado sent a message to the Coumadin Clinic nurse this afternoon. Tara verbalized understanding. She will call them in the morning if they don't call her this afternoon.

## 2022-12-15 NOTE — TELEPHONE ENCOUNTER
Please call the patient. She had an extraction done today. It was not as simple as though, but needs instructions on when she should start taking her Warfrin. Please call.

## 2022-12-16 NOTE — TELEPHONE ENCOUNTER
RN spoke with Dilcia Gay. States that the bleeding from her tooth has stopped. Advised to continue warfarin at usual dose and recheck INR as scheduled on 12/27. Asked if she would like to come next week instead and she declined. Advised to contact us for a sooner appointment if she experiences any changes in bleeding- Tara verbalized understanding.

## 2022-12-21 ENCOUNTER — HOSPITAL ENCOUNTER (OUTPATIENT)
Dept: GENERAL RADIOLOGY | Age: 82
Discharge: HOME | End: 2022-12-21
Attending: INTERNAL MEDICINE
Payer: MEDICARE

## 2022-12-21 ENCOUNTER — OFFICE VISIT (OUTPATIENT)
Dept: INTERNAL MEDICINE CLINIC | Facility: CLINIC | Age: 82
End: 2022-12-21
Payer: MEDICARE

## 2022-12-21 VITALS
HEART RATE: 92 BPM | WEIGHT: 226 LBS | SYSTOLIC BLOOD PRESSURE: 108 MMHG | DIASTOLIC BLOOD PRESSURE: 74 MMHG | HEIGHT: 70 IN | BODY MASS INDEX: 32.35 KG/M2

## 2022-12-21 DIAGNOSIS — Z00.00 ENCOUNTER FOR ANNUAL HEALTH EXAMINATION: ICD-10-CM

## 2022-12-21 DIAGNOSIS — E66.09 CLASS 1 OBESITY DUE TO EXCESS CALORIES WITHOUT SERIOUS COMORBIDITY WITH BODY MASS INDEX (BMI) OF 32.0 TO 32.9 IN ADULT: ICD-10-CM

## 2022-12-21 DIAGNOSIS — M17.0 PRIMARY OSTEOARTHRITIS OF BOTH KNEES: ICD-10-CM

## 2022-12-21 DIAGNOSIS — J43.9 PULMONARY EMPHYSEMA, UNSPECIFIED EMPHYSEMA TYPE (HCC): ICD-10-CM

## 2022-12-21 DIAGNOSIS — E04.2 NONTOXIC MULTINODULAR GOITER: ICD-10-CM

## 2022-12-21 DIAGNOSIS — K21.9 GASTROESOPHAGEAL REFLUX DISEASE WITHOUT ESOPHAGITIS: ICD-10-CM

## 2022-12-21 DIAGNOSIS — N18.31 STAGE 3A CHRONIC KIDNEY DISEASE (HCC): ICD-10-CM

## 2022-12-21 DIAGNOSIS — Z00.00 MEDICARE ANNUAL WELLNESS VISIT, SUBSEQUENT: Primary | ICD-10-CM

## 2022-12-21 PROBLEM — Z79.01 MONITORING FOR LONG-TERM ANTICOAGULANT USE: Status: RESOLVED | Noted: 2022-08-04 | Resolved: 2022-12-21

## 2022-12-21 PROBLEM — T81.718A IATROGENIC PULMONARY EMBOLISM AND INFARCTION, INITIAL ENCOUNTER (HCC): Status: RESOLVED | Noted: 2022-08-04 | Resolved: 2022-12-21

## 2022-12-21 PROBLEM — Z51.81 MONITORING FOR LONG-TERM ANTICOAGULANT USE: Status: RESOLVED | Noted: 2022-08-04 | Resolved: 2022-12-21

## 2022-12-21 PROBLEM — I26.99 IATROGENIC PULMONARY EMBOLISM AND INFARCTION, INITIAL ENCOUNTER (HCC): Status: RESOLVED | Noted: 2022-08-04 | Resolved: 2022-12-21

## 2022-12-21 PROCEDURE — 73562 X-RAY EXAM OF KNEE 3: CPT | Performed by: INTERNAL MEDICINE

## 2022-12-21 PROCEDURE — 1125F AMNT PAIN NOTED PAIN PRSNT: CPT | Performed by: INTERNAL MEDICINE

## 2022-12-21 PROCEDURE — G0439 PPPS, SUBSEQ VISIT: HCPCS | Performed by: INTERNAL MEDICINE

## 2022-12-21 RX ORDER — CHOLESTYRAMINE LIGHT 4 G/5.7G
4 POWDER, FOR SUSPENSION ORAL DAILY
Qty: 30 EACH | Refills: 2 | Status: SHIPPED | OUTPATIENT
Start: 2022-12-21 | End: 2023-01-20

## 2022-12-30 ENCOUNTER — ANTI-COAG VISIT (OUTPATIENT)
Dept: ANTICOAGULATION | Facility: CLINIC | Age: 82
End: 2022-12-30
Payer: MEDICARE

## 2022-12-30 DIAGNOSIS — I26.99 IATROGENIC PULMONARY EMBOLISM AND INFARCTION, INITIAL ENCOUNTER (HCC): ICD-10-CM

## 2022-12-30 DIAGNOSIS — Z51.81 MONITORING FOR LONG-TERM ANTICOAGULANT USE: ICD-10-CM

## 2022-12-30 DIAGNOSIS — T81.718A IATROGENIC PULMONARY EMBOLISM AND INFARCTION, INITIAL ENCOUNTER (HCC): ICD-10-CM

## 2022-12-30 DIAGNOSIS — Z79.01 MONITORING FOR LONG-TERM ANTICOAGULANT USE: ICD-10-CM

## 2022-12-30 LAB
INR: 2.6 (ref 0.8–1.2)
TEST STRIP EXPIRATION DATE: ABNORMAL DATE

## 2022-12-30 PROCEDURE — 85610 PROTHROMBIN TIME: CPT | Performed by: INTERNAL MEDICINE

## 2022-12-30 PROCEDURE — 93793 ANTICOAG MGMT PT WARFARIN: CPT | Performed by: INTERNAL MEDICINE

## 2022-12-30 NOTE — PROGRESS NOTES
Face to face. Continues to eat salad/veggies a few days/week. Per protocol, patient to continue current dose and recheck INR in 4 weeks.

## 2023-01-02 DIAGNOSIS — M25.562 CHRONIC PAIN OF LEFT KNEE: ICD-10-CM

## 2023-01-02 DIAGNOSIS — G89.29 CHRONIC PAIN OF LEFT KNEE: ICD-10-CM

## 2023-01-03 RX ORDER — LIDOCAINE 4 G/G
1 PATCH TOPICAL DAILY
Qty: 15 PATCH | Refills: 0 | Status: SHIPPED | OUTPATIENT
Start: 2023-01-03

## 2023-01-03 NOTE — TELEPHONE ENCOUNTER
Please advise on lidocaine patches refill    Requested Prescriptions   Pending Prescriptions Disp Refills    LIDOCAINE PAIN RELIEF 4 % External Patch [Pharmacy Med Name: Lidocaine Pain Relief Patch 4 % Pad Rugb] 15 patch 0     Sig: APPLY 1 patch onto the skin daily. Non-Narcotic Pain Medication Protocol Passed - 1/2/2023 12:23 PM        Passed - In person appointment or virtual visit in the past 6 mos or appointment in next 3 mos     Recent Outpatient Visits              1 week ago Medicare annual wellness visit, subsequent    [facility], [facility], [place] dum[place] MD    Office Visit    1 month ago [facility])    [facility]emat[facility] Oncology Shawnee Roca MD    Office Visit    1 month ago [facility])    [facility] Hematology Oncology    Nurse Only    4 months ago Chronic pain of left knee    [facility], [address], Ashli, Mitra, APRN    Office Visit    4 months ago [facility])    [facility] Hematology Oncology    Nurse Only          Future Appointments         Provider Department Appt Notes    In 3 weeks [facility] Smith MD [facility], [address], [place], informed of policy    In 4 weeks Lidia Downing RN [facility], [place]     In 1 month [address] Hematology Oncology cbc, cmp PIV. cl    In 1 month Lazaro Bhandari MD [facility] Hematology Oncology follow up visit. cl  3m  date per pt req.     In 6 months MD Jeffery Handy [address] yearly                    Recent Outpatient Visits              1 week ago [address] annual wellness visit, subsequent    Bren Go MD    Office Visit    1 month ago [facility])    [facility] Hematology Oncology Shawnee Roca MD    Office Visit    1 month ago Polycythemia vera [facility])    [facility] Hematology Oncology    Nurse Only    4 months ago Chronic pain of left knee    [facility]ette Lee, [address], Mitra, ARMOND    Office Visit    4 months ago Polycythemia vera [facility])    [facility] Hematology Oncology    Nurse Only            Future Appointments         Provider Department Appt Notes    In 3 weeks William Fernandes MD [facility], [address], [place], informed of policy    In 4 weeks Stew Ordaz [place]     In 1 month [address] Hematology Oncology cbc, cmp PIV. cl    In 1 month Governor Concha Bhandari MD [facility] Hematology Oncology follow up visit. cl  3m  date per pt req.     In 6 months [facility], [address] [place] [address] yearly

## 2023-01-31 ENCOUNTER — ANTI-COAG VISIT (OUTPATIENT)
Dept: ANTICOAGULATION | Facility: CLINIC | Age: 83
End: 2023-01-31

## 2023-01-31 DIAGNOSIS — Z79.01 MONITORING FOR LONG-TERM ANTICOAGULANT USE: ICD-10-CM

## 2023-01-31 DIAGNOSIS — Z51.81 MONITORING FOR LONG-TERM ANTICOAGULANT USE: ICD-10-CM

## 2023-01-31 DIAGNOSIS — T81.718A IATROGENIC PULMONARY EMBOLISM AND INFARCTION, INITIAL ENCOUNTER (HCC): ICD-10-CM

## 2023-01-31 DIAGNOSIS — I26.99 IATROGENIC PULMONARY EMBOLISM AND INFARCTION, INITIAL ENCOUNTER (HCC): ICD-10-CM

## 2023-01-31 LAB
INR: 2.1 (ref 0.8–1.2)
TEST STRIP EXPIRATION DATE: ABNORMAL DATE

## 2023-01-31 PROCEDURE — 93793 ANTICOAG MGMT PT WARFARIN: CPT | Performed by: FAMILY MEDICINE

## 2023-01-31 PROCEDURE — 85610 PROTHROMBIN TIME: CPT | Performed by: FAMILY MEDICINE

## 2023-02-02 ENCOUNTER — OFFICE VISIT (OUTPATIENT)
Dept: ORTHOPEDICS CLINIC | Facility: CLINIC | Age: 83
End: 2023-02-02

## 2023-02-02 VITALS — HEART RATE: 78 BPM | DIASTOLIC BLOOD PRESSURE: 73 MMHG | SYSTOLIC BLOOD PRESSURE: 137 MMHG

## 2023-02-02 DIAGNOSIS — M17.0 PRIMARY OSTEOARTHRITIS OF BOTH KNEES: Primary | ICD-10-CM

## 2023-02-02 PROCEDURE — 99204 OFFICE O/P NEW MOD 45 MIN: CPT | Performed by: ORTHOPAEDIC SURGERY

## 2023-02-02 PROCEDURE — 20610 DRAIN/INJ JOINT/BURSA W/O US: CPT

## 2023-02-02 RX ORDER — TRIAMCINOLONE ACETONIDE 40 MG/ML
40 INJECTION, SUSPENSION INTRA-ARTICULAR; INTRAMUSCULAR
Status: COMPLETED | OUTPATIENT
Start: 2023-02-02 | End: 2023-02-02

## 2023-02-02 RX ADMIN — TRIAMCINOLONE ACETONIDE 40 MG: 40 INJECTION, SUSPENSION INTRA-ARTICULAR; INTRAMUSCULAR at 12:08:00

## 2023-02-02 RX ADMIN — TRIAMCINOLONE ACETONIDE 40 MG: 40 INJECTION, SUSPENSION INTRA-ARTICULAR; INTRAMUSCULAR at 12:09:00

## 2023-02-21 ENCOUNTER — NURSE ONLY (OUTPATIENT)
Dept: HEMATOLOGY/ONCOLOGY | Facility: HOSPITAL | Age: 83
End: 2023-02-21
Attending: INTERNAL MEDICINE
Payer: MEDICARE

## 2023-02-21 VITALS
SYSTOLIC BLOOD PRESSURE: 148 MMHG | HEART RATE: 72 BPM | WEIGHT: 227 LBS | RESPIRATION RATE: 18 BRPM | DIASTOLIC BLOOD PRESSURE: 62 MMHG | HEIGHT: 70 IN | BODY MASS INDEX: 32.5 KG/M2 | TEMPERATURE: 98 F | OXYGEN SATURATION: 96 %

## 2023-02-21 DIAGNOSIS — Z51.11 CHEMOTHERAPY MANAGEMENT, ENCOUNTER FOR: ICD-10-CM

## 2023-02-21 DIAGNOSIS — D45 POLYCYTHEMIA VERA (HCC): ICD-10-CM

## 2023-02-21 DIAGNOSIS — I82.403 DEEP VEIN THROMBOSIS (DVT) OF BOTH LOWER EXTREMITIES, UNSPECIFIED CHRONICITY, UNSPECIFIED VEIN (HCC): ICD-10-CM

## 2023-02-21 DIAGNOSIS — D45 POLYCYTHEMIA VERA (HCC): Primary | ICD-10-CM

## 2023-02-21 LAB
ALBUMIN SERPL-MCNC: 3.9 G/DL (ref 3.4–5)
ALBUMIN/GLOB SERPL: 1.1 {RATIO} (ref 1–2)
ALP LIVER SERPL-CCNC: 78 U/L
ALT SERPL-CCNC: 23 U/L
ANION GAP SERPL CALC-SCNC: 4 MMOL/L (ref 0–18)
AST SERPL-CCNC: 21 U/L (ref 15–37)
BASOPHILS # BLD AUTO: 0.05 X10(3) UL (ref 0–0.2)
BASOPHILS NFR BLD AUTO: 1 %
BILIRUB SERPL-MCNC: 0.7 MG/DL (ref 0.1–2)
BUN BLD-MCNC: 18 MG/DL (ref 7–18)
BUN/CREAT SERPL: 15 (ref 10–20)
CALCIUM BLD-MCNC: 9.4 MG/DL (ref 8.5–10.1)
CHLORIDE SERPL-SCNC: 101 MMOL/L (ref 98–112)
CO2 SERPL-SCNC: 28 MMOL/L (ref 21–32)
CREAT BLD-MCNC: 1.2 MG/DL
DEPRECATED RDW RBC AUTO: 49 FL (ref 35.1–46.3)
EOSINOPHIL # BLD AUTO: 0.05 X10(3) UL (ref 0–0.7)
EOSINOPHIL NFR BLD AUTO: 1 %
ERYTHROCYTE [DISTWIDTH] IN BLOOD BY AUTOMATED COUNT: 12.3 % (ref 11–15)
GFR SERPLBLD BASED ON 1.73 SQ M-ARVRAT: 45 ML/MIN/1.73M2 (ref 60–?)
GLOBULIN PLAS-MCNC: 3.4 G/DL (ref 2.8–4.4)
GLUCOSE BLD-MCNC: 84 MG/DL (ref 70–99)
HCT VFR BLD AUTO: 39.4 %
HGB BLD-MCNC: 13.7 G/DL
IMM GRANULOCYTES # BLD AUTO: 0.02 X10(3) UL (ref 0–1)
IMM GRANULOCYTES NFR BLD: 0.4 %
LYMPHOCYTES # BLD AUTO: 0.99 X10(3) UL (ref 1–4)
LYMPHOCYTES NFR BLD AUTO: 20 %
MCH RBC QN AUTO: 37.7 PG (ref 26–34)
MCHC RBC AUTO-ENTMCNC: 34.8 G/DL (ref 31–37)
MCV RBC AUTO: 108.5 FL
MONOCYTES # BLD AUTO: 0.41 X10(3) UL (ref 0.1–1)
MONOCYTES NFR BLD AUTO: 8.3 %
NEUTROPHILS # BLD AUTO: 3.43 X10 (3) UL (ref 1.5–7.7)
NEUTROPHILS # BLD AUTO: 3.43 X10(3) UL (ref 1.5–7.7)
NEUTROPHILS NFR BLD AUTO: 69.3 %
OSMOLALITY SERPL CALC.SUM OF ELEC: 277 MOSM/KG (ref 275–295)
PLATELET # BLD AUTO: 253 10(3)UL (ref 150–450)
POTASSIUM SERPL-SCNC: 4.8 MMOL/L (ref 3.5–5.1)
PROT SERPL-MCNC: 7.3 G/DL (ref 6.4–8.2)
RBC # BLD AUTO: 3.63 X10(6)UL
SODIUM SERPL-SCNC: 133 MMOL/L (ref 136–145)
WBC # BLD AUTO: 5 X10(3) UL (ref 4–11)

## 2023-02-21 PROCEDURE — 99215 OFFICE O/P EST HI 40 MIN: CPT | Performed by: INTERNAL MEDICINE

## 2023-02-21 PROCEDURE — 85025 COMPLETE CBC W/AUTO DIFF WBC: CPT

## 2023-02-21 PROCEDURE — 36415 COLL VENOUS BLD VENIPUNCTURE: CPT

## 2023-02-21 PROCEDURE — 80053 COMPREHEN METABOLIC PANEL: CPT

## 2023-03-02 RX ORDER — WARFARIN SODIUM 5 MG/1
TABLET ORAL
Qty: 120 TABLET | Refills: 1 | Status: SHIPPED | OUTPATIENT
Start: 2023-03-02

## 2023-03-14 ENCOUNTER — ANTI-COAG VISIT (OUTPATIENT)
Dept: ANTICOAGULATION | Facility: CLINIC | Age: 83
End: 2023-03-14

## 2023-03-14 DIAGNOSIS — Z51.81 MONITORING FOR LONG-TERM ANTICOAGULANT USE: ICD-10-CM

## 2023-03-14 DIAGNOSIS — I26.99 IATROGENIC PULMONARY EMBOLISM AND INFARCTION, INITIAL ENCOUNTER (HCC): ICD-10-CM

## 2023-03-14 DIAGNOSIS — Z79.01 MONITORING FOR LONG-TERM ANTICOAGULANT USE: ICD-10-CM

## 2023-03-14 DIAGNOSIS — T81.718A IATROGENIC PULMONARY EMBOLISM AND INFARCTION, INITIAL ENCOUNTER (HCC): ICD-10-CM

## 2023-03-14 LAB
INR: 1.8 (ref 0.8–1.2)
TEST STRIP EXPIRATION DATE: ABNORMAL DATE

## 2023-03-14 PROCEDURE — 93793 ANTICOAG MGMT PT WARFARIN: CPT | Performed by: INTERNAL MEDICINE

## 2023-03-14 PROCEDURE — 85610 PROTHROMBIN TIME: CPT | Performed by: INTERNAL MEDICINE

## 2023-04-04 ENCOUNTER — TELEPHONE (OUTPATIENT)
Dept: PULMONOLOGY | Facility: CLINIC | Age: 83
End: 2023-04-04

## 2023-04-04 NOTE — TELEPHONE ENCOUNTER
Patient is due for Ct Chest in May. Patient has scheduled an appointment for 5/18/23. Letter sent to patient.

## 2023-04-11 ENCOUNTER — ANTI-COAG VISIT (OUTPATIENT)
Dept: ANTICOAGULATION | Facility: CLINIC | Age: 83
End: 2023-04-11

## 2023-04-11 DIAGNOSIS — I26.99 IATROGENIC PULMONARY EMBOLISM AND INFARCTION, INITIAL ENCOUNTER (HCC): ICD-10-CM

## 2023-04-11 DIAGNOSIS — T81.718A IATROGENIC PULMONARY EMBOLISM AND INFARCTION, INITIAL ENCOUNTER (HCC): ICD-10-CM

## 2023-04-11 DIAGNOSIS — Z79.01 MONITORING FOR LONG-TERM ANTICOAGULANT USE: ICD-10-CM

## 2023-04-11 DIAGNOSIS — Z51.81 MONITORING FOR LONG-TERM ANTICOAGULANT USE: ICD-10-CM

## 2023-04-11 LAB
INR: 2.1 (ref 0.8–1.2)
TEST STRIP EXPIRATION DATE: ABNORMAL DATE

## 2023-04-11 PROCEDURE — 93793 ANTICOAG MGMT PT WARFARIN: CPT | Performed by: INTERNAL MEDICINE

## 2023-04-11 PROCEDURE — 85610 PROTHROMBIN TIME: CPT | Performed by: INTERNAL MEDICINE

## 2023-04-21 RX ORDER — WARFARIN SODIUM 5 MG/1
TABLET ORAL
Qty: 120 TABLET | Refills: 1 | Status: SHIPPED | OUTPATIENT
Start: 2023-04-21

## 2023-04-21 NOTE — TELEPHONE ENCOUNTER
Refill passed per [facility] protocol. Requested Prescriptions   Pending Prescriptions Disp Refills    warfarin 5 MG Oral Tab 120 tablet 1     Sig: Take 1.5 tabs (7.5mg) by mouth Tues, Thurs, Sat and 1 tab (5mg) all other days or as directed by Coumadin Clinic       Rx Em Warfarin Protocol Passed - 4/21/2023 11:14 AM        Passed - Appointment in past 12 or next 3 months     Recent Outpatient Visits              1 month ago [facility])    [facility] Hematology Oncology    Nurse Only    1 month ago [facility])    [facility] Hematology Oncology Denver Lopes, MD    Office Visit    2 months ago Primary osteoarthritis of both knees    [facility], [address], [place], Rosana Erickson MD    Office Visit    4 months ago [employer] annual wellness visit, subsequent    Shahnaz Stevens MD    Office Visit    5 months ago [facility]    [facility] Hematology Oncology Denver Lopes, MD    Office Visit          Future Appointments         Provider Department Appt Notes    In 2 weeks JULIAN Flannery [place]     In 3 weeks [facility]    In 1 month [facility] Hematology Oncology cbc, cmp PIV. cl    In 1 month Dirk Bhandari MD [facility] Hematology Oncology follow up visit. cl  3m  date per pt req.     In 2 months MD Basia Foley, [facility] yearly               Passed - INR 3.9 or less past 6 weeks     INR   Date Value Ref Range Status   04/11/2023 2.1 (A) 0.8 - 1.2 Final                     Passed - CMP within past 12 months     Recent Results (from the past 4380 hour(s))   COMP METABOLIC PANEL (14)    Collection Time: 02/21/23 10:20 AM   Result Value Ref Range    Glucose 84 70 - 99 mg/dL    Sodium 133 (L) 136 - 145 mmol/L    Potassium 4.8 3.5 - 5.1 mmol/L    Chloride 101 98 - 112 mmol/L    CO2 28.0 21.0 - 32.0 mmol/L    Anion Gap 4 0 - 18 mmol/L    BUN 18 7 - 18 mg/dL    Creatinine 1.20 (H) 0.55 - 1.02 mg/dL    BUN/CREA Ratio 15.0 10.0 - 20.0    Calcium, Total 9.4 8.5 - 10.1 mg/dL    Calculated Osmolality 277 275 - 295 mOsm/kg    eGFR-Cr 45 (L) >=60 mL/min/1.73m2    ALT 23 13 - 56 U/L    AST 21 15 - 37 U/L    Alkaline Phosphatase 78 55 - 142 U/L    Bilirubin, Total 0.7 0.1 - 2.0 mg/dL    Total Protein 7.3 6.4 - 8.2 g/dL    Albumin 3.9 3.4 - 5.0 g/dL    Globulin  3.4 2.8 - 4.4 g/dL    A/G Ratio 1.1 1.0 - 2.0    Patient Fasting for CMP?  Patient not present                      Passed - AST < 111 (less than 3 Xs the upper limit)     Lab Results   Component Value Date    AST 21 02/21/2023                       Passed - ALT < 168 (less than 3Xs the upper limit)     Lab Results   Component Value Date    ALT 23 02/21/2023                       Passed - CBC within the past 12 months     Recent Results (from the past 8760 hour(s))   CBC W/ DIFFERENTIAL    Collection Time: 02/21/23 10:20 AM   Result Value Ref Range    WBC 5.0 4.0 - 11.0 x10(3) uL    RBC 3.63 (L) 3.80 - 5.30 x10(6)uL    HGB 13.7 12.0 - 16.0 g/dL    HCT 39.4 35.0 - 48.0 %    .5 (H) 80.0 - 100.0 fL    MCH 37.7 (H) 26.0 - 34.0 pg    MCHC 34.8 31.0 - 37.0 g/dL    RDW-SD 49.0 (H) 35.1 - 46.3 fL    RDW 12.3 11.0 - 15.0 %    .0 150.0 - 450.0 10(3)uL    Neutrophil Absolute Prelim 3.43 1.50 - 7.70 x10 (3) uL    Neutrophil Absolute 3.43 1.50 - 7.70 x10(3) uL    Lymphocyte Absolute 0.99 (L) 1.00 - 4.00 x10(3) uL    Monocyte Absolute 0.41 0.10 - 1.00 x10(3) uL    Eosinophil Absolute 0.05 0.00 - 0.70 x10(3) uL    Basophil Absolute 0.05 0.00 - 0.20 x10(3) uL    Immature Granulocyte Absolute 0.02 0.00 - 1.00 x10(3) uL    Neutrophil % 69.3 %    Lymphocyte % 20.0 %    Monocyte % 8.3 %    Eosinophil % 1.0 %    Basophil % 1.0 %    Immature Granulocyte % 0.4 %     *Note: Due to a large number of results and/or encounters for the requested time period, some results have not been displayed. A complete set of results can be found in Results Review.                    Passed - Hgb >/= 10g/dl within past 12 months     HGB   Date Value Ref Range Status   02/21/2023 13.7 12.0 - 16.0 g/dL Final                     Passed - Platelets >/= 858 past 12 months     PLT   Date Value Ref Range Status   02/21/2023 253.0 150.0 - 450.0 10(3)uL Final

## 2023-05-09 ENCOUNTER — ANTI-COAG VISIT (OUTPATIENT)
Dept: ANTICOAGULATION | Facility: CLINIC | Age: 83
End: 2023-05-09

## 2023-05-09 DIAGNOSIS — Z79.01 MONITORING FOR LONG-TERM ANTICOAGULANT USE: ICD-10-CM

## 2023-05-09 DIAGNOSIS — Z51.81 MONITORING FOR LONG-TERM ANTICOAGULANT USE: ICD-10-CM

## 2023-05-09 DIAGNOSIS — I26.99 IATROGENIC PULMONARY EMBOLISM AND INFARCTION, INITIAL ENCOUNTER (HCC): ICD-10-CM

## 2023-05-09 DIAGNOSIS — T81.718A IATROGENIC PULMONARY EMBOLISM AND INFARCTION, INITIAL ENCOUNTER (HCC): ICD-10-CM

## 2023-05-09 LAB
INR: 3.5 (ref 0.8–1.2)
TEST STRIP EXPIRATION DATE: ABNORMAL DATE

## 2023-05-09 PROCEDURE — 93793 ANTICOAG MGMT PT WARFARIN: CPT | Performed by: FAMILY MEDICINE

## 2023-05-09 PROCEDURE — 85610 PROTHROMBIN TIME: CPT | Performed by: FAMILY MEDICINE

## 2023-05-18 ENCOUNTER — HOSPITAL ENCOUNTER (OUTPATIENT)
Dept: CT IMAGING | Age: 83
Discharge: HOME | End: 2023-05-18
Attending: INTERNAL MEDICINE
Payer: MEDICARE

## 2023-05-18 DIAGNOSIS — R91.1 PULMONARY NODULE: ICD-10-CM

## 2023-05-18 PROCEDURE — 71250 CT THORAX DX C-: CPT | Performed by: INTERNAL MEDICINE

## 2023-05-19 ENCOUNTER — TELEPHONE (OUTPATIENT)
Dept: PULMONOLOGY | Facility: CLINIC | Age: 83
End: 2023-05-19

## 2023-05-19 NOTE — TELEPHONE ENCOUNTER
----- Message from Mack Jacobson MD sent at 5/18/2023  5:43 PM CDT -----  RN, please call the patient to let her know that all the pulmonary nodules are stable and that there is no evidence of recurrent cancer.   Please add to the calendar a repeat superDimension noncontrast CT scan of the chest at the 6-month interval.  Omar Cordova

## 2023-05-22 ENCOUNTER — OFFICE VISIT (OUTPATIENT)
Dept: HEMATOLOGY/ONCOLOGY | Facility: HOSPITAL | Age: 83
End: 2023-05-22
Attending: INTERNAL MEDICINE
Payer: MEDICARE

## 2023-05-22 VITALS
DIASTOLIC BLOOD PRESSURE: 61 MMHG | BODY MASS INDEX: 32.35 KG/M2 | WEIGHT: 226 LBS | RESPIRATION RATE: 18 BRPM | SYSTOLIC BLOOD PRESSURE: 153 MMHG | OXYGEN SATURATION: 97 % | HEIGHT: 70 IN | HEART RATE: 70 BPM | TEMPERATURE: 98 F

## 2023-05-22 DIAGNOSIS — D45 POLYCYTHEMIA VERA (HCC): Primary | ICD-10-CM

## 2023-05-22 DIAGNOSIS — Z51.11 CHEMOTHERAPY MANAGEMENT, ENCOUNTER FOR: ICD-10-CM

## 2023-05-22 DIAGNOSIS — I82.403 DEEP VEIN THROMBOSIS (DVT) OF BOTH LOWER EXTREMITIES, UNSPECIFIED CHRONICITY, UNSPECIFIED VEIN (HCC): ICD-10-CM

## 2023-05-22 DIAGNOSIS — D45 POLYCYTHEMIA VERA (HCC): ICD-10-CM

## 2023-05-22 LAB
ALBUMIN SERPL-MCNC: 3.3 G/DL (ref 3.4–5)
ALBUMIN/GLOB SERPL: 1 {RATIO} (ref 1–2)
ALP LIVER SERPL-CCNC: 66 U/L
ALT SERPL-CCNC: 21 U/L
ANION GAP SERPL CALC-SCNC: 2 MMOL/L (ref 0–18)
AST SERPL-CCNC: 25 U/L (ref 15–37)
BASOPHILS # BLD AUTO: 0.05 X10(3) UL (ref 0–0.2)
BASOPHILS NFR BLD AUTO: 0.9 %
BILIRUB SERPL-MCNC: 0.8 MG/DL (ref 0.1–2)
BUN BLD-MCNC: 17 MG/DL (ref 7–18)
BUN/CREAT SERPL: 14 (ref 10–20)
CALCIUM BLD-MCNC: 9.5 MG/DL (ref 8.5–10.1)
CHLORIDE SERPL-SCNC: 111 MMOL/L (ref 98–112)
CO2 SERPL-SCNC: 28 MMOL/L (ref 21–32)
CREAT BLD-MCNC: 1.21 MG/DL
DEPRECATED RDW RBC AUTO: 52.1 FL (ref 35.1–46.3)
EOSINOPHIL # BLD AUTO: 0.07 X10(3) UL (ref 0–0.7)
EOSINOPHIL NFR BLD AUTO: 1.3 %
ERYTHROCYTE [DISTWIDTH] IN BLOOD BY AUTOMATED COUNT: 12.6 % (ref 11–15)
FASTING STATUS PATIENT QL REPORTED: NO
GFR SERPLBLD BASED ON 1.73 SQ M-ARVRAT: 44 ML/MIN/1.73M2 (ref 60–?)
GLOBULIN PLAS-MCNC: 3.4 G/DL (ref 2.8–4.4)
GLUCOSE BLD-MCNC: 99 MG/DL (ref 70–99)
HCT VFR BLD AUTO: 39.3 %
HGB BLD-MCNC: 13 G/DL
IMM GRANULOCYTES # BLD AUTO: 0.02 X10(3) UL (ref 0–1)
IMM GRANULOCYTES NFR BLD: 0.4 %
LYMPHOCYTES # BLD AUTO: 1.17 X10(3) UL (ref 1–4)
LYMPHOCYTES NFR BLD AUTO: 22.1 %
MCH RBC QN AUTO: 37.2 PG (ref 26–34)
MCHC RBC AUTO-ENTMCNC: 33.1 G/DL (ref 31–37)
MCV RBC AUTO: 112.6 FL
MONOCYTES # BLD AUTO: 0.4 X10(3) UL (ref 0.1–1)
MONOCYTES NFR BLD AUTO: 7.5 %
NEUTROPHILS # BLD AUTO: 3.59 X10 (3) UL (ref 1.5–7.7)
NEUTROPHILS # BLD AUTO: 3.59 X10(3) UL (ref 1.5–7.7)
NEUTROPHILS NFR BLD AUTO: 67.8 %
OSMOLALITY SERPL CALC.SUM OF ELEC: 294 MOSM/KG (ref 275–295)
PLATELET # BLD AUTO: 234 10(3)UL (ref 150–450)
POTASSIUM SERPL-SCNC: 5.2 MMOL/L (ref 3.5–5.1)
PROT SERPL-MCNC: 6.7 G/DL (ref 6.4–8.2)
RBC # BLD AUTO: 3.49 X10(6)UL
SODIUM SERPL-SCNC: 141 MMOL/L (ref 136–145)
WBC # BLD AUTO: 5.3 X10(3) UL (ref 4–11)

## 2023-05-22 PROCEDURE — 36415 COLL VENOUS BLD VENIPUNCTURE: CPT

## 2023-05-22 PROCEDURE — 85060 BLOOD SMEAR INTERPRETATION: CPT

## 2023-05-22 PROCEDURE — 85025 COMPLETE CBC W/AUTO DIFF WBC: CPT

## 2023-05-22 PROCEDURE — 99215 OFFICE O/P EST HI 40 MIN: CPT | Performed by: INTERNAL MEDICINE

## 2023-05-22 PROCEDURE — 80053 COMPREHEN METABOLIC PANEL: CPT

## 2023-05-23 NOTE — TELEPHONE ENCOUNTER
Spoke with patient informed her of Dr. Todd Bustos result note/order, patient verbalized understanding, states Dr. Yadira Bull recommended no further scans.     Dr. Akil Aguilera

## 2023-06-06 ENCOUNTER — ANTI-COAG VISIT (OUTPATIENT)
Dept: ANTICOAGULATION | Facility: CLINIC | Age: 83
End: 2023-06-06

## 2023-06-06 DIAGNOSIS — I26.99 IATROGENIC PULMONARY EMBOLISM AND INFARCTION, INITIAL ENCOUNTER (HCC): ICD-10-CM

## 2023-06-06 DIAGNOSIS — T81.718A IATROGENIC PULMONARY EMBOLISM AND INFARCTION, INITIAL ENCOUNTER (HCC): ICD-10-CM

## 2023-06-06 DIAGNOSIS — Z79.01 MONITORING FOR LONG-TERM ANTICOAGULANT USE: ICD-10-CM

## 2023-06-06 DIAGNOSIS — Z51.81 MONITORING FOR LONG-TERM ANTICOAGULANT USE: ICD-10-CM

## 2023-06-06 LAB
INR: 2.2 (ref 0.8–1.2)
TEST STRIP EXPIRATION DATE: ABNORMAL DATE

## 2023-06-06 PROCEDURE — 93793 ANTICOAG MGMT PT WARFARIN: CPT | Performed by: INTERNAL MEDICINE

## 2023-06-06 PROCEDURE — 85610 PROTHROMBIN TIME: CPT | Performed by: INTERNAL MEDICINE

## 2023-06-08 DIAGNOSIS — G89.29 CHRONIC PAIN OF LEFT KNEE: ICD-10-CM

## 2023-06-08 DIAGNOSIS — M25.562 CHRONIC PAIN OF LEFT KNEE: ICD-10-CM

## 2023-06-08 RX ORDER — LIDOCAINE 4 G/G
1 PATCH TOPICAL DAILY
Qty: 15 PATCH | Refills: 0 | Status: SHIPPED | OUTPATIENT
Start: 2023-06-08

## 2023-06-09 NOTE — TELEPHONE ENCOUNTER
Refill passed per [facility] protocol. Dr. Hubbard/covering provider please advise on lidocaine patch refill (last prescribed for 15 patches refill-0)  . Requested Prescriptions   Pending Prescriptions Disp Refills    lidocaine (LIDOCAINE PAIN RELIEF) 4 % External Patch 15 patch 0     Sig: Place 1 patch onto the skin daily. Non-Narcotic Pain Medication Protocol Passed - 6/8/2023  8:28 AM        Passed - In person appointment or virtual visit in the past 6 mos or appointment in next 3 mos     Recent Outpatient Visits              2 weeks ago [facility])    [facility] Hematology Oncology Rick Oscar MD    Office Visit    2 weeks ago [facility])    [facility] Hematology Oncology    Nurse Only    3 months ago [facility])    [facility] Hematology Oncology    Nurse Only    3 months ago [facility])    [facility] Hematology Oncology Rick Oscar MD    Office Visit    4 months ago Primary osteoarthritis of both knees    [facility], [address], [place], Era Clay MD    Office Visit          Future Appointments         Provider Department Appt Notes    In 1 week Chayo Espinoza MD [address] [facility], [address], [place] Both knee pain    In 1 month Xena Reyes MD [address], [facility] yearly    In 1 month Gus Troncoso RN [address] [facility], [facility]     In 2 months [address] Hematology Oncology cbc, cmp PIV. cl    In 2 months Mahin Bhandari MD [facility] Hematology Oncology follow up visit. cl  3m                    Recent Outpatient Visits              2 weeks ago [facility]    [facility] Hematology Oncology Rick Oscar MD    Office Visit    2 weeks ago [facility])    [facility] Hematology Oncology    Nurse Only    3 months ago Polycythemia vera [facility])    [facility] Hematology Oncology    Nurse Only    3 months ago Polycythemia vera [facility] Hematology Oncology Hannah Mariee MD    Office Visit    4 months ago Primary osteoarthritis of both knees    [facility], [address] [place] [address] Rd,[address], [place], Jim Calderon MD    Office Visit            Future Appointments         Provider Department Appt Notes    In 1 week Jen Caraballo MD [address] [place] [address], [address] Both knee pain    In 1 month MD Rebecca Pastrana [address] yearly    In 1 month Maria Luz Givens RN [facility], [facility], [address]     In 2 months [address] Hematology Oncology cbc, cmp PIV. cl    In 2 months Nighat Bhandari MD [facility] Hematology Oncology follow up visit. cl  3m

## 2023-06-15 ENCOUNTER — OFFICE VISIT (OUTPATIENT)
Dept: ORTHOPEDICS CLINIC | Facility: CLINIC | Age: 83
End: 2023-06-15

## 2023-06-15 VITALS — DIASTOLIC BLOOD PRESSURE: 75 MMHG | SYSTOLIC BLOOD PRESSURE: 147 MMHG | HEART RATE: 81 BPM

## 2023-06-15 DIAGNOSIS — M17.0 PRIMARY OSTEOARTHRITIS OF BOTH KNEES: Primary | ICD-10-CM

## 2023-06-15 PROCEDURE — 1125F AMNT PAIN NOTED PAIN PRSNT: CPT

## 2023-06-15 PROCEDURE — 20610 DRAIN/INJ JOINT/BURSA W/O US: CPT

## 2023-06-15 RX ORDER — TRIAMCINOLONE ACETONIDE 40 MG/ML
40 INJECTION, SUSPENSION INTRA-ARTICULAR; INTRAMUSCULAR ONCE
Status: COMPLETED | OUTPATIENT
Start: 2023-06-15 | End: 2023-06-15

## 2023-06-15 NOTE — PROCEDURES
Per verbal order from Dr. Valentine Shaw draw up 3ml of 0.5% Marcaine & 2ml 1% lidocaine and 1ml of Kenalog 40 for cortisone injection to bilateral knees.

## 2023-07-11 ENCOUNTER — ANTI-COAG VISIT (OUTPATIENT)
Dept: ANTICOAGULATION | Facility: CLINIC | Age: 83
End: 2023-07-11

## 2023-07-11 DIAGNOSIS — Z51.81 MONITORING FOR LONG-TERM ANTICOAGULANT USE: ICD-10-CM

## 2023-07-11 DIAGNOSIS — I26.99 IATROGENIC PULMONARY EMBOLISM AND INFARCTION, INITIAL ENCOUNTER (HCC): ICD-10-CM

## 2023-07-11 DIAGNOSIS — T81.718A IATROGENIC PULMONARY EMBOLISM AND INFARCTION, INITIAL ENCOUNTER (HCC): ICD-10-CM

## 2023-07-11 DIAGNOSIS — Z79.01 MONITORING FOR LONG-TERM ANTICOAGULANT USE: ICD-10-CM

## 2023-07-11 LAB
INR: 3.1 (ref 0.8–1.2)
TEST STRIP EXPIRATION DATE: ABNORMAL DATE

## 2023-07-11 PROCEDURE — 93793 ANTICOAG MGMT PT WARFARIN: CPT | Performed by: INTERNAL MEDICINE

## 2023-07-11 PROCEDURE — 85610 PROTHROMBIN TIME: CPT | Performed by: INTERNAL MEDICINE

## 2023-07-11 NOTE — PROGRESS NOTES
Face to face. Denies any change in diet/meds. INR is minimally above therapeutic goal range. Per protocol, continue current dose and recheck INR in 4 weeks.      7.5 mg every Tue, Thu; 5 mg all other days

## 2023-07-27 ENCOUNTER — APPOINTMENT (OUTPATIENT)
Dept: GENERAL RADIOLOGY | Age: 83
End: 2023-07-27
Attending: PHYSICIAN ASSISTANT
Payer: MEDICARE

## 2023-07-27 ENCOUNTER — HOSPITAL ENCOUNTER (OUTPATIENT)
Age: 83
Discharge: HOME | End: 2023-07-27
Payer: MEDICARE

## 2023-07-27 VITALS
TEMPERATURE: 98 F | RESPIRATION RATE: 18 BRPM | SYSTOLIC BLOOD PRESSURE: 149 MMHG | DIASTOLIC BLOOD PRESSURE: 65 MMHG | HEART RATE: 84 BPM | OXYGEN SATURATION: 95 %

## 2023-07-27 DIAGNOSIS — M25.561 ARTHRALGIA OF RIGHT LOWER LEG: Primary | ICD-10-CM

## 2023-07-27 DIAGNOSIS — M79.643 PAIN, HAND: ICD-10-CM

## 2023-07-27 PROCEDURE — 73130 X-RAY EXAM OF HAND: CPT | Performed by: PHYSICIAN ASSISTANT

## 2023-07-27 PROCEDURE — 99213 OFFICE O/P EST LOW 20 MIN: CPT | Performed by: PHYSICIAN ASSISTANT

## 2023-07-27 NOTE — ED INITIAL ASSESSMENT (HPI)
Pt here for edema to right hand, which she noticed while on vacation last Sunday. Denies pain at rest. Reports intermittent shooting pains.  No known injury

## 2023-08-07 ENCOUNTER — TELEPHONE (OUTPATIENT)
Dept: INTERNAL MEDICINE CLINIC | Facility: CLINIC | Age: 83
End: 2023-08-07

## 2023-08-07 NOTE — TELEPHONE ENCOUNTER
Patient (name and  verified) calling for ER follow up visit with PCP. States that she fell one week ago and was sent to the ER for evaluation. Patient was discharged but is still having knee pain and swelling. Assisted patient with appt scheduling, verbalized understanding and agrees to plan.    Future Appointments   Date Time Provider Sarah Giles   2023  2:00 PM ARMOND Nicolas [facility]   8/15/2023  1:45 PM Maria Luz Givens RN [facility]   2023  1:00 PM CMA [facility] [address] [place] [address] HEM ONC EMO   2023  2:00 PM Hannah Mariee MD [address] [place] [address] HEM ONC EMO

## 2023-08-09 ENCOUNTER — HOSPITAL ENCOUNTER (INPATIENT)
Facility: HOSPITAL | Age: 83
LOS: 6 days | Discharge: HOME HEALTH | End: 2023-08-15
Attending: EMERGENCY MEDICINE | Admitting: HOSPITALIST
Payer: MEDICARE

## 2023-08-09 ENCOUNTER — MED REC SCAN ONLY (OUTPATIENT)
Dept: INTERNAL MEDICINE CLINIC | Facility: CLINIC | Age: 83
End: 2023-08-09

## 2023-08-09 ENCOUNTER — OFFICE VISIT (OUTPATIENT)
Dept: INTERNAL MEDICINE CLINIC | Facility: CLINIC | Age: 83
End: 2023-08-09

## 2023-08-09 VITALS
BODY MASS INDEX: 32 KG/M2 | OXYGEN SATURATION: 97 % | SYSTOLIC BLOOD PRESSURE: 128 MMHG | HEIGHT: 70 IN | DIASTOLIC BLOOD PRESSURE: 62 MMHG | HEART RATE: 125 BPM

## 2023-08-09 DIAGNOSIS — M25.561 PAIN AND SWELLING OF KNEE, RIGHT: Primary | ICD-10-CM

## 2023-08-09 DIAGNOSIS — I48.91 ATRIAL FIBRILLATION, NEW ONSET (HCC): Primary | ICD-10-CM

## 2023-08-09 DIAGNOSIS — I48.91 ATRIAL FIBRILLATION WITH RAPID VENTRICULAR RESPONSE (HCC): ICD-10-CM

## 2023-08-09 DIAGNOSIS — I49.9 IRREGULAR HEART RATE: ICD-10-CM

## 2023-08-09 DIAGNOSIS — M25.461 PAIN AND SWELLING OF KNEE, RIGHT: Primary | ICD-10-CM

## 2023-08-09 DIAGNOSIS — I48.91 ATRIAL FIBRILLATION, UNSPECIFIED TYPE (HCC): ICD-10-CM

## 2023-08-09 LAB
ANION GAP SERPL CALC-SCNC: 4 MMOL/L (ref 0–18)
BASOPHILS # BLD AUTO: 0.07 X10(3) UL (ref 0–0.2)
BASOPHILS NFR BLD AUTO: 1.1 %
BUN BLD-MCNC: 23 MG/DL (ref 7–18)
BUN/CREAT SERPL: 15.1 (ref 10–20)
CALCIUM BLD-MCNC: 9.3 MG/DL (ref 8.5–10.1)
CHLORIDE SERPL-SCNC: 112 MMOL/L (ref 98–112)
CO2 SERPL-SCNC: 27 MMOL/L (ref 21–32)
CREAT BLD-MCNC: 1.52 MG/DL
DEPRECATED RDW RBC AUTO: 52.5 FL (ref 35.1–46.3)
EGFRCR SERPLBLD CKD-EPI 2021: 34 ML/MIN/1.73M2 (ref 60–?)
EOSINOPHIL # BLD AUTO: 0.06 X10(3) UL (ref 0–0.7)
EOSINOPHIL NFR BLD AUTO: 0.9 %
ERYTHROCYTE [DISTWIDTH] IN BLOOD BY AUTOMATED COUNT: 13.1 % (ref 11–15)
GLUCOSE BLD-MCNC: 123 MG/DL (ref 70–99)
HCT VFR BLD AUTO: 41.1 %
HGB BLD-MCNC: 13.9 G/DL
IMM GRANULOCYTES # BLD AUTO: 0.03 X10(3) UL (ref 0–1)
IMM GRANULOCYTES NFR BLD: 0.5 %
INR BLD: 3.17 (ref 0.85–1.16)
LYMPHOCYTES # BLD AUTO: 1.66 X10(3) UL (ref 1–4)
LYMPHOCYTES NFR BLD AUTO: 25.8 %
MCH RBC QN AUTO: 37.1 PG (ref 26–34)
MCHC RBC AUTO-ENTMCNC: 33.8 G/DL (ref 31–37)
MCV RBC AUTO: 109.6 FL
MONOCYTES # BLD AUTO: 0.43 X10(3) UL (ref 0.1–1)
MONOCYTES NFR BLD AUTO: 6.7 %
NEUTROPHILS # BLD AUTO: 4.18 X10 (3) UL (ref 1.5–7.7)
NEUTROPHILS # BLD AUTO: 4.18 X10(3) UL (ref 1.5–7.7)
NEUTROPHILS NFR BLD AUTO: 65 %
OSMOLALITY SERPL CALC.SUM OF ELEC: 301 MOSM/KG (ref 275–295)
PLATELET # BLD AUTO: 248 10(3)UL (ref 150–450)
POTASSIUM SERPL-SCNC: 4.7 MMOL/L (ref 3.5–5.1)
PROTHROMBIN TIME: 31.8 SECONDS (ref 11.6–14.8)
RBC # BLD AUTO: 3.75 X10(6)UL
SODIUM SERPL-SCNC: 143 MMOL/L (ref 136–145)
TROPONIN I HIGH SENSITIVITY: 25 NG/L
WBC # BLD AUTO: 6.4 X10(3) UL (ref 4–11)

## 2023-08-09 PROCEDURE — 99215 OFFICE O/P EST HI 40 MIN: CPT

## 2023-08-09 PROCEDURE — 99223 1ST HOSP IP/OBS HIGH 75: CPT | Performed by: HOSPITALIST

## 2023-08-09 PROCEDURE — 1125F AMNT PAIN NOTED PAIN PRSNT: CPT

## 2023-08-09 PROCEDURE — 93000 ELECTROCARDIOGRAM COMPLETE: CPT

## 2023-08-09 RX ORDER — ACETAMINOPHEN 325 MG/1
650 TABLET ORAL EVERY 6 HOURS PRN
Status: DISCONTINUED | OUTPATIENT
Start: 2023-08-09 | End: 2023-08-15

## 2023-08-09 RX ORDER — FUROSEMIDE 10 MG/ML
40 INJECTION INTRAMUSCULAR; INTRAVENOUS
Status: DISCONTINUED | OUTPATIENT
Start: 2023-08-09 | End: 2023-08-11

## 2023-08-09 RX ORDER — TEMAZEPAM 15 MG/1
15 CAPSULE ORAL NIGHTLY PRN
Status: DISCONTINUED | OUTPATIENT
Start: 2023-08-09 | End: 2023-08-15

## 2023-08-09 RX ORDER — ACETAMINOPHEN 500 MG
500 TABLET ORAL EVERY 4 HOURS PRN
Status: DISCONTINUED | OUTPATIENT
Start: 2023-08-09 | End: 2023-08-15

## 2023-08-09 RX ORDER — METOCLOPRAMIDE HYDROCHLORIDE 5 MG/ML
5 INJECTION INTRAMUSCULAR; INTRAVENOUS EVERY 8 HOURS PRN
Status: DISCONTINUED | OUTPATIENT
Start: 2023-08-09 | End: 2023-08-15

## 2023-08-09 RX ORDER — ONDANSETRON 2 MG/ML
4 INJECTION INTRAMUSCULAR; INTRAVENOUS EVERY 6 HOURS PRN
Status: DISCONTINUED | OUTPATIENT
Start: 2023-08-09 | End: 2023-08-15

## 2023-08-09 RX ORDER — FUROSEMIDE 10 MG/ML
40 INJECTION INTRAMUSCULAR; INTRAVENOUS ONCE
Status: COMPLETED | OUTPATIENT
Start: 2023-08-09 | End: 2023-08-09

## 2023-08-09 NOTE — H&P
[facility]    PATIENT'S NAME: Favio PRESSLEY   ATTENDING PHYSICIAN: Azalea Baldwin. Ryan Stein MD   PATIENT ACCOUNT#:   [id number]    LOCATION:  [place] [address]  MEDICAL RECORD #:   [id number]       YOB: 1940  ADMISSION DATE:       08/09/2023    HISTORY AND PHYSICAL EXAMINATION    CHIEF COMPLAINT:  Atrial fibrillation with rapid ventricular response. HISTORY OF PRESENT ILLNESS:  Patient is an 79-year-old  female who was seen today on a followup appointment with her primary care physician and she was found to be in a fast rhythm, irregular. Sent to the emergency department for evaluation. Upon arrival, she was noted to be in atrial fibrillation with rapid ventricular response rate 130. CBC and chemistry unremarkable. GFR is 34 which is at baseline. Troponin was negative. Prothrombin time is still pending. Patient was started on IV Cardizem drip and was given Lasix, and she will be admitted to the hospital for further management. PAST MEDICAL HISTORY:  Osteoarthritis, chronic kidney disease stage 3, degenerative joint disease of lumbar spine. She has a history of recurrent DVTs and PE, chronically anticoagulated with Coumadin; left ventricular diastolic dysfunction. She had MATT positive polycythemia, myeloproliferative disorder, being treated with hydroxyurea. PAST SURGICAL HISTORY:  Left upper lobectomy for lung cancer, cholecystectomy, hysterectomy, left parotidectomy, bilateral foot bunionectomy procedure, and right total hip arthroplasty. MEDICATIONS:  Please see medication reconciliation list.      ALLERGIES:  Fentanyl, hydrocodone, and side effects to morphine. FAMILY HISTORY:  Mother had Alzheimer dementia and bladder cancer. Father had lung cancer. SOCIAL HISTORY:  No tobacco or drug use. Social alcohol. Lives with her family. Usually independent in her basic activities of daily living.     REVIEW OF SYSTEMS:  Patient reports retrospectively now that she has been feeling a bit more dyspneic with some orthopnea. She denies any fever or chills. She does not feel any palpitations. Other 12-point review of systems is negative. No chest pain. Also, patient reports feeling sleepy almost every day and not getting enough night sleep. She is not aware if she snores at night. PHYSICAL EXAMINATION:    GENERAL:  Alert, oriented to time, place, and person. No acute distress. VITAL SIGNS:  Temperature 98.7, pulse 125, respiratory rate 16, blood pressure 154/79, pulse ox 97% on room air. HEENT:  Atraumatic. Oropharynx clear. Moist mucous membranes. Normal hard and soft palate. Eyes:  Anicteric sclerae. NECK:  Supple. No lymphadenopathy. Trachea midline. Full range of motion. LUNGS:  Clear to auscultation bilaterally. Normal respiratory effort. HEART:  Irregularly irregular rhythm. S1, S2 auscultated. No murmur. ABDOMEN:  Soft, nondistended. No tenderness. Positive bowel sounds. EXTREMITIES:  Trace edema of both legs. No clubbing or cyanosis. NEUROLOGIC:  Motor and sensory intact. ASSESSMENT:    1. Atrial fibrillation with rapid ventricular response. 2.   Possible fluid overload status, rule out heart failure. 3.   Hypertension. 4.   Chronic kidney disease stage 3. PLAN:  Patient will be admitted to telemetry floor. IV Cardizem drip. A 2D echocardiogram with Doppler. IV Lasix. Cardiology consult. Monitor her kidney function and hemodynamic status. Patient will need sleep study as an outpatient. Further recommendations to follow.     Dictated By Anika Brooks MD  d: 08/09/2023 18:27:14  t: 08/09/2023 18:44:36  Job [id number]  FB/

## 2023-08-09 NOTE — ED INITIAL ASSESSMENT (HPI)
Patient sent by pcp office when she received an abnormal EKG showing new onset afib with rvr. Patient denies CP/SOB. Denies n/v/d/f. Patient reports she was at pcp for knee pain.

## 2023-08-10 ENCOUNTER — APPOINTMENT (OUTPATIENT)
Dept: GENERAL RADIOLOGY | Facility: HOSPITAL | Age: 83
End: 2023-08-10
Attending: HOSPITALIST
Payer: MEDICARE

## 2023-08-10 ENCOUNTER — APPOINTMENT (OUTPATIENT)
Dept: CV DIAGNOSTICS | Facility: HOSPITAL | Age: 83
End: 2023-08-10
Attending: NURSE PRACTITIONER
Payer: MEDICARE

## 2023-08-10 LAB
ANION GAP SERPL CALC-SCNC: 5 MMOL/L (ref 0–18)
BASOPHILS # BLD AUTO: 0.04 X10(3) UL (ref 0–0.2)
BASOPHILS NFR BLD AUTO: 0.7 %
BUN BLD-MCNC: 25 MG/DL (ref 7–18)
BUN/CREAT SERPL: 17.9 (ref 10–20)
CALCIUM BLD-MCNC: 8.9 MG/DL (ref 8.5–10.1)
CHLORIDE SERPL-SCNC: 109 MMOL/L (ref 98–112)
CO2 SERPL-SCNC: 27 MMOL/L (ref 21–32)
CREAT BLD-MCNC: 1.4 MG/DL
DEPRECATED RDW RBC AUTO: 51.7 FL (ref 35.1–46.3)
EGFRCR SERPLBLD CKD-EPI 2021: 37 ML/MIN/1.73M2 (ref 60–?)
EOSINOPHIL # BLD AUTO: 0.07 X10(3) UL (ref 0–0.7)
EOSINOPHIL NFR BLD AUTO: 1.3 %
ERYTHROCYTE [DISTWIDTH] IN BLOOD BY AUTOMATED COUNT: 13 % (ref 11–15)
GLUCOSE BLD-MCNC: 107 MG/DL (ref 70–99)
HCT VFR BLD AUTO: 39.1 %
HGB BLD-MCNC: 13.3 G/DL
IMM GRANULOCYTES # BLD AUTO: 0.02 X10(3) UL (ref 0–1)
IMM GRANULOCYTES NFR BLD: 0.4 %
INR BLD: 2.63 (ref 0.85–1.16)
LYMPHOCYTES # BLD AUTO: 1.33 X10(3) UL (ref 1–4)
LYMPHOCYTES NFR BLD AUTO: 24.7 %
MCH RBC QN AUTO: 37.2 PG (ref 26–34)
MCHC RBC AUTO-ENTMCNC: 34 G/DL (ref 31–37)
MCV RBC AUTO: 109.2 FL
MONOCYTES # BLD AUTO: 0.39 X10(3) UL (ref 0.1–1)
MONOCYTES NFR BLD AUTO: 7.2 %
NEUTROPHILS # BLD AUTO: 3.53 X10 (3) UL (ref 1.5–7.7)
NEUTROPHILS # BLD AUTO: 3.53 X10(3) UL (ref 1.5–7.7)
NEUTROPHILS NFR BLD AUTO: 65.7 %
NT-PROBNP SERPL-MCNC: 9461 PG/ML (ref ?–450)
OSMOLALITY SERPL CALC.SUM OF ELEC: 297 MOSM/KG (ref 275–295)
PLATELET # BLD AUTO: 231 10(3)UL (ref 150–450)
POTASSIUM SERPL-SCNC: 4.1 MMOL/L (ref 3.5–5.1)
PROTHROMBIN TIME: 27.8 SECONDS (ref 11.6–14.8)
Q-T INTERVAL: 256 MS
QRS DURATION: 96 MS
QTC CALCULATION (BEZET): 358 MS
R AXIS: 58 DEGREES
RBC # BLD AUTO: 3.58 X10(6)UL
SODIUM SERPL-SCNC: 141 MMOL/L (ref 136–145)
T AXIS: -88 DEGREES
TSI SER-ACNC: 2.99 MIU/ML (ref 0.36–3.74)
VENTRICULAR RATE: 118 BPM
WBC # BLD AUTO: 5.4 X10(3) UL (ref 4–11)

## 2023-08-10 PROCEDURE — 99233 SBSQ HOSP IP/OBS HIGH 50: CPT | Performed by: HOSPITALIST

## 2023-08-10 PROCEDURE — 71045 X-RAY EXAM CHEST 1 VIEW: CPT | Performed by: HOSPITALIST

## 2023-08-10 PROCEDURE — 93306 TTE W/DOPPLER COMPLETE: CPT | Performed by: NURSE PRACTITIONER

## 2023-08-10 RX ORDER — DILTIAZEM HYDROCHLORIDE 120 MG/1
120 CAPSULE, EXTENDED RELEASE ORAL DAILY
Status: DISCONTINUED | OUTPATIENT
Start: 2023-08-10 | End: 2023-08-15

## 2023-08-10 RX ORDER — PANTOPRAZOLE SODIUM 20 MG/1
20 TABLET, DELAYED RELEASE ORAL
Status: DISCONTINUED | OUTPATIENT
Start: 2023-08-10 | End: 2023-08-15

## 2023-08-10 RX ORDER — WARFARIN SODIUM 5 MG/1
5 TABLET ORAL NIGHTLY
Status: DISCONTINUED | OUTPATIENT
Start: 2023-08-10 | End: 2023-08-15

## 2023-08-10 RX ORDER — HYDROXYUREA 500 MG/1
500 CAPSULE ORAL DAILY
Status: DISCONTINUED | OUTPATIENT
Start: 2023-08-10 | End: 2023-08-15

## 2023-08-10 NOTE — DIETARY NOTE
NUTRITION EDUCATION NOTE     Received consult for heart failure diet education. Verbally reviewed basic cardiac diet restrictions focusing on sodium. Provided with Eating Heart-Healthy and NCM handout to reinforce. Receptive to instruction. Expect good compliance.         Rebecca López RD, LDN  Clinical Dietitian  P: [phone]

## 2023-08-10 NOTE — PLAN OF CARE
Problem: Patient Centered Care  Goal: Patient preferences are identified and integrated in the patient's plan of care  Description: Interventions:  - What would you like us to know as we care for you? From home with children.  - Provide timely, complete, and accurate information to patient/family  - Incorporate patient and family knowledge, values, beliefs, and cultural backgrounds into the planning and delivery of care  - Encourage patient/family to participate in care and decision-making at the level they choose  - Honor patient and family perspectives and choices  Outcome: Progressing     Problem: Patient/Family Goals  Goal: Patient/Family Long Term Goal  Description: Patient's Long Term Goal: To go home    Interventions:  - Adhere to medication schedule to revert back to control atrial fibrillation.   - Utilize call light to call for assistance. - See additional Care Plan goals for specific interventions  Outcome: Progressing  Goal: Patient/Family Short Term Goal  Description: Patient's Short Term Goal: Free from falls    Interventions:   - Call for assistance when ambulating.   - Hourly rounding done by nursing staff. - See additional Care Plan goals for specific interventions  Outcome: Progressing     Problem: SAFETY ADULT - FALL  Goal: Free from fall injury  Description: INTERVENTIONS:  - Assess pt frequently for physical needs  - Identify cognitive and physical deficits and behaviors that affect risk of falls.   - [place] fall precautions as indicated by assessment.  - Educate pt/family on patient safety including physical limitations  - Instruct pt to call for assistance with activity based on assessment  - Modify environment to reduce risk of injury  - Provide assistive devices as appropriate  - Consider OT/PT consult to assist with strengthening/mobility  - Encourage toileting schedule  Outcome: Progressing     Problem: CARDIOVASCULAR - ADULT  Goal: Maintains optimal cardiac output and hemodynamic stability  Description: INTERVENTIONS:  - Monitor vital signs, rhythm, and trends  - Monitor for bleeding, hypotension and signs of decreased cardiac output  - Evaluate effectiveness of vasoactive medications to optimize hemodynamic stability  - Monitor arterial and/or venous puncture sites for bleeding and/or hematoma  - Assess quality of pulses, skin color and temperature  - Assess for signs of decreased coronary artery perfusion - ex. Angina  - Evaluate fluid balance, assess for edema, trend weights  Outcome: Progressing  Goal: Absence of cardiac arrhythmias or at baseline  Description: INTERVENTIONS:  - Continuous cardiac monitoring, monitor vital signs, obtain 12 lead EKG if indicated  - Evaluate effectiveness of antiarrhythmic and heart rate control medications as ordered  - Initiate emergency measures for life threatening arrhythmias  - Monitor electrolytes and administer replacement therapy as ordered  Outcome: Progressing     Problem: PAIN - ADULT  Goal: Verbalizes/displays adequate comfort level or patient's stated pain goal  Description: INTERVENTIONS:  - Encourage pt to monitor pain and request assistance  - Assess pain using appropriate pain scale  - Administer analgesics based on type and severity of pain and evaluate response  - Implement non-pharmacological measures as appropriate and evaluate response  - Consider cultural and social influences on pain and pain management  - Manage/alleviate anxiety  - Utilize distraction and/or relaxation techniques  - Monitor for opioid side effects  - Notify MD/LIP if interventions unsuccessful or patient reports new pain  - Anticipate increased pain with activity and pre-edicate as appropriate  Outcome: Progressing   Patient is alert and oriented x 4. On room air. Standby assist with walker. For MRI right knee. Diltiazem po revised. Echo and CXR done. On PT/OT tolerated. Still to monitor kidney function.

## 2023-08-10 NOTE — PLAN OF CARE
Pt a&ox4 on RA complaining of bilateral leg pain controlled by tylenol. Pt came in d/t new onset afib. Cardizem gtt started and then stopped later on in the night for HR less than 75. Pt on PO cardizem states she's feeling good. Still in afib rhythm. Plan is for echo and doppler in AM.     Problem: Patient Centered Care  Goal: Patient preferences are identified and integrated in the patient's plan of care  Description: Interventions:  - What would you like us to know as we care for you? From home with children.  - Provide timely, complete, and accurate information to patient/family  - Incorporate patient and family knowledge, values, beliefs, and cultural backgrounds into the planning and delivery of care  - Encourage patient/family to participate in care and decision-making at the level they choose  - Honor patient and family perspectives and choices  Outcome: Progressing     Problem: Patient/Family Goals  Goal: Patient/Family Long Term Goal  Description: Patient's Long Term Goal: To go home    Interventions:  - Adhere to medication schedule to revert back to control atrial fibrillation.   - Utilize call light to call for assistance. - See additional Care Plan goals for specific interventions  Outcome: Progressing  Goal: Patient/Family Short Term Goal  Description: Patient's Short Term Goal: Free from falls    Interventions:   - Call for assistance when ambulating.   - Hourly rounding done by nursing staff. - See additional Care Plan goals for specific interventions  Outcome: Progressing     Problem: SAFETY ADULT - FALL  Goal: Free from fall injury  Description: INTERVENTIONS:  - Assess pt frequently for physical needs  - Identify cognitive and physical deficits and behaviors that affect risk of falls.   - [place] fall precautions as indicated by assessment.  - Educate pt/family on patient safety including physical limitations  - Instruct pt to call for assistance with activity based on assessment  - Modify environment to reduce risk of injury  - Provide assistive devices as appropriate  - Consider OT/PT consult to assist with strengthening/mobility  - Encourage toileting schedule  Outcome: Progressing     Problem: CARDIOVASCULAR - ADULT  Goal: Maintains optimal cardiac output and hemodynamic stability  Description: INTERVENTIONS:  - Monitor vital signs, rhythm, and trends  - Monitor for bleeding, hypotension and signs of decreased cardiac output  - Evaluate effectiveness of vasoactive medications to optimize hemodynamic stability  - Monitor arterial and/or venous puncture sites for bleeding and/or hematoma  - Assess quality of pulses, skin color and temperature  - Assess for signs of decreased coronary artery perfusion - ex.  Angina  - Evaluate fluid balance, assess for edema, trend weights  Outcome: Progressing  Goal: Absence of cardiac arrhythmias or at baseline  Description: INTERVENTIONS:  - Continuous cardiac monitoring, monitor vital signs, obtain 12 lead EKG if indicated  - Evaluate effectiveness of antiarrhythmic and heart rate control medications as ordered  - Initiate emergency measures for life threatening arrhythmias  - Monitor electrolytes and administer replacement therapy as ordered  Outcome: Progressing     Problem: PAIN - ADULT  Goal: Verbalizes/displays adequate comfort level or patient's stated pain goal  Description: INTERVENTIONS:  - Encourage pt to monitor pain and request assistance  - Assess pain using appropriate pain scale  - Administer analgesics based on type and severity of pain and evaluate response  - Implement non-pharmacological measures as appropriate and evaluate response  - Consider cultural and social influences on pain and pain management  - Manage/alleviate anxiety  - Utilize distraction and/or relaxation techniques  - Monitor for opioid side effects  - Notify MD/LIP if interventions unsuccessful or patient reports new pain  - Anticipate increased pain with activity and pre-medicate as appropriate  Outcome: Progressing

## 2023-08-10 NOTE — PROGRESS NOTES
Patient received from Aaron from ED. Cardizem running at 10 mg/hr. Vital signs WNL. Endorsed care to [facility].

## 2023-08-11 ENCOUNTER — APPOINTMENT (OUTPATIENT)
Dept: MRI IMAGING | Facility: HOSPITAL | Age: 83
End: 2023-08-11
Attending: HOSPITALIST
Payer: MEDICARE

## 2023-08-11 LAB
ANION GAP SERPL CALC-SCNC: 7 MMOL/L (ref 0–18)
BUN BLD-MCNC: 36 MG/DL (ref 7–18)
BUN/CREAT SERPL: 22.1 (ref 10–20)
CALCIUM BLD-MCNC: 9.3 MG/DL (ref 8.5–10.1)
CHLORIDE SERPL-SCNC: 105 MMOL/L (ref 98–112)
CO2 SERPL-SCNC: 27 MMOL/L (ref 21–32)
CREAT BLD-MCNC: 1.63 MG/DL
DEPRECATED RDW RBC AUTO: 53 FL (ref 35.1–46.3)
EGFRCR SERPLBLD CKD-EPI 2021: 31 ML/MIN/1.73M2 (ref 60–?)
ERYTHROCYTE [DISTWIDTH] IN BLOOD BY AUTOMATED COUNT: 12.7 % (ref 11–15)
GLUCOSE BLD-MCNC: 116 MG/DL (ref 70–99)
HCT VFR BLD AUTO: 43.2 %
HGB BLD-MCNC: 14.4 G/DL
INR BLD: 2.19 (ref 0.85–1.16)
MCH RBC QN AUTO: 37.5 PG (ref 26–34)
MCHC RBC AUTO-ENTMCNC: 33.3 G/DL (ref 31–37)
MCV RBC AUTO: 112.5 FL
OSMOLALITY SERPL CALC.SUM OF ELEC: 297 MOSM/KG (ref 275–295)
PLATELET # BLD AUTO: 250 10(3)UL (ref 150–450)
POTASSIUM SERPL-SCNC: 3.9 MMOL/L (ref 3.5–5.1)
PROTHROMBIN TIME: 24 SECONDS (ref 11.6–14.8)
RBC # BLD AUTO: 3.84 X10(6)UL
SODIUM SERPL-SCNC: 139 MMOL/L (ref 136–145)
WBC # BLD AUTO: 7.7 X10(3) UL (ref 4–11)

## 2023-08-11 PROCEDURE — 99233 SBSQ HOSP IP/OBS HIGH 50: CPT | Performed by: HOSPITALIST

## 2023-08-11 PROCEDURE — 73721 MRI JNT OF LWR EXTRE W/O DYE: CPT | Performed by: HOSPITALIST

## 2023-08-11 RX ORDER — DILTIAZEM HYDROCHLORIDE 120 MG/1
120 CAPSULE, EXTENDED RELEASE ORAL DAILY
Qty: 30 CAPSULE | Refills: 11 | Status: SHIPPED | OUTPATIENT
Start: 2023-08-12 | End: 2024-08-11

## 2023-08-11 NOTE — PLAN OF CARE
A&ox4 on RA complaining of right knee pain controlled by prn meds and ice was offered. Cardizem switched from tid to once a day see mar. Echo and cxr done see results. IV lasix continued and pt hr 70s-80s. Plan is MRI tomorrow then discharge when medically cleared. Problem: Patient Centered Care  Goal: Patient preferences are identified and integrated in the patient's plan of care  Description: Interventions:  - What would you like us to know as we care for you? From home with children.  - Provide timely, complete, and accurate information to patient/family  - Incorporate patient and family knowledge, values, beliefs, and cultural backgrounds into the planning and delivery of care  - Encourage patient/family to participate in care and decision-making at the level they choose  - Honor patient and family perspectives and choices  Outcome: Progressing     Problem: Patient/Family Goals  Goal: Patient/Family Long Term Goal  Description: Patient's Long Term Goal: To go home    Interventions:  - Adhere to medication schedule to revert back to control atrial fibrillation.   - Utilize call light to call for assistance. - See additional Care Plan goals for specific interventions  Outcome: Progressing  Goal: Patient/Family Short Term Goal  Description: Patient's Short Term Goal: Free from falls    Interventions:   - Call for assistance when ambulating.   - Hourly rounding done by nursing staff. - See additional Care Plan goals for specific interventions  Outcome: Progressing     Problem: SAFETY ADULT - FALL  Goal: Free from fall injury  Description: INTERVENTIONS:  - Assess pt frequently for physical needs  - Identify cognitive and physical deficits and behaviors that affect risk of falls.   - [place] fall precautions as indicated by assessment.  - Educate pt/family on patient safety including physical limitations  - Instruct pt to call for assistance with activity based on assessment  - Modify environment to reduce risk of injury  - Provide assistive devices as appropriate  - Consider OT/PT consult to assist with strengthening/mobility  - Encourage toileting schedule  Outcome: Progressing     Problem: CARDIOVASCULAR - ADULT  Goal: Maintains optimal cardiac output and hemodynamic stability  Description: INTERVENTIONS:  - Monitor vital signs, rhythm, and trends  - Monitor for bleeding, hypotension and signs of decreased cardiac output  - Evaluate effectiveness of vasoactive medications to optimize hemodynamic stability  - Monitor arterial and/or venous puncture sites for bleeding and/or hematoma  - Assess quality of pulses, skin color and temperature  - Assess for signs of decreased coronary artery perfusion - ex.  Angina  - Evaluate fluid balance, assess for edema, trend weights  Outcome: Progressing  Goal: Absence of cardiac arrhythmias or at baseline  Description: INTERVENTIONS:  - Continuous cardiac monitoring, monitor vital signs, obtain 12 lead EKG if indicated  - Evaluate effectiveness of antiarrhythmic and heart rate control medications as ordered  - Initiate emergency measures for life threatening arrhythmias  - Monitor electrolytes and administer replacement therapy as ordered  Outcome: Progressing     Problem: PAIN - ADULT  Goal: Verbalizes/displays adequate comfort level or patient's stated pain goal  Description: INTERVENTIONS:  - Encourage pt to monitor pain and request assistance  - Assess pain using appropriate pain scale  - Administer analgesics based on type and severity of pain and evaluate response  - Implement non-pharmacological measures as appropriate and evaluate response  - Consider cultural and social influences on pain and pain management  - Manage/alleviate anxiety  - Utilize distraction and/or relaxation techniques  - Monitor for opioid side effects  - Notify MD/LIP if interventions unsuccessful or patient reports new pain  - Anticipate increased pain with activity and pre-medicate as appropriate  Outcome: Progressing

## 2023-08-11 NOTE — CARDIAC REHAB
Cardiac Rehab Phase I    Activity:  Distance   Assistance needed   Patient tolerated activity . Education:  Handouts provided and reviewed: [address] Diet: Healthy Cardiac diet reviewed. Disease Process: Disease process reviewed.     Reviewed the following:      RISK FACTORS: Reviewed      SMOKING CESSATION: Reviewed      HOME EXERCISE ACTIVITY: Reviewed      OUTPATIENT CARDIAC REHAB: Referred to Cardiac Rehabilitation

## 2023-08-11 NOTE — PLAN OF CARE
Problem: Patient Centered Care  Goal: Patient preferences are identified and integrated in the patient's plan of care  Description: Interventions:  - What would you like us to know as we care for you? From home with children.  - Provide timely, complete, and accurate information to patient/family  - Incorporate patient and family knowledge, values, beliefs, and cultural backgrounds into the planning and delivery of care  - Encourage patient/family to participate in care and decision-making at the level they choose  - Honor patient and family perspectives and choices  Outcome: Progressing     Problem: Patient/Family Goals  Goal: Patient/Family Long Term Goal  Description: Patient's Long Term Goal: To go home    Interventions:  - Adhere to medication schedule to revert back to control atrial fibrillation.   - Utilize call light to call for assistance. - See additional Care Plan goals for specific interventions  Outcome: Progressing  Goal: Patient/Family Short Term Goal  Description: Patient's Short Term Goal: Free from falls    Interventions:   - Call for assistance when ambulating.   - Hourly rounding done by nursing staff. - See additional Care Plan goals for specific interventions  Outcome: Progressing     Problem: SAFETY ADULT - FALL  Goal: Free from fall injury  Description: INTERVENTIONS:  - Assess pt frequently for physical needs  - Identify cognitive and physical deficits and behaviors that affect risk of falls.   - [place] fall precautions as indicated by assessment.  - Educate pt/family on patient safety including physical limitations  - Instruct pt to call for assistance with activity based on assessment  - Modify environment to reduce risk of injury  - Provide assistive devices as appropriate  - Consider OT/PT consult to assist with strengthening/mobility  - Encourage toileting schedule  Outcome: Progressing     Problem: CARDIOVASCULAR - ADULT  Goal: Maintains optimal cardiac output and hemodynamic stability  Description: INTERVENTIONS:  - Monitor vital signs, rhythm, and trends  - Monitor for bleeding, hypotension and signs of decreased cardiac output  - Evaluate effectiveness of vasoactive medications to optimize hemodynamic stability  - Monitor arterial and/or venous puncture sites for bleeding and/or hematoma  - Assess quality of pulses, skin color and temperature  - Assess for signs of decreased coronary artery perfusion - ex.  Angina  - Evaluate fluid balance, assess for edema, trend weights  Outcome: Progressing  Goal: Absence of cardiac arrhythmias or at baseline  Description: INTERVENTIONS:  - Continuous cardiac monitoring, monitor vital signs, obtain 12 lead EKG if indicated  - Evaluate effectiveness of antiarrhythmic and heart rate control medications as ordered  - Initiate emergency measures for life threatening arrhythmias  - Monitor electrolytes and administer replacement therapy as ordered  Outcome: Progressing     Problem: PAIN - ADULT  Goal: Verbalizes/displays adequate comfort level or patient's stated pain goal  Description: INTERVENTIONS:  - Encourage pt to monitor pain and request assistance  - Assess pain using appropriate pain scale  - Administer analgesics based on type and severity of pain and evaluate response  - Implement non-pharmacological measures as appropriate and evaluate response  - Consider cultural and social influences on pain and pain management  - Manage/alleviate anxiety  - Utilize distraction and/or relaxation techniques  - Monitor for opioid side effects  - Notify MD/LIP if interventions unsuccessful or patient reports new pain  - Anticipate increased pain with activity and pre-medicate as appropriate  Outcome: Progressing    Discharge cancelled d/t fracture noted on MRI  Pt declined pain medications, NWB to right leg  Dr Allison Hernandez consulted

## 2023-08-12 ENCOUNTER — APPOINTMENT (OUTPATIENT)
Dept: GENERAL RADIOLOGY | Facility: HOSPITAL | Age: 83
End: 2023-08-12
Attending: ORTHOPAEDIC SURGERY
Payer: MEDICARE

## 2023-08-12 ENCOUNTER — APPOINTMENT (OUTPATIENT)
Dept: CT IMAGING | Facility: HOSPITAL | Age: 83
End: 2023-08-12
Attending: ORTHOPAEDIC SURGERY
Payer: MEDICARE

## 2023-08-12 LAB
ANION GAP SERPL CALC-SCNC: 4 MMOL/L (ref 0–18)
BUN BLD-MCNC: 40 MG/DL (ref 7–18)
BUN/CREAT SERPL: 26.5 (ref 10–20)
CALCIUM BLD-MCNC: 9.1 MG/DL (ref 8.5–10.1)
CHLORIDE SERPL-SCNC: 105 MMOL/L (ref 98–112)
CO2 SERPL-SCNC: 29 MMOL/L (ref 21–32)
CREAT BLD-MCNC: 1.51 MG/DL
DEPRECATED RDW RBC AUTO: 50.5 FL (ref 35.1–46.3)
EGFRCR SERPLBLD CKD-EPI 2021: 34 ML/MIN/1.73M2 (ref 60–?)
ERYTHROCYTE [DISTWIDTH] IN BLOOD BY AUTOMATED COUNT: 12.6 % (ref 11–15)
GLUCOSE BLD-MCNC: 118 MG/DL (ref 70–99)
HCT VFR BLD AUTO: 40.4 %
HGB BLD-MCNC: 13.7 G/DL
MCH RBC QN AUTO: 36.6 PG (ref 26–34)
MCHC RBC AUTO-ENTMCNC: 33.9 G/DL (ref 31–37)
MCV RBC AUTO: 108 FL
OSMOLALITY SERPL CALC.SUM OF ELEC: 297 MOSM/KG (ref 275–295)
PLATELET # BLD AUTO: 242 10(3)UL (ref 150–450)
POTASSIUM SERPL-SCNC: 4.6 MMOL/L (ref 3.5–5.1)
RBC # BLD AUTO: 3.74 X10(6)UL
SODIUM SERPL-SCNC: 138 MMOL/L (ref 136–145)
WBC # BLD AUTO: 8.6 X10(3) UL (ref 4–11)

## 2023-08-12 PROCEDURE — 73700 CT LOWER EXTREMITY W/O DYE: CPT | Performed by: ORTHOPAEDIC SURGERY

## 2023-08-12 PROCEDURE — 99239 HOSP IP/OBS DSCHRG MGMT >30: CPT | Performed by: HOSPITALIST

## 2023-08-12 PROCEDURE — 73562 X-RAY EXAM OF KNEE 3: CPT | Performed by: ORTHOPAEDIC SURGERY

## 2023-08-12 NOTE — CONSULTS
[facility]    PATIENT'S NAME: Dilan PRESSLEY   ATTENDING PHYSICIAN: Concepcion Osullivan MD   CONSULTING PHYSICIAN: Navid Arellano MD   PATIENT ACCOUNT#:   [id number]    LOCATION:  [address] [place] [address] RECORD #:   [id number]       YOB: 1940  ADMISSION DATE:       08/09/2023      CONSULT DATE:  08/12/2023    REPORT OF CONSULTATION      REFERRING PHYSICIAN:  Ferdinand Ravi MD/Robbie Zaldivar MD    REASON FOR CONSULTATION:  Right knee pain. HISTORY OF PRESENT ILLNESS:  The patient presents as a pleasant 65-year-old female who has been experiencing chronic bilateral knee pain with a history of arthritis. Initially her symptoms were described as being left greater than right, but on July 27, 2023, she presented to the emergency room with increased pain in the knee in addition to hand pain. She was evaluated at Urgent Care, but no radiographs of the knees were obtained. She described pain over the anterior aspect of the knee, which was exacerbated by weightbearing and ameliorated by rest, which had persisted, associated with swelling. On August 9, 2023, she was seen by Dr. Randolph Lewis and found to have a fast irregular heart rhythm, was sent to the emergency room for evaluation and diagnosed to have atrial fibrillation with a rapid ventricular response. Cardiology was consulted, when she was noticed to have persistent right knee pain which was worse when she has was standing up and trying to ambulate. She was also found to have a swollen knee. She describes the pain as more anteriorly, exacerbated by any type of activity and weightbearing attempts and ameliorated by rest.  An MRI scan was ordered and performed on August 11, 2023, with the following radiologist conclusions:    1. Severe right knee tricompartmental osteoarthritis.   2.   Subacute insufficiency fracture involving the lateral femoral condyle with subtle irregularity of the articular surface and stress changes within the lateral femoral condyle. 3.   Remote appearing high-grade partial-thickness tear of the ACL. 4.   Complex degenerative tear involving the posterior horn and body of the medial meniscus. 5.   Large horizontal tear involving nearly the entire lateral meniscus. 6.   Moderate size suprapatellar joint effusion with synovitis. 7.   (Dr. Meghan Barfield). I was asked to evaluate her. She denies any history of any bruising, skin lacerations, or increased pain elsewhere. PAST MEDICAL HISTORY:  Osteoarthritis of the knees; chronic kidney disease stage 3; arthritis of the lumbar spine; history of DVT and PE, chronic Coumadin utilization; left ventricular diastolic dysfunction; polycythemia, myeloproliferative disorder. PAST SURGICAL HISTORY:  Left upper lobectomy for lung cancer, cholecystectomy, hysterectomy, left parotidectomy, bilateral foot bunion procedures, status post right total hip arthroplasty. MEDICATIONS:  She is presently on Cardizem, diltiazem ER, Lasix, Hydrea, Protonix, Coumadin; doses of which are enumerated in the chart. ALLERGIES:  Fentanyl, hydrocodone, morphine, phenol. SOCIAL HISTORY:  Denies alcohol use or illicit drug use. Describes occasional alcohol use. She had ambulated with the assistance of a cane prior to the above incident. PHYSICAL EXAMINATION:    GENERAL:  She is alert, oriented, and appropriate throughout the examination, and in no apparent distress. VITAL SIGNS:  Her pulse and respiratory rate appears stable. EXTREMITIES:  There is no abnormal posturing of bilateral lower extremities. She points to the anterior aspect of the knee where there is point tenderness. Some patellofemoral crepitation is noted. No ecchymosis or bruising is seen. There is decreased range of motion of the knee, both in flexion and extension. There is no observable nor palpable soft tissue masses/defects seen. Chad Shady sign is presently negative.   Her distal neurovascular status appears to be intact grossly with good color and capillary refill. No ecchymosis or bruising nor lacerations/abrasions. ASSESSMENT:  Right knee chronic and advanced osteoarthritis with the possibility of a distal femur insufficiency/stress fracture and associated medial and lateral meniscal tearing. Partial anterior cruciate ligament tear also noted. RECOMMENDATIONS:    1. Will order plain x-rays and correlate them with a CT scan. 2.   Have called [address] [place] [address] to obtain a double upright, drop lock hinged knee brace, and will presently recommend nonweightbearing pending the results of the above. 3.   Patient may require total knee arthroplasty; however, if there is intra-articular displacement, then treatment options would include open reduction and internal fixation. If an insufficiency fracture demonstrates no significant displacement, then would recommend conservative treatment with bracing and nonweightbearing to allow the bones to heal and then to assess subsequently whether or not she is a candidate for total knee arthroplasty. Numerous questions were asked, all of which were answered to the best my ability; therefore, I do feel that she has a good understanding of the above discussion. Thank you for the consultation.     Dictated By Joce Ortiz MD  d: 08/12/2023 11:51:25  t: 08/12/2023 12:12:41  Hardy Dickson [id number]  MARIA LUISA/

## 2023-08-12 NOTE — DISCHARGE SUMMARY
[facility] Hospitalist Discharge Summary   Patient ID:  Rosa Villela  [id number]  54 year old  9/9/1940    Admit date: 8/9/2023  Discharge date: 8/13/2023  Primary Care Physician: Lesli Nixon MD   Attending Physician: Naresh Nunez MD   Consults:   Consultants         Provider   Role Specialty     Lakhwinder Plascencia MD  Consulting Physician Interventional, Cardiology     Marcel Khan MD  Consulting Physician Cardiovascular Diseases     Nichole Fleming MD  Consulting Physician SURGERY, ORTHOPEDIC            Discharge Diagnoses:   Atrial fibrillation, new onset [facility])    Reason for admission  Copied from admission H&P: Patient is an 54-year-old  female who was seen today on a followup appointment with her primary care physician and she was found to be in a fast rhythm, irregular. Sent to the emergency department for evaluation. Upon arrival, she was noted to be in atrial fibrillation with rapid ventricular response rate 130. CBC and chemistry unremarkable. GFR is 34 which is at baseline. Troponin was negative. Prothrombin time is still pending. Patient was started on IV Cardizem drip and was given Lasix, and she will be admitted to the hospital for further management. Hospital Course: # New onset Atrial fibrillation with RVR  # Acute HFpEF   -  cards on consult. - switched to cardizem 120mg daily   - ECHO reviewed EF normal.   - already on warfarin due to h/o VTE goal INR 2.0-3.0   - INR 2.63   - TSH normal. BNP 9461. CXR  - can stop lasix per cards. 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 Subacute insuff fracture involving lateral femoral condyle  - orthopedic surgery on consult. - after mechanical fall.   - CT/XR pending if no evidence of displacement will plan to place patient in knee immobilizer and discharge NWB RLE.   - NWB RLE   - analgesics PRN  - ice. # CKD III  - at baseline      # HTN  - controlled. 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 CHANGE how you take these medications      warfarin 5 MG Tabs  Commonly known as: Coumadin  Take as directed. If you are unsure how to take this medication, talk to your nurse or doctor. Original instructions: Take as directed by the coumadin clinic. Take 1.5 tabs (7.5mg) by mouth Tues, Thurs, Sat and 1 tab (5mg) all other days. What changed:   how much to take  how to take this  when to take this  additional instructions            CONTINUE taking these medications      acetaminophen 325 MG Tabs  Commonly known as: Tylenol     Esomeprazole Magnesium 20 MG Cpdr  Commonly known as: NEXIUM  Take 1 capsule (20 mg total) by mouth every morning before breakfast.     hydroxyurea 500 MG Caps  Commonly known as: Hydrea  TAKE 1 CAPSULE ON SUN/TUES/WEDS/THURS/SAT AND TAKE 2 CAPSULES ON MON/FRI AS DIRECTED     MULTIVITAMIN ADULT OR               Where to Get Your Medications        These medications were sent to [address] [place] [address], [address] [place] [address] [phone], [phone]  [address] [place] [address]      Phone: [phone]   dilTIAZem HCl ER Beads 120 MG Cp24         Activity: activity as tolerated  Diet: regular diet  Wound Care: NA  Code Status: Prior        Discharge Instructions         FU with PCP in 1 week. FU with cards as scheduled. FU with Dr Jeanna Trujillo in 1 week.  Nonweight bearing to RLE           Important follow up:   Cristobal Scruggs MD Follow up in 1 week(s). Specialties: Interventional, Cardiology, CARDIOLOGY  Contact information:  [address]  [facility]               Jennifer Clayton MD Follow up in 1 week(s). Specialty: Internal Medicine  Contact information:  [address] [facility]  [phone]               Mary Frank MD Follow up in 1 week(s). Specialty: SURGERY, ORTHOPEDIC  Contact information:  [address] [address] [address] [phone]                             -PCP in [] within 7 days [] within 14 days [] other     Disposition: home  Discharged Condition: good    Hospital Discharge Diagnoses:  A.fib    Lace+ Score: 74  59-90 High Risk  29-58 Medium Risk  0-28   Low Risk. TCM Follow-Up Recommendation:  LACE > 58:  High Risk of readmission after discharge from the hospital.            Total Time Coordinating Care: Greater than 30 minutes    Patient had opportunity to ask questions, state understanding, and agree with therapeutic plan as outlined    Pam Howell MD  Hospitalist  8/12/2023

## 2023-08-12 NOTE — DISCHARGE INSTRUCTIONS
FU with PCP in 1 week. FU with cards as scheduled. FU with Dr Cornelius Pablo in 1 week. Per Dr. Cornelius Pablo (based on CT results) toe touch weight bearing, ok to unlock brace. Sometimes managing your health at home requires assistance. The [facility] team has recognized your preference to use [facility]. They can be reached by phone at [phone]. The fax number for your reference is [phone]. A representative from the home health agency will contact you or your family to schedule your first visit.

## 2023-08-12 NOTE — CM/SW NOTE
08/12/23 1400   Discharge disposition   Expected discharge disposition Home or Self   Discharge transportation Private car     Pt received MDO For discharge. Pt is self, NN at this time. SW/CM to remain available for support and/or discharge planning.      YOSSI Maire, LSW   x [phone]

## 2023-08-12 NOTE — PLAN OF CARE
Stable vital signs. ES tylenol for right leg pain with relief. Problem: Patient Centered Care  Goal: Patient preferences are identified and integrated in the patient's plan of care  Description: Interventions:  - What would you like us to know as we care for you? From home with children.  - Provide timely, complete, and accurate information to patient/family  - Incorporate patient and family knowledge, values, beliefs, and cultural backgrounds into the planning and delivery of care  - Encourage patient/family to participate in care and decision-making at the level they choose  - Honor patient and family perspectives and choices  Outcome: Progressing     Problem: SAFETY ADULT - FALL  Goal: Free from fall injury  Description: INTERVENTIONS:  - Assess pt frequently for physical needs  - Identify cognitive and physical deficits and behaviors that affect risk of falls. - [place] fall precautions as indicated by assessment.  - Educate pt/family on patient safety including physical limitations  - Instruct pt to call for assistance with activity based on assessment  - Modify environment to reduce risk of injury  - Provide assistive devices as appropriate  - Consider OT/PT consult to assist with strengthening/mobility  - Encourage toileting schedule  Outcome: Progressing     Problem: CARDIOVASCULAR - ADULT  Goal: Maintains optimal cardiac output and hemodynamic stability  Description: INTERVENTIONS:  - Monitor vital signs, rhythm, and trends  - Monitor for bleeding, hypotension and signs of decreased cardiac output  - Evaluate effectiveness of vasoactive medications to optimize hemodynamic stability  - Monitor arterial and/or venous puncture sites for bleeding and/or hematoma  - Assess quality of pulses, skin color and temperature  - Assess for signs of decreased coronary artery perfusion - ex.  Angina  - Evaluate fluid balance, assess for edema, trend weights  Outcome: Progressing  Goal: Absence of cardiac arrhythmias or at baseline  Description: INTERVENTIONS:  - Continuous cardiac monitoring, monitor vital signs, obtain 12 lead EKG if indicated  - Evaluate effectiveness of antiarrhythmic and heart rate control medications as ordered  - Initiate emergency measures for life threatening arrhythmias  - Monitor electrolytes and administer replacement therapy as ordered  Outcome: Progressing     Problem: PAIN - ADULT  Goal: Verbalizes/displays adequate comfort level or patient's stated pain goal  Description: INTERVENTIONS:  - Encourage pt to monitor pain and request assistance  - Assess pain using appropriate pain scale  - Administer analgesics based on type and severity of pain and evaluate response  - Implement non-pharmacological measures as appropriate and evaluate response  - Consider cultural and social influences on pain and pain management  - Manage/alleviate anxiety  - Utilize distraction and/or relaxation techniques  - Monitor for opioid side effects  - Notify MD/LIP if interventions unsuccessful or patient reports new pain  - Anticipate increased pain with activity and pre-medicate as appropriate  Outcome: Progressing     Problem: DISCHARGE PLANNING  Goal: Discharge to home or other facility with appropriate resources  Description: INTERVENTIONS:  - Identify barriers to discharge w/pt and caregiver  - Include patient/family/discharge partner in discharge planning  - Arrange for needed discharge resources and transportation as appropriate  - Identify discharge learning needs (meds, wound care, etc)  - Arrange for interpreters to assist at discharge as needed  - Consider post-discharge preferences of patient/family/discharge partner  - Complete POLST form as appropriate  - Assess patient's ability to be responsible for managing their own health  - Refer to Case Management Department for coordinating discharge planning if the patient needs post-hospital services based on physician/LIP order or complex needs related to functional status, cognitive ability or social support system  Outcome: Progressing

## 2023-08-13 PROCEDURE — 99232 SBSQ HOSP IP/OBS MODERATE 35: CPT | Performed by: HOSPITALIST

## 2023-08-13 NOTE — CM/SW NOTE
SW received MDO for [facility] per family request. SW placed f2f, and sent [facility] referrals via aidin. When choices are avail, SW will provide pt and family list.     1006am- Pt was given list for [facility]. SW will follow back up for choice. Pt has no further questions at this time    1123am- SW followed up with pt in regards to [facility] choice. Pt wishes for [facility]. SW notified Karyna Espinoza from [facility]. [facility] home reserved via aidin. 08/13/23 1123   Discharge disposition   Expected discharge disposition Home or Self   Additional [id number] Provider [facility]   Discharge transportation Private car     DC cancelled today. PT rec [facility]. SW called pt to inform her of PT recs. Pt informed SW that she wishes to have 2nd opinion from a different ortho- such as erick or someone else. SW asked pt if  SW can send [facility] referrals in the meantime while waiting for second opinion, pt stated no. SW/CM to remain available for support and/or discharge planning.      Goldie Chance, MSW, LSW   x [phone]

## 2023-08-13 NOTE — HOME CARE LIAISON
[facility] Spoke w/ patient who is agreeable with [facility].  Contact information placed on AVS.

## 2023-08-13 NOTE — DISCHARGE SUMMARY
[address] Hospitalist Discharge Summary   Patient ID:  Amando Orellana  [id number]  33 year old  9/9/1940    Admit date: 8/9/2023  Discharge date: 8/13/2023  Primary Care Physician: Mary Kay Villeda MD   Attending Physician: Josh Trevino MD   Consults:   Consultants         Provider   Role Specialty     Brittny Myles MD  Consulting Physician Interventional, Cardiology     Hever Galeano MD  Consulting Physician Cardiovascular Diseases     Lluvia Morris MD  Consulting Physician SURGERY, ORTHOPEDIC            Discharge Diagnoses:   Atrial fibrillation, new onset [facility])    Reason for admission  Copied from admission H&P: Patient is an 54-year-old  female who was seen today on a followup appointment with her primary care physician and she was found to be in a fast rhythm, irregular. Sent to the emergency department for evaluation. Upon arrival, she was noted to be in atrial fibrillation with rapid ventricular response rate 130. CBC and chemistry unremarkable. GFR is 34 which is at baseline. Troponin was negative. Prothrombin time is still pending. Patient was started on IV Cardizem drip and was given Lasix, and she will be admitted to the hospital for further management. Hospital Course: # New onset Atrial fibrillation with RVR  # Acute HFpEF   -  cards on consult. - switched to cardizem 120mg daily   - ECHO reviewed EF normal.   - already on warfarin due to h/o VTE goal INR 2.0-3.0   - INR 2.63   - TSH normal. BNP 9461. CXR  - can stop lasix per cards. 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 Subacute insuff fracture involving lateral femoral condyle  - orthopedic surgery on consult. - after mechanical fall.   - CT/XR no displacement. Plans to discharge with brace NWB RLE and fu with ortho in clinic next week. - NWB RLE   - analgesics PRN  - ice. # CKD III  - at baseline      # HTN  - controlled.          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 CHANGE how you take these medications      warfarin 5 MG Tabs  Commonly known as: Coumadin  Take as directed. If you are unsure how to take this medication, talk to your nurse or doctor. Original instructions: Take as directed by the coumadin clinic. Take 1.5 tabs (7.5mg) by mouth Tues, Thurs, Sat and 1 tab (5mg) all other days. What changed:   how much to take  how to take this  when to take this  additional instructions            CONTINUE taking these medications      acetaminophen 325 MG Tabs  Commonly known as: Tylenol     Esomeprazole Magnesium 20 MG Cpdr  Commonly known as: NEXIUM  Take 1 capsule (20 mg total) by mouth every morning before breakfast.     hydroxyurea 500 MG Caps  Commonly known as: Hydrea  TAKE 1 CAPSULE ON SUN/TUES/WEDS/THURS/SAT AND TAKE 2 CAPSULES ON MON/FRI AS DIRECTED     MULTIVITAMIN ADULT OR               Where to Get Your Medications        These medications were sent to [address] [place] [address], [address] [place] [address] [phone], [phone]  [address] [place] [address]      Phone: [phone]   dilTIAZem HCl ER Beads 120 MG Cp24         Activity: activity as tolerated  Diet: regular diet  Wound Care: NA  Code Status: Prior        Discharge Instructions         FU with PCP in 1 week. FU with cards as scheduled. FU with Dr David Fontanez in 1 week. Nonweight bearing to RLE           Important follow up:    Follow-up Information       Celeste Hope MD Follow up in 1 week(s). Specialties: Interventional, Cardiology, CARDIOLOGY  Contact information:  [address]               Amee Rios MD Follow up in 1 week(s). Specialty: Internal Medicine  Contact information:  [address] [facility]  [phone]               Leonor Murillo MD Follow up in 1 week(s). Specialty: SURGERY, ORTHOPEDIC  Contact information:  [address] [address] [address] [phone]                             -PCP in [] within 7 days [] within 14 days [] other     Disposition: home  Discharged Condition: good    Hospital Discharge Diagnoses:  A.fib    Lace+ Score: 75  59-90 High Risk  29-58 Medium Risk  0-28   Low Risk. TCM Follow-Up Recommendation:  LACE > 58:  High Risk of readmission after discharge from the hospital.            Total Time Coordinating Care: Greater than 30 minutes    Patient had opportunity to ask questions, state understanding, and agree with therapeutic plan as outlined    Gisell Saenz MD  Hospitalist  8/12/2023

## 2023-08-13 NOTE — PLAN OF CARE
Pt alert OX[facility]. Pt on RA. Tylenol PRN given for pain management. CT of knee & X ray done today - Pt medically cleared for discharge - PT eval pending. Pt's family requested for [facility] -  on consult. Safety precautions in place - call light within reach - frequent rounding by nursing staffs. Problem: Patient Centered Care  Goal: Patient preferences are identified and integrated in the patient's plan of care  Description: Interventions:  - What would you like us to know as we care for you? From home with children.  - Provide timely, complete, and accurate information to patient/family  - Incorporate patient and family knowledge, values, beliefs, and cultural backgrounds into the planning and delivery of care  - Encourage patient/family to participate in care and decision-making at the level they choose  - Honor patient and family perspectives and choices  Outcome: Progressing     Problem: Patient/Family Goals  Goal: Patient/Family Long Term Goal  Description: Patient's Long Term Goal: To go home    Interventions:  - Adhere to medication schedule to revert back to control atrial fibrillation.   - Utilize call light to call for assistance. - See additional Care Plan goals for specific interventions  Outcome: Progressing  Goal: Patient/Family Short Term Goal  Description: Patient's Short Term Goal: Free from falls    Interventions:   - Call for assistance when ambulating.   - Hourly rounding done by nursing staff. - See additional Care Plan goals for specific interventions  Outcome: Progressing     Problem: SAFETY ADULT - FALL  Goal: Free from fall injury  Description: INTERVENTIONS:  - Assess pt frequently for physical needs  - Identify cognitive and physical deficits and behaviors that affect risk of falls.   - [place] fall precautions as indicated by assessment.  - Educate pt/family on patient safety including physical limitations  - Instruct pt to call for assistance with activity based on assessment  - Modify environment to reduce risk of injury  - Provide assistive devices as appropriate  - Consider OT/PT consult to assist with strengthening/mobility  - Encourage toileting schedule  Outcome: Progressing     Problem: CARDIOVASCULAR - ADULT  Goal: Maintains optimal cardiac output and hemodynamic stability  Description: INTERVENTIONS:  - Monitor vital signs, rhythm, and trends  - Monitor for bleeding, hypotension and signs of decreased cardiac output  - Evaluate effectiveness of vasoactive medications to optimize hemodynamic stability  - Monitor arterial and/or venous puncture sites for bleeding and/or hematoma  - Assess quality of pulses, skin color and temperature  - Assess for signs of decreased coronary artery perfusion - ex.  Angina  - Evaluate fluid balance, assess for edema, trend weights  Outcome: Progressing  Goal: Absence of cardiac arrhythmias or at baseline  Description: INTERVENTIONS:  - Continuous cardiac monitoring, monitor vital signs, obtain 12 lead EKG if indicated  - Evaluate effectiveness of antiarrhythmic and heart rate control medications as ordered  - Initiate emergency measures for life threatening arrhythmias  - Monitor electrolytes and administer replacement therapy as ordered  Outcome: Progressing     Problem: PAIN - ADULT  Goal: Verbalizes/displays adequate comfort level or patient's stated pain goal  Description: INTERVENTIONS:  - Encourage pt to monitor pain and request assistance  - Assess pain using appropriate pain scale  - Administer analgesics based on type and severity of pain and evaluate response  - Implement non-pharmacological measures as appropriate and evaluate response  - Consider cultural and social influences on pain and pain management  - Manage/alleviate anxiety  - Utilize distraction and/or relaxation techniques  - Monitor for opioid side effects  - Notify MD/LIP if interventions unsuccessful or patient reports new pain  - Anticipate increased pain with activity and pre-medicate as appropriate  Outcome: Progressing     Problem: DISCHARGE PLANNING  Goal: Discharge to home or other facility with appropriate resources  Description: INTERVENTIONS:  - Identify barriers to discharge w/pt and caregiver  - Include patient/family/discharge partner in discharge planning  - Arrange for needed discharge resources and transportation as appropriate  - Identify discharge learning needs (meds, wound care, etc)  - Arrange for interpreters to assist at discharge as needed  - Consider post-discharge preferences of patient/family/discharge partner  - Complete POLST form as appropriate  - Assess patient's ability to be responsible for managing their own health  - Refer to Case Management Department for coordinating discharge planning if the patient needs post-hospital services based on physician/LIP order or complex needs related to functional status, cognitive ability or social support system  Outcome: Progressing

## 2023-08-13 NOTE — PHYSICAL THERAPY NOTE
PHYSICAL THERAPY EVALUATION - INPATIENT     Room Number: 322/322-A  Evaluation Date: 8/13/2023  Type of Evaluation: Initial   Physician Order: PT Eval and Treat    Presenting Problem: A fib with RVR and R knee pain  Co-Morbidities : Severe R knee OA with subacute intraarticular fx  Reason for Therapy: Mobility Dysfunction and Discharge Planning    PHYSICAL THERAPY ASSESSMENT     Patient is a 79year old female admitted 8/9/2023 for New onset of Afib in addition to R knee pain. Patient's current functional deficits include bed mobility, functional tranfers, balance, strength, endurance, gait and stair navigation, which are below the patient's pre-admission status. Patient received supine in bed, agreeable to PT evaluation. Pt found with brace lying on counter - RN requesting education on donning thus instructed pt and RN on donning of hinged knee brace. Per pt's EMR pt to be NWB with knee brace to RLE. RN had conflicting order set from MD stating pt to be TTWB with knee brace unlocked. This PT erroring on side of caution given ortho MD's note stating conservative treatment with consist of NWB with knee brace - \"If an insufficiency fracture demonstrates no significant displacement, then would recommend conservative treatment with bracing and nonweightbearing to allow the bones to heal and then to assess subsequently\"  Pt's knee brace locked into extension and instructed on NWB given MD note copied above. Pt required Max A of 2 people to stand with raised EOB to RW. Once standing she was unable to maintain standing with NWB to RLE for >30 seconds before needing a seated rest. Pt then needing Mod A x2 for sit > supine and supine boost.   Unsafe to attempt ambulation or transfer given inability to maintain NWB to RLE. Pt was previously independent without device for her functional mobility and is now requiring Max A of 2 people for limited mobilization.  She is well below her baseline mobility and is unsafe to return home as the two people she lives with are unable to consistently assist her. I am recommending RAVI to optimize safety and independence. Bed Mobility: Supine > sit with CGA/Min A. Sit > supine with Mod A x2 people to lift Les and lower trunk. Transfers: Sit <> stand with Max A x2 with RW. Assist needed to maintain NWB to RLE. Gait: Unsafe to attempt. The patient's Approx Degree of Impairment: 72.57% has been calculated based on documentation in the [facility] '6 clicks' Inpatient Basic Mobility Short Form. Research supports that patients with this level of impairment may benefit from RAVI. Patient will benefit from continued IP PT services to address these deficits in preparation for discharge. DISCHARGE RECOMMENDATIONS  PT Discharge Recommendations: Sub-acute rehabilitation    PLAN  PT Treatment Plan: Body mechanics; Bed mobility; Don/doff brace; Endurance; Energy conservation;Patient education; Family education;Gait training;Range of motion;Strengthening;Stoop training;Transfer training;Balance training  Rehab Potential : Good  Frequency (Obs): 5x/week       PHYSICAL THERAPY MEDICAL/SOCIAL HISTORY     Problem List  Principal Problem:    Atrial fibrillation, new onset ([address]  Active Problems:    Atrial fibrillation with rapid ventricular response (HCC)      HOME SITUATION  Home Situation  Type of Home: Condo  Home Layout: One level  Stairs to Enter : 0  Lives With: Daughter (Daughter with severe depression and grandson who has aspergers)  Drives: Yes     Prior Level of [place]: Independent with ambulation, ADLs, driving, etc.     SUBJECTIVE  \"My daughter's   from covid and she rarely gets out of bed. Her son, my grandson has Aspergers.      PHYSICAL THERAPY EXAMINATION     OBJECTIVE  Precautions: Cardiac;Limb alert - right;Knee immobilizer  Fall Risk: High fall risk    WEIGHT BEARING RESTRICTION        R Lower Extremity: Non-Weight Bearing       PAIN ASSESSMENT  Rating:  (Not rated)  Location: R knee  Management Techniques: Activity promotion; Body mechanics;Repositioning    COGNITION  Overall Cognitive Status:  WFL - within functional limits    RANGE OF MOTION AND STRENGTH ASSESSMENT  Upper extremity ROM and strength are within functional limits   Lower extremity ROM limited to RLE by pain and MD orders for extension brace  Lower extremity strength limited to jose roberto Les, R>L     BALANCE  Static Sitting: Fair  Dynamic Sitting: Fair  Static Standing: Poor  Dynamic Standing: Not tested      AM-PAC '6-Clicks' INPATIENT SHORT FORM - BASIC MOBILITY  How much difficulty does the patient currently have. .. Patient Difficulty: Turning over in bed (including adjusting bedclothes, sheets and blankets)?: A Little   Patient Difficulty: Sitting down on and standing up from a chair with arms (e.g., wheelchair, bedside commode, etc.): A Lot   Patient Difficulty: Moving from lying on back to sitting on the side of the bed?: A Lot   How much help from another person does the patient currently need. .. Help from Another: Moving to and from a bed to a chair (including a wheelchair)?: A Lot   Help from Another: Need to walk in hospital room?: Total   Help from Another: Climbing 3-5 steps with a railing?: Total     AM-PAC Score:  Raw Score: 11   Approx Degree of Impairment: 72.57%   Standardized Score (AM-PAC Scale): 33.86   CMS Modifier (G-Code): CL    FUNCTIONAL ABILITY STATUS  Functional Mobility/Gait Assessment  Gait Assistance: Not tested      Exercise/Education Provided:  Bed mobility  Body mechanics  Energy conservation  Functional activity tolerated  Gait training  ROM  Strengthening  Transfer training  Brace fitting/donning/doffing  Weight bearing status    Patient End of Session: In bed;Needs met;Call light within reach;RN aware of session/findings;Bracing education provided per handout; All patient questions and concerns addressed    CURRENT GOALS    Goals to be met by: 8/20/23  Patient Goal Patient's self-stated goal is: none stated   Goal #1 Patient is able to demonstrate supine - sit EOB @ level: supervision     Goal #1   Current Status    Goal #2 Patient is able to demonstrate transfers EOB to/from Chair/Wheelchair at assistance level: moderate assistance with walker - rolling     Goal #2  Current Status    Goal #3 Patient is able to ambulate 5 feet with assist device: walker - rolling at assistance level: moderate assistance maintaining precautions per MD   Goal #3   Current Status            Goal #5 Patient to demonstrate independence with home activity/exercise instructions provided to patient in preparation for discharge.    Goal #5   Current Status    Goal #6    Goal #6  Current Status      Patient Evaluation Complexity Level:  History High - 3 or more personal factors and/or co-morbidities   Examination of body systems High - addressing a total of 4 or more elements   Clinical Presentation High - Unstable   Clinical Decision Making High Complexity       Therapeutic Activity: 25 minutes  PT Eval x10 mins

## 2023-08-14 NOTE — CM/SW NOTE
10: 30AM  SW met w/ pt at bedside to f/up in regards to PT recommendation for RAVI and her request to see Dr. Kate Bynum from [facility]. Per pt, she is declining RAVI. She will have her dtr and/or grandson at home upon DC. Pt is agreeable to [facility] and confirmed her choice for [facility] as previously told to [address] [place] [address] on Sunday. Pt confirmed her request to see Dr. Kate Bynum and informed SW she has been seeing him more recently about her knees. Above info relayed to Dr. Jannet Garces. 02:15PM  Received call from Dr. Jannet Garces - she confirmed Dr. Kate Bynum would follow same course as Dr. Karan Reynolds. MD updated pt at bedside. Per Dr. Jannet Garces, pt is now agreeable to RAVI. MD asking about [facility]. SW informed MD that [facility] has no beds available at this time. Also informed MD, DC unlikely to happen today as RAVI referrals need to be sent and reviewed. List then needs to be given for pt and family to review and make a selection. RAVI referrals sent in [facility]. PASRR completed and uploaded. 03:30PM  RAVI list of quality data obtained from [facility]. Presented to pt at bedside. Quality data explained. No questions from pt at this time. Follow up instructions provided. Pt agreed to review list and provide SW w/ final choice. *Alt Plan: Home w/ family & [facility]      PLAN: RAVI  - pending choice      SW/CM to remain available for support and/or discharge planning.          Kaylee Marina, MSW, [facility]

## 2023-08-14 NOTE — PLAN OF CARE
Patient is Ax4, RA and 2 assist standing only. PT recommended rehab. Pt wants another opinion. Call light and belongings at bedside and safety measures in place. DC when medically cleared. Problem: Patient Centered Care  Goal: Patient preferences are identified and integrated in the patient's plan of care  Description: Interventions:  - What would you like us to know as we care for you? From home with children.  - Provide timely, complete, and accurate information to patient/family  - Incorporate patient and family knowledge, values, beliefs, and cultural backgrounds into the planning and delivery of care  - Encourage patient/family to participate in care and decision-making at the level they choose  - Honor patient and family perspectives and choices  Outcome: Progressing     Problem: SAFETY ADULT - FALL  Goal: Free from fall injury  Description: INTERVENTIONS:  - Assess pt frequently for physical needs  - Identify cognitive and physical deficits and behaviors that affect risk of falls. - [place] fall precautions as indicated by assessment.  - Educate pt/family on patient safety including physical limitations  - Instruct pt to call for assistance with activity based on assessment  - Modify environment to reduce risk of injury  - Provide assistive devices as appropriate  - Consider OT/PT consult to assist with strengthening/mobility  - Encourage toileting schedule  Outcome: Progressing     Problem: CARDIOVASCULAR - ADULT  Goal: Maintains optimal cardiac output and hemodynamic stability  Description: INTERVENTIONS:  - Monitor vital signs, rhythm, and trends  - Monitor for bleeding, hypotension and signs of decreased cardiac output  - Evaluate effectiveness of vasoactive medications to optimize hemodynamic stability  - Monitor arterial and/or venous puncture sites for bleeding and/or hematoma  - Assess quality of pulses, skin color and temperature  - Assess for signs of decreased coronary artery perfusion - ex. Angina  - Evaluate fluid balance, assess for edema, trend weights  Outcome: Progressing     Problem: CARDIOVASCULAR - ADULT  Goal: Absence of cardiac arrhythmias or at baseline  Description: INTERVENTIONS:  - Continuous cardiac monitoring, monitor vital signs, obtain 12 lead EKG if indicated  - Evaluate effectiveness of antiarrhythmic and heart rate control medications as ordered  - Initiate emergency measures for life threatening arrhythmias  - Monitor electrolytes and administer replacement therapy as ordered  Outcome: Progressing     Problem: PAIN - ADULT  Goal: Verbalizes/displays adequate comfort level or patient's stated pain goal  Description: INTERVENTIONS:  - Encourage pt to monitor pain and request assistance  - Assess pain using appropriate pain scale  - Administer analgesics based on type and severity of pain and evaluate response  - Implement non-pharmacological measures as appropriate and evaluate response  - Consider cultural and social influences on pain and pain management  - Manage/alleviate anxiety  - Utilize distraction and/or relaxation techniques  - Monitor for opioid side effects  - Notify MD/LIP if interventions unsuccessful or patient reports new pain  - Anticipate increased pain with activity and pre-medicate as appropriate  Outcome: Progressing     Problem: DISCHARGE PLANNING  Goal: Discharge to home or other facility with appropriate resources  Description: INTERVENTIONS:  - Identify barriers to discharge w/pt and caregiver  - Include patient/family/discharge partner in discharge planning  - Arrange for needed discharge resources and transportation as appropriate  - Identify discharge learning needs (meds, wound care, etc)  - Arrange for interpreters to assist at discharge as needed  - Consider post-discharge preferences of patient/family/discharge partner  - Complete POLST form as appropriate  - Assess patient's ability to be responsible for managing their own health  - Refer to Case Management Department for coordinating discharge planning if the patient needs post-hospital services based on physician/LIP order or complex needs related to functional status, cognitive ability or social support system  Outcome: Progressing

## 2023-08-15 ENCOUNTER — PATIENT OUTREACH (OUTPATIENT)
Dept: CASE MANAGEMENT | Age: 83
End: 2023-08-15

## 2023-08-15 VITALS
OXYGEN SATURATION: 96 % | HEART RATE: 94 BPM | WEIGHT: 226 LBS | DIASTOLIC BLOOD PRESSURE: 52 MMHG | TEMPERATURE: 98 F | HEIGHT: 70 IN | SYSTOLIC BLOOD PRESSURE: 112 MMHG | RESPIRATION RATE: 18 BRPM | BODY MASS INDEX: 32.35 KG/M2

## 2023-08-15 PROCEDURE — 99239 HOSP IP/OBS DSCHRG MGMT >30: CPT | Performed by: HOSPITALIST

## 2023-08-15 NOTE — PLAN OF CARE
Problem: Patient Centered Care  Goal: Patient preferences are identified and integrated in the patient's plan of care  Description: Interventions:  - What would you like us to know as we care for you? From home with children.  - Provide timely, complete, and accurate information to patient/family  - Incorporate patient and family knowledge, values, beliefs, and cultural backgrounds into the planning and delivery of care  - Encourage patient/family to participate in care and decision-making at the level they choose  - Honor patient and family perspectives and choices  Outcome: Progressing     Problem: Patient/Family Goals  Goal: Patient/Family Long Term Goal  Description: Patient's Long Term Goal: To go home    Interventions:  - Adhere to medication schedule to revert back to control atrial fibrillation.   - Utilize call light to call for assistance. - See additional Care Plan goals for specific interventions  Outcome: Progressing  Goal: Patient/Family Short Term Goal  Description: Patient's Short Term Goal: Free from falls    Interventions:   - Call for assistance when ambulating.   - Hourly rounding done by nursing staff. - See additional Care Plan goals for specific interventions  Outcome: Progressing     Problem: SAFETY ADULT - FALL  Goal: Free from fall injury  Description: INTERVENTIONS:  - Assess pt frequently for physical needs  - Identify cognitive and physical deficits and behaviors that affect risk of falls.   - [place] fall precautions as indicated by assessment.  - Educate pt/family on patient safety including physical limitations  - Instruct pt to call for assistance with activity based on assessment  - Modify environment to reduce risk of injury  - Provide assistive devices as appropriate  - Consider OT/PT consult to assist with strengthening/mobility  - Encourage toileting schedule  Outcome: Progressing     Problem: CARDIOVASCULAR - ADULT  Goal: Maintains optimal cardiac output and hemodynamic stability  Description: INTERVENTIONS:  - Monitor vital signs, rhythm, and trends  - Monitor for bleeding, hypotension and signs of decreased cardiac output  - Evaluate effectiveness of vasoactive medications to optimize hemodynamic stability  - Monitor arterial and/or venous puncture sites for bleeding and/or hematoma  - Assess quality of pulses, skin color and temperature  - Assess for signs of decreased coronary artery perfusion - ex.  Angina  - Evaluate fluid balance, assess for edema, trend weights  Outcome: Progressing  Goal: Absence of cardiac arrhythmias or at baseline  Description: INTERVENTIONS:  - Continuous cardiac monitoring, monitor vital signs, obtain 12 lead EKG if indicated  - Evaluate effectiveness of antiarrhythmic and heart rate control medications as ordered  - Initiate emergency measures for life threatening arrhythmias  - Monitor electrolytes and administer replacement therapy as ordered  Outcome: Progressing     Problem: PAIN - ADULT  Goal: Verbalizes/displays adequate comfort level or patient's stated pain goal  Description: INTERVENTIONS:  - Encourage pt to monitor pain and request assistance  - Assess pain using appropriate pain scale  - Administer analgesics based on type and severity of pain and evaluate response  - Implement non-pharmacological measures as appropriate and evaluate response  - Consider cultural and social influences on pain and pain management  - Manage/alleviate anxiety  - Utilize distraction and/or relaxation techniques  - Monitor for opioid side effects  - Notify MD/LIP if interventions unsuccessful or patient reports new pain  - Anticipate increased pain with activity and pre-medicate as appropriate  Outcome: Progressing     Problem: DISCHARGE PLANNING  Goal: Discharge to home or other facility with appropriate resources  Description: INTERVENTIONS:  - Identify barriers to discharge w/pt and caregiver  - Include patient/family/discharge partner in discharge planning  - Arrange for needed discharge resources and transportation as appropriate  - Identify discharge learning needs (meds, wound care, etc)  - Arrange for interpreters to assist at discharge as needed  - Consider post-discharge preferences of patient/family/discharge partner  - Complete POLST form as appropriate  - Assess patient's ability to be responsible for managing their own health  - Refer to Case Management Department for coordinating discharge planning if the patient needs post-hospital services based on physician/LIP order or complex needs related to functional status, cognitive ability or social support system  Outcome: Progressing     Pt alert and oriented X 4. Pt on room air. Pt had complaints of left heel pain from a spur, PRN medication given. Safety precautions in place, call light within reach. Plan is discharge today to [facility] senior living.

## 2023-08-15 NOTE — CM/SW NOTE
08/15/23 1146   Discharge disposition   Expected discharge disposition subacute   Post Acute Care Provider [address] services after discharge None   Discharge transportation [address] [place] [address]     The patient received a MDO for discharge. The patient will be transported to [facility] living via [address] [place] [address] at 3:30pm.     Social work notified the RN and left a voicemail for the patients daughter Josue Hodges. Katie Bland Jaxsonchristiano has been notified of transport time. The number to call report is   [phone]. PCS complete. SW/CM to remain available for support and/or discharge planning.      Oneyda Oliveira MSW, [facility]  Discharge Planner [id number]

## 2023-08-15 NOTE — PROGRESS NOTES
Gave [facility] pt's discharge packet. IV and tele removed. All bedside belongings returned. Pt discharging.

## 2023-08-15 NOTE — DISCHARGE SUMMARY
[facility]   Patient ID:  Joseluis Centeno  [id number]  57 year old  9/9/1940    Admit date: 8/9/2023  Discharge date: 8/15/2023  Primary Care Physician: Kiley Traylor MD   Attending Physician: Nelly Garrido MD   Consults:   Consultants         Provider   Role Specialty     Karie Kinney MD  Consulting Physician Interventional, Cardiology     Ry Maurice MD  Consulting Physician Cardiovascular Diseases     Cristofer Gudino MD  Consulting Physician SURGERY, ORTHOPEDIC            Discharge Diagnoses:   Atrial fibrillation, new onset [facility])    Reason for admission  Copied from admission H&P: Patient is an 76-year-old  female who was seen today on a followup appointment with her primary care physician and she was found to be in a fast rhythm, irregular. Sent to the emergency department for evaluation. Upon arrival, she was noted to be in atrial fibrillation with rapid ventricular response rate 130. CBC and chemistry unremarkable. GFR is 34 which is at baseline. Troponin was negative. Prothrombin time is still pending. Patient was started on IV Cardizem drip and was given Lasix, and she will be admitted to the hospital for further management. Hospital Course: # New onset Atrial fibrillation with RVR  # Acute HFpEF   -  cards on consult. - switched to cardizem 120mg daily   - ECHO reviewed EF normal.   - already on warfarin due to h/o VTE goal INR 2.0-3.0   - INR 2.63   - TSH normal. BNP 9461. CXR  - can stop lasix per cards. 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 Subacute insuff fracture involving lateral femoral condyle  - orthopedic surgery on consult. - after mechanical fall.   - CT/XR no displacement. Plans to discharge with brace NWB RLE and fu with ortho in clinic next week. - NWB RLE   - analgesics PRN  - ice. # CKD III  - at baseline      # HTN  - controlled.          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 CHANGE how you take these medications      warfarin 5 MG Tabs  Commonly known as: Coumadin  Take as directed. If you are unsure how to take this medication, talk to your nurse or doctor. Original instructions: Take as directed by the coumadin clinic. Take 1.5 tabs (7.5mg) by mouth Tues, Thurs, Sat and 1 tab (5mg) all other days. What changed:   how much to take  how to take this  when to take this  additional instructions            CONTINUE taking these medications      acetaminophen 325 MG Tabs  Commonly known as: Tylenol     Esomeprazole Magnesium 20 MG Cpdr  Commonly known as: NEXIUM  Take 1 capsule (20 mg total) by mouth every morning before breakfast.     hydroxyurea 500 MG Caps  Commonly known as: Hydrea  TAKE 1 CAPSULE ON SUN/TUES/WEDS/THURS/SAT AND TAKE 2 CAPSULES ON MON/FRI AS DIRECTED     MULTIVITAMIN ADULT OR               Where to Get Your Medications        These medications were sent to [address] [place] [address], [address] [place] [address] [phone], [phone]  [address] [place] [address]      Phone: [phone]   dilTIAZem HCl ER Beads 120 MG Cp24         Activity: activity as tolerated  Diet: regular diet  Wound Care: NA  Code Status: Prior        Discharge Instructions         FU with PCP in 1 week. FU with cards as scheduled. FU with Dr Jeanna Trujillo in 1 week. Nonweight bearing to RLE           Important follow up:    Follow-up Information       Tony Vaughan MD Follow up in 1 week(s). Specialties: Interventional, Cardiology, CARDIOLOGY  Contact information:  [address]               Esther Morales MD Follow up in 1 week(s). Specialty: Internal Medicine  Contact information:  [address] [facility]  [phone]               Cara Zuleta MD Follow up in 1 week(s). Specialty: SURGERY, ORTHOPEDIC  Contact information:  [address] [address] [address] [phone]                             -PCP in [] within 7 days [] within 14 days [] other     Disposition: home  Discharged Condition: good    Hospital Discharge Diagnoses:  A.fib    Lace+ Score: 77  59-90 High Risk  29-58 Medium Risk  0-28   Low Risk. TCM Follow-Up Recommendation:  LACE > 58:  High Risk of readmission after discharge from the hospital.            Total Time Coordinating Care: Greater than 30 minutes    Patient had opportunity to ask questions, state understanding, and agree with therapeutic plan as outlined    Fred Miguel MD  Hospitalist  8/12/2023

## 2023-08-15 NOTE — PROGRESS NOTES
1343: Attempted to call Charles Zimmerman to give report, no answer. Call back number left.      1400: Report given to [employer]

## 2023-08-15 NOTE — PHYSICAL THERAPY NOTE
PHYSICAL THERAPY TREATMENT NOTE - INPATIENT     Room Number: 756/056-B       Presenting Problem: A fib with RVR and R knee pain  Co-Morbidities : Severe R knee OA with subacute intraarticular fx    Problem List  Principal Problem:    Atrial fibrillation, new onset ([address]  Active Problems:    Atrial fibrillation with rapid ventricular response ([address]      PHYSICAL THERAPY ASSESSMENT   Patient is a 79year old female admitted 8/9/2023 for New onset of Afib in addition to R knee pain. Patient's current functional deficits include bed mobility, functional tranfers, balance, strength, endurance, gait and stair navigation, which are below the patient's pre-admission status. Copied from Dr Jamari Eldridge note, order:  A brace has been ordered and we are recommending initial protective weightbearing. In view of the CT/x-ray findings, we will unlock the brace (which was done this morning) and allow foot flat weightbearing as tolerated. Foot flat partial weightbearing in brace with gait training and transfers. Pt ok to see per rn, pt recd in supine, educated in role of PT, goals for session, importance of consistent mobility. Pt is alert and oriented x 4, able to follow all commands. Pt also educated in MD orders for wt bearing above, use of hinged knee brace when up. Brace donned in supine. Pt transferred supine to sit with cga, good sitting balance with no dizziness. /70. Pt stood to rw with min a, gait training with emphasis on PWB right LE, upright posture. Pt able to maintain protective wt bearing. Pt tolerated well with no increase in right knee pain. Pt made comfortable in recliner. Discussed with pt POC, dc recommendation. Pt in agreement, recommend up in chair for all meals, pt also is ambulating with [facility] staff. Rn aware of session and assist needed.      The patient's Approx Degree of Impairment: 57.7% has been calculated based on documentation in the [facility] '6 clicks' Inpatient Basic Mobility Short Form.  Research supports that patients with this level of impairment may benefit from RAVI. DISCHARGE RECOMMENDATIONS  PT Discharge Recommendations: Sub-acute rehabilitation     PLAN  PT Treatment Plan: Body mechanics; Bed mobility; Don/doff brace; Endurance; Energy conservation;Patient education; Family education;Gait training;Range of motion;Strengthening;Stoop training;Transfer training;Balance training  Frequency (Obs): 5x/week      OBJECTIVE  Precautions: Cardiac;Limb alert - right;Knee immobilizer    WEIGHT BEARING RESTRICTION        R Lower Extremity: Partial Weight Bearing       PAIN ASSESSMENT   Rating:  (Not rated)  Location: R knee  Management Techniques: Activity promotion; Body mechanics;Repositioning    BALANCE  Static Sitting: Good  Dynamic Sitting: Good  Static Standing: Poor +  Dynamic Standing: Poor +    ACTIVITY TOLERANCE           BP: 141/70  BP Location: Right arm  BP Method: Automatic  Patient Position: Sitting     O2 WALK       AM-PAC '6-Clicks' INPATIENT SHORT FORM - BASIC MOBILITY  How much difficulty does the patient currently have. .. Patient Difficulty: Turning over in bed (including adjusting bedclothes, sheets and blankets)?: A Little   Patient Difficulty: Sitting down on and standing up from a chair with arms (e.g., wheelchair, bedside commode, etc.): A Little   Patient Difficulty: Moving from lying on back to sitting on the side of the bed?: A Little   How much help from another person does the patient currently need. .. Help from Another: Moving to and from a bed to a chair (including a wheelchair)?: A Little   Help from Another: Need to walk in hospital room?: A Lot   Help from Another: Climbing 3-5 steps with a railing?: Total     AM-PAC Score:  Raw Score: 15   Approx Degree of Impairment: 57.7%   Standardized Score (AM-PAC Scale): 39.45   CMS Modifier (G-Code): CK    FUNCTIONAL ABILITY STATUS  Functional Mobility/Gait Assessment  Gait Assistance:  Moderate assistance  Distance (ft): 10  Assistive Device: Rolling walker  Pattern:  (able to maintain PWB right LE with use of rw)      Patient End of Session: Up in chair;Needs met;Call light within reach;RN aware of session/findings; All patient questions and concerns addressed    CURRENT GOALS   Goals to be met by: 8/20/23  Patient Goal Patient's self-stated goal is: none stated   Goal #1 Patient is able to demonstrate supine - sit EOB @ level: supervision      Goal #1   Current Status     Goal #2 Patient is able to demonstrate transfers EOB to/from Chair/Wheelchair at assistance level: moderate assistance with walker - rolling      Goal #2  Current Status     Goal #3 Patient is able to ambulate 5 feet with assist device: walker - rolling at assistance level: moderate assistance maintaining precautions per MD   Goal #3   Current Status                 Goal #5 Patient to demonstrate independence with home activity/exercise instructions provided to patient in preparation for discharge.    Goal #5   Current Status       Therapeutic Activity: 23 minutes

## 2023-08-15 NOTE — PROGRESS NOTES
TCM chart review. No TCM as patient was discharged to [facility]. Encounter closing.

## 2023-08-22 ENCOUNTER — APPOINTMENT (OUTPATIENT)
Dept: HEMATOLOGY/ONCOLOGY | Facility: HOSPITAL | Age: 83
End: 2023-08-22
Attending: INTERNAL MEDICINE
Payer: MEDICARE

## 2023-08-28 ENCOUNTER — OFFICE VISIT (OUTPATIENT)
Dept: INTERNAL MEDICINE CLINIC | Facility: CLINIC | Age: 83
End: 2023-08-28

## 2023-08-28 VITALS
OXYGEN SATURATION: 98 % | DIASTOLIC BLOOD PRESSURE: 70 MMHG | HEIGHT: 70 IN | BODY MASS INDEX: 32.93 KG/M2 | SYSTOLIC BLOOD PRESSURE: 132 MMHG | WEIGHT: 230 LBS | HEART RATE: 71 BPM

## 2023-08-28 DIAGNOSIS — M25.561 PAIN AND SWELLING OF KNEE, RIGHT: ICD-10-CM

## 2023-08-28 DIAGNOSIS — M25.461 PAIN AND SWELLING OF KNEE, RIGHT: ICD-10-CM

## 2023-08-28 DIAGNOSIS — I48.91 ATRIAL FIBRILLATION, UNSPECIFIED TYPE (HCC): Primary | ICD-10-CM

## 2023-08-28 PROBLEM — M16.9 OSTEOARTHRITIS OF HIP, UNSPECIFIED: Status: ACTIVE | Noted: 2023-08-15

## 2023-08-28 PROCEDURE — 1125F AMNT PAIN NOTED PAIN PRSNT: CPT

## 2023-08-28 PROCEDURE — 1111F DSCHRG MED/CURRENT MED MERGE: CPT

## 2023-08-28 PROCEDURE — 99214 OFFICE O/P EST MOD 30 MIN: CPT

## 2023-08-28 RX ORDER — TRAMADOL HYDROCHLORIDE 50 MG/1
1 TABLET ORAL
COMMUNITY
Start: 2023-08-18 | End: 2023-08-28

## 2023-08-28 RX ORDER — LIDOCAINE 4 G/G
1 PATCH TOPICAL EVERY 24 HOURS
Qty: 20 PATCH | Refills: 0 | Status: SHIPPED | OUTPATIENT
Start: 2023-08-28

## 2023-08-29 ENCOUNTER — OFFICE VISIT (OUTPATIENT)
Dept: HEMATOLOGY/ONCOLOGY | Facility: HOSPITAL | Age: 83
End: 2023-08-29
Attending: INTERNAL MEDICINE
Payer: MEDICARE

## 2023-08-29 ENCOUNTER — TELEPHONE (OUTPATIENT)
Dept: ANTICOAGULATION | Facility: CLINIC | Age: 83
End: 2023-08-29

## 2023-08-29 VITALS
HEART RATE: 69 BPM | RESPIRATION RATE: 18 BRPM | DIASTOLIC BLOOD PRESSURE: 55 MMHG | TEMPERATURE: 98 F | OXYGEN SATURATION: 99 % | WEIGHT: 230.63 LBS | BODY MASS INDEX: 33.02 KG/M2 | HEIGHT: 70 IN | SYSTOLIC BLOOD PRESSURE: 141 MMHG

## 2023-08-29 DIAGNOSIS — D45 POLYCYTHEMIA VERA (HCC): Primary | ICD-10-CM

## 2023-08-29 DIAGNOSIS — T81.718A IATROGENIC PULMONARY EMBOLISM AND INFARCTION, INITIAL ENCOUNTER (HCC): Primary | ICD-10-CM

## 2023-08-29 DIAGNOSIS — Z51.11 CHEMOTHERAPY MANAGEMENT, ENCOUNTER FOR: ICD-10-CM

## 2023-08-29 DIAGNOSIS — I82.403 DEEP VEIN THROMBOSIS (DVT) OF BOTH LOWER EXTREMITIES, UNSPECIFIED CHRONICITY, UNSPECIFIED VEIN (HCC): ICD-10-CM

## 2023-08-29 DIAGNOSIS — Z79.01 MONITORING FOR LONG-TERM ANTICOAGULANT USE: ICD-10-CM

## 2023-08-29 DIAGNOSIS — D45 POLYCYTHEMIA VERA (HCC): ICD-10-CM

## 2023-08-29 DIAGNOSIS — I26.99 IATROGENIC PULMONARY EMBOLISM AND INFARCTION, INITIAL ENCOUNTER (HCC): Primary | ICD-10-CM

## 2023-08-29 DIAGNOSIS — Z51.81 MONITORING FOR LONG-TERM ANTICOAGULANT USE: ICD-10-CM

## 2023-08-29 LAB
ALBUMIN SERPL-MCNC: 3.6 G/DL (ref 3.4–5)
ALBUMIN/GLOB SERPL: 1.2 {RATIO} (ref 1–2)
ALP LIVER SERPL-CCNC: 64 U/L
ALT SERPL-CCNC: 19 U/L
ANION GAP SERPL CALC-SCNC: 7 MMOL/L (ref 0–18)
AST SERPL-CCNC: 18 U/L (ref 15–37)
BASOPHILS # BLD AUTO: 0.06 X10(3) UL (ref 0–0.2)
BASOPHILS NFR BLD AUTO: 1 %
BILIRUB SERPL-MCNC: 0.6 MG/DL (ref 0.1–2)
BUN BLD-MCNC: 23 MG/DL (ref 7–18)
BUN/CREAT SERPL: 16.7 (ref 10–20)
CALCIUM BLD-MCNC: 9.2 MG/DL (ref 8.5–10.1)
CHLORIDE SERPL-SCNC: 109 MMOL/L (ref 98–112)
CO2 SERPL-SCNC: 26 MMOL/L (ref 21–32)
CREAT BLD-MCNC: 1.38 MG/DL
DEPRECATED RDW RBC AUTO: 51.9 FL (ref 35.1–46.3)
EGFRCR SERPLBLD CKD-EPI 2021: 38 ML/MIN/1.73M2 (ref 60–?)
EOSINOPHIL # BLD AUTO: 0.09 X10(3) UL (ref 0–0.7)
EOSINOPHIL NFR BLD AUTO: 1.5 %
ERYTHROCYTE [DISTWIDTH] IN BLOOD BY AUTOMATED COUNT: 12.6 % (ref 11–15)
GLOBULIN PLAS-MCNC: 3 G/DL (ref 2.8–4.4)
GLUCOSE BLD-MCNC: 99 MG/DL (ref 70–99)
HCT VFR BLD AUTO: 39 %
HGB BLD-MCNC: 13 G/DL
IMM GRANULOCYTES # BLD AUTO: 0.02 X10(3) UL (ref 0–1)
IMM GRANULOCYTES NFR BLD: 0.3 %
LYMPHOCYTES # BLD AUTO: 1.16 X10(3) UL (ref 1–4)
LYMPHOCYTES NFR BLD AUTO: 19 %
MCH RBC QN AUTO: 37.1 PG (ref 26–34)
MCHC RBC AUTO-ENTMCNC: 33.3 G/DL (ref 31–37)
MCV RBC AUTO: 111.4 FL
MONOCYTES # BLD AUTO: 0.45 X10(3) UL (ref 0.1–1)
MONOCYTES NFR BLD AUTO: 7.4 %
NEUTROPHILS # BLD AUTO: 4.34 X10 (3) UL (ref 1.5–7.7)
NEUTROPHILS # BLD AUTO: 4.34 X10(3) UL (ref 1.5–7.7)
NEUTROPHILS NFR BLD AUTO: 70.8 %
OSMOLALITY SERPL CALC.SUM OF ELEC: 298 MOSM/KG (ref 275–295)
PLATELET # BLD AUTO: 245 10(3)UL (ref 150–450)
POTASSIUM SERPL-SCNC: 4.5 MMOL/L (ref 3.5–5.1)
PROT SERPL-MCNC: 6.6 G/DL (ref 6.4–8.2)
RBC # BLD AUTO: 3.5 X10(6)UL
SODIUM SERPL-SCNC: 142 MMOL/L (ref 136–145)
WBC # BLD AUTO: 6.1 X10(3) UL (ref 4–11)

## 2023-08-29 PROCEDURE — 85060 BLOOD SMEAR INTERPRETATION: CPT

## 2023-08-29 PROCEDURE — 80053 COMPREHEN METABOLIC PANEL: CPT

## 2023-08-29 PROCEDURE — 36415 COLL VENOUS BLD VENIPUNCTURE: CPT

## 2023-08-29 PROCEDURE — 85025 COMPLETE CBC W/AUTO DIFF WBC: CPT

## 2023-08-29 PROCEDURE — 99215 OFFICE O/P EST HI 40 MIN: CPT | Performed by: INTERNAL MEDICINE

## 2023-08-30 PROBLEM — T81.718A IATROGENIC PULMONARY EMBOLISM AND INFARCTION, INITIAL ENCOUNTER (HCC): Status: ACTIVE | Noted: 2023-08-30

## 2023-08-30 PROBLEM — Z79.01 MONITORING FOR LONG-TERM ANTICOAGULANT USE: Status: ACTIVE | Noted: 2023-08-30

## 2023-08-30 PROBLEM — Z51.81 MONITORING FOR LONG-TERM ANTICOAGULANT USE: Status: ACTIVE | Noted: 2023-08-30

## 2023-08-30 PROBLEM — I26.99 IATROGENIC PULMONARY EMBOLISM AND INFARCTION, INITIAL ENCOUNTER (HCC): Status: ACTIVE | Noted: 2023-08-30

## 2023-09-12 ENCOUNTER — TELEPHONE (OUTPATIENT)
Dept: INTERNAL MEDICINE CLINIC | Facility: CLINIC | Age: 83
End: 2023-09-12

## 2023-09-12 ENCOUNTER — ANTI-COAG VISIT (OUTPATIENT)
Dept: ANTICOAGULATION | Facility: CLINIC | Age: 83
End: 2023-09-12

## 2023-09-12 DIAGNOSIS — T81.718A IATROGENIC PULMONARY EMBOLISM AND INFARCTION, INITIAL ENCOUNTER (HCC): ICD-10-CM

## 2023-09-12 DIAGNOSIS — I26.99 IATROGENIC PULMONARY EMBOLISM AND INFARCTION, INITIAL ENCOUNTER (HCC): ICD-10-CM

## 2023-09-12 DIAGNOSIS — Z79.01 MONITORING FOR LONG-TERM ANTICOAGULANT USE: Primary | ICD-10-CM

## 2023-09-12 DIAGNOSIS — Z51.81 MONITORING FOR LONG-TERM ANTICOAGULANT USE: Primary | ICD-10-CM

## 2023-09-12 LAB — INR: 2.7 (ref 0.8–1.2)

## 2023-09-21 ENCOUNTER — OFFICE VISIT (OUTPATIENT)
Dept: ORTHOPEDICS CLINIC | Facility: CLINIC | Age: 83
End: 2023-09-21

## 2023-09-21 ENCOUNTER — HOSPITAL ENCOUNTER (OUTPATIENT)
Dept: GENERAL RADIOLOGY | Facility: HOSPITAL | Age: 83
Discharge: HOME | End: 2023-09-21
Attending: ORTHOPAEDIC SURGERY
Payer: MEDICARE

## 2023-09-21 DIAGNOSIS — T14.8XXA CONTUSION OF BONE: ICD-10-CM

## 2023-09-21 DIAGNOSIS — M17.11 PRIMARY OSTEOARTHRITIS OF RIGHT KNEE: Primary | ICD-10-CM

## 2023-09-21 DIAGNOSIS — M25.561 RIGHT KNEE PAIN, UNSPECIFIED CHRONICITY: ICD-10-CM

## 2023-09-21 PROCEDURE — 73564 X-RAY EXAM KNEE 4 OR MORE: CPT | Performed by: ORTHOPAEDIC SURGERY

## 2023-09-21 PROCEDURE — 99213 OFFICE O/P EST LOW 20 MIN: CPT | Performed by: ORTHOPAEDIC SURGERY

## 2023-09-21 NOTE — PROGRESS NOTES
NURSING INTAKE COMMENTS: Patient presents with: Follow - Up: Bilateral knee -  Pt states she fell August 1st, she was going down hill and fell backwards. Pt states she has a broken bone. Pt went to  and  had MRI , CT,and XR. Rates pain 5/10. Pt has swelling of knee. Pt states she re- injured 10 days ago she drove and pain was worse. HPI: This 79year old female presents today with complaints of right knee pain. She had an injury whereby she fell onto both knees back on August 1 or nearly 2 months ago. She was hospitalized briefly. She had an MRI which showed a bone marrow lesion of the lateral femoral condyle. She was in a hinged knee brace for short period of time. Currently she has mild ongoing pain over the anterior knee mainly medially. She feels some discomfort with weightbearing. She does use a walker for ambulation.     Past Medical History:   Diagnosis Date    Acid reflux     Acute medial meniscus tear of right knee 3/6/2018    Acute medial meniscus tear of right knee 3/6/2018    Acute midline low back pain with left-sided sciatica 1/15/2019    Age-related nuclear cataract of both eyes 11/9/2017    Arthritis     Degenerative disc disease, lumbar 1/15/2019    Diverticulosis large intestine w/o perforation or abscess w/o bleeding 9/8/2017    Diverticulosis large intestine w/o perforation or abscess w/o bleeding 9/8/2017    Family history of glaucoma in mother 11/27/2018    Family history of glaucoma in mother 11/27/2018    Family history of macular degeneration 11/9/2017    Gastric polyps 9/8/2017    Gastric polyps 9/8/2017    Hiatal hernia     History of hip replacement, total     right hip    Iatrogenic pulmonary embolism and infarction, initial encounter ([address] 8/4/2022    Internal hemorrhoids 9/8/2017    Kidney problem     Long term (current) use of anticoagulants 7/13/2021    Lumbar herniated disc 1/24/2019    Lung cancer ([address] 6-2016    Obesity, unspecified     Osteoarthrosis 5/8/2014 Osteoarthrosis, unspecified whether generalized or localized, unspecified site     Post-menopausal bleeding     Primary osteoarthritis of left knee 3/23/2017    Primary osteoarthritis of right knee 3/27/2018    Pulmonary embolism ([address]     S/P colonoscopy with polypectomy 2017    Spinal stenosis of lumbar region without neurogenic claudication 2019    Spondylolisthesis of lumbar region 1/15/2019    Thyroid condition      Past Surgical History:   Procedure Laterality Date    CHOLECYSTECTOMY      COLONOSCOPY      COLONOSCOPY N/A 2017    Procedure: COLONOSCOPY;  Surgeon: Rola Wei MD;  Location: [facility] ENDOSCOPY    EGD      FOOT SURGERY Bilateral     Bunion Surgery X6    FOOT/TOES SURGERY PROC UNLISTED Left     Pt had surgery to straighten toes on left foot. HIP REPLACEMENT SURGERY Right     HIP SURGERY Right 2015    right total hip arthroplasty    HYSTERECTOMY      LAPAROSCOPIC CHOLECYSTECTOMY            x7 (Twins x1) Max weight 9 1/2 lbs    OTHER SURGICAL HISTORY      parotid gland removed left benigh    OTHER SURGICAL HISTORY      Removal Skin Mole    OTHER SURGICAL HISTORY      SAB/ D&C    OTHER SURGICAL HISTORY      Ear Surgery    OTHER SURGICAL HISTORY      Neg EM Bx    REMOVAL OF LUNG,LOBECTOMY Left     Pt had upper left lobe removed. Current Outpatient Medications   Medication Sig Dispense Refill    lidocaine ( LIDOCAINE PATCH) 4 % External Patch Place 1 patch onto the skin daily. 20 patch 0    dilTIAZem HCl ER Beads 120 MG Oral Capsule SR 24 Hr Take 1 capsule (120 mg total) by mouth daily. 30 capsule 11    hydroxyurea 500 MG Oral Cap TAKE 1 CAPSULE ON SUN/TUES/WEDS/THURS/SAT AND TAKE 2 CAPSULES ON MON/FRI AS DIRECTED 117 capsule 0    warfarin 5 MG Oral Tab Take as directed by the coumadin clinic. Take 1.5 tabs (7.5mg) by mouth es, Th, Sat and 1 tab (5mg) all other days. (Patient taking differently: Take 1 tablet (5 mg total) by mouth As Directed.  Take as directed by the coumadin clinic. Take 1.5 tabs (7.5mg) by mouth Tues, Thurs and 1 tab (5mg) all other days per Coumadin Clinic) 120 tablet 1    Esomeprazole Magnesium 20 MG Oral Capsule Delayed Release Take 1 capsule (20 mg total) by mouth every morning before breakfast. 30 capsule 0    Multiple Vitamins-Minerals (MULTIVITAMIN ADULT OR) Take 1 tablet by mouth daily. acetaminophen (TYLENOL) 325 MG Oral Tab Take 500 mg by mouth every 6 (six) hours as needed for Pain. Fentanyl                ANAPHYLAXIS  Hydrocodone             RASH  Morphine                OTHER (SEE COMMENTS)    Comment:Chest tightness  Phenol                  ANAPHYLAXIS  Family History   Problem Relation Age of Onset    Cancer Father 80        Lung  -  smoker    Macular degeneration Mother     Glaucoma Mother     Other (Alzheimer's Disease) Mother 80    Other (Bladder Cancer) Mother     Macular degeneration Brother     Other (Pancreatic Cancer) Sister 68    Macular degeneration Brother     Other (Myocardial Infarction) Brother 71    Macular degeneration Maternal Aunt     Glaucoma Maternal Aunt     Breast Cancer Daughter 50    Diabetes Neg        Social History    Occupational History      Not on file    Tobacco Use      Smoking status: Never      Smokeless tobacco: Never    Substance and Sexual Activity      Alcohol use:  Yes        Alcohol/week: 0.8 standard drinks of alcohol        Types: 1 Glasses of wine per week        Comment: 1-2x/month      Drug use: No      Sexual activity: Not on file       Review of Systems:  GENERAL: denies fevers, chills, night sweats, fatigue, unintentional weight loss/gain  SKIN: denies skin lesions, open sores, rash  HEENT:denies recent vision change, new nasal congestion,hearing loss, tinnitus, sore throat, headaches  RESPIRATORY: denies new shortness of breath, cough, asthma, wheezing  CARDIOVASCULAR: denies chest pain, leg cramps with exertion, palpitations, leg swelling  GI: denies abdominal pain, nausea, vomiting, diarrhea, constipation, hematochezia, worsening heartburn or stomach ulcers  : denies dysuria, hematuria, incontinence, increased frequency, urgency, difficulty urinating  MUSCULOSKELETAL: denies musculoskeletal complaints other than in HPI  NEURO: denies numbness, tingling, weakness, balance issues, dizziness, memory loss  PSYCHIATRIC: denies Hx of depression, anxiety, other psychiatric disorders  HEMATOLOGIC: denies blood clots, anemia, blood clotting disorders, blood transfusion  ENDOCRINE: denies autoimmune disease, thyroid issues, or diabetes  ALLERGY: denies asthma, seasonal allergies    Physical Examination:    There were no vitals taken for this visit. Constitutional: appears well hydrated, alert and responsive, no acute distress noted  Extremities: Right knee trace effusion. Tender at the medial joint line. Range of motion 0 to 120 degrees. Good varus valgus stability at 30 degrees and full extension. No pain with valgus stress. Lachman's and posterior drawer negative. Mild patellofemoral crepitus. No pain with passive range of motion of the hip. Neurological: Light touch and pinprick sensation intact throughout the lower extremities. Ankle dorsiflexion plantarflexion EHL knee extension and hip flexion strength are 5 out of 5 bilaterally. No clonus. Imaging:   X-rays of the right knee show tricompartmental degenerative changes most pronounced in the lateral compartment with significant joint space narrowing and osteophyte formation.     Labs:  Lab Results   Component Value Date    WBC 6.1 08/29/2023    HGB 13.0 08/29/2023    .0 08/29/2023      Lab Results   Component Value Date    GLU 99 08/29/2023    BUN 23 (H) 08/29/2023    CREATSERUM 1.38 (H) 08/29/2023    GFR 40 (L) 10/27/2015    GFRNAA 43 (L) 05/09/2022    GFRAA 49 (L) 05/09/2022        Assessment and Plan:  Diagnoses and all orders for this visit:    Primary osteoarthritis of right knee    Right knee pain, unspecified chronicity  -     XR KNEE, COMPLETE (4 OR MORE VIEWS), RIGHT ([id number]=[id number]); Future    Contusion of bone        Assessment: Right knee osteoarthritis, primary, bone marrow lesion lateral femoral condyle    Plan: I advised gradual return to activity as tolerated. Suggested icing and oral anti-inflammatories. Consider an intra-articular injection if symptoms continue beyond the next 4 weeks. Follow-up with me again in 6 weeks or as needed. Advised continued in-home physical therapy for stretching and strengthening. Follow Up: Return in about 6 weeks (around 11/2/2023).     Aleksandra Mcneil MD

## 2023-09-27 ENCOUNTER — ANTI-COAG VISIT (OUTPATIENT)
Dept: ANTICOAGULATION | Facility: CLINIC | Age: 83
End: 2023-09-27

## 2023-09-27 ENCOUNTER — TELEPHONE (OUTPATIENT)
Dept: INTERNAL MEDICINE CLINIC | Facility: CLINIC | Age: 83
End: 2023-09-27

## 2023-09-27 DIAGNOSIS — I26.99 IATROGENIC PULMONARY EMBOLISM AND INFARCTION, INITIAL ENCOUNTER (HCC): ICD-10-CM

## 2023-09-27 DIAGNOSIS — Z51.81 MONITORING FOR LONG-TERM ANTICOAGULANT USE: Primary | ICD-10-CM

## 2023-09-27 DIAGNOSIS — T81.718A IATROGENIC PULMONARY EMBOLISM AND INFARCTION, INITIAL ENCOUNTER (HCC): ICD-10-CM

## 2023-09-27 DIAGNOSIS — Z79.01 MONITORING FOR LONG-TERM ANTICOAGULANT USE: Primary | ICD-10-CM

## 2023-09-27 LAB — INR: 3.1 (ref 0.8–1.2)

## 2023-09-27 NOTE — TELEPHONE ENCOUNTER
[address] called, verified patient's Name and . She states that the patient's PT/INR is 36 and 3.1, respectively.  Patient is on warfarin 5 mg daily, except Tuesday and Thursday 7.5 mg.     Routed to Coumadin Clinic

## 2023-09-27 NOTE — PROGRESS NOTES
INR result received from [facility]. Detailed message left on confidential voicemail for Jovanni Ignacio- [facility]. Number left for her to return call to coumadin clinic for any additional questions. INR is minimally above therapeutic goal range. Per protocol, continue current dose and recheck INR in 2 weeks.

## 2023-09-27 NOTE — TELEPHONE ENCOUNTER
Detailed message left on confidential voicemail for Stiven Haus- [facility]. Number left for her to return call to coumadin clinic for any additional questions. See Anticoag note.

## 2023-10-05 ENCOUNTER — TELEPHONE (OUTPATIENT)
Dept: INTERNAL MEDICINE CLINIC | Facility: CLINIC | Age: 83
End: 2023-10-05

## 2023-10-05 NOTE — TELEPHONE ENCOUNTER
Angi physical therapist from [facility] called to give the doctor an fyi. That patient rescheduled last weeks physical therapy to this week due scheduling conflicts. Any questions can contact her.

## 2023-10-10 ENCOUNTER — TELEPHONE (OUTPATIENT)
Dept: INTERNAL MEDICINE CLINIC | Facility: CLINIC | Age: 83
End: 2023-10-10

## 2023-10-10 ENCOUNTER — ANTI-COAG VISIT (OUTPATIENT)
Dept: ANTICOAGULATION | Facility: CLINIC | Age: 83
End: 2023-10-10

## 2023-10-10 DIAGNOSIS — I26.99 IATROGENIC PULMONARY EMBOLISM AND INFARCTION, INITIAL ENCOUNTER (HCC): ICD-10-CM

## 2023-10-10 DIAGNOSIS — T81.718A IATROGENIC PULMONARY EMBOLISM AND INFARCTION, INITIAL ENCOUNTER (HCC): ICD-10-CM

## 2023-10-10 DIAGNOSIS — Z79.01 MONITORING FOR LONG-TERM ANTICOAGULANT USE: Primary | ICD-10-CM

## 2023-10-10 DIAGNOSIS — Z51.81 MONITORING FOR LONG-TERM ANTICOAGULANT USE: Primary | ICD-10-CM

## 2023-10-10 LAB — INR: 3 (ref 2–3)

## 2023-10-10 NOTE — TELEPHONE ENCOUNTER
Nurse Lyudmila Kemp reported    PT-36.4    INR-3  Pt takes Warfarin 7.5 Tues/Thurs  other days is 5 mg

## 2023-10-10 NOTE — PROGRESS NOTES
[facility] [phone]   HHC / INR 3.0,  therapeutic. (Goal 2.5 )    Etiology: stable. No reported changes. Pt currently has Jass Justin. Recheck INR 2 weeks. Or 1 week. Pt reports: Any missed doses: No   Medications changes: No    Spoke to: Dayna RN, detailed message  left on voicemail. who verbalized understanding and agreement. Plan per protocol: 7.5 mg every Tue, Thu; 5 mg all other days           [phone] is the emergency after hours. Number.

## 2023-10-16 ENCOUNTER — TELEPHONE (OUTPATIENT)
Dept: INTERNAL MEDICINE CLINIC | Facility: CLINIC | Age: 83
End: 2023-10-16

## 2023-10-16 NOTE — TELEPHONE ENCOUNTER
Would like to let provider know that patient   Plan is to discharge of physical therapy this week, patient is doing well but will continue with nursing assistance.

## 2023-10-19 ENCOUNTER — TELEPHONE (OUTPATIENT)
Dept: INTERNAL MEDICINE CLINIC | Facility: CLINIC | Age: 83
End: 2023-10-19

## 2023-10-20 ENCOUNTER — TELEPHONE (OUTPATIENT)
Dept: ORTHOPEDICS CLINIC | Facility: CLINIC | Age: 83
End: 2023-10-20

## 2023-10-20 NOTE — TELEPHONE ENCOUNTER
S/w patient- patient seen on 9/21/23 in our clinic following right knee injury. Patient states that she is working on re-applying for drivers license and she needs a note from ortho doctor stating that she can apply given her current orthopedic condition. She has follow up this week on 10/26/23. Please advise if we can create note for patient or should we wait to assess on Thursday for note.     Please advise

## 2023-10-20 NOTE — TELEPHONE ENCOUNTER
Pt called to request a note stating pt is able to apply for a drivers license. Mail to pt's home address.

## 2023-10-23 NOTE — TELEPHONE ENCOUNTER
Left message for patient informing her that she will be assessed on Thursday and she will be assessed for note at that time

## 2023-10-24 ENCOUNTER — ANTI-COAG VISIT (OUTPATIENT)
Dept: ANTICOAGULATION | Facility: CLINIC | Age: 83
End: 2023-10-24

## 2023-10-24 DIAGNOSIS — Z51.81 MONITORING FOR LONG-TERM ANTICOAGULANT USE: Primary | ICD-10-CM

## 2023-10-24 DIAGNOSIS — I26.99 IATROGENIC PULMONARY EMBOLISM AND INFARCTION, INITIAL ENCOUNTER (HCC): ICD-10-CM

## 2023-10-24 DIAGNOSIS — T81.718A IATROGENIC PULMONARY EMBOLISM AND INFARCTION, INITIAL ENCOUNTER (HCC): ICD-10-CM

## 2023-10-24 DIAGNOSIS — Z79.01 MONITORING FOR LONG-TERM ANTICOAGULANT USE: Primary | ICD-10-CM

## 2023-10-24 LAB — INR: 2.8 (ref 0.8–1.2)

## 2023-10-24 NOTE — PROGRESS NOTES
INR result received from [facility] Justin. Detailed message left on confidential voicemail for [facility]. Number left for her to return call to coumadin clinic for any additional questions. Per protocol, continue current dose and recheck INR in 2 weeks.      7.5 mg every Tue, Thu; 5 mg all other days

## 2023-10-26 ENCOUNTER — HOSPITAL ENCOUNTER (OUTPATIENT)
Dept: GENERAL RADIOLOGY | Facility: HOSPITAL | Age: 83
Discharge: HOME | End: 2023-10-26
Attending: ORTHOPAEDIC SURGERY

## 2023-10-26 ENCOUNTER — OFFICE VISIT (OUTPATIENT)
Dept: ORTHOPEDICS CLINIC | Facility: CLINIC | Age: 83
End: 2023-10-26

## 2023-10-26 VITALS — DIASTOLIC BLOOD PRESSURE: 68 MMHG | HEART RATE: 83 BPM | SYSTOLIC BLOOD PRESSURE: 123 MMHG

## 2023-10-26 DIAGNOSIS — M17.0 PRIMARY OSTEOARTHRITIS OF BOTH KNEES: Primary | ICD-10-CM

## 2023-10-26 DIAGNOSIS — M25.569 KNEE PAIN, UNSPECIFIED CHRONICITY, UNSPECIFIED LATERALITY: ICD-10-CM

## 2023-10-26 PROCEDURE — 99213 OFFICE O/P EST LOW 20 MIN: CPT | Performed by: ORTHOPAEDIC SURGERY

## 2023-10-26 PROCEDURE — 20610 DRAIN/INJ JOINT/BURSA W/O US: CPT

## 2023-10-26 PROCEDURE — 73564 X-RAY EXAM KNEE 4 OR MORE: CPT | Performed by: ORTHOPAEDIC SURGERY

## 2023-10-26 PROCEDURE — 1125F AMNT PAIN NOTED PAIN PRSNT: CPT | Performed by: ORTHOPAEDIC SURGERY

## 2023-10-26 RX ORDER — TRIAMCINOLONE ACETONIDE 40 MG/ML
40 INJECTION, SUSPENSION INTRA-ARTICULAR; INTRAMUSCULAR
Status: COMPLETED | OUTPATIENT
Start: 2023-10-26 | End: 2023-10-26

## 2023-10-26 RX ADMIN — TRIAMCINOLONE ACETONIDE 40 MG: 40 INJECTION, SUSPENSION INTRA-ARTICULAR; INTRAMUSCULAR at 14:50:00

## 2023-10-26 RX ADMIN — TRIAMCINOLONE ACETONIDE 40 MG: 40 INJECTION, SUSPENSION INTRA-ARTICULAR; INTRAMUSCULAR at 14:45:00

## 2023-10-26 NOTE — PROGRESS NOTES
NURSING INTAKE COMMENTS: Patient presents with:  Knee Pain: Bilateral knees f/u-  only L knee pain 6/10 on and off-  no R knee pain- pt requesting a note for Drivers License and also here for jose roberto knees injection- last inj was on 6/15/2023 that helped for a month and half      HPI: This 79year old female presents today with complaints of bilateral knee pain. Her right knee injury seems to have healed. She has minimal discomfort along the lateral aspect of the knee. Her main complaint now is left knee pain. She feels she has been protecting the right knee by bearing more weight on the left knee. No history of injury to the left knee. She has had prior cortisone injections in both knees which seem to help.     Past Medical History:   Diagnosis Date    Acid reflux     Acute medial meniscus tear of right knee 3/6/2018    Acute medial meniscus tear of right knee 3/6/2018    Acute midline low back pain with left-sided sciatica 1/15/2019    Age-related nuclear cataract of both eyes 11/9/2017    Arthritis     Degenerative disc disease, lumbar 1/15/2019    Diverticulosis large intestine w/o perforation or abscess w/o bleeding 9/8/2017    Diverticulosis large intestine w/o perforation or abscess w/o bleeding 9/8/2017    Family history of glaucoma in mother 11/27/2018    Family history of glaucoma in mother 11/27/2018    Family history of macular degeneration 11/9/2017    Gastric polyps 9/8/2017    Gastric polyps 9/8/2017    Hiatal hernia     History of hip replacement, total     right hip    Iatrogenic pulmonary embolism and infarction, initial encounter ([facility] [address] 8/4/2022    Internal hemorrhoids 9/8/2017    Kidney problem     Long term (current) use of anticoagulants 7/13/2021    Lumbar herniated disc 1/24/2019    Lung cancer ([address] 6-2016    Obesity, unspecified     Osteoarthrosis 5/8/2014    Osteoarthrosis, unspecified whether generalized or localized, unspecified site     Post-menopausal bleeding     Primary osteoarthritis of left knee 3/23/2017    Primary osteoarthritis of right knee 3/27/2018    Pulmonary embolism ([facility] [address]     S/P colonoscopy with polypectomy 2017    Spinal stenosis of lumbar region without neurogenic claudication 2019    Spondylolisthesis of lumbar region 1/15/2019    Thyroid condition      Past Surgical History:   Procedure Laterality Date    CHOLECYSTECTOMY      COLONOSCOPY      COLONOSCOPY N/A 2017    Procedure: COLONOSCOPY;  Surgeon: Shira Muñoz MD;  Location: [facility] ENDOSCOPY    EGD      FOOT SURGERY Bilateral     Bunion Surgery X6    FOOT/TOES SURGERY PROC UNLISTED Left     Pt had surgery to straighten toes on left foot. HIP REPLACEMENT SURGERY Right     HIP SURGERY Right 2015    right total hip arthroplasty    HYSTERECTOMY      LAPAROSCOPIC CHOLECYSTECTOMY            x7 (Twins x1) Max weight 9 1/2 lbs    OTHER SURGICAL HISTORY      parotid gland removed left benigh    OTHER SURGICAL HISTORY      Removal Skin Mole    OTHER SURGICAL HISTORY      SAB/ D&C    OTHER SURGICAL HISTORY      Ear Surgery    OTHER SURGICAL HISTORY      Neg EM Bx    REMOVAL OF LUNG,LOBECTOMY Left 2016    Pt had upper left lobe removed. Current Outpatient Medications   Medication Sig Dispense Refill    lidocaine ( LIDOCAINE PATCH) 4 % External Patch Place 1 patch onto the skin daily. 20 patch 0    dilTIAZem HCl ER Beads 120 MG Oral Capsule SR 24 Hr Take 1 capsule (120 mg total) by mouth daily. 30 capsule 11    hydroxyurea 500 MG Oral Cap TAKE 1 CAPSULE ON SUN/TU/WEDS/THURS/SAT AND TAKE 2 CAPSULES ON MON/FRI AS DIRECTED 117 capsule 0    warfarin 5 MG Oral Tab Take as directed by the coumadin clinic. Take 1.5 tabs (7.5mg) by mouth Tues, Thurs, Sat and 1 tab (5mg) all other days. (Patient taking differently: Take 1 tablet (5 mg total) by mouth As Directed. Take as directed by the coumadin clinic.  Take 1.5 tabs (7.5mg) by mouth Tues, Thurs and 1 tab (5mg) all other days per Coumadin Clinic) 120 tablet 1    Esomeprazole Magnesium 20 MG Oral Capsule Delayed Release Take 1 capsule (20 mg total) by mouth every morning before breakfast. 30 capsule 0    Multiple Vitamins-Minerals (MULTIVITAMIN ADULT OR) Take 1 tablet by mouth daily. acetaminophen (TYLENOL) 325 MG Oral Tab Take 500 mg by mouth every 6 (six) hours as needed for Pain. Fentanyl                ANAPHYLAXIS  Hydrocodone             RASH  Morphine                OTHER (SEE COMMENTS)    Comment:Chest tightness  Phenol                  ANAPHYLAXIS  Family History   Problem Relation Age of Onset    Cancer Father 80        Lung  -  smoker    Macular degeneration Mother     Glaucoma Mother     Other (Alzheimer's Disease) Mother 80    Other (Bladder Cancer) Mother     Macular degeneration Brother     Other (Pancreatic Cancer) Sister 68    Macular degeneration Brother     Other (Myocardial Infarction) Brother 71    Macular degeneration Maternal Aunt     Glaucoma Maternal Aunt     Breast Cancer Daughter 50    Diabetes Neg        Social History    Occupational History      Not on file    Tobacco Use      Smoking status: Never      Smokeless tobacco: Never    Substance and Sexual Activity      Alcohol use:  Yes        Alcohol/week: 0.8 standard drinks of alcohol        Types: 1 Glasses of wine per week        Comment: 1-2x/month      Drug use: No      Sexual activity: Not on file       Review of Systems:  GENERAL: denies fevers, chills, night sweats, fatigue, unintentional weight loss/gain  SKIN: denies skin lesions, open sores, rash  HEENT:denies recent vision change, new nasal congestion,hearing loss, tinnitus, sore throat, headaches  RESPIRATORY: denies new shortness of breath, cough, asthma, wheezing  CARDIOVASCULAR: denies chest pain, leg cramps with exertion, palpitations, leg swelling  GI: denies abdominal pain, nausea, vomiting, diarrhea, constipation, hematochezia, worsening heartburn or stomach ulcers  : denies dysuria, hematuria, incontinence, increased frequency, urgency, difficulty urinating  MUSCULOSKELETAL: denies musculoskeletal complaints other than in HPI  NEURO: denies numbness, tingling, weakness, balance issues, dizziness, memory loss  PSYCHIATRIC: denies Hx of depression, anxiety, other psychiatric disorders  HEMATOLOGIC: denies blood clots, anemia, blood clotting disorders, blood transfusion  ENDOCRINE: denies autoimmune disease, thyroid issues, or diabetes  ALLERGY: denies asthma, seasonal allergies    Physical Examination:    /68   Pulse 83   Constitutional: appears well hydrated, alert and responsive, no acute distress noted  Extremities: Bilateral knees with valgus deformities. Tender at the lateral joint line bilaterally. Trace effusion in each knee. Good ligament stability bilaterally. No pain with passive range of motion of the hips. Neurological: Light touch and pinprick sensation intact throughout the lower extremities. Ankle dorsiflexion plantarflexion EHL knee extension and hip flexion strength are 5 out of 5 bilaterally. No clonus. Imaging:   X-rays both knees show severe degenerative change lateral compartment with complete joint space narrowing and osteophyte formation. No evidence of the prior fracture. Labs:  Lab Results   Component Value Date    WBC 6.1 08/29/2023    HGB 13.0 08/29/2023    .0 08/29/2023      Lab Results   Component Value Date    GLU 99 08/29/2023    BUN 23 (H) 08/29/2023    CREATSERUM 1.38 (H) 08/29/2023    GFR 40 (L) 10/27/2015    GFRNAA 43 (L) 05/09/2022    GFRAA 49 (L) 05/09/2022        Assessment and Plan:  Diagnoses and all orders for this visit:    Primary osteoarthritis of both knees  -     arthrocentesis major joint  -     triamcinolone acetonide (Kenalog-40) 40 MG/ML injection 40 mg    Knee pain, unspecified chronicity, unspecified laterality  -     XR KNEE, COMPLETE (4 OR MORE VIEWS), LEFT (VXY=[id number]);  Future        Assessment: Bilateral knee osteoarthritis, primary, lateral compartment    Plan: I discussed operative and nonoperative treatment options. She is not interested in surgery at this stage in her life. Advised icing, oral anti-inflammatories, activity modifications, home exercises. Both knees were injected with 40 mg of Kenalog today. Recommend follow-up again as needed. She had questions concerning her 's test.  From my standpoint she can sit for a 's test.  She is ambulating with a cane and therefore should be capable of operating a vehicle. Follow-up again as needed. Follow Up: Return if symptoms worsen or fail to improve.     Leobardo Pitts MD

## 2023-10-26 NOTE — PROGRESS NOTES
Per verbal order from Dr. Von Welch draw up 2 syringes of 3ml of 0.5% Marcaine & 2ml 1% lidocaine and 1ml of Kenalog 40 for cortisone injection to bilateral knee Kapil Mejia LPN    Patient provided education handout for cortisone injection.

## 2023-10-27 ENCOUNTER — TELEPHONE (OUTPATIENT)
Dept: INTERNAL MEDICINE CLINIC | Facility: CLINIC | Age: 83
End: 2023-10-27

## 2023-10-27 NOTE — TELEPHONE ENCOUNTER
Pt would like schedule a follow up appointment with the coumadin clinic . Pt has an appointment on Monday 10-30-23 at 1:30 to NP/Kevyn and would like to know if the can see the coumadin nurse around the same time.

## 2023-10-30 ENCOUNTER — ANTI-COAG VISIT (OUTPATIENT)
Dept: ANTICOAGULATION | Facility: CLINIC | Age: 83
End: 2023-10-30

## 2023-10-30 ENCOUNTER — MED REC SCAN ONLY (OUTPATIENT)
Dept: INTERNAL MEDICINE CLINIC | Facility: CLINIC | Age: 83
End: 2023-10-30

## 2023-10-30 ENCOUNTER — OFFICE VISIT (OUTPATIENT)
Dept: INTERNAL MEDICINE CLINIC | Facility: CLINIC | Age: 83
End: 2023-10-30

## 2023-10-30 VITALS
OXYGEN SATURATION: 96 % | HEART RATE: 97 BPM | HEIGHT: 70 IN | WEIGHT: 231 LBS | SYSTOLIC BLOOD PRESSURE: 132 MMHG | BODY MASS INDEX: 33.07 KG/M2 | DIASTOLIC BLOOD PRESSURE: 72 MMHG

## 2023-10-30 DIAGNOSIS — I48.91 ATRIAL FIBRILLATION, UNSPECIFIED TYPE (HCC): ICD-10-CM

## 2023-10-30 DIAGNOSIS — M25.561 PAIN AND SWELLING OF KNEE, RIGHT: ICD-10-CM

## 2023-10-30 DIAGNOSIS — I48.91 ATRIAL FIBRILLATION, NEW ONSET (HCC): ICD-10-CM

## 2023-10-30 DIAGNOSIS — R49.9 CHANGE IN VOICE: ICD-10-CM

## 2023-10-30 DIAGNOSIS — Z51.81 MONITORING FOR LONG-TERM ANTICOAGULANT USE: Primary | ICD-10-CM

## 2023-10-30 DIAGNOSIS — Z79.01 MONITORING FOR LONG-TERM ANTICOAGULANT USE: Primary | ICD-10-CM

## 2023-10-30 DIAGNOSIS — I26.99 IATROGENIC PULMONARY EMBOLISM AND INFARCTION, INITIAL ENCOUNTER (HCC): ICD-10-CM

## 2023-10-30 DIAGNOSIS — R60.0 BILATERAL EDEMA OF LOWER EXTREMITY: Primary | ICD-10-CM

## 2023-10-30 DIAGNOSIS — Z23 NEED FOR VACCINATION: ICD-10-CM

## 2023-10-30 DIAGNOSIS — T81.718A IATROGENIC PULMONARY EMBOLISM AND INFARCTION, INITIAL ENCOUNTER (HCC): ICD-10-CM

## 2023-10-30 DIAGNOSIS — M25.461 PAIN AND SWELLING OF KNEE, RIGHT: ICD-10-CM

## 2023-10-30 LAB
INR: 2.1 (ref 2–3)
TEST STRIP EXPIRATION DATE: ABNORMAL DATE

## 2023-10-30 PROCEDURE — G0008 ADMIN INFLUENZA VIRUS VAC: HCPCS

## 2023-10-30 PROCEDURE — 1125F AMNT PAIN NOTED PAIN PRSNT: CPT

## 2023-10-30 PROCEDURE — 85610 PROTHROMBIN TIME: CPT | Performed by: FAMILY MEDICINE

## 2023-10-30 PROCEDURE — 90662 IIV NO PRSV INCREASED AG IM: CPT

## 2023-10-30 PROCEDURE — 93793 ANTICOAG MGMT PT WARFARIN: CPT | Performed by: FAMILY MEDICINE

## 2023-10-30 PROCEDURE — 99214 OFFICE O/P EST MOD 30 MIN: CPT

## 2023-10-30 RX ORDER — WARFARIN SODIUM 7.5 MG/1
7.5 TABLET ORAL EVERY OTHER DAY
COMMUNITY
Start: 2023-08-15

## 2023-10-30 NOTE — PROGRESS NOTES
Face-to-Face    / INR 2.1,  therapeutic. (Goal 2.5 )    Etiology: Pt will be doubling her Nexium for 4 weeks starting today. Recheck INR 2 weeks. [facility] order sent. Pt given their number and advised to call if she doesn't get a call from them. Pt reports: Any missed doses: No   Medications changes: No      Tara verbalized understanding and agreement.     WARFARIN Plan per protocol: 7.5 mg every Tue, Thu; 5 mg all other days

## 2023-11-03 RX ORDER — WARFARIN SODIUM 5 MG/1
TABLET ORAL NIGHTLY
Qty: 105 TABLET | Refills: 1 | Status: SHIPPED | OUTPATIENT
Start: 2023-11-03

## 2023-11-03 NOTE — TELEPHONE ENCOUNTER
Refill passed per [facility] protocol. Requested Prescriptions   Pending Prescriptions Disp Refills    warfarin 5 MG Oral Tab 120 tablet 1     Sig: Take as directed by the coumadin clinic. Take 1.5 tabs (7.5mg) by mouth Tues, Thurs, Sat and 1 tab (5mg) all other days.        Rx Em Warfarin Protocol Passed - 11/3/2023 12:28 PM        Passed - Appointment in past 12 or next 3 months     Recent Outpatient Visits              4 days ago Bilateral edema of lower extremity    [facility], [address], [address], [employer] Visit    1 week ago Primary osteoarthritis of both knees    [employer], [address], Tabatha Kearney MD    Office Visit    1 month ago Primary osteoarthritis of right knee    [employer], [address], Tabatha Kearney MD    Office Visit    [facility]    [facility] H[facility]    Nurse Only    2 months ago [facility])    [facility] Hematology Oncology Jen Boothe MD    Office Visit          Future Appointments         Provider Department Appt Notes    In 3 weeks  Ja Galicia     In 3 weeks Lisandra Hinton 19 Hematology Oncology 3 M f/u caf    In 2 months Antonio Ware  [facility] 3 month follow up                      Passed - INR 3.9 or less past 6 weeks     INR   Date Value Ref Range Status   10/30/2023 2.1 (A) 2 - 3 Final                   Passed - CMP within past 12 months     Recent Results (from the past 4380 hour(s))   COMP METABOLIC PANEL [E]    Collection Time: 08/29/23 12:44 PM   Result Value Ref Range    Glucose 99 70 - 99 mg/dL    Sodium 142 136 - 145 mmol/L    Potassium 4.5 3.5 - 5.1 mmol/L    Chloride 109 98 - 112 mmol/L    CO2 26.0 21.0 - 32.0 mmol/L    Anion Gap 7 0 - 18 mmol/L    BUN 23 (H) 7 - 18 mg/dL Creatinine 1.38 (H) 0.55 - 1.02 mg/dL    BUN/CREA Ratio 16.7 10.0 - 20.0    Calcium, Total 9.2 8.5 - 10.1 mg/dL    Calculated Osmolality 298 (H) 275 - 295 mOsm/kg    eGFR-Cr 38 (L) >=60 mL/min/1.73m2    ALT 19 13 - 56 U/L    AST 18 15 - 37 U/L    Alkaline Phosphatase 64 55 - 142 U/L    Bilirubin, Total 0.6 0.1 - 2.0 mg/dL    Total Protein 6.6 6.4 - 8.2 g/dL    Albumin 3.6 3.4 - 5.0 g/dL    Globulin  3.0 2.8 - 4.4 g/dL    A/G Ratio 1.2 1.0 - 2.0    Patient Fasting for CMP? Patient not present                    Passed - AST < 111 (less than 3 Xs the upper limit)     Lab Results   Component Value Date    AST 18 08/29/2023                     Passed - ALT < 168 (less than 3Xs the upper limit)     Lab Results   Component Value Date    ALT 19 08/29/2023                     Passed - CBC within the past 12 months     Recent Results (from the past 8760 hour(s))   CBC W/ DIFFERENTIAL    Collection Time: 08/29/23 12:44 PM   Result Value Ref Range    WBC 6.1 4.0 - 11.0 x10(3) uL    RBC 3.50 (L) 3.80 - 5.30 x10(6)uL    HGB 13.0 12.0 - 16.0 g/dL    HCT 39.0 35.0 - 48.0 %    .4 (H) 80.0 - 100.0 fL    MCH 37.1 (H) 26.0 - 34.0 pg    MCHC 33.3 31.0 - 37.0 g/dL    RDW-SD 51.9 (H) 35.1 - 46.3 fL    RDW 12.6 11.0 - 15.0 %    .0 150.0 - 450.0 10(3)uL    Neutrophil Absolute Prelim 4.34 1.50 - 7.70 x10 (3) uL    Neutrophil Absolute 4.34 1.50 - 7.70 x10(3) uL    Lymphocyte Absolute 1.16 1.00 - 4.00 x10(3) uL    Monocyte Absolute 0.45 0.10 - 1.00 x10(3) uL    Eosinophil Absolute 0.09 0.00 - 0.70 x10(3) uL    Basophil Absolute 0.06 0.00 - 0.20 x10(3) uL    Immature Granulocyte Absolute 0.02 0.00 - 1.00 x10(3) uL    Neutrophil % 70.8 %    Lymphocyte % 19.0 %    Monocyte % 7.4 %    Eosinophil % 1.5 %    Basophil % 1.0 %    Immature Granulocyte % 0.3 %     *Note: Due to a large number of results and/or encounters for the requested time period, some results have not been displayed.  A complete set of results can be found in Results Review.                  Passed - Hgb >/= 10g/dl within past 12 months     HGB   Date Value Ref Range Status   08/29/2023 13.0 12.0 - 16.0 g/dL Final                   Passed - Platelets >/= 502 past 12 months     PLT   Date Value Ref Range Status   08/29/2023 245.0 150.0 - 450.0 10(3)uL Final                        Future Appointments         Provider Department Appt Notes    In 3 weeks  Ja Galicia     In 3 weeks Lisandra Martines 19 Hematology Oncology 3 M f/u caf    In 2 months Blas Bence, MD [address] 3 month follow up          Recent Outpatient Visits              4 days ago Bilateral edema of lower extremity    [facility], [address] [place]    Office Visit    1 week ago Primary osteoarthritis of both Freddy JeffreyNanci carson MD    Office Visit    1 month ago Primary osteoarthritis of right knee    Christel Vigil Tania Barrows, MD    Office Visit    [facility]    Nurse Only    2 months ago Polycythemia [facility])    [facility] Hematology Oncology Julio Dang MD    Office Visit

## 2023-11-03 NOTE — TELEPHONE ENCOUNTER
Patient only has 4 pills left of Rx:    Warfarin    [id number] - [address], [address] [facility] [phone], [phone]   [address] [place] [address]   Phone: [phone] Fax: [phone]   Hours: Not open 24 hours       Please call patient when sent to Pharmacy  [phone] [facility] Phone ) ok to lv a message

## 2023-11-06 RX ORDER — WARFARIN SODIUM 5 MG/1
TABLET ORAL NIGHTLY
Qty: 120 TABLET | Refills: 1 | Status: SHIPPED | OUTPATIENT
Start: 2023-11-06

## 2023-11-06 NOTE — TELEPHONE ENCOUNTER
RN left message for the pharmacy as directed by their automated phone system. Provided patient information/identification. Provide warfarin dose clarification- patient is using 5 mg tablets, she takes one tablet (total of 5 mg) on two nights a week and one and a half tablets (7.5 mg) on five nights a week. Number left to return call to coumadin clinic for additional questions/clarification.

## 2023-11-06 NOTE — TELEPHONE ENCOUNTER
Received fax needing to verify directions     There are two sets of directions       warfarin 5 MG Oral Tab, Take 1-1.5 tablets (5-7.5 mg total) by mouth nightly. Take as directed by the coumadin clinic.  Take 1.5 tabs (7.5mg) by mouth Tues, Thurs and 1 tab (5mg) all other days per Coumadin Clinic, Disp: 105 tablet, Rfl: 1

## 2023-11-06 NOTE — TELEPHONE ENCOUNTER
Refill passed per [address] [facility] protocol. Requested Prescriptions   Pending Prescriptions Disp Refills    warfarin 5 MG Oral Tab 120 tablet 1     Sig: Take 1-1.5 tablets (5-7.5 mg total) by mouth nightly. Take as directed by the coumadin clinic. Take one tablet (5 mg) five nights a week. Take one and one half tablets (7.5 mg) two nights a week.        Rx Em Warfarin Protocol Passed - 11/6/2023  5:14 PM        Passed - Appointment in past 12 or next 3 months     Recent Outpatient Visits              1 week ago Bilateral edema of lower extremity    Manuelita Acre, [address] [facility],[address], [employer] Visit    1 week ago Primary osteoarthritis of both knees    [address], [address], Ramana Kearnye MD    Office Visit    1 month ago Primary osteoarthritis of right knee    [address], [address], Ramana Kearney MD    Office Visit    2 months ago [facility]    [facility] H[facility]    Nurse Only    2 months ago Polycythemia [facility] Hematology Oncology Khalida Gay MD    Office Visit          Future Appointments         Provider Department Appt Notes    In 3 weeks  Ja Galicia     In 3 weeks Lisandra Fajardo 19 Hematology Oncology 3 M f/u caf    In 2 months Laverne Mistry MD [address] [facility] 3 month follow up                      Passed - INR 3.9 or less past 6 weeks     INR   Date Value Ref Range Status   10/30/2023 2.1 (A) 2 - 3 Final                   Passed - CMP within past 12 months     Recent Results (from the past 4380 hour(s))   COMP METABOLIC PANEL [E]    Collection Time: 08/29/23 12:44 PM   Result Value Ref Range    Glucose 99 70 - 99 mg/dL    Sodium 142 136 - 145 mmol/L    Potassium 4.5 3.5 - 5.1 mmol/L    Chloride 109 98 - 112 mmol/L    CO2 26.0 21.0 - 32.0 mmol/L    Anion Gap 7 0 - 18 mmol/L    BUN 23 (H) 7 - 18 mg/dL    Creatinine 1.38 (H) 0.55 - 1.02 mg/dL    BUN/CREA Ratio 16.7 10.0 - 20.0    Calcium, Total 9.2 8.5 - 10.1 mg/dL    Calculated Osmolality 298 (H) 275 - 295 mOsm/kg    eGFR-Cr 38 (L) >=60 mL/min/1.73m2    ALT 19 13 - 56 U/L    AST 18 15 - 37 U/L    Alkaline Phosphatase 64 55 - 142 U/L    Bilirubin, Total 0.6 0.1 - 2.0 mg/dL    Total Protein 6.6 6.4 - 8.2 g/dL    Albumin 3.6 3.4 - 5.0 g/dL    Globulin  3.0 2.8 - 4.4 g/dL    A/G Ratio 1.2 1.0 - 2.0    Patient Fasting for CMP?  Patient not present                    Passed - AST < 111 (less than 3 Xs the upper limit)     Lab Results   Component Value Date    AST 18 08/29/2023                     Passed - ALT < 168 (less than 3Xs the upper limit)     Lab Results   Component Value Date    ALT 19 08/29/2023                     Passed - CBC within the past 12 months     Recent Results (from the past 8760 hour(s))   CBC W/ DIFFERENTIAL    Collection Time: 08/29/23 12:44 PM   Result Value Ref Range    WBC 6.1 4.0 - 11.0 x10(3) uL    RBC 3.50 (L) 3.80 - 5.30 x10(6)uL    HGB 13.0 12.0 - 16.0 g/dL    HCT 39.0 35.0 - 48.0 %    .4 (H) 80.0 - 100.0 fL    MCH 37.1 (H) 26.0 - 34.0 pg    MCHC 33.3 31.0 - 37.0 g/dL    RDW-SD 51.9 (H) 35.1 - 46.3 fL    RDW 12.6 11.0 - 15.0 %    .0 150.0 - 450.0 10(3)uL    Neutrophil Absolute Prelim 4.34 1.50 - 7.70 x10 (3) uL    Neutrophil Absolute 4.34 1.50 - 7.70 x10(3) uL    Lymphocyte Absolute 1.16 1.00 - 4.00 x10(3) uL    Monocyte Absolute 0.45 0.10 - 1.00 x10(3) uL    Eosinophil Absolute 0.09 0.00 - 0.70 x10(3) uL    Basophil Absolute 0.06 0.00 - 0.20 x10(3) uL    Immature Granulocyte Absolute 0.02 0.00 - 1.00 x10(3) uL    Neutrophil % 70.8 %    Lymphocyte % 19.0 %    Monocyte % 7.4 %    Eosinophil % 1.5 %    Basophil % 1.0 %    Immature Granulocyte % 0.3 %     *Note: Due to a large number of results and/or encounters for the requested time period, some results have not been displayed. A complete set of results can be found in Results Review. Passed - Hgb >/= 10g/dl within past 12 months     HGB   Date Value Ref Range Status   08/29/2023 13.0 12.0 - 16.0 g/dL Final                   Passed - Platelets >/= 511 past 12 months     PLT   Date Value Ref Range Status   08/29/2023 245.0 150.0 - 450.0 10(3)uL Final                    Signed Prescriptions Disp Refills    warfarin 5 MG Oral Tab 105 tablet 1     Sig: Take 1-1.5 tablets (5-7.5 mg total) by mouth nightly. Take as directed by the coumadin clinic.  Take 1.5 tabs (7.5mg) by mouth Tues, Thurs and 1 tab (5mg) all other days per [facility]       Rx Em Warfarin Protocol Passed - 11/3/2023 12:28 PM        Passed - Appointment in past 12 or next 3 months     Recent Outpatient Visits              1 week ago Bilateral edema of lower extremity    [address], [address] [facility], [facility] [facility],[address], [employer] Visit    1 week ago Primary osteoarthritis of both knees    [employer], [address] [facility] [address] [address], Julieta Kearney MD    Office Visit    1 month ago Primary osteoarthritis of right knee    [employer], [address] [facility],[address], Julieta Kearney MD    Office Visit    2 months ago Polycythemia [facility] Hematology Oncology    Nurse Only    2 months ago Polycythemia [facility])    [facility] Hematology Oncology Ebony Singleton MD    Office Visit          Future Appointments         Provider Department Appt Notes    In 3 weeks  Ja Galicia     In 3 weeks Lisandra Angulo 19 Hematology Oncology 3 M f/u caf    In 2 months Rosalie Holstein, MD Denys Myrtle, [place] 3 month follow up                      Passed - INR 3.9 or less past 6 weeks     INR   Date Value Ref Range Status   10/30/2023 2.1 (A) 2 - 3 Final                   Passed - CMP within past 12 months     Recent Results (from the past 4380 hour(s))   COMP METABOLIC PANEL [E]    Collection Time: 08/29/23 12:44 PM   Result Value Ref Range    Glucose 99 70 - 99 mg/dL    Sodium 142 136 - 145 mmol/L    Potassium 4.5 3.5 - 5.1 mmol/L    Chloride 109 98 - 112 mmol/L    CO2 26.0 21.0 - 32.0 mmol/L    Anion Gap 7 0 - 18 mmol/L    BUN 23 (H) 7 - 18 mg/dL    Creatinine 1.38 (H) 0.55 - 1.02 mg/dL    BUN/CREA Ratio 16.7 10.0 - 20.0    Calcium, Total 9.2 8.5 - 10.1 mg/dL    Calculated Osmolality 298 (H) 275 - 295 mOsm/kg    eGFR-Cr 38 (L) >=60 mL/min/1.73m2    ALT 19 13 - 56 U/L    AST 18 15 - 37 U/L    Alkaline Phosphatase 64 55 - 142 U/L    Bilirubin, Total 0.6 0.1 - 2.0 mg/dL    Total Protein 6.6 6.4 - 8.2 g/dL    Albumin 3.6 3.4 - 5.0 g/dL    Globulin  3.0 2.8 - 4.4 g/dL    A/G Ratio 1.2 1.0 - 2.0    Patient Fasting for CMP?  Patient not present                    Passed - AST < 111 (less than 3 Xs the upper limit)     Lab Results   Component Value Date    AST 18 08/29/2023                     Passed - ALT < 168 (less than 3Xs the upper limit)     Lab Results   Component Value Date    ALT 19 08/29/2023                     Passed - CBC within the past 12 months     Recent Results (from the past 8760 hour(s))   CBC W/ DIFFERENTIAL    Collection Time: 08/29/23 12:44 PM   Result Value Ref Range    WBC 6.1 4.0 - 11.0 x10(3) uL    RBC 3.50 (L) 3.80 - 5.30 x10(6)uL    HGB 13.0 12.0 - 16.0 g/dL    HCT 39.0 35.0 - 48.0 %    .4 (H) 80.0 - 100.0 fL    MCH 37.1 (H) 26.0 - 34.0 pg    MCHC 33.3 31.0 - 37.0 g/dL    RDW-SD 51.9 (H) 35.1 - 46.3 fL    RDW 12.6 11.0 - 15.0 %    .0 150.0 - 450.0 10(3)uL    Neutrophil Absolute Prelim 4.34 1.50 - 7.70 x10 (3) uL    Neutrophil Absolute 4.34 1.50 - 7.70 x10(3) uL    Lymphocyte Absolute 1.16 1.00 - 4.00 x10(3) uL    Monocyte Absolute 0.45 0.10 - 1.00 x10(3) uL    Eosinophil Absolute 0.09 0.00 - 0.70 x10(3) uL    Basophil Absolute 0.06 0.00 - 0.20 x10(3) uL    Immature Granulocyte Absolute 0.02 0.00 - 1.00 x10(3) uL    Neutrophil % 70.8 %    Lymphocyte % 19.0 %    Monocyte % 7.4 %    Eosinophil % 1.5 %    Basophil % 1.0 %    Immature Granulocyte % 0.3 %     *Note: Due to a large number of results and/or encounters for the requested time period, some results have not been displayed. A complete set of results can be found in Results Review.                  Passed - Hgb >/= 10g/dl within past 12 months     HGB   Date Value Ref Range Status   08/29/2023 13.0 12.0 - 16.0 g/dL Final                   Passed - Platelets >/= 137 past 12 months     PLT   Date Value Ref Range Status   08/29/2023 245.0 150.0 - 450.0 10(3)uL Final                        Future Appointments         Provider Department Appt Notes    In 3 weeks  Ja Galicia     In 3 weeks Lisandra Hatch 19 Hematology Oncology 3 M f/u caf    In 2 months Brent Solano MD [address] [facility] 3 month follow up          Recent Outpatient Visits              1 week ago Bilateral edema of lower extremity    [facility], [address] [facility], [address] [facility],[address], APRN    Office Visit    1 week ago Primary osteoarthritis of both GladeMelva Mcqueen MD    Office Visit    1 month ago Primary osteoarthritis of right knee    Christel Ramirez Kathalene January, MD    Office Visit    2 months [facility]    [facility] Hematology Oncology    Nurse Only    2 months ago Polycythemia [facility])    [facility] Hematology Oncology Yulia Whyte MD    Office Visit

## 2023-11-13 ENCOUNTER — ANTI-COAG VISIT (OUTPATIENT)
Dept: ANTICOAGULATION | Facility: CLINIC | Age: 83
End: 2023-11-13

## 2023-11-13 DIAGNOSIS — I48.91 ATRIAL FIBRILLATION, NEW ONSET (HCC): Primary | ICD-10-CM

## 2023-11-13 DIAGNOSIS — Z79.01 MONITORING FOR LONG-TERM ANTICOAGULANT USE: ICD-10-CM

## 2023-11-13 DIAGNOSIS — Z51.81 MONITORING FOR LONG-TERM ANTICOAGULANT USE: ICD-10-CM

## 2023-11-13 LAB — INR: 2.2 (ref 2–3)

## 2023-11-13 NOTE — PROGRESS NOTES
[facility] / INR 2.2,  therapeutic. (Goal 2.5 )    Etiology: Pt doubled Nexium after last  INR but only a day or two. No changes. Continue the current dose for now. Recheck INR 4 weeks HLE ordered. Pt reports: Any missed doses: No   Medications changes: No    Contacted  Tara by phone  who verbalized understanding and agreement.     WARFARIN Plan per protocol: 7.5 mg every Tue, Thu; 5 mg all other days

## 2023-11-28 ENCOUNTER — OFFICE VISIT (OUTPATIENT)
Dept: HEMATOLOGY/ONCOLOGY | Facility: HOSPITAL | Age: 83
End: 2023-11-28
Attending: INTERNAL MEDICINE
Payer: MEDICARE

## 2023-11-28 VITALS
HEART RATE: 118 BPM | BODY MASS INDEX: 33.64 KG/M2 | HEIGHT: 70 IN | OXYGEN SATURATION: 98 % | DIASTOLIC BLOOD PRESSURE: 76 MMHG | RESPIRATION RATE: 18 BRPM | TEMPERATURE: 98 F | SYSTOLIC BLOOD PRESSURE: 121 MMHG | WEIGHT: 235 LBS

## 2023-11-28 DIAGNOSIS — D45 POLYCYTHEMIA VERA (HCC): ICD-10-CM

## 2023-11-28 DIAGNOSIS — D45 POLYCYTHEMIA VERA (HCC): Primary | ICD-10-CM

## 2023-11-28 DIAGNOSIS — I82.403 DEEP VEIN THROMBOSIS (DVT) OF BOTH LOWER EXTREMITIES, UNSPECIFIED CHRONICITY, UNSPECIFIED VEIN (HCC): ICD-10-CM

## 2023-11-28 DIAGNOSIS — Z51.11 CHEMOTHERAPY MANAGEMENT, ENCOUNTER FOR: ICD-10-CM

## 2023-11-28 LAB
ALBUMIN SERPL-MCNC: 3.8 G/DL (ref 3.2–4.8)
ALBUMIN/GLOB SERPL: 1.5 {RATIO} (ref 1–2)
ALP LIVER SERPL-CCNC: 66 U/L
ALT SERPL-CCNC: 21 U/L
ANION GAP SERPL CALC-SCNC: 4 MMOL/L (ref 0–18)
AST SERPL-CCNC: 20 U/L (ref ?–34)
BASOPHILS # BLD AUTO: 0.04 X10(3) UL (ref 0–0.2)
BASOPHILS NFR BLD AUTO: 0.7 %
BILIRUB SERPL-MCNC: 0.7 MG/DL (ref 0.2–1.1)
BUN BLD-MCNC: 21 MG/DL (ref 9–23)
BUN/CREAT SERPL: 17.2 (ref 10–20)
CALCIUM BLD-MCNC: 9.2 MG/DL (ref 8.7–10.4)
CHLORIDE SERPL-SCNC: 110 MMOL/L (ref 98–112)
CO2 SERPL-SCNC: 25 MMOL/L (ref 21–32)
CREAT BLD-MCNC: 1.22 MG/DL
DEPRECATED RDW RBC AUTO: 54.4 FL (ref 35.1–46.3)
EGFRCR SERPLBLD CKD-EPI 2021: 44 ML/MIN/1.73M2 (ref 60–?)
EOSINOPHIL # BLD AUTO: 0.08 X10(3) UL (ref 0–0.7)
EOSINOPHIL NFR BLD AUTO: 1.3 %
ERYTHROCYTE [DISTWIDTH] IN BLOOD BY AUTOMATED COUNT: 13.4 % (ref 11–15)
GLOBULIN PLAS-MCNC: 2.5 G/DL (ref 2.8–4.4)
GLUCOSE BLD-MCNC: 92 MG/DL (ref 70–99)
HCT VFR BLD AUTO: 41 %
HGB BLD-MCNC: 13.7 G/DL
IMM GRANULOCYTES # BLD AUTO: 0.02 X10(3) UL (ref 0–1)
IMM GRANULOCYTES NFR BLD: 0.3 %
LYMPHOCYTES # BLD AUTO: 1.07 X10(3) UL (ref 1–4)
LYMPHOCYTES NFR BLD AUTO: 17.6 %
MCH RBC QN AUTO: 37 PG (ref 26–34)
MCHC RBC AUTO-ENTMCNC: 33.4 G/DL (ref 31–37)
MCV RBC AUTO: 110.8 FL
MONOCYTES # BLD AUTO: 0.44 X10(3) UL (ref 0.1–1)
MONOCYTES NFR BLD AUTO: 7.2 %
NEUTROPHILS # BLD AUTO: 4.44 X10 (3) UL (ref 1.5–7.7)
NEUTROPHILS # BLD AUTO: 4.44 X10(3) UL (ref 1.5–7.7)
NEUTROPHILS NFR BLD AUTO: 72.9 %
OSMOLALITY SERPL CALC.SUM OF ELEC: 291 MOSM/KG (ref 275–295)
PLATELET # BLD AUTO: 256 10(3)UL (ref 150–450)
POTASSIUM SERPL-SCNC: 4.4 MMOL/L (ref 3.5–5.1)
PROT SERPL-MCNC: 6.3 G/DL (ref 5.7–8.2)
RBC # BLD AUTO: 3.7 X10(6)UL
SODIUM SERPL-SCNC: 139 MMOL/L (ref 136–145)
WBC # BLD AUTO: 6.1 X10(3) UL (ref 4–11)

## 2023-11-28 PROCEDURE — 85025 COMPLETE CBC W/AUTO DIFF WBC: CPT

## 2023-11-28 PROCEDURE — 36415 COLL VENOUS BLD VENIPUNCTURE: CPT

## 2023-11-28 PROCEDURE — 99215 OFFICE O/P EST HI 40 MIN: CPT | Performed by: INTERNAL MEDICINE

## 2023-11-28 PROCEDURE — 80053 COMPREHEN METABOLIC PANEL: CPT

## 2023-12-12 ENCOUNTER — ANTI-COAG VISIT (OUTPATIENT)
Dept: ANTICOAGULATION | Facility: CLINIC | Age: 83
End: 2023-12-12

## 2023-12-12 DIAGNOSIS — Z79.01 MONITORING FOR LONG-TERM ANTICOAGULANT USE: ICD-10-CM

## 2023-12-12 DIAGNOSIS — I48.91 ATRIAL FIBRILLATION, NEW ONSET (HCC): Primary | ICD-10-CM

## 2023-12-12 DIAGNOSIS — Z51.81 MONITORING FOR LONG-TERM ANTICOAGULANT USE: ICD-10-CM

## 2023-12-12 LAB — INR: 2.6 (ref 0.8–1.2)

## 2023-12-12 PROCEDURE — 93793 ANTICOAG MGMT PT WARFARIN: CPT | Performed by: FAMILY MEDICINE

## 2023-12-12 NOTE — PROGRESS NOTES
INR result received from the mobile lab- [facility]. Detailed message left (HIPAA verified) for Rolando Yeboah. Asked her to return call to coumadin clinic for any additional questions. Per protocol, continue current dose and recheck INR in 4 weeks. Order entered for next INR on 1/8/24 with [facility] lab.      7.5 mg every Tue, Thu; 5 mg all other days

## 2023-12-14 RX ORDER — HYDROXYUREA 500 MG/1
CAPSULE ORAL
Qty: 117 CAPSULE | Refills: 3 | Status: SHIPPED | OUTPATIENT
Start: 2023-12-14

## 2023-12-18 ENCOUNTER — TELEPHONE (OUTPATIENT)
Dept: ANTICOAGULATION | Facility: CLINIC | Age: 83
End: 2023-12-18

## 2023-12-18 NOTE — TELEPHONE ENCOUNTER
Call received from [facility] at [facility] cardiology office. States that Elliot Mccoy will be having a pacemaker inserted on 12/26/23- has been instructed to hold warfarin for 4 days prior to the procedure- last done of warfarin will be taken on 12/21. An INR will be done the day of the procedure. RN to watch for INR the day of the procedure and contact Tara with updated dose instructions. Mobile lab- [address] [facility] is scheduled to do an INR draw on 01/08/24- RN will adjust this date to check INR sooner after procedure- based on INR result on 12/26/23. Spoke with Elliot Mccoy and advised on above. She verbalized understanding. States she may want to come to the clinic for the INR draw but we can decide this when we talk next time.

## 2023-12-21 ENCOUNTER — LAB ENCOUNTER (OUTPATIENT)
Dept: LAB | Age: 83
End: 2023-12-21
Attending: INTERNAL MEDICINE
Payer: MEDICARE

## 2023-12-21 ENCOUNTER — HOSPITAL ENCOUNTER (OUTPATIENT)
Dept: GENERAL RADIOLOGY | Age: 83
Discharge: HOME | End: 2023-12-21
Attending: INTERNAL MEDICINE
Payer: MEDICARE

## 2023-12-21 DIAGNOSIS — Z51.81 MONITORING FOR LONG-TERM ANTICOAGULANT USE: ICD-10-CM

## 2023-12-21 DIAGNOSIS — Z79.01 MONITORING FOR LONG-TERM ANTICOAGULANT USE: ICD-10-CM

## 2023-12-21 DIAGNOSIS — R00.0 TACHYCARDIA: ICD-10-CM

## 2023-12-21 DIAGNOSIS — I48.0 PAF (PAROXYSMAL ATRIAL FIBRILLATION) (HCC): Primary | ICD-10-CM

## 2023-12-21 DIAGNOSIS — I48.0 PAF (PAROXYSMAL ATRIAL FIBRILLATION) (HCC): ICD-10-CM

## 2023-12-21 DIAGNOSIS — I48.91 ATRIAL FIBRILLATION, NEW ONSET (HCC): ICD-10-CM

## 2023-12-21 LAB
ANION GAP SERPL CALC-SCNC: 4 MMOL/L (ref 0–18)
BASOPHILS # BLD AUTO: 0.06 X10(3) UL (ref 0–0.2)
BASOPHILS NFR BLD AUTO: 1.1 %
BUN BLD-MCNC: 24 MG/DL (ref 9–23)
BUN/CREAT SERPL: 17.9 (ref 10–20)
CALCIUM BLD-MCNC: 9.7 MG/DL (ref 8.7–10.4)
CHLORIDE SERPL-SCNC: 108 MMOL/L (ref 98–112)
CO2 SERPL-SCNC: 27 MMOL/L (ref 21–32)
CREAT BLD-MCNC: 1.34 MG/DL
DEPRECATED RDW RBC AUTO: 50.9 FL (ref 35.1–46.3)
EGFRCR SERPLBLD CKD-EPI 2021: 39 ML/MIN/1.73M2 (ref 60–?)
EOSINOPHIL # BLD AUTO: 0.06 X10(3) UL (ref 0–0.7)
EOSINOPHIL NFR BLD AUTO: 1.1 %
ERYTHROCYTE [DISTWIDTH] IN BLOOD BY AUTOMATED COUNT: 12.9 % (ref 11–15)
FASTING STATUS PATIENT QL REPORTED: NO
GLUCOSE BLD-MCNC: 91 MG/DL (ref 70–99)
HCT VFR BLD AUTO: 40.2 %
HGB BLD-MCNC: 13.7 G/DL
IMM GRANULOCYTES # BLD AUTO: 0.02 X10(3) UL (ref 0–1)
IMM GRANULOCYTES NFR BLD: 0.4 %
INR BLD: 2.83 (ref 0.8–1.2)
LYMPHOCYTES # BLD AUTO: 1.15 X10(3) UL (ref 1–4)
LYMPHOCYTES NFR BLD AUTO: 20.5 %
MCH RBC QN AUTO: 36.5 PG (ref 26–34)
MCHC RBC AUTO-ENTMCNC: 34.1 G/DL (ref 31–37)
MCV RBC AUTO: 107.2 FL
MONOCYTES # BLD AUTO: 0.42 X10(3) UL (ref 0.1–1)
MONOCYTES NFR BLD AUTO: 7.5 %
NEUTROPHILS # BLD AUTO: 3.89 X10 (3) UL (ref 1.5–7.7)
NEUTROPHILS # BLD AUTO: 3.89 X10(3) UL (ref 1.5–7.7)
NEUTROPHILS NFR BLD AUTO: 69.4 %
OSMOLALITY SERPL CALC.SUM OF ELEC: 292 MOSM/KG (ref 275–295)
PLATELET # BLD AUTO: 249 10(3)UL (ref 150–450)
POTASSIUM SERPL-SCNC: 4.4 MMOL/L (ref 3.5–5.1)
PROTHROMBIN TIME: 31.5 SECONDS (ref 11.6–14.8)
RBC # BLD AUTO: 3.75 X10(6)UL
SODIUM SERPL-SCNC: 139 MMOL/L (ref 136–145)
WBC # BLD AUTO: 5.6 X10(3) UL (ref 4–11)

## 2023-12-21 PROCEDURE — 85610 PROTHROMBIN TIME: CPT

## 2023-12-21 PROCEDURE — 87641 MR-STAPH DNA AMP PROBE: CPT

## 2023-12-21 PROCEDURE — 71046 X-RAY EXAM CHEST 2 VIEWS: CPT | Performed by: INTERNAL MEDICINE

## 2023-12-21 PROCEDURE — 80048 BASIC METABOLIC PNL TOTAL CA: CPT

## 2023-12-21 PROCEDURE — 85025 COMPLETE CBC W/AUTO DIFF WBC: CPT

## 2023-12-21 PROCEDURE — 36415 COLL VENOUS BLD VENIPUNCTURE: CPT

## 2023-12-22 LAB — MRSA DNA SPEC QL NAA+PROBE: NEGATIVE

## 2023-12-26 ENCOUNTER — APPOINTMENT (OUTPATIENT)
Dept: GENERAL RADIOLOGY | Facility: HOSPITAL | Age: 83
End: 2023-12-26
Attending: INTERNAL MEDICINE
Payer: MEDICARE

## 2023-12-26 ENCOUNTER — HOSPITAL ENCOUNTER (OUTPATIENT)
Dept: INTERVENTIONAL RADIOLOGY/VASCULAR | Facility: HOSPITAL | Age: 83
Discharge: HOME | End: 2023-12-26
Attending: INTERNAL MEDICINE | Admitting: INTERNAL MEDICINE
Payer: MEDICARE

## 2023-12-26 ENCOUNTER — ANTI-COAG VISIT (OUTPATIENT)
Dept: ANTICOAGULATION | Facility: CLINIC | Age: 83
End: 2023-12-26

## 2023-12-26 VITALS
TEMPERATURE: 97 F | WEIGHT: 235 LBS | OXYGEN SATURATION: 95 % | HEART RATE: 72 BPM | HEIGHT: 70 IN | SYSTOLIC BLOOD PRESSURE: 135 MMHG | RESPIRATION RATE: 24 BRPM | DIASTOLIC BLOOD PRESSURE: 50 MMHG | BODY MASS INDEX: 33.64 KG/M2

## 2023-12-26 DIAGNOSIS — I49.5 BRADY-TACHY SYNDROME (HCC): ICD-10-CM

## 2023-12-26 DIAGNOSIS — I48.91 ATRIAL FIBRILLATION, NEW ONSET (HCC): Primary | ICD-10-CM

## 2023-12-26 DIAGNOSIS — I48.91 A-FIB (HCC): ICD-10-CM

## 2023-12-26 DIAGNOSIS — Z79.01 MONITORING FOR LONG-TERM ANTICOAGULANT USE: ICD-10-CM

## 2023-12-26 DIAGNOSIS — Z51.81 MONITORING FOR LONG-TERM ANTICOAGULANT USE: ICD-10-CM

## 2023-12-26 LAB
INR BLD: 1.25 (ref 0.8–1.2)
PROTHROMBIN TIME: 16.5 SECONDS (ref 11.6–14.8)

## 2023-12-26 PROCEDURE — 71045 X-RAY EXAM CHEST 1 VIEW: CPT | Performed by: INTERNAL MEDICINE

## 2023-12-26 PROCEDURE — 99152 MOD SED SAME PHYS/QHP 5/>YRS: CPT | Performed by: INTERNAL MEDICINE

## 2023-12-26 PROCEDURE — 99153 MOD SED SAME PHYS/QHP EA: CPT | Performed by: INTERNAL MEDICINE

## 2023-12-26 PROCEDURE — 33208 INSRT HEART PM ATRIAL & VENT: CPT | Performed by: INTERNAL MEDICINE

## 2023-12-26 PROCEDURE — 36415 COLL VENOUS BLD VENIPUNCTURE: CPT

## 2023-12-26 PROCEDURE — 02HK3JZ INSERTION OF PACEMAKER LEAD INTO RIGHT VENTRICLE, PERCUTANEOUS APPROACH: ICD-10-PCS | Performed by: INTERNAL MEDICINE

## 2023-12-26 PROCEDURE — 02H63JZ INSERTION OF PACEMAKER LEAD INTO RIGHT ATRIUM, PERCUTANEOUS APPROACH: ICD-10-PCS | Performed by: INTERNAL MEDICINE

## 2023-12-26 PROCEDURE — 0JH606Z INSERTION OF PACEMAKER, DUAL CHAMBER INTO CHEST SUBCUTANEOUS TISSUE AND FASCIA, OPEN APPROACH: ICD-10-PCS | Performed by: INTERNAL MEDICINE

## 2023-12-26 PROCEDURE — 85610 PROTHROMBIN TIME: CPT | Performed by: INTERNAL MEDICINE

## 2023-12-26 RX ORDER — DIPHENHYDRAMINE HYDROCHLORIDE 50 MG/ML
INJECTION INTRAMUSCULAR; INTRAVENOUS
Status: COMPLETED
Start: 2023-12-26 | End: 2023-12-26

## 2023-12-26 RX ORDER — SODIUM CHLORIDE 9 MG/ML
INJECTION, SOLUTION INTRAVENOUS
Status: COMPLETED | OUTPATIENT
Start: 2023-12-26 | End: 2023-12-26

## 2023-12-26 RX ORDER — CHLORHEXIDINE GLUCONATE 4 G/100ML
30 SOLUTION TOPICAL
Status: COMPLETED | OUTPATIENT
Start: 2023-12-26 | End: 2023-12-26

## 2023-12-26 RX ORDER — ONDANSETRON 2 MG/ML
4 INJECTION INTRAMUSCULAR; INTRAVENOUS EVERY 6 HOURS PRN
Status: DISCONTINUED | OUTPATIENT
Start: 2023-12-26 | End: 2023-12-26

## 2023-12-26 RX ORDER — MIDAZOLAM HYDROCHLORIDE 1 MG/ML
INJECTION INTRAMUSCULAR; INTRAVENOUS
Status: COMPLETED
Start: 2023-12-26 | End: 2023-12-26

## 2023-12-26 RX ORDER — ACETAMINOPHEN 325 MG/1
TABLET ORAL
Status: COMPLETED
Start: 2023-12-26 | End: 2023-12-26

## 2023-12-26 RX ORDER — LIDOCAINE HYDROCHLORIDE AND EPINEPHRINE 10; 10 MG/ML; UG/ML
INJECTION, SOLUTION INFILTRATION; PERINEURAL
Status: COMPLETED
Start: 2023-12-26 | End: 2023-12-26

## 2023-12-26 RX ORDER — CEFAZOLIN SODIUM/WATER 2 G/20 ML
SYRINGE (ML) INTRAVENOUS
Status: COMPLETED
Start: 2023-12-26 | End: 2023-12-26

## 2023-12-26 RX ADMIN — ACETAMINOPHEN 650 MG: 325 TABLET ORAL at 13:19:00

## 2023-12-26 RX ADMIN — SODIUM CHLORIDE: 9 INJECTION, SOLUTION INTRAVENOUS at 09:00:00

## 2023-12-26 RX ADMIN — CHLORHEXIDINE GLUCONATE 30 ML: 4 SOLUTION TOPICAL at 09:00:00

## 2023-12-26 NOTE — DISCHARGE INSTRUCTIONS
Activity    Plan to rest tonight and tomorrow. Avoid drinking alcohol for next 24 hours. Do not drive after procedure until 1-week device check appointment, unless otherwise instructed by your cardiologist.  Wear sling for next 24 hours, then only at night as needed for 30 days to help assist with arm restrictions while sleeping. Arm Restrictions For 30 days after implant:    Do NOT lift arm with implanted device above your head or behind your back. Arm is to remain below your shoulder and in front of your body. Do NOT lift more than 10 lbs with affected arm  Do NOT perform repetitive cycling motion with affected arm (swimming, golfing, bowling, tennis, exercise machines, raking, vacuuming, snow shoveling). Dr. Rico Da Only Patients: Do NOT perform repetitive cycling motion for 90 days total    Incision Care/Bathing    Keep incision site completely dry for 5 days after surgery. After day 5, you can remove top dressing and shower, letting clean soapy water run over incision area, patting dry. The white steri-strips placed directly over the incision will gradually fall off over the next few weeks and can be physically removed after 3 weeks. Do not take them off before this time. If you have a zipline dressing, this will be removed by device nurse or MD in 4 weeks  Keep procedure site clean and dry, do not apply any ointments, creams, lotions to the incision. Look at your incision daily to monitor for signs and symptoms of infection (redness, swelling, drainage, foul odor from procedure site)  Do not cover incision with extra dressings or gauze unless otherwise instructed. Do not submerge incision in water, take whirlpool baths or swim for 30 days or until site is completely healed.     When to notify your physician:    If you have chest pain, shortness of breath or persistent cough  If you experience any signs of fever (temperature >101), chills, infection (redness, swelling, thick yellow drainage, foul order from procedure site)  New or worsening swelling of arm with implanted device  If an ICD was inserted and you receive a shock and have no symptoms, call [facility] device clinic. If you have symptoms (fainting, near fainting, dizziness, lightheadedness, chest pain, shortness of breath, palpitations), call [phone] [address] Direct Line: [phone] [Monday-Friday.  8:30AM-4PM]    [address] [facility] Office: [phone] [KEHLLO- Friday 8AM-5PM]

## 2023-12-26 NOTE — IVS NOTE
DISCHARGE NOTE     Pt is able to sit up and ambulate without difficulty. Pt voided and tolerated fluids and food. Left chest procedural site remains dry and intact with good circulation, motion and sensation. Arm sling in place. No signs or symptoms of bleeding/hematoma noted. Pt denies any pain or discomfort at this time. IV access removed  Instruction provided, patient/family verbalizes understanding. Dr. Cruz Rowley spoke with patient/family post procedure.      Pt discharge via wheelchair to [address]      Follow up Appointment: 1/2 at 2:30pm with device clinic    New Prescription: n/a

## 2023-12-26 NOTE — PROCEDURES
OPERATION(S) PERFORMED:   1. Dual-chamber pacemaker implant   2. Conscious sedation     : Richa Mckeon MD    INDICATION: SSS, pafib, tachybrady syndrome with synocope, GARCIA, no reversible cause    COMPLICATIONS: None   Moderate conscious sedation:  IV was maintained by RN and moderate conscious sedation of versed and fentanyl was given. Patient was assessed and monitoring of oxygen, heart rate and blood pressure by nurse and myself during the exam from 1050 to 1125. ESTIMATED BLOOD LOSS: Minimal.    METHODS: The patient was brought to the outpatient cardiac telemetry unit in a fasting and nonsedated state after providing informed consent. IV sedation was administered during continuous ECG, pulse oximeter, and noninvasive hemodynamic monitoring. After administering 1% lidocaine for local anesthesia, an incision was made parallel to the left deltopectoral groove. The plane of the incision was extended to the prepectoral fascia. A pocket was formed. Access to the cephalic vein was achieved via cutdown and it was isolated with stay sutures. Venotomy was performed and a sheath was placed over a J wire which was advanced to the IVC. The RV lead was placed in the RV apex. Local electrograms and pacing thresholds were measured. The sheath was removed with a retained J wire in place. Another sheath was placed over this wire. In a similar manner, an atrial lead was positioned in the RA appendage. Local electrograms and pacing thresholds were measured. Pacing was performed at 10 v to rule out phrenic nerve stimulation. The pocket was irrigated with antibiotic solution. Bleeding was sought for until hemostasis was achieved. The leads were sutured to the pectoralis muscle over the suture collar. The cephalic vein was sutured proximally and distally with the stay sutures. The leads were connected to a pulse generator. They were coiled and placed into the pocket along with the pulse generator.  The incision was closed in 2 layers using 3-0 and 4-0 Vlock Vicryl. Steri-Strips were applied followed by a sterile dressing. The left arm was placed in a sling and the patient was transported to telemetry in stable condition. There were no apparent intraoperative complications. MEASURED DATA: RA and RV lead impedence, sensing and thresholds all stable. CONCLUSIONS:   1. Status post successful implant of a dual-chamber pacemaker with a active fixation right ventricular (RV) lead, active fixation RA lead: [employer]   2. Adequate RA, RV pacing and sensing thresholds.     3. Conscious sedation    Juan Florentino, [facility]  Cardiac Electrophysiology  12/26/2023

## 2023-12-26 NOTE — INTERVAL H&P NOTE
Pre-op Diagnosis: * No pre-op diagnosis entered *    The above referenced H&P was reviewed by Cristiano Hernandez MD on 12/26/2023, the patient was examined and no significant changes have occurred in the patient's condition since the H&P was performed. I discussed with the patient and/or legal representative the potential benefits, risks and side effects of this procedure; the likelihood of the patient achieving goals; and potential problems that might occur during recuperation. I discussed reasonable alternatives to the procedure, including risks, benefits and side effects related to the alternatives and risks related to not receiving this procedure. We will proceed with procedure as planned.

## 2023-12-27 ENCOUNTER — ANTI-COAG VISIT (OUTPATIENT)
Dept: ANTICOAGULATION | Facility: CLINIC | Age: 83
End: 2023-12-27

## 2023-12-27 DIAGNOSIS — Z51.81 MONITORING FOR LONG-TERM ANTICOAGULANT USE: ICD-10-CM

## 2023-12-27 DIAGNOSIS — Z79.01 MONITORING FOR LONG-TERM ANTICOAGULANT USE: ICD-10-CM

## 2023-12-27 DIAGNOSIS — I48.91 ATRIAL FIBRILLATION, NEW ONSET (HCC): Primary | ICD-10-CM

## 2023-12-27 LAB — INR: 1.25 (ref 0.8–1.2)

## 2023-12-27 PROCEDURE — 93793 ANTICOAG MGMT PT WARFARIN: CPT | Performed by: FAMILY MEDICINE

## 2023-12-27 NOTE — PROGRESS NOTES
INR result received from the lab via chart review- cardiology procedure was done by Dr. Delaney Dorsey on 12/26. Tara held warfarin for 4 nights prior to the procedure. Detailed message left (HIPAA verified) for Sebastián Berenicejacqueline. Asked her to return call to coumadin clinic for any additional questions. Per protocol, take two extra partial doses of warfarin, then continue current dose. Order entered for next INR on 01/02/24 with [facility] lab.

## 2024-01-01 ENCOUNTER — TELEPHONE (OUTPATIENT)
Dept: INTERNAL MEDICINE CLINIC | Facility: CLINIC | Age: 84
End: 2024-01-01

## 2024-01-02 ENCOUNTER — ANTI-COAG VISIT (OUTPATIENT)
Dept: ANTICOAGULATION | Facility: CLINIC | Age: 84
End: 2024-01-02

## 2024-01-02 DIAGNOSIS — Z51.81 MONITORING FOR LONG-TERM ANTICOAGULANT USE: ICD-10-CM

## 2024-01-02 DIAGNOSIS — Z79.01 MONITORING FOR LONG-TERM ANTICOAGULANT USE: ICD-10-CM

## 2024-01-02 DIAGNOSIS — I48.91 ATRIAL FIBRILLATION, NEW ONSET (HCC): Primary | ICD-10-CM

## 2024-01-02 LAB — INR: 1.9 (ref 0.8–1.2)

## 2024-01-02 PROCEDURE — 93793 ANTICOAG MGMT PT WARFARIN: CPT | Performed by: FAMILY MEDICINE

## 2024-01-02 NOTE — PROGRESS NOTES
INR result received from the mobile lab- [facility].     Spoke with Tara.     Denies any change in diet/meds, no missed doses. She held warfarin for a cardiac procedure last week and took extra doses of warfarin as instructed.     INR is minimally below therapeutic goal range.     Per protocol, continue current dose and recheck INR in 2 weeks.     Order requested for [facility] on 1/16/24.

## 2024-01-03 DIAGNOSIS — M25.561 PAIN AND SWELLING OF KNEE, RIGHT: ICD-10-CM

## 2024-01-03 DIAGNOSIS — M25.461 PAIN AND SWELLING OF KNEE, RIGHT: ICD-10-CM

## 2024-01-04 RX ORDER — LIDOCAINE 4 G/G
1 PATCH TOPICAL EVERY 24 HOURS
Qty: 20 PATCH | Refills: 2 | Status: SHIPPED | OUTPATIENT
Start: 2024-01-04

## 2024-01-04 NOTE — TELEPHONE ENCOUNTER
Refill passed per [facility] protocol.  Requested Prescriptions   Pending Prescriptions Disp Refills    lidocaine (HM LIDOCAINE PATCH) 4 % External Patch 20 patch 0     Sig: Place 1 patch onto the skin daily.       Non-Narcotic Pain Medication Protocol Passed - 1/3/2024 12:02 PM        Passed - In person appointment or virtual visit in the past 6 mos or appointment in next 3 mos     Recent Outpatient Visits              1 month ago Polycythemia vera (HCC)    [facility] Hematology Oncology Herman Bhandari MD    Office Visit    1 month ago Polycythemia vera (HCC)    [facility] - Infusion    Nurse Only    2 months ago Bilateral edema of lower extremity    [facility] [place] Taylor Bagley APRN    Office Visit    2 months ago Primary osteoarthritis of both knees    [facility] Henok Hawkins MD    Office Visit    3 months ago Primary osteoarthritis of right knee    [facility] Henok Hawkins MD    Office Visit          Future Appointments         Provider Department Appt Notes    In 3 weeks Henok Hawkins MD [facility] 3 month follow up    In 1 month Herman Bhandari MD [facility] Hematology Oncology 3 M f/u caf    In 1 month [facility] Hematology Oncology                   Future Appointments         Provider Department Appt Notes    In 3 weeks Henok Hawkins MD [facility] 3 month follow up    In 1 month Herman Bhandari MD [facility] Hematology Oncology 3 M f/u caf    In 1 month [facility] Hematology Oncology           Recent Outpatient Visits              1 month ago Polycythemia vera (HCC)    [facility] Hematology Oncology Herman Bhandari MD    Office Visit    1 month ago Polycythemia vera (HCC)    [facility]    Nurse Only    2 months ago Bilateral edema of lower extremity    [facility], Taylor Chen APRN    Office Visit    2 months ago [facility] knees    [facility]    [facility]    3 months ago [facility] right knee    [facility]    [facility]

## 2024-01-17 ENCOUNTER — TELEPHONE (OUTPATIENT)
Dept: ANTICOAGULATION | Facility: CLINIC | Age: 84
End: 2024-01-17

## 2024-01-17 NOTE — TELEPHONE ENCOUNTER
Chart reviewed- scheduled for mobile lab INR draw by [employer] on 1/16- no results available on [employer] web site.     Called to [facility] lab and they are closed. RN to call in the AM to follow up on recent INR results.    Statement Selected

## 2024-01-19 ENCOUNTER — ANTI-COAG VISIT (OUTPATIENT)
Dept: ANTICOAGULATION | Facility: CLINIC | Age: 84
End: 2024-01-19

## 2024-01-19 ENCOUNTER — OFFICE VISIT (OUTPATIENT)
Dept: INTERNAL MEDICINE CLINIC | Facility: CLINIC | Age: 84
End: 2024-01-19

## 2024-01-19 VITALS
BODY MASS INDEX: 33.55 KG/M2 | OXYGEN SATURATION: 96 % | HEIGHT: 70 IN | DIASTOLIC BLOOD PRESSURE: 76 MMHG | HEART RATE: 84 BPM | WEIGHT: 234.38 LBS | SYSTOLIC BLOOD PRESSURE: 136 MMHG

## 2024-01-19 DIAGNOSIS — E78.5 HYPERLIPIDEMIA, UNSPECIFIED HYPERLIPIDEMIA TYPE: ICD-10-CM

## 2024-01-19 DIAGNOSIS — Z79.01 MONITORING FOR LONG-TERM ANTICOAGULANT USE: ICD-10-CM

## 2024-01-19 DIAGNOSIS — D47.1 CHRONIC MYELOPROLIFERATIVE DISEASE (HCC): ICD-10-CM

## 2024-01-19 DIAGNOSIS — Z00.00 ENCOUNTER FOR ANNUAL HEALTH EXAMINATION: Primary | ICD-10-CM

## 2024-01-19 DIAGNOSIS — J43.9 PULMONARY EMPHYSEMA, UNSPECIFIED EMPHYSEMA TYPE (HCC): ICD-10-CM

## 2024-01-19 DIAGNOSIS — Z51.81 MONITORING FOR LONG-TERM ANTICOAGULANT USE: ICD-10-CM

## 2024-01-19 DIAGNOSIS — T81.718A IATROGENIC PULMONARY EMBOLISM AND INFARCTION, INITIAL ENCOUNTER (HCC): ICD-10-CM

## 2024-01-19 DIAGNOSIS — N18.31 STAGE 3A CHRONIC KIDNEY DISEASE (HCC): ICD-10-CM

## 2024-01-19 DIAGNOSIS — M17.0 PRIMARY OSTEOARTHRITIS OF BOTH KNEES: ICD-10-CM

## 2024-01-19 DIAGNOSIS — I48.91: Primary | ICD-10-CM

## 2024-01-19 DIAGNOSIS — Z78.0 POSTMENOPAUSAL: ICD-10-CM

## 2024-01-19 DIAGNOSIS — I48.91: ICD-10-CM

## 2024-01-19 DIAGNOSIS — I26.99 IATROGENIC PULMONARY EMBOLISM AND INFARCTION, INITIAL ENCOUNTER (HCC): ICD-10-CM

## 2024-01-19 DIAGNOSIS — E04.2 NONTOXIC MULTINODULAR GOITER: ICD-10-CM

## 2024-01-19 DIAGNOSIS — Z95.0: Primary | ICD-10-CM

## 2024-01-19 DIAGNOSIS — K21.9 GASTROESOPHAGEAL REFLUX DISEASE WITHOUT ESOPHAGITIS: ICD-10-CM

## 2024-01-19 DIAGNOSIS — Z95.0: ICD-10-CM

## 2024-01-19 DIAGNOSIS — E66.09 CLASS 1 OBESITY DUE TO EXCESS CALORIES WITHOUT SERIOUS COMORBIDITY WITH BODY MASS INDEX (BMI) OF 32.0 TO 32.9 IN ADULT: ICD-10-CM

## 2024-01-19 PROBLEM — R60.0 BILATERAL LEG EDEMA: Status: ACTIVE | Noted: 2023-08-24

## 2024-01-19 PROBLEM — I50.32 CHRONIC DIASTOLIC (CONGESTIVE) HEART FAILURE (HCC): Status: ACTIVE | Noted: 2023-08-15

## 2024-01-19 PROBLEM — R53.1 WEAKNESS: Status: ACTIVE | Noted: 2023-08-15

## 2024-01-19 PROBLEM — M77.52 BONE SPUR OF LEFT FOOT: Status: ACTIVE | Noted: 2023-03-07

## 2024-01-19 PROBLEM — I49.5 TACHYCARDIA-BRADYCARDIA (HCC): Status: ACTIVE | Noted: 2023-12-18

## 2024-01-19 PROBLEM — R00.0 TACHYCARDIA: Status: ACTIVE | Noted: 2021-10-26

## 2024-01-19 PROBLEM — S42.441D: Status: ACTIVE | Noted: 2023-08-15

## 2024-01-19 PROBLEM — R06.09 CHRONIC DYSPNEA: Status: ACTIVE | Noted: 2023-12-18

## 2024-01-19 PROBLEM — R55 SYNCOPE AND COLLAPSE: Status: ACTIVE | Noted: 2023-12-18

## 2024-01-19 PROBLEM — R26.81 UNSTEADINESS ON FEET: Status: ACTIVE | Noted: 2023-08-15

## 2024-01-19 LAB — INR: 2 (ref 0.8–1.2)

## 2024-01-19 PROCEDURE — 93793 ANTICOAG MGMT PT WARFARIN: CPT | Performed by: FAMILY MEDICINE

## 2024-01-19 PROCEDURE — 1125F AMNT PAIN NOTED PAIN PRSNT: CPT

## 2024-01-19 PROCEDURE — G0439 PPPS, SUBSEQ VISIT: HCPCS

## 2024-01-19 NOTE — PROGRESS NOTES
Subjective:   Tara Lockwood is a 83 year old female who presents for a Medicare Subsequent Annual Wellness visit (Pt already had Initial Annual Wellness) and scheduled follow up of multiple significant but stable problems.     She had a pacemaker placed on 12/26, has been doing well. Reports allergic reaction/redness on skin related to adhesive dressing. Had pacemaker checked and it was activating 37% of the time    C/o every time she eats a meal has a bowel movement right after, it can be formed or soft  Has been chronic for years   Mostly happens after breakfast and lunch   She has one 10oz cup and then a 6oz cup of coffee both at breakfast time   Denies dietary changes otherwise   No blood in the stool and no liquid stools  Has not experienced any GERD symptoms, taking esomeprazole daily     No other concerns     Thinks she got the shingles vaccine at [facility] in [place]     Reports some stress related to her daughter and grandson who is slightly autistic living in her condo  Daughter has some health issues and mood can be labile    History/Other:   Fall Risk Assessment:   She has been screened for Falls and is High Risk. Fall Prevention information provided to patient in After Visit Summary.    Do you feel unsteady when standing or walking?: Yes  Do you worry about falling?: Yes  Have you fallen in the past year?: Yes  How many times have you fallen?: 1  Were you injured?: Yes     Cognitive Assessment:   She had a completely normal cognitive assessment - see flowsheet entries     Functional Ability/Status:   Tara Lockwood has some abnormal functions as listed below:  She has Hearing problems based on screening of functional status.    Depression Screening (PHQ-2/PHQ-9): PHQ-2 SCORE: 0, done 1/19/2024       Advanced Directives:   She does NOT have a Living Will. [Do you have a living will?: No]  She has a Power of  for Health Care on file in [employer].  Discussed Advance Care Planning with patient (and  family/surrogate if present). Standard forms made available to patient in After Visit Summary.      Patient Active Problem List   Diagnosis    Obesity    Pulmonary emphysema (HCC)    Nontoxic multinodular goiter    Gastroesophageal reflux disease    Stage 3a chronic kidney disease (HCC)    Control of atrial fibrillation with pacemaker  (HCC)    Atrial fibrillation with rapid ventricular response (HCC)    Osteoarthritis of hip, unspecified    Monitoring for long-term anticoagulant use    Iatrogenic pulmonary embolism and infarction, initial encounter ([facility])    Bilateral leg edema    Bone spur of left foot    Chronic diastolic (congestive) heart failure (HCC)    Chronic dyspnea    Chronic myeloproliferative disease (HCC)    Displaced fracture (avulsion) of medial epicondyle of right humerus, subsequent encounter for fracture with routine healing    Syncope and collapse    Tachycardia    Tachycardia-bradycardia (HCC)    Unsteadiness on feet    Weakness     Allergies:  She is allergic to fentanyl, hydrocodone, morphine, and phenol.    Current Medications:  Outpatient Medications Marked as Taking for the 1/19/24 encounter (Office Visit) with Taylor Bagley APRN   Medication Sig    lidocaine (HM LIDOCAINE PATCH) 4 % External Patch Place 1 patch onto the skin daily.    hydroxyurea 500 MG Oral Cap GERRY 1 CAPSULE ON SUN,TUES, WEDS, THUR AND SAT AND 2 CAPSULES ON MON AND FRI AS DIRECTED    warfarin 5 MG Oral Tab Take 1-1.5 tablets (5-7.5 mg total) by mouth nightly. Take as directed by the coumadin clinic. Take one tablet (5 mg) five nights a week. Take one and one half tablets (7.5 mg) two nights a week.    warfarin 5 MG Oral Tab Take 1-1.5 tablets (5-7.5 mg total) by mouth nightly. Take as directed by the coumadin clinic. Take 1.5 tabs (7.5mg) by mouth Tues, Thurs and 1 tab (5mg) all other days per Coumadin Clinic    dilTIAZem HCl ER Beads 120 MG Oral Capsule SR 24 Hr Take 1 capsule (120 mg total) by mouth daily.     Esomeprazole Magnesium 20 MG Oral Capsule Delayed Release Take 1 capsule (20 mg total) by mouth every morning before breakfast.    Multiple Vitamins-Minerals (MULTIVITAMIN ADULT OR) Take 1 tablet by mouth daily.    acetaminophen (TYLENOL) 325 MG Oral Tab Take 500 mg by mouth every 6 (six) hours as needed for Pain.         Medical History:  She  has a past medical history of Acid reflux, Acute medial meniscus tear of right knee (3/6/2018), Acute medial meniscus tear of right knee (3/6/2018), Acute midline low back pain with left-sided sciatica (1/15/2019), Age-related nuclear cataract of both eyes (11/9/2017), Arthritis, Degenerative disc disease, lumbar (1/15/2019), Diverticulosis large intestine w/o perforation or abscess w/o bleeding (9/8/2017), Diverticulosis large intestine w/o perforation or abscess w/o bleeding (9/8/2017), Family history of glaucoma in mother (11/27/2018), Family history of glaucoma in mother (11/27/2018), Family history of macular degeneration (11/9/2017), Gastric polyps (9/8/2017), Gastric polyps (9/8/2017), Hiatal hernia, History of hip replacement, total, Iatrogenic pulmonary embolism and infarction, initial encounter ([facility]) (8/4/2022), Internal hemorrhoids (9/8/2017), Kidney problem, Long term (current) use of anticoagulants (7/13/2021), Lumbar herniated disc (1/24/2019), Lung cancer ([facility]) (6-2016), Obesity, unspecified, Osteoarthrosis (5/8/2014), Osteoarthrosis, unspecified whether generalized or localized, unspecified site, Post-menopausal bleeding, Primary osteoarthritis of left knee (3/23/2017), Primary osteoarthritis of right knee (3/27/2018), Pulmonary embolism ([facility]), S/P colonoscopy with polypectomy (9/8/2017), Spinal stenosis of lumbar region without neurogenic claudication (1/24/2019), Spondylolisthesis of lumbar region (1/15/2019), and Thyroid condition.  Surgical History:  She  has a past surgical history that includes hysterectomy; Laparoscopic cholecystectomy; cholecystectomy;  other surgical history; other surgical history; foot surgery (Bilateral); ; other surgical history; other surgical history; other surgical history (); hip replacement surgery (Right); foot/toes surgery proc unlisted (Left); removal of lung,lobectomy (Left, 2016); hip surgery (Right, 2015); colonoscopy; egd; and colonoscopy (N/A, 2017).   Family History:  Her family history includes Alzheimer's Disease (age of onset: 93) in her mother; Bladder Cancer in her mother; Breast Cancer (age of onset: 48) in her daughter; Cancer (age of onset: 87) in her father; Glaucoma in her maternal aunt and mother; Macular degeneration in her brother, brother, maternal aunt, and mother; Myocardial Infarction (age of onset: 69) in her brother; Pancreatic Cancer (age of onset: 73) in her sister.  Social History:  She  reports that she has never smoked. She has never used smokeless tobacco. She reports current alcohol use of about 0.8 standard drinks of alcohol per week. She reports that she does not use drugs.    Tobacco:  She has never smoked tobacco.    CAGE Alcohol Screen:   CAGE screening score of 0 on 2024, showing low risk of alcohol abuse.      Patient Care Team:  Robbie Hubbard MD as PCP - General (Internal Medicine)  Nathan Reyes MD as Consulting Physician (PULMONARY DISEASES)  Afia Meeks CMA    Review of Systems   Constitutional: Negative.    HENT:          Left ear clogged ear   Respiratory: Negative.     Cardiovascular: Negative.    Gastrointestinal:  Positive for diarrhea. Negative for blood in stool, nausea and vomiting.   Genitourinary: Negative.    Skin: Negative.    Neurological: Negative.    Psychiatric/Behavioral: Negative.     All other systems reviewed and are negative.    Objective:   Physical Exam  Vitals reviewed.   Constitutional:       General: She is not in acute distress.     Appearance: Normal appearance. She is well-developed.   HENT:      Right Ear: Tympanic membrane normal.       Left Ear: There is impacted cerumen.      Mouth/Throat:      Mouth: Mucous membranes are moist.      Pharynx: Oropharynx is clear.   Cardiovascular:      Rate and Rhythm: Normal rate and regular rhythm.      Heart sounds: Normal heart sounds.   Pulmonary:      Effort: Pulmonary effort is normal.      Breath sounds: Normal breath sounds.   Abdominal:      General: Bowel sounds are normal.      Palpations: Abdomen is soft.   Musculoskeletal:      Cervical back: Normal range of motion.   Lymphadenopathy:      Cervical: No cervical adenopathy.   Skin:     General: Skin is warm and dry.   Neurological:      Mental Status: She is alert and oriented to person, place, and time.       /76   Pulse 84   Ht 5' 10\" (1.778 m)   Wt 234 lb 6.4 oz (106.3 kg)   SpO2 96%   BMI 33.63 kg/m²  Estimated body mass index is 33.63 kg/m² as calculated from the following:    Height as of this encounter: 5' 10\" (1.778 m).    Weight as of this encounter: 234 lb 6.4 oz (106.3 kg).    Medicare Hearing Assessment:   Hearing Screening    Screening Method: Finger Rub  Finger Rub Result: Pass                     Assessment & Plan:   Tara Lockwood is a 83 year old female who presents for a Medicare Assessment.     1. Encounter for annual health examination (Primary)  -     TSH W Reflex To Free T4; Future; Expected date: 01/19/2024  -     Lipid Panel; Future; Expected date: 01/19/2024  2. Gastroesophageal reflux disease without esophagitis  On esomeprazole, asymptomatic  Will attempt to wean off medication and reassess her symptoms  3. Iatrogenic pulmonary embolism and infarction, initial encounter (HCC)  On warfarin, sees heme/onc Dr. Bhandari - next visit end of January  4. Pulmonary emphysema, unspecified emphysema type (HCC)  Overview:  Overview:   Mild COPD  5. Stage 3a chronic kidney disease (HCC)  Stable, avoid NSAIDs, recheck   6. Primary osteoarthritis of both knees  Pain not ideally controlled, sees Dr. Hawkins for injections, has  appt 1/29    7. Hyperlipidemia, unspecified hyperlipidemia type  Recheck  8. Nontoxic multinodular goiter  S/p FNA in 2017 and repeat US in 2018 with stability  9. Class 1 obesity due to excess calories without serious comorbidity with body mass index (BMI) of 32.0 to 32.9 in adult  Discussed healthy diet, protein intake, cooks her own meals  10. Control of atrial fibrillation with pacemaker  (HCC)  Controlled with pacemaker, sees Dr. Brooks   11. Chronic myeloproliferative disease (HCC)  Sees heme/onc Dr. Bhandari - next visit end of January  12. Postmenopausal  -     Dexa Scan/Bone Density screening (Screening allowed every 2 years); Future; Expected date: 01/19/2024  Last DEXA 2020 osteopenia, recheck    The patient indicates understanding of these issues and agrees to the plan.     Reinforced healthy diet, lifestyle, and exercise.      Return in 3 months (on 4/19/2024).     ARMOND He, 1/19/2024     Supplementary Documentation:   General Health:  In the past six months, have you lost more than 10 pounds without trying?: 2 - No  Has your appetite been poor?: No  Type of Diet: Low Salt;Other  How does the patient maintain a good energy level?: Other  How would you describe your daily physical activity?: None  How would you describe your current health state?: Good  How do you maintain positive mental well-being?: Social Interaction;Puzzles;Games;Visiting Friends;Visiting Family  On a scale of 0 to 10, with 0 being no pain and 10 being severe pain, what is your pain level?: 5 - (Moderate)  In the past six months, have you experienced urine leakage?: 0-No  At any time do you feel concerned for the safety/well-being of yourself and/or your children, in your home or elsewhere?: No  Have you had any immunizations at another office such as Influenza, Hepatitis B, Tetanus, or Pneumococcal?: Yes         Tara Lockwood's SCREENING SCHEDULE   Tests on this list are recommended by your physician but may not be covered, or  covered at this frequency, by your insurer.   Please check with your insurance carrier before scheduling to verify coverage.   PREVENTATIVE SERVICES FREQUENCY &  COVERAGE DETAILS LAST COMPLETION DATE   Diabetes Screening    Fasting Blood Sugar /  Glucose    One screening every 12 months if never tested or if previously tested but not diagnosed with pre-diabetes   One screening every 6 months if diagnosed with pre-diabetes Lab Results   Component Value Date    GLU 91 12/21/2023        Cardiovascular Disease Screening    Lipid Panel  Cholesterol  Lipoprotein (HDL)  Triglycerides Covered every 5 years for all Medicare beneficiaries without apparent signs or symptoms of cardiovascular disease Lab Results   Component Value Date    CHOLEST 185 09/18/2021    HDL 52 09/18/2021     (H) 09/18/2021    TRIG 90 09/18/2021         Electrocardiogram (EKG)   Covered if needed at Welcome to Medicare, and non-screening if indicated for medical reasons 08/17/2023      Ultrasound Screening for Abdominal Aortic Aneurysm (AAA) Covered once in a lifetime for one of the following risk factors    Men who are 65-75 years old and have ever smoked    Anyone with a family history -     Colorectal Cancer Screening  Covered for ages 50-85; only need ONE of the following:    Colonoscopy   Covered every 10 years    Covered every 2 years if patient is at high risk or previous colonoscopy was abnormal 09/08/2017    Health Maintenance   Topic Date Due    Colorectal Cancer Screening  09/08/2027       Flexible Sigmoidoscopy   Covered every 4 years -    Fecal Occult Blood Test Covered annually -   Bone Density Screening    Bone density screening    Covered every 2 years after age 65 if diagnosed with risk of osteoporosis or estrogen deficiency.    Covered yearly for long-term glucocorticoid medication use (Steroids) Last Dexa Scan:    XR DEXA BONE DENSITOMETRY (CPT=77080) 10/21/2020      No recommendations at this time   Pap and Pelvic    Pap    Covered every 2 years for women at normal risk; Annually if at high risk -  No recommendations at this time    Chlamydia Annually if high risk -  No recommendations at this time   Screening Mammogram    Mammogram     Recommend annually for all female patients aged 40 and older    One baseline mammogram covered for patients aged 35-39 -    No recommendations at this time    Immunizations    Influenza Covered once per flu season  Please get every year 10/30/2023  No recommendations at this time    Pneumococcal Each vaccine ([id number] & [id number]) covered once after 65 Prevnar 13: 04/13/2015    Eloupbxtr75: 09/30/2020     No recommendations at this time    Hepatitis B One screening covered for patients with certain risk factors   -  No recommendations at this time    Tetanus Toxoid Not covered by Medicare Part B unless medically necessary (cut with metal); may be covered with your pharmacy prescription benefits 07/01/2009    Tetanus, Diptheria and Pertusis TD and TDaP Not covered by Medicare Part B -  No recommendations at this time    Zoster Not covered by Medicare Part B; may be covered with your pharmacy  prescription benefits -  Zoster Vaccines(1 of 2) Never done     Chronic Obstructive Pulmonary Disease (COPD)    Spirometry Annually Spirometry date: 09/21/2021

## 2024-01-19 NOTE — PATIENT INSTRUCTIONS
Tara Lockwood's SCREENING SCHEDULE   Tests on this list are recommended by your physician but may not be covered, or covered at this frequency, by your insurer.   Please check with your insurance carrier before scheduling to verify coverage.   PREVENTATIVE SERVICES FREQUENCY &  COVERAGE DETAILS LAST COMPLETION DATE   Diabetes Screening    Fasting Blood Sugar /  Glucose    One screening every 12 months if never tested or if previously tested but not diagnosed with pre-diabetes   One screening every 6 months if diagnosed with pre-diabetes Lab Results   Component Value Date    GLU 91 12/21/2023        Cardiovascular Disease Screening    Lipid Panel  Cholesterol  Lipoprotein (HDL)  Triglycerides Covered every 5 years for all Medicare beneficiaries without apparent signs or symptoms of cardiovascular disease Lab Results   Component Value Date    CHOLEST 185 09/18/2021    HDL 52 09/18/2021     (H) 09/18/2021    TRIG 90 09/18/2021         Electrocardiogram (EKG)   Covered if needed at Welcome to Medicare, and non-screening if indicated for medical reasons 08/17/2023      Ultrasound Screening for Abdominal Aortic Aneurysm (AAA) Covered once in a lifetime for one of the following risk factors    Men who are 65-75 years old and have ever smoked    Anyone with a family history -     Colorectal Cancer Screening  Covered for ages 50-85; only need ONE of the following:    Colonoscopy   Covered every 10 years    Covered every 2 years if patient is at high risk or previous colonoscopy was abnormal 09/08/2017    Health Maintenance   Topic Date Due    Colorectal Cancer Screening  09/08/2027       Flexible Sigmoidoscopy   Covered every 4 years -    Fecal Occult Blood Test Covered annually -   Bone Density Screening    Bone density screening    Covered every 2 years after age 65 if diagnosed with risk of osteoporosis or estrogen deficiency.    Covered yearly for long-term glucocorticoid medication use (Steroids) Last Dexa  Scan:    XR DEXA BONE DENSITOMETRY (CPT=77080) 10/21/2020      No recommendations at this time   Pap and Pelvic    Pap   Covered every 2 years for women at normal risk; Annually if at high risk -  No recommendations at this time    Chlamydia Annually if high risk -  No recommendations at this time   Screening Mammogram    Mammogram     Recommend annually for all female patients aged 40 and older    One baseline mammogram covered for patients aged 35-39 -    No recommendations at this time    Immunizations    Influenza Covered once per flu season  Please get every year 10/30/2023  No recommendations at this time    Pneumococcal Each vaccine ([id number] & [id number]) covered once after 65 Prevnar 13: 04/13/2015    [id number]: 09/30/2020     No recommendations at this time    Hepatitis B One screening covered for patients with certain risk factors   -  No recommendations at this time    Tetanus Toxoid Not covered by Medicare Part B unless medically necessary (cut with metal); may be covered with your pharmacy prescription benefits 07/01/2009    Tetanus, Diptheria and Pertusis TD and TDaP Not covered by Medicare Part B -  No recommendations at this time    Zoster Not covered by Medicare Part B; may be covered with your pharmacy  prescription benefits -  Zoster Vaccines(1 of 2) Never done     Chronic Obstructive Pulmonary Disease (COPD)    Spirometry Annually Spirometry date: 09/21/2021     Take nexium every other day for a week and then stop, stay off the medicine for about 2-4 weeks and let us know how you are feeling.

## 2024-01-19 NOTE — PROGRESS NOTES
Face to face.     Prefers to come to the clinic instead of using [facility] at this time. May use in the future if there is bad weather or if she is unable to reschedule an appointment.     Per protocol, continue current dose and recheck INR in 4 weeks.     7.5 mg every Tue, Thu; 5 mg all other days

## 2024-01-20 ENCOUNTER — LAB ENCOUNTER (OUTPATIENT)
Dept: LAB | Age: 84
End: 2024-01-20
Payer: MEDICARE

## 2024-01-20 DIAGNOSIS — Z00.00 ENCOUNTER FOR ANNUAL HEALTH EXAMINATION: ICD-10-CM

## 2024-01-20 LAB
CHOLEST SERPL-MCNC: 203 MG/DL (ref ?–200)
FASTING PATIENT LIPID ANSWER: YES
HDLC SERPL-MCNC: 55 MG/DL (ref 40–59)
LDLC SERPL CALC-MCNC: 129 MG/DL (ref ?–100)
NONHDLC SERPL-MCNC: 148 MG/DL (ref ?–130)
TRIGL SERPL-MCNC: 107 MG/DL (ref 30–149)
TSI SER-ACNC: 3.49 MIU/ML (ref 0.55–4.78)
VLDLC SERPL CALC-MCNC: 19 MG/DL (ref 0–30)

## 2024-01-20 PROCEDURE — 80061 LIPID PANEL: CPT

## 2024-01-20 PROCEDURE — 84443 ASSAY THYROID STIM HORMONE: CPT

## 2024-01-20 PROCEDURE — 36415 COLL VENOUS BLD VENIPUNCTURE: CPT

## 2024-01-31 ENCOUNTER — OFFICE VISIT (OUTPATIENT)
Dept: ORTHOPEDICS CLINIC | Facility: CLINIC | Age: 84
End: 2024-01-31

## 2024-01-31 VITALS — DIASTOLIC BLOOD PRESSURE: 71 MMHG | SYSTOLIC BLOOD PRESSURE: 128 MMHG | HEART RATE: 81 BPM

## 2024-01-31 DIAGNOSIS — M17.0 PRIMARY OSTEOARTHRITIS OF BOTH KNEES: Primary | ICD-10-CM

## 2024-01-31 PROCEDURE — 99213 OFFICE O/P EST LOW 20 MIN: CPT | Performed by: ORTHOPAEDIC SURGERY

## 2024-01-31 PROCEDURE — 1125F AMNT PAIN NOTED PAIN PRSNT: CPT | Performed by: ORTHOPAEDIC SURGERY

## 2024-01-31 PROCEDURE — 20610 DRAIN/INJ JOINT/BURSA W/O US: CPT | Performed by: ORTHOPAEDIC SURGERY

## 2024-01-31 RX ORDER — TRIAMCINOLONE ACETONIDE 40 MG/ML
40 INJECTION, SUSPENSION INTRA-ARTICULAR; INTRAMUSCULAR
Status: COMPLETED | OUTPATIENT
Start: 2024-01-31 | End: 2024-01-31

## 2024-01-31 RX ADMIN — TRIAMCINOLONE ACETONIDE 40 MG: 40 INJECTION, SUSPENSION INTRA-ARTICULAR; INTRAMUSCULAR at 14:49:00

## 2024-01-31 RX ADMIN — TRIAMCINOLONE ACETONIDE 40 MG: 40 INJECTION, SUSPENSION INTRA-ARTICULAR; INTRAMUSCULAR at 14:39:00

## 2024-01-31 NOTE — PROGRESS NOTES
Per verbal order from Dr. Hawkins draw up 3ml of 0.5% Marcaine & 2ml 1% lidocaine and 1ml of Kenalog 40 for cortisone injection to bilateral knee Camryn Spangler    Patient provided education handout for cortisone injection.

## 2024-01-31 NOTE — PROGRESS NOTES
NURSING INTAKE COMMENTS:   Chief Complaint   Patient presents with    Follow - Up     Bilateral knee pain. Patient wants cortisone injection. Last injection was given on 10/26 to both knee. Patient rates her pain 5/10 at this time.        HPI: This 83 year old female presents today with complaints of bilateral knee pain follow-up.  She usually gets about 1 month of relief from her cortisone injections.  Her most recent injection was more than 3 months ago.  She has had no recent immunizations or illnesses.  No recent injury to the knee.    Past Medical History:   Diagnosis Date    Acid reflux     Acute medial meniscus tear of right knee 3/6/2018    Acute medial meniscus tear of right knee 3/6/2018    Acute midline low back pain with left-sided sciatica 1/15/2019    Age-related nuclear cataract of both eyes 11/9/2017    Arthritis     Degenerative disc disease, lumbar 1/15/2019    Diverticulosis large intestine w/o perforation or abscess w/o bleeding 9/8/2017    Diverticulosis large intestine w/o perforation or abscess w/o bleeding 9/8/2017    Family history of glaucoma in mother 11/27/2018    Family history of glaucoma in mother 11/27/2018    Family history of macular degeneration 11/9/2017    Gastric polyps 9/8/2017    Gastric polyps 9/8/2017    Hiatal hernia     History of hip replacement, total     right hip    Iatrogenic pulmonary embolism and infarction, initial encounter ([facility]) 8/4/2022    Internal hemorrhoids 9/8/2017    Kidney problem     Long term (current) use of anticoagulants 7/13/2021    Lumbar herniated disc 1/24/2019    Lung cancer ([facility]) 6-2016    Obesity, unspecified     Osteoarthrosis 5/8/2014    Osteoarthrosis, unspecified whether generalized or localized, unspecified site     Post-menopausal bleeding     Primary osteoarthritis of left knee 3/23/2017    Primary osteoarthritis of right knee 3/27/2018    Pulmonary embolism ([facility])     S/P colonoscopy with polypectomy 9/8/2017    Spinal stenosis of lumbar  region without neurogenic claudication 2019    Spondylolisthesis of lumbar region 1/15/2019    Thyroid condition      Past Surgical History:   Procedure Laterality Date    CHOLECYSTECTOMY      COLONOSCOPY      COLONOSCOPY N/A 2017    Procedure: COLONOSCOPY;  Surgeon: Clare Victor MD;  Location: [facility] ENDOSCOPY    EGD      FOOT SURGERY Bilateral     Bunion Surgery X6    FOOT/TOES SURGERY PROC UNLISTED Left     Pt had surgery to straighten toes on left foot.    HIP REPLACEMENT SURGERY Right     HIP SURGERY Right 2015    right total hip arthroplasty    HYSTERECTOMY      LAPAROSCOPIC CHOLECYSTECTOMY            x7 (Twins x1) Max weight 9 1/2 lbs    OTHER SURGICAL HISTORY      parotid gland removed left benigh    OTHER SURGICAL HISTORY      Removal Skin Mole    OTHER SURGICAL HISTORY      SAB/ D&C    OTHER SURGICAL HISTORY      Ear Surgery    OTHER SURGICAL HISTORY      Neg EM Bx    REMOVAL OF LUNG,LOBECTOMY Left 2016    Pt had upper left lobe removed.     Current Outpatient Medications   Medication Sig Dispense Refill    lidocaine (HM LIDOCAINE PATCH) 4 % External Patch Place 1 patch onto the skin daily. 20 patch 2    hydroxyurea 500 MG Oral Cap GERRY 1 CAPSULE ON SUN,TU, WEDS, THUR AND SAT AND 2 CAPSULES ON MON AND FRI AS DIRECTED 117 capsule 3    warfarin 5 MG Oral Tab Take 1-1.5 tablets (5-7.5 mg total) by mouth nightly. Take as directed by the coumadin clinic. Take one tablet (5 mg) five nights a week. Take one and one half tablets (7.5 mg) two nights a week. 120 tablet 1    warfarin 5 MG Oral Tab Take 1-1.5 tablets (5-7.5 mg total) by mouth nightly. Take as directed by the coumadin clinic. Take 1.5 tabs (7.5mg) by mouth Tues, Thurs and 1 tab (5mg) all other days per [facility] 105 tablet 1    dilTIAZem HCl ER Beads 120 MG Oral Capsule SR 24 Hr Take 1 capsule (120 mg total) by mouth daily. 30 capsule 11    Esomeprazole Magnesium 20 MG Oral Capsule Delayed Release Take 1 capsule (20  mg total) by mouth every morning before breakfast. 30 capsule 0    Multiple Vitamins-Minerals (MULTIVITAMIN ADULT OR) Take 1 tablet by mouth daily.      acetaminophen (TYLENOL) 325 MG Oral Tab Take 500 mg by mouth every 6 (six) hours as needed for Pain.         Allergies   Allergen Reactions    Fentanyl ANAPHYLAXIS    Hydrocodone RASH    Morphine OTHER (SEE COMMENTS)     Chest tightness     Phenol ANAPHYLAXIS     Family History   Problem Relation Age of Onset    Cancer Father 87        Lung  -  smoker    Macular degeneration Mother     Glaucoma Mother     Other (Alzheimer's Disease) Mother 93    Other (Bladder Cancer) Mother     Macular degeneration Brother     Other (Pancreatic Cancer) Sister 73    Macular degeneration Brother     Other (Myocardial Infarction) Brother 69    Macular degeneration Maternal Aunt     Glaucoma Maternal Aunt     Breast Cancer Daughter 48    Diabetes Neg        Social History     Occupational History    Not on file   Tobacco Use    Smoking status: Never    Smokeless tobacco: Never   Substance and Sexual Activity    Alcohol use: Yes     Alcohol/week: 0.8 standard drinks of alcohol     Types: 1 Glasses of wine per week     Comment: 1-2x/month    Drug use: No    Sexual activity: Not on file        Review of Systems:  GENERAL: denies fevers, chills, night sweats, fatigue, unintentional weight loss/gain  SKIN: denies skin lesions, open sores, rash  HEENT:denies recent vision change, new nasal congestion,hearing loss, tinnitus, sore throat, headaches  RESPIRATORY: denies new shortness of breath, cough, asthma, wheezing  CARDIOVASCULAR: denies chest pain, leg cramps with exertion, palpitations, leg swelling  GI: denies abdominal pain, nausea, vomiting, diarrhea, constipation, hematochezia, worsening heartburn or stomach ulcers  : denies dysuria, hematuria, incontinence, increased frequency, urgency, difficulty urinating  MUSCULOSKELETAL: denies musculoskeletal complaints other than in  HPI  NEURO: denies numbness, tingling, weakness, balance issues, dizziness, memory loss  PSYCHIATRIC: denies Hx of depression, anxiety, other psychiatric disorders  HEMATOLOGIC: denies blood clots, anemia, blood clotting disorders, blood transfusion  ENDOCRINE: denies autoimmune disease, thyroid issues, or diabetes  ALLERGY: denies asthma, seasonal allergies    Physical Examination:    /71 (BP Location: Left arm, Patient Position: Sitting, Cuff Size: large)   Pulse 81   Constitutional: appears well hydrated, alert and responsive, no acute distress noted  Extremities: Bilateral knee examination unchanged  Neurological: Unchanged    Imaging:   No results found.     Labs:  Lab Results   Component Value Date    WBC 5.6 12/21/2023    HGB 13.7 12/21/2023    .0 12/21/2023      Lab Results   Component Value Date    GLU 91 12/21/2023    BUN 24 (H) 12/21/2023    CREATSERUM 1.34 (H) 12/21/2023    GFR 40 (L) 10/27/2015    GFRNAA 43 (L) 05/09/2022    GFRAA 49 (L) 05/09/2022        Assessment and Plan:  Diagnoses and all orders for this visit:    Primary osteoarthritis of both knees  -     arthrocentesis major joint  -     triamcinolone acetonide (Kenalog-40) 40 MG/ML injection 40 mg        Assessment: Bilateral knee osteoarthritis, primary    Plan: Using sterile technique and a superior lateral parapatellar approach, both knees were injected with 40 mg of Kenalog.  She tolerated the procedure well.  She was advised of the potential complications of the injection.  Recommend icing and oral anti-inflammatory use.  Discussed activity modifications and home exercises.  Follow-up with me again as needed.    Follow Up: Return if symptoms worsen or fail to improve.    NANCY LIRA MD

## 2024-02-12 ENCOUNTER — HOSPITAL ENCOUNTER (OUTPATIENT)
Dept: BONE DENSITY | Facility: HOSPITAL | Age: 84
Discharge: HOME | End: 2024-02-12
Payer: MEDICARE

## 2024-02-12 DIAGNOSIS — Z78.0 POSTMENOPAUSAL: ICD-10-CM

## 2024-02-12 PROCEDURE — 77080 DXA BONE DENSITY AXIAL: CPT

## 2024-02-13 NOTE — PLAN OF CARE
Problem: SAFETY ADULT - FALL  Goal: Free from fall injury  Description: INTERVENTIONS:  - Assess pt frequently for physical needs  - Identify cognitive and physical deficits and behaviors that affect risk of falls. - [place] fall precautions as indicated by assessment.  - Educate pt/family on patient safety including physical limitations  - Instruct pt to call for assistance with activity based on assessment  - Modify environment to reduce risk of injury  - Provide assistive devices as appropriate  - Consider OT/PT consult to assist with strengthening/mobility  - Encourage toileting schedule  Outcome: Progressing     Problem: CARDIOVASCULAR - ADULT  Goal: Maintains optimal cardiac output and hemodynamic stability  Description: INTERVENTIONS:  - Monitor vital signs, rhythm, and trends  - Monitor for bleeding, hypotension and signs of decreased cardiac output  - Evaluate effectiveness of vasoactive medications to optimize hemodynamic stability  - Monitor arterial and/or venous puncture sites for bleeding and/or hematoma  - Assess quality of pulses, skin color and temperature  - Assess for signs of decreased coronary artery perfusion - ex.  Angina  - Evaluate fluid balance, assess for edema, trend weights  Outcome: Progressing  Goal: Absence of cardiac arrhythmias or at baseline  Description: INTERVENTIONS:  - Continuous cardiac monitoring, monitor vital signs, obtain 12 lead EKG if indicated  - Evaluate effectiveness of antiarrhythmic and heart rate control medications as ordered  - Initiate emergency measures for life threatening arrhythmias  - Monitor electrolytes and administer replacement therapy as ordered  Outcome: Progressing     Problem: PAIN - ADULT  Goal: Verbalizes/displays adequate comfort level or patient's stated pain goal  Description: INTERVENTIONS:  - Encourage pt to monitor pain and request assistance  - Assess pain using appropriate pain scale  - Administer analgesics based on type and severity of pain and evaluate response  - Implement non-pharmacological measures as appropriate and evaluate response  - Consider cultural and social influences on pain and pain management  - Manage/alleviate anxiety  - Utilize distraction and/or relaxation techniques  - Monitor for opioid side effects  - Notify MD/LIP if interventions unsuccessful or patient reports new pain  - Anticipate increased pain with activity and pre-medicate as appropriate  Outcome: Progressing    Received awake,oriented. Slept soundly during the night. No complaints of pain at this time. Plans for ortho surgeon consult today. Will continue to monitor patient. Opt out

## 2024-02-14 ENCOUNTER — PATIENT MESSAGE (OUTPATIENT)
Dept: OTOLARYNGOLOGY | Facility: CLINIC | Age: 84
End: 2024-02-14

## 2024-02-14 ENCOUNTER — OFFICE VISIT (OUTPATIENT)
Dept: OTOLARYNGOLOGY | Facility: CLINIC | Age: 84
End: 2024-02-14
Payer: MEDICARE

## 2024-02-14 DIAGNOSIS — R09.82 POSTNASAL DRIP: ICD-10-CM

## 2024-02-14 DIAGNOSIS — H92.12 OTORRHEA OF LEFT EAR: ICD-10-CM

## 2024-02-14 DIAGNOSIS — H61.21 CERUMEN DEBRIS ON TYMPANIC MEMBRANE OF RIGHT EAR: ICD-10-CM

## 2024-02-14 DIAGNOSIS — H92.11 OTORRHEA OF RIGHT EAR: Primary | ICD-10-CM

## 2024-02-14 DIAGNOSIS — J37.0 CHRONIC ATROPHIC LARYNGITIS: ICD-10-CM

## 2024-02-14 PROCEDURE — 31575 DIAGNOSTIC LARYNGOSCOPY: CPT | Performed by: SPECIALIST

## 2024-02-14 PROCEDURE — 99203 OFFICE O/P NEW LOW 30 MIN: CPT | Performed by: SPECIALIST

## 2024-02-14 RX ORDER — OFLOXACIN 3 MG/ML
5 SOLUTION AURICULAR (OTIC) 2 TIMES DAILY
Qty: 5 ML | Refills: 0 | Status: SHIPPED | OUTPATIENT
Start: 2024-02-14 | End: 2024-02-24

## 2024-02-15 NOTE — PATIENT INSTRUCTIONS
Is a large amount of cerumen and debris fully cleaned from your right ear.  I placed you on a trial of Floxin drops and asked you to follow-up in 3 weeks time.  You had bowing of your bilateral vocal cords consistent with atrophic laryngitis.  There was no evidence of vocal cord paralysis or mass.  I asked you to try some Chlor-Trimeton for your postnasal drip.

## 2024-02-15 NOTE — PROGRESS NOTES
Tara Lockwood is a 83 year old female.   Chief Complaint   Patient presents with    Ear Problem     Reports blocked ears     Sinus Problem     Reports post nasal drip    Throat Problem     Reports hoarseness in voice.      HPI:   Patient here for hoarseness.  Is also having difficulty hearing from her ears.    Current Outpatient Medications   Medication Sig Dispense Refill    ofloxacin 0.3 % Otic Solution Place 5 drops into the left ear 2 (two) times daily for 10 days. 5 mL 0    lidocaine (HM LIDOCAINE PATCH) 4 % External Patch Place 1 patch onto the skin daily. 20 patch 2    hydroxyurea 500 MG Oral Cap GERRY 1 CAPSULE ON SUN,TUES, WEDS, THUR AND SAT AND 2 CAPSULES ON MON AND FRI AS DIRECTED 117 capsule 3    warfarin 5 MG Oral Tab Take 1-1.5 tablets (5-7.5 mg total) by mouth nightly. Take as directed by the coumadin clinic. Take one tablet (5 mg) five nights a week. Take one and one half tablets (7.5 mg) two nights a week. 120 tablet 1    warfarin 5 MG Oral Tab Take 1-1.5 tablets (5-7.5 mg total) by mouth nightly. Take as directed by the coumadin clinic. Take 1.5 tabs (7.5mg) by mouth Tues, Thurs and 1 tab (5mg) all other days per [facility] 105 tablet 1    dilTIAZem HCl ER Beads 120 MG Oral Capsule SR 24 Hr Take 1 capsule (120 mg total) by mouth daily. 30 capsule 11    Multiple Vitamins-Minerals (MULTIVITAMIN ADULT OR) Take 1 tablet by mouth daily.      acetaminophen (TYLENOL) 325 MG Oral Tab Take 500 mg by mouth every 6 (six) hours as needed for Pain.        Esomeprazole Magnesium 20 MG Oral Capsule Delayed Release Take 1 capsule (20 mg total) by mouth every morning before breakfast. 30 capsule 0      Past Medical History:   Diagnosis Date    Acid reflux     Acute medial meniscus tear of right knee 3/6/2018    Acute medial meniscus tear of right knee 3/6/2018    Acute midline low back pain with left-sided sciatica 1/15/2019    Age-related nuclear cataract of both eyes 11/9/2017    Arthritis     Degenerative  disc disease, lumbar 1/15/2019    Diverticulosis large intestine w/o perforation or abscess w/o bleeding 9/8/2017    Diverticulosis large intestine w/o perforation or abscess w/o bleeding 9/8/2017    Family history of glaucoma in mother 11/27/2018    Family history of glaucoma in mother 11/27/2018    Family history of macular degeneration 11/9/2017    Gastric polyps 9/8/2017    Gastric polyps 9/8/2017    Hiatal hernia     History of hip replacement, total     right hip    Iatrogenic pulmonary embolism and infarction, initial encounter ([facility]) 8/4/2022    Internal hemorrhoids 9/8/2017    Kidney problem     Long term (current) use of anticoagulants 7/13/2021    Lumbar herniated disc 1/24/2019    Lung cancer ([facility]) 6-2016    Obesity, unspecified     Osteoarthrosis 5/8/2014    Osteoarthrosis, unspecified whether generalized or localized, unspecified site     Post-menopausal bleeding     Primary osteoarthritis of left knee 3/23/2017    Primary osteoarthritis of right knee 3/27/2018    Pulmonary embolism ([facility])     S/P colonoscopy with polypectomy 9/8/2017    Spinal stenosis of lumbar region without neurogenic claudication 1/24/2019    Spondylolisthesis of lumbar region 1/15/2019    Thyroid condition       Social History:  Social History     Socioeconomic History    Marital status:    Tobacco Use    Smoking status: Never    Smokeless tobacco: Never   Vaping Use    Vaping Use: Never used   Substance and Sexual Activity    Alcohol use: Yes     Alcohol/week: 0.8 standard drinks of alcohol     Types: 1 Glasses of wine per week     Comment: 1-2x/month    Drug use: No   Other Topics Concern    Caffeine Concern Yes     Comment: 2 cups coffee daily    Pt has a pacemaker No    Pt has a defibrillator No    Reaction to local anesthetic No        REVIEW OF SYSTEMS:   GENERAL HEALTH: feels well otherwise  GENERAL : denies fever, chills, sweats, weight loss, weight gain  SKIN: denies any unusual skin lesions or rashes  RESPIRATORY:  denies shortness of breath with exertion  NEURO: denies headaches    EXAM:   There were no vitals taken for this visit.  System Details   Skin Inspection - Normal.   Constitutional Overall appearance - Normal.   Head/Face Facial features - Normal. Eyebrows - Normal. Skull - Normal.   Eyes Conjunctiva - Right: Normal, Left: Normal. Pupil - Right: Normal, Left: Normal.    Ears Inspection - Right: Normal, Left: Normal.   Canal -slight cerumen cleaned from the left ear and large amount of cerumen and debris fully cleaned from the right ear.  TM - Right: Normal, Left: Normal.   Nasal External nose - Normal.   Nasal septum - Normal.  Turbinates -congestion and clear postnasal drip   Oral/Oropharynx Lips - Normal, Tonsils - Normal, Tongue - Normal    Neck Exam Inspection - Normal. Palpation - Normal. Parotid gland - Normal. Thyroid gland - Normal.   Lymph Detail Submental. Submandibular. Anterior cervical. Posterior cervical. Supraclavicular.  All without enlargement   Psychiatric Orientation - Oriented to time, place, person & situation. Appropriate mood and affect.   Neurological Memory - Normal. Cranial nerves - Cranial nerves II through XII grossly intact.   Nasopharynx Normal by fiberoptic exam   Larynx Consent was obtained for the procedure.  The nose was asesthetized with 1% neosynephrine and 4% lidocaine topical drops.    The scope was placed into the bilateral nares as well as the nasopharynx, hypopharynx and larynx.    All of which were examined.  The  entire larynx including the vallecula, epiglottis, true and false vocal cords, aryepiglottic folds, piriform sinuses and post cricord area were examined for tumors and infectious processes as well as for evidence of reflux and retained secretions.  Vocal cord mobility was also assessed.    Any abnormalities are noted in the exam section.  Bowing of the bilateral vocal cords.  Normal vocal cord mobility.  No tumors or masses seen.  No retained secretions.      ASSESSMENT AND PLAN:   1. Otorrhea of left ear  Cleaned as much as possible and patient placed on Floxin drops.  Follow-up in 3 weeks time, sooner if problems.    2. Cerumen debris on tympanic membrane of left ear  Fully cleaned    3. Chronic atrophic laryngitis  On fiberoptic exam.  Patient denies any weight loss.    4. Postnasal drip  Can try Chlor-Trimeton for postnasal drip.      The patient indicates understanding of these issues and agrees to the plan.      Glory Reyes MD  2/14/2024  9:17 PM

## 2024-02-16 ENCOUNTER — ANTI-COAG VISIT (OUTPATIENT)
Dept: ANTICOAGULATION | Facility: CLINIC | Age: 84
End: 2024-02-16

## 2024-02-16 DIAGNOSIS — I48.91: Primary | ICD-10-CM

## 2024-02-16 DIAGNOSIS — T81.718A IATROGENIC PULMONARY EMBOLISM AND INFARCTION, INITIAL ENCOUNTER (HCC): ICD-10-CM

## 2024-02-16 DIAGNOSIS — I26.99 IATROGENIC PULMONARY EMBOLISM AND INFARCTION, INITIAL ENCOUNTER (HCC): ICD-10-CM

## 2024-02-16 DIAGNOSIS — Z51.81 MONITORING FOR LONG-TERM ANTICOAGULANT USE: ICD-10-CM

## 2024-02-16 DIAGNOSIS — Z79.01 MONITORING FOR LONG-TERM ANTICOAGULANT USE: ICD-10-CM

## 2024-02-16 DIAGNOSIS — Z95.0: Primary | ICD-10-CM

## 2024-02-16 LAB — INR: 1.7 (ref 0.8–1.2)

## 2024-02-16 PROCEDURE — 85610 PROTHROMBIN TIME: CPT | Performed by: FAMILY MEDICINE

## 2024-02-16 PROCEDURE — 93793 ANTICOAG MGMT PT WARFARIN: CPT | Performed by: FAMILY MEDICINE

## 2024-02-16 NOTE — PROGRESS NOTES
TTR 69.7%    Face to face.      Denies any missed doses, no change in diet/meds. INR has been trending low the last few visits.      Per protocol, increase weekly dose 5-10%- actual increase 6.2% and recheck INR in 3 weeks.      7.5 mg every Tue, Thu, Sat; 5 mg all other days

## 2024-02-23 DIAGNOSIS — C34.12 MALIGNANT NEOPLASM OF UPPER LOBE OF LEFT LUNG (HCC): ICD-10-CM

## 2024-02-23 DIAGNOSIS — D45 POLYCYTHEMIA VERA (HCC): Primary | ICD-10-CM

## 2024-02-26 ENCOUNTER — APPOINTMENT (OUTPATIENT)
Dept: HEMATOLOGY/ONCOLOGY | Facility: HOSPITAL | Age: 84
End: 2024-02-26
Attending: INTERNAL MEDICINE
Payer: MEDICARE

## 2024-02-26 VITALS
TEMPERATURE: 98 F | WEIGHT: 230 LBS | HEART RATE: 74 BPM | RESPIRATION RATE: 18 BRPM | OXYGEN SATURATION: 97 % | SYSTOLIC BLOOD PRESSURE: 133 MMHG | DIASTOLIC BLOOD PRESSURE: 60 MMHG | HEIGHT: 70 IN | BODY MASS INDEX: 32.93 KG/M2

## 2024-02-26 DIAGNOSIS — D45 POLYCYTHEMIA VERA (HCC): Primary | ICD-10-CM

## 2024-02-26 DIAGNOSIS — Z51.11 CHEMOTHERAPY MANAGEMENT, ENCOUNTER FOR: ICD-10-CM

## 2024-02-26 DIAGNOSIS — D45 POLYCYTHEMIA VERA (HCC): ICD-10-CM

## 2024-02-26 DIAGNOSIS — C34.12 MALIGNANT NEOPLASM OF UPPER LOBE OF LEFT LUNG (HCC): ICD-10-CM

## 2024-02-26 DIAGNOSIS — Z86.718 HISTORY OF DVT (DEEP VEIN THROMBOSIS): ICD-10-CM

## 2024-02-26 DIAGNOSIS — Z86.711 HISTORY OF PULMONARY EMBOLISM: ICD-10-CM

## 2024-02-26 LAB
ALBUMIN SERPL-MCNC: 3.9 G/DL (ref 3.2–4.8)
ALBUMIN/GLOB SERPL: 1.4 {RATIO} (ref 1–2)
ALP LIVER SERPL-CCNC: 69 U/L
ALT SERPL-CCNC: 23 U/L
ANION GAP SERPL CALC-SCNC: 5 MMOL/L (ref 0–18)
AST SERPL-CCNC: 22 U/L (ref ?–34)
BASOPHILS # BLD AUTO: 0.05 X10(3) UL (ref 0–0.2)
BASOPHILS NFR BLD AUTO: 0.7 %
BILIRUB SERPL-MCNC: 0.7 MG/DL (ref 0.2–1.1)
BUN BLD-MCNC: 23 MG/DL (ref 9–23)
BUN/CREAT SERPL: 21.1 (ref 10–20)
CALCIUM BLD-MCNC: 9.2 MG/DL (ref 8.7–10.4)
CHLORIDE SERPL-SCNC: 106 MMOL/L (ref 98–112)
CO2 SERPL-SCNC: 26 MMOL/L (ref 21–32)
CREAT BLD-MCNC: 1.09 MG/DL
DEPRECATED RDW RBC AUTO: 52.8 FL (ref 35.1–46.3)
EGFRCR SERPLBLD CKD-EPI 2021: 50 ML/MIN/1.73M2 (ref 60–?)
EOSINOPHIL # BLD AUTO: 0.07 X10(3) UL (ref 0–0.7)
EOSINOPHIL NFR BLD AUTO: 1 %
ERYTHROCYTE [DISTWIDTH] IN BLOOD BY AUTOMATED COUNT: 13.2 % (ref 11–15)
FASTING STATUS PATIENT QL REPORTED: NO
GLOBULIN PLAS-MCNC: 2.7 G/DL (ref 2.8–4.4)
GLUCOSE BLD-MCNC: 93 MG/DL (ref 70–99)
HCT VFR BLD AUTO: 38.7 %
HGB BLD-MCNC: 13.6 G/DL
IMM GRANULOCYTES # BLD AUTO: 0.02 X10(3) UL (ref 0–1)
IMM GRANULOCYTES NFR BLD: 0.3 %
LYMPHOCYTES # BLD AUTO: 1.06 X10(3) UL (ref 1–4)
LYMPHOCYTES NFR BLD AUTO: 14.5 %
MCH RBC QN AUTO: 38.1 PG (ref 26–34)
MCHC RBC AUTO-ENTMCNC: 35.1 G/DL (ref 31–37)
MCV RBC AUTO: 108.4 FL
MONOCYTES # BLD AUTO: 0.41 X10(3) UL (ref 0.1–1)
MONOCYTES NFR BLD AUTO: 5.6 %
NEUTROPHILS # BLD AUTO: 5.72 X10 (3) UL (ref 1.5–7.7)
NEUTROPHILS # BLD AUTO: 5.72 X10(3) UL (ref 1.5–7.7)
NEUTROPHILS NFR BLD AUTO: 77.9 %
OSMOLALITY SERPL CALC.SUM OF ELEC: 287 MOSM/KG (ref 275–295)
PLATELET # BLD AUTO: 228 10(3)UL (ref 150–450)
POTASSIUM SERPL-SCNC: 5.2 MMOL/L (ref 3.5–5.1)
PROT SERPL-MCNC: 6.6 G/DL (ref 5.7–8.2)
RBC # BLD AUTO: 3.57 X10(6)UL
SODIUM SERPL-SCNC: 137 MMOL/L (ref 136–145)
WBC # BLD AUTO: 7.3 X10(3) UL (ref 4–11)

## 2024-02-26 PROCEDURE — G2211 COMPLEX E/M VISIT ADD ON: HCPCS | Performed by: INTERNAL MEDICINE

## 2024-02-26 PROCEDURE — 80053 COMPREHEN METABOLIC PANEL: CPT

## 2024-02-26 PROCEDURE — 36415 COLL VENOUS BLD VENIPUNCTURE: CPT

## 2024-02-26 PROCEDURE — 85025 COMPLETE CBC W/AUTO DIFF WBC: CPT

## 2024-02-26 PROCEDURE — 99215 OFFICE O/P EST HI 40 MIN: CPT | Performed by: INTERNAL MEDICINE

## 2024-02-26 NOTE — PROGRESS NOTES
Cancer Center Progress Note    Patient Name: Tara Lockwood   YOB: 1940   Medical Record Number: [id number]   Attending Physician: Herman Bhandari M.D.       Chief Complaint:  Lung cancer    History of Present Illness:  Cancer history:  83 year old   female with a history of left upper lobe adenocarcinoma of the lung (well differentiated pT2aN0 4.3 cm No LVI) stage Ib.  S/p left lateral thoracotomy with left upper lobectomy and mediastinal lymph node dissection 6/14/16.    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 Perioperatively she had a removable IVC filter placed however still had DVT and PE in the postop setting of her thoracotomy while not on anticoagulation.  Her filter has since been retrieved.    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  She feels well overall.   She denies any chest pain or shortness of breath.    She denies any bruising or bleeding.  She denies any adenopathy.  She is otherwise without complaints today.    Performance Status:  ECOG 1  Past Medical History:  Past Medical History:   Diagnosis Date    Acid reflux     Acute medial meniscus tear of right knee 3/6/2018    Acute medial meniscus tear of right knee 3/6/2018    Acute midline low back pain with left-sided sciatica 1/15/2019    Age-related nuclear cataract of both eyes 11/9/2017    Arthritis     Degenerative disc disease, lumbar 1/15/2019    Diverticulosis large intestine w/o perforation or abscess w/o bleeding 9/8/2017    Diverticulosis large intestine w/o perforation or abscess w/o bleeding 9/8/2017    Family history of glaucoma in mother 11/27/2018    Family history of glaucoma in mother 11/27/2018    Family history of macular degeneration 11/9/2017    Gastric polyps 9/8/2017    Gastric polyps 9/8/2017    Hiatal hernia     History  of hip replacement, total     right hip    Iatrogenic pulmonary embolism and infarction, initial encounter ([facility]) 2022    Internal hemorrhoids 2017    Kidney problem     Long term (current) use of anticoagulants 2021    Lumbar herniated disc 2019    Lung cancer ([facility]) 6-    Obesity, unspecified     Osteoarthrosis 2014    Osteoarthrosis, unspecified whether generalized or localized, unspecified site     Post-menopausal bleeding     Primary osteoarthritis of left knee 3/23/2017    Primary osteoarthritis of right knee 3/27/2018    Pulmonary embolism ([facility])     S/P colonoscopy with polypectomy 2017    Spinal stenosis of lumbar region without neurogenic claudication 2019    Spondylolisthesis of lumbar region 1/15/2019    Thyroid condition        Past Surgical History:  Past Surgical History:   Procedure Laterality Date    CHOLECYSTECTOMY      COLONOSCOPY      COLONOSCOPY N/A 2017    Procedure: COLONOSCOPY;  Surgeon: Clare Victor MD;  Location: [facility] ENDOSCOPY    EGD      FOOT SURGERY Bilateral     Bunion Surgery X6    FOOT/TOES SURGERY PROC UNLISTED Left     Pt had surgery to straighten toes on left foot.    HIP REPLACEMENT SURGERY Right     HIP SURGERY Right 2015    right total hip arthroplasty    HYSTERECTOMY      LAPAROSCOPIC CHOLECYSTECTOMY            x7 (Twins x1) Max weight 9 1/2 lbs    OTHER SURGICAL HISTORY      parotid gland removed left benigh    OTHER SURGICAL HISTORY      Removal Skin Mole    OTHER SURGICAL HISTORY      SAB/ D&C    OTHER SURGICAL HISTORY      Ear Surgery    OTHER SURGICAL HISTORY      Neg EM Bx    REMOVAL OF LUNG,LOBECTOMY Left 2016    Pt had upper left lobe removed.       Family History:  Family History   Problem Relation Age of Onset    Cancer Father 87        Lung  -  smoker    Macular degeneration Mother     Glaucoma Mother     Other (Alzheimer's Disease) Mother 93    Other (Bladder Cancer) Mother     Macular degeneration Brother      Other (Pancreatic Cancer) Sister 73    Macular degeneration Brother     Other (Myocardial Infarction) Brother 69    Macular degeneration Maternal Aunt     Glaucoma Maternal Aunt     Breast Cancer Daughter 48    Diabetes Neg        Social History:  Social History     Socioeconomic History    Marital status:      Spouse name: Not on file    Number of children: Not on file    Years of education: Not on file    Highest education level: Not on file   Occupational History    Not on file   Tobacco Use    Smoking status: Never    Smokeless tobacco: Never   Vaping Use    Vaping Use: Never used   Substance and Sexual Activity    Alcohol use: Yes     Alcohol/week: 0.8 standard drinks of alcohol     Types: 1 Glasses of wine per week     Comment: 1-2x/month    Drug use: No    Sexual activity: Not on file   Other Topics Concern     Service Not Asked    Blood Transfusions Not Asked    Caffeine Concern Yes     Comment: 2 cups coffee daily    Occupational Exposure Not Asked    Hobby Hazards Not Asked    Sleep Concern Not Asked    Stress Concern Not Asked    Weight Concern Not Asked    Special Diet Not Asked    Back Care Not Asked    Exercise Not Asked    Bike Helmet Not Asked    Seat Belt Not Asked    Self-Exams Not Asked    Grew up on a farm Not Asked    History of tanning Not Asked    Outdoor occupation Not Asked    Pt has a pacemaker No    Pt has a defibrillator No    Breast feeding Not Asked    Reaction to local anesthetic No   Social History Narrative    Not on file     Social Determinants of Health     Financial Resource Strain: Not on file   Food Insecurity: Not on file   Transportation Needs: Not on file   Physical Activity: Not on file   Stress: Not on file   Social Connections: Not on file   Housing Stability: Not on file         Current Medications:    Current Outpatient Medications:     lidocaine (HM LIDOCAINE PATCH) 4 % External Patch, Place 1 patch onto the skin daily., Disp: 20 patch, Rfl: 2     hydroxyurea 500 MG Oral Cap, GERRY 1 CAPSULE ON SUN,TUES, WEDS, THUR AND SAT AND 2 CAPSULES ON MON AND FRI AS DIRECTED, Disp: 117 capsule, Rfl: 3    warfarin 5 MG Oral Tab, Take 1-1.5 tablets (5-7.5 mg total) by mouth nightly. Take as directed by the coumadin clinic. Take one tablet (5 mg) five nights a week. Take one and one half tablets (7.5 mg) two nights a week., Disp: 120 tablet, Rfl: 1    warfarin 5 MG Oral Tab, Take 1-1.5 tablets (5-7.5 mg total) by mouth nightly. Take as directed by the coumadin clinic. Take 1.5 tabs (7.5mg) by mouth Tues, Thurs and 1 tab (5mg) all other days per Coumadin Clinic, Disp: 105 tablet, Rfl: 1    dilTIAZem HCl ER Beads 120 MG Oral Capsule SR 24 Hr, Take 1 capsule (120 mg total) by mouth daily., Disp: 30 capsule, Rfl: 11    Esomeprazole Magnesium 20 MG Oral Capsule Delayed Release, Take 1 capsule (20 mg total) by mouth every morning before breakfast., Disp: 30 capsule, Rfl: 0    Multiple Vitamins-Minerals (MULTIVITAMIN ADULT OR), Take 1 tablet by mouth daily., Disp: , Rfl:     acetaminophen (TYLENOL) 325 MG Oral Tab, Take 500 mg by mouth every 6 (six) hours as needed for Pain.  , Disp: , Rfl:     Allergies:  Allergies   Allergen Reactions    Fentanyl ANAPHYLAXIS    Hydrocodone RASH    Morphine OTHER (SEE COMMENTS)     Chest tightness     Phenol ANAPHYLAXIS        Review of Systems:  All other systems reviewed and negative x12    Vital Signs:  /60 (BP Location: Left arm, Patient Position: Sitting, Cuff Size: large)   Pulse 76   Temp 98.1 °F (36.7 °C) (Oral)   Resp 18   Ht 1.778 m (5' 10\")   Wt 104.3 kg (230 lb)   SpO2 97%   BMI 33.00 kg/m²     Physical Examination:  General: Patient is alert and oriented x 3, not in acute distress.  Psych:  Mood and affect appropriate  HEENT: EOMs intact. PERRL. Oropharynx is clear.   Neck: No JVD. No palpable lymphadenopathy. Neck is supple.  Lymphatics: There is no palpable peripheral lymphadenopathy   Chest: Symmetric expansion.   Nonlabored  Cardiovascular: Good distal pulses  Abdomen: Soft, non tender.   No hepatosplenomegaly.  No palpable mass.  Extremities: No edema.  Neurological: 5/5 motor x4.        Laboratory:  CBC:   Lab Results  Component Value Date   RBC 4.78 12/02/2016   HGB 13.0 12/02/2016   HCT 40.0 12/02/2016   MCV 83.8 12/02/2016   MCH 27.2 12/02/2016   MCHC 32.4 12/02/2016   RDW 16.9* 12/02/2016   WBC 7.1 12/02/2016    12/02/2016     CMP:   Recent Labs   Lab 02/26/24  0854   GLU 93   BUN 23   CREATSERUM 1.09*   CA 9.2   ALB 3.9      K 5.2*      CO2 26.0   ALKPHO 69   AST 22   ALT 23   BILT 0.7   TP 6.6         Radiology:  CT chest 4/2021  1.  There is a background of paraseptal emphysema.  Within the right pulmonary apex there are 2 closely adjacent nodules measuring 3 and 5 mm in diameter.  Stable 3 mm micronodule within the right lower lobe.  Stable linear band of atelectasis/scar   within the lingular segment.  No new nodules.  Nodules are otherwise stable since 6/5/18 when considering variation in patient positioning and imaging technique.   2.  Coronary atherosclerosis.   3.  Bilateral adrenal nodules compatible with adenomas.   4.  Thyroid nodules.  Correlate with prior workup which includes prior FNA from 2017 and thyroid ultrasound from 8/17/18.        Stable nodules    Impression and Plan:  83 year old   female with a history of left upper lobe adenocarcinoma of the lung (well differentiated pT2aN0 4.3 cm no LVI) stage Ib.  S/p left lateral thoracotomy with left upper lobectomy and mediastinal lymph node dissection 6/14/16.  --s/p R0 resection (however close margin noted).  She did not receive any adjuvant treatment.  --Doing well at this time  --repeat chest imaging spring 2022    History of recurrent bilateral DVT/PE with multiple episodes in the postoperative setting including recently as noted above  -Continue lifelong anticoagulation with warfarin   -pulm nodules  followed by  pulm    Polycythemia vera JAK2 V617 F+ diagnosed fall 2020  -Orders placed to start hydroxyurea as of November 2020 she is high risk based on age 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  Labs adequate on Hydrea 500 mg daily 1000 mg MF      If surgery is needed in the future with interruption of warfarin recommend admission with bridging with heparin and temp IVC filter based on previous history      Return to clinic in 3 months      Medical decision making: High risk. myeloproliferative neoplasm requiringmyelosuppressive therapy and monitoring  we will continue to be focal point of care in setting of MPN undergoing ongoing treatment.         Herman Bhandari MD

## 2024-03-08 ENCOUNTER — ANTI-COAG VISIT (OUTPATIENT)
Dept: ANTICOAGULATION | Facility: CLINIC | Age: 84
End: 2024-03-08

## 2024-03-08 DIAGNOSIS — T81.718A IATROGENIC PULMONARY EMBOLISM AND INFARCTION, INITIAL ENCOUNTER (HCC): ICD-10-CM

## 2024-03-08 DIAGNOSIS — Z51.81 MONITORING FOR LONG-TERM ANTICOAGULANT USE: ICD-10-CM

## 2024-03-08 DIAGNOSIS — I26.99 IATROGENIC PULMONARY EMBOLISM AND INFARCTION, INITIAL ENCOUNTER (HCC): ICD-10-CM

## 2024-03-08 DIAGNOSIS — I48.91 CONTROL OF ATRIAL FIBRILLATION WITH PACEMAKER (HCC): Primary | ICD-10-CM

## 2024-03-08 DIAGNOSIS — Z95.0 CONTROL OF ATRIAL FIBRILLATION WITH PACEMAKER (HCC): Primary | ICD-10-CM

## 2024-03-08 DIAGNOSIS — Z79.01 MONITORING FOR LONG-TERM ANTICOAGULANT USE: ICD-10-CM

## 2024-03-08 LAB — INR: 2.4 (ref 0.8–1.2)

## 2024-03-08 PROCEDURE — 85610 PROTHROMBIN TIME: CPT | Performed by: FAMILY MEDICINE

## 2024-03-08 PROCEDURE — 93793 ANTICOAG MGMT PT WARFARIN: CPT | Performed by: FAMILY MEDICINE

## 2024-03-14 RX ORDER — WARFARIN SODIUM 5 MG/1
TABLET ORAL
Qty: 120 TABLET | Refills: 3 | Status: SHIPPED | OUTPATIENT
Start: 2024-03-14

## 2024-03-14 NOTE — TELEPHONE ENCOUNTER
Passed WARFARIN refill Protocol     Most recent dose: 7.5 mg every Tue, Thu, Sat; 5 mg all other days        Anticoagulant Meds: warfarin Tabs - 5 MG, 5 MG

## 2024-03-18 ENCOUNTER — NURSE TRIAGE (OUTPATIENT)
Dept: INTERNAL MEDICINE CLINIC | Facility: CLINIC | Age: 84
End: 2024-03-18

## 2024-03-18 ENCOUNTER — PATIENT MESSAGE (OUTPATIENT)
Dept: INTERNAL MEDICINE CLINIC | Facility: CLINIC | Age: 84
End: 2024-03-18

## 2024-03-18 NOTE — TELEPHONE ENCOUNTER
RN - ER follow-up, thank you    Action Requested: Summary for Provider     []  Critical Lab, Recommendations Needed  [] Need Additional Advice  [x]   FYI    []   Need Orders  [] Need Medications Sent to Pharmacy  []  Other     SUMMARY: Daughter will bring patient to ER today    Reason for call: Acute  Onset: Today    RN called patient. Patient's date of birth and full name both confirmed.     Muscle aches and pains in hands and legs.     Bilateral hands cramp and spasm.   Relieved when she sits on her hands.   Legs can cramp and spasm    Spasms come and go  Denies cramping now    Triaged per protocol and advised care advice per protocol.   Speaking clearly   Denies shortness of breath, difficulty breathing, chest pain, chest pressure.   Denies any other spasms  More details regarding Symptoms under Additional Documentation > Protocols Used.     Evaluation advised in ER now.   Patient says she will ask her daughter to drive her later today, daughter comes home soon.     Advised to monitor symptoms.  RN advised if symptoms get severely worse, patient should seek care at Emergency Room or Immediate Care.  RN also informed patient to seek immediate medical attention at ER if patient experiences severe/worsening symptoms, shortness of breath, chest pain, or severe pain.  Patient verbalizes understanding and is agreeable to instructions.     Future Appointments   Date Time Provider Department Center   4/5/2024 10:45 AM Aranza Coleman RN [facility]   4/19/2024 11:00 AM Taylor Bagley APRN [facility]   6/25/2024 10:15 AM [facility]   6/25/2024 11:15 AM Herman Bhandari MD [facility]       Reason for Disposition   Patient sounds very sick or weak to the triager    Protocols used: Muscle Aches and Body Pain-A-[place]

## 2024-03-18 NOTE — TELEPHONE ENCOUNTER
From: Tara Lockwood  To: Taylor Bagley  Sent: 3/18/2024 10:52 AM CDT  Subject: muscle cramps    I am having severe muscle cramps at times, i would like to know if there is a vitamin supplement that would help me with these cramps. They are in my hands and legs!

## 2024-03-18 NOTE — TELEPHONE ENCOUNTER
----- Message from Qing Patton RN sent at 3/18/2024  1:50 PM CDT -----  Regarding: FW: muscle cramps  Contact: [phone]      ----- Message -----  From: Tara Lockwood  Sent: 3/18/2024  10:52 AM CDT  To: Em Triage Support  Subject: muscle cramps                                    I am having severe muscle cramps at times, i would like to know if there is a vitamin supplement that would help me with these cramps.  They are in my hands and legs!

## 2024-03-19 NOTE — TELEPHONE ENCOUNTER
No ER visits, however, Pt has an appointment with Dr. Hubbard 3/20/24  Notes: hands issue   Made On: 3/19/2024 9:31 AM       Future Appointments   Date Time Provider Department Center   3/20/2024  1:40 PM Robbie Hubbard MD [facility]

## 2024-03-20 ENCOUNTER — OFFICE VISIT (OUTPATIENT)
Dept: INTERNAL MEDICINE CLINIC | Facility: CLINIC | Age: 84
End: 2024-03-20

## 2024-03-20 ENCOUNTER — LAB ENCOUNTER (OUTPATIENT)
Dept: LAB | Age: 84
End: 2024-03-20
Attending: INTERNAL MEDICINE
Payer: MEDICARE

## 2024-03-20 VITALS
WEIGHT: 230 LBS | BODY MASS INDEX: 36.1 KG/M2 | DIASTOLIC BLOOD PRESSURE: 73 MMHG | HEIGHT: 67 IN | OXYGEN SATURATION: 98 % | SYSTOLIC BLOOD PRESSURE: 139 MMHG | HEART RATE: 82 BPM

## 2024-03-20 DIAGNOSIS — R91.8 LUNG NODULES: Primary | ICD-10-CM

## 2024-03-20 DIAGNOSIS — I48.91 ATRIAL FIBRILLATION WITH RAPID VENTRICULAR RESPONSE (HCC): ICD-10-CM

## 2024-03-20 DIAGNOSIS — R60.0 BILATERAL LEG EDEMA: ICD-10-CM

## 2024-03-20 DIAGNOSIS — Z85.118 HISTORY OF LUNG CANCER: ICD-10-CM

## 2024-03-20 DIAGNOSIS — E55.9 VITAMIN D DEFICIENCY: ICD-10-CM

## 2024-03-20 DIAGNOSIS — I48.91 CONTROL OF ATRIAL FIBRILLATION WITH PACEMAKER (HCC): ICD-10-CM

## 2024-03-20 DIAGNOSIS — R25.1 TREMORS OF NERVOUS SYSTEM: ICD-10-CM

## 2024-03-20 DIAGNOSIS — R25.2 SPASM: ICD-10-CM

## 2024-03-20 DIAGNOSIS — Z95.0 CONTROL OF ATRIAL FIBRILLATION WITH PACEMAKER (HCC): ICD-10-CM

## 2024-03-20 DIAGNOSIS — E04.2 NONTOXIC MULTINODULAR GOITER: ICD-10-CM

## 2024-03-20 DIAGNOSIS — I50.32 CHRONIC DIASTOLIC (CONGESTIVE) HEART FAILURE (HCC): ICD-10-CM

## 2024-03-20 DIAGNOSIS — R06.09 CHRONIC DYSPNEA: ICD-10-CM

## 2024-03-20 LAB
MAGNESIUM SERPL-MCNC: 2.3 MG/DL (ref 1.6–2.6)
VIT D+METAB SERPL-MCNC: 26.7 NG/ML (ref 30–100)

## 2024-03-20 PROCEDURE — 83735 ASSAY OF MAGNESIUM: CPT

## 2024-03-20 PROCEDURE — 99215 OFFICE O/P EST HI 40 MIN: CPT | Performed by: INTERNAL MEDICINE

## 2024-03-20 PROCEDURE — 82306 VITAMIN D 25 HYDROXY: CPT

## 2024-03-20 PROCEDURE — 82607 VITAMIN B-12: CPT

## 2024-03-20 PROCEDURE — 36415 COLL VENOUS BLD VENIPUNCTURE: CPT

## 2024-03-20 PROCEDURE — 82746 ASSAY OF FOLIC ACID SERUM: CPT

## 2024-03-20 PROCEDURE — G2211 COMPLEX E/M VISIT ADD ON: HCPCS | Performed by: INTERNAL MEDICINE

## 2024-03-20 RX ORDER — ALBUTEROL SULFATE 90 UG/1
2 AEROSOL, METERED RESPIRATORY (INHALATION) EVERY 4 HOURS PRN
Qty: 1 EACH | Refills: 1 | Status: SHIPPED | OUTPATIENT
Start: 2024-03-20 | End: 2025-03-20

## 2024-03-21 LAB
FOLATE SERPL-MCNC: >24 NG/ML (ref 5.4–?)
VIT B12 SERPL-MCNC: 454 PG/ML (ref 211–911)

## 2024-03-27 PROBLEM — R55 SYNCOPE AND COLLAPSE: Status: RESOLVED | Noted: 2023-12-18 | Resolved: 2024-03-27

## 2024-03-27 PROBLEM — R55 SYNCOPE AND COLLAPSE: Status: RESOLVED | Noted: 2023-12-18 | Resolved: 2024-01-01

## 2024-03-27 NOTE — PROGRESS NOTES
HPI:    Patient ID: Tara Lockwood is a 83 year old female.    Breathing Problem  She complains of difficulty breathing and shortness of breath (exertional). There is no cough or wheezing. Pertinent negatives include no chest pain, fever, headaches, postnasal drip, rhinorrhea or sore throat.       Dyspnea on exertion  Accompained by her son and daughter  Pt called the nurse  and was advised to go to the emergency room  She came her instead      /73 (BP Location: Right arm, Patient Position: Sitting, Cuff Size: large)   Pulse 82   Ht 5' 7\" (1.702 m)   Wt 230 lb (104.3 kg)   SpO2 98%   BMI 36.02 kg/m²   Wt Readings from Last 6 Encounters:   03/20/24 230 lb (104.3 kg)   02/26/24 230 lb (104.3 kg)   01/19/24 234 lb 6.4 oz (106.3 kg)   12/19/23 235 lb (106.6 kg)   11/28/23 235 lb (106.6 kg)   10/30/23 231 lb (104.8 kg)     Body mass index is 36.02 kg/m².  HGBA1C:    Lab Results   Component Value Date    A1C 5.4 10/22/2014    A1C 5.8 06/20/2013         Review of Systems   Constitutional:  Positive for fatigue. Negative for activity change, chills and fever.   HENT:  Negative for ear discharge, nosebleeds, postnasal drip, rhinorrhea, sinus pressure and sore throat.    Eyes:  Negative for pain, discharge and redness.   Respiratory:  Positive for shortness of breath (exertional). Negative for cough, chest tightness and wheezing.    Cardiovascular:  Negative for chest pain, palpitations and leg swelling.   Gastrointestinal:  Negative for abdominal pain, blood in stool, constipation, diarrhea, nausea and vomiting.   Genitourinary:  Negative for difficulty urinating, dysuria, frequency, hematuria and urgency.   Musculoskeletal:  Positive for arthralgias and gait problem (foot pain/ deformities). Negative for back pain and joint swelling.   Skin:  Negative for rash.   Neurological:  Negative for syncope, weakness, light-headedness and headaches.   Psychiatric/Behavioral:  Negative for dysphoric mood. The patient is  not nervous/anxious.          Current Outpatient Medications   Medication Sig Dispense Refill    albuterol (PROAIR HFA) 108 (90 Base) MCG/ACT Inhalation Aero Soln Inhale 2 puffs into the lungs every 4 (four) hours as needed for Wheezing. 1 each 1    warfarin 5 MG Oral Tab Take as directed by INR clinic or take one & 1/2 tabs Tuesdays, Thursdays, Saturdays. Take one tab all other days 120 tablet 3    hydroxyurea 500 MG Oral Cap GERRY 1 CAPSULE ON SUN,TUES, WEDS, THUR AND SAT AND 2 CAPSULES ON MON AND FRI AS DIRECTED 117 capsule 3    warfarin 5 MG Oral Tab Take 1-1.5 tablets (5-7.5 mg total) by mouth nightly. Take as directed by the coumadin clinic. Take 1.5 tabs (7.5mg) by mouth Tues, Thurs and 1 tab (5mg) all other days per Coumadin Clinic 105 tablet 1    dilTIAZem HCl ER Beads 120 MG Oral Capsule SR 24 Hr Take 1 capsule (120 mg total) by mouth daily. 30 capsule 11    Multiple Vitamins-Minerals (MULTIVITAMIN ADULT OR) Take 1 tablet by mouth daily.      acetaminophen (TYLENOL) 325 MG Oral Tab Take 500 mg by mouth every 6 (six) hours as needed for Pain.        ergocalciferol 1.25 MG (97344 UT) Oral Cap Take 1 capsule (50,000 Units total) by mouth once a week. 12 capsule 0    lidocaine (HM LIDOCAINE PATCH) 4 % External Patch Place 1 patch onto the skin daily. (Patient not taking: Reported on 3/20/2024) 20 patch 2    warfarin 5 MG Oral Tab Take 1-1.5 tablets (5-7.5 mg total) by mouth nightly. Take as directed by the coumadin clinic. Take one tablet (5 mg) five nights a week. Take one and one half tablets (7.5 mg) two nights a week. (Patient not taking: Reported on 3/20/2024) 120 tablet 1     Allergies:  Allergies   Allergen Reactions    Fentanyl ANAPHYLAXIS    Hydrocodone RASH    Morphine OTHER (SEE COMMENTS)     Chest tightness     Phenol ANAPHYLAXIS       HISTORY:  Past Medical History:   Diagnosis Date    Acid reflux     Acute medial meniscus tear of right knee 3/6/2018    Acute medial meniscus tear of right knee  3/6/2018    Acute midline low back pain with left-sided sciatica 1/15/2019    Age-related nuclear cataract of both eyes 2017    Arthritis     Degenerative disc disease, lumbar 1/15/2019    Diverticulosis large intestine w/o perforation or abscess w/o bleeding 2017    Diverticulosis large intestine w/o perforation or abscess w/o bleeding 2017    Family history of glaucoma in mother 2018    Family history of glaucoma in mother 2018    Family history of macular degeneration 2017    Gastric polyps 2017    Gastric polyps 2017    Hiatal hernia     History of hip replacement, total     right hip    Iatrogenic pulmonary embolism and infarction, initial encounter ([facility]) 2022    Internal hemorrhoids 2017    Kidney problem     Long term (current) use of anticoagulants 2021    Lumbar herniated disc 2019    Lung cancer ([facility]) 6-    Obesity, unspecified     Osteoarthrosis 2014    Osteoarthrosis, unspecified whether generalized or localized, unspecified site     Post-menopausal bleeding     Primary osteoarthritis of left knee 3/23/2017    Primary osteoarthritis of right knee 3/27/2018    Pulmonary embolism ([facility])     S/P colonoscopy with polypectomy 2017    Spinal stenosis of lumbar region without neurogenic claudication 2019    Spondylolisthesis of lumbar region 1/15/2019    Thyroid condition       Past Surgical History:   Procedure Laterality Date    CHOLECYSTECTOMY      COLONOSCOPY      COLONOSCOPY N/A 2017    Procedure: COLONOSCOPY;  Surgeon: Clare Victor MD;  Location: [facility] ENDOSCOPY    EGD      FOOT SURGERY Bilateral     Bunion Surgery X6    FOOT/TOES SURGERY PROC UNLISTED Left     Pt had surgery to straighten toes on left foot.    HIP REPLACEMENT SURGERY Right     HIP SURGERY Right     right total hip arthroplasty    HYSTERECTOMY      LAPAROSCOPIC CHOLECYSTECTOMY            x7 (Twins x1) Max weight 9 1/2 lbs    OTHER SURGICAL  HISTORY      parotid gland removed left benigh    OTHER SURGICAL HISTORY      Removal Skin Mole    OTHER SURGICAL HISTORY      SAB/ D&C    OTHER SURGICAL HISTORY      Ear Surgery    OTHER SURGICAL HISTORY  7/13    Neg EM Bx    REMOVAL OF LUNG,LOBECTOMY Left 2016    Pt had upper left lobe removed.      Family History   Problem Relation Age of Onset    Cancer Father 87        Lung  -  smoker    Macular degeneration Mother     Glaucoma Mother     Other (Alzheimer's Disease) Mother 93    Other (Bladder Cancer) Mother     Macular degeneration Brother     Other (Pancreatic Cancer) Sister 73    Macular degeneration Brother     Other (Myocardial Infarction) Brother 69    Macular degeneration Maternal Aunt     Glaucoma Maternal Aunt     Breast Cancer Daughter 48    Diabetes Neg       Social History:   Social History     Socioeconomic History    Marital status:    Tobacco Use    Smoking status: Never     Passive exposure: Never    Smokeless tobacco: Never   Vaping Use    Vaping Use: Never used   Substance and Sexual Activity    Alcohol use: Yes     Alcohol/week: 0.8 standard drinks of alcohol     Types: 1 Glasses of wine per week     Comment: 1-2x/month    Drug use: No   Other Topics Concern    Caffeine Concern Yes     Comment: 2 cups coffee daily    Pt has a pacemaker No    Pt has a defibrillator No    Reaction to local anesthetic No        PHYSICAL EXAM:    Physical Exam  Constitutional:       Appearance: She is well-developed. She is not ill-appearing.   HENT:      Right Ear: Ear canal normal.      Left Ear: Ear canal normal.      Mouth/Throat:      Pharynx: Oropharynx is clear.   Eyes:      Extraocular Movements: Extraocular movements intact.      Conjunctiva/sclera: Conjunctivae normal.      Pupils: Pupils are equal, round, and reactive to light.   Cardiovascular:      Rate and Rhythm: Normal rate and regular rhythm.      Heart sounds: Normal heart sounds.   Pulmonary:      Effort: Pulmonary effort is normal.       Breath sounds: Normal breath sounds.      Comments: Bilateral basilar crackles  O2 satruation while sitting and walking around the office with pulse ox remained above 95 percent  Abdominal:      General: Bowel sounds are normal.      Palpations: Abdomen is soft.   Skin:     General: Skin is warm and dry.   Neurological:      Mental Status: She is alert.      Motor: Weakness present.      Gait: Gait abnormal.   Psychiatric:         Mood and Affect: Mood normal.              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  Plan: chronci monitor    (E55.9) Vitamin D deficiency  Plan: Vitamin D [E]        Supplement      (I48.91,  Z95.0) Control of atrial fibrillation with pacemaker (HCC)  Plan: stable monitor    (R06.09) Chronic dyspnea  Plan: oxygen  rest and exertion within normal  Pt activity is light  She has chronic foot pain and is seeing podiatry  this also limties her acativity      (I50.32) Chronic diastolic (congestive) heart failure (HCC)  Plan: stable     (R60.0) Bilateral leg edema  Plan: chronic mild    (I48.91) Atrial fibrillation with rapid ventricular response (HCC)  Plan: controlled          Hx of atrial paroxysmal atrial fibraillation  Hx of lung cancer   Hx of DVT and PE  on anticoautlaion'  Currently medically stable  Recently seen and monitore by oncology     DYSPNEA on exertion chronic seem to be getting worse  Reivewed cardiology notes in detail  Jackson Brooks  12/18/23  Patient here for follow-up after treatment for atrial fibrillation previously seen as new EP consultation regarding atrial  fibrillation.I took care of the patient's  before he passed away of note she has a new diagnosis of paroxysmal atrial fibrillation for evaluation. He also had a pacemaker.Because of the paroxysmal atrial fibrillation and elevated rates on Cardizem we tried flecainide. Initial Holter showed all sinus rhythm good heart rate control but subsequently had 2 syncope episodes. Syncope episodes with minimal warning feeling fine afterwards fortunately no injury but consistent with tacky bradycardia postconversion pauses. Discussed and daughters suggested pacemaker. Discussed these options.She also has dyspnea on exertion with activity unclear if it is related to the A-fib rates or other since she does have lung disease. Will continue to reevaluate after the pacemaker tachybradycardia syndrome treatment since her PET stress test is normal 3 months ago.She was diagnosed in August 2023 in the hospital with atrial fibrillation that was not symptomatic. Rate was under control with diltiazem she was on Coumadin chronically. She had an echo in the hospital which is unremarkable with normal ejection fraction and then she had a nuclear stress test normal perfusion. She had a monitor that did not show any atrial fibrillation but she is currently in atrial fibrillation 117 beats a minute. She has dyspnea on exertion now when she is in A-fib and feels some palpitations. She has no chest pain.   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  Given tachybradycardia syndrome and failure of antiarrhythmics and rate control recommend pacemaker  and continuing the diltiazem  Other options of AV node ablation or other medications discussedDyspnea on exertion  Has EF normal on PET scan normal perfusion September 2023  Does have history of 1 lung and history of surgery probably related pulse ox has been okay  Possibly related A-fib tacky bradycardia or medications will reevaluate after pacemaker  Also follows with Dr. Fontanez history  Long history of DVTs on chronic warfarinPlan  Stay off flecainide  Continue other medications  Schedule dual chamber pacemaker, [employer]  Hold warfarin 4 days prior   12/26/23  OPERATION(S) PERFORMED:   1. Dual-chamber pacemaker implant   2. Conscious sedation     : Jackson Brooks MD     INDICATION: SSS, pafib, tachybrady syndrome with synocope, GARCIA, no reversible cause      CANCER HISTORY2/26/2024   Cancer history:  83 year old   female with a history of left upper lobe adenocarcinoma of the lung (well differentiated pT2aN0 4.3 cm No LVI) stage Ib.  S/p left lateral thoracotomy with left upper lobectomy and mediastinal lymph node dissection 6/14/16.     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 Perioperatively she had a removable IVC filter placed however still had DVT and PE in the postop setting of her thoracotomy while not on anticoagulation.  Her filter has since been retrieved.     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  She feels well overall.   She denies any chest pain or shortness of breath.    She denies any bruising or bleeding.  She denies any adenopathy.  She is otherwise without complaints today.   2/26/2024     Spent total time  40   minutes on obtaining history / chart review, evaluating patient / performing medically appropriate exam, discussing treatment options, counseling / educating, and completing  documentation, coordinating care.    Orders Placed This Encounter   Procedures    Vitamin B12    Folic Acid Serum (Folate)    Magnesium [E]    Vitamin D [E]       Meds This Visit:  Requested Prescriptions     Signed Prescriptions Disp Refills    albuterol (PROAIR HFA) 108 (90 Base) MCG/ACT Inhalation Aero Soln 1 each 1     Sig: Inhale 2 puffs into the lungs every 4 (four) hours as needed for Wheezing.       Imaging & Referrals:  CT CHEST (CPT=71250)        ID#[id number]

## 2024-04-01 ENCOUNTER — HOSPITAL ENCOUNTER (OUTPATIENT)
Dept: CT IMAGING | Age: 84
Discharge: HOME | End: 2024-04-01
Attending: INTERNAL MEDICINE
Payer: MEDICARE

## 2024-04-01 DIAGNOSIS — R91.8 LUNG NODULES: ICD-10-CM

## 2024-04-01 DIAGNOSIS — Z85.118 HISTORY OF LUNG CANCER: ICD-10-CM

## 2024-04-01 PROCEDURE — 71250 CT THORAX DX C-: CPT | Performed by: INTERNAL MEDICINE

## 2024-04-04 ENCOUNTER — MED REC SCAN ONLY (OUTPATIENT)
Dept: INTERNAL MEDICINE CLINIC | Facility: CLINIC | Age: 84
End: 2024-04-04

## 2024-04-05 ENCOUNTER — ANTI-COAG VISIT (OUTPATIENT)
Dept: ANTICOAGULATION | Facility: CLINIC | Age: 84
End: 2024-04-05
Payer: MEDICARE

## 2024-04-05 DIAGNOSIS — Z95.0 CONTROL OF ATRIAL FIBRILLATION WITH PACEMAKER (HCC): Primary | ICD-10-CM

## 2024-04-05 DIAGNOSIS — T81.718A IATROGENIC PULMONARY EMBOLISM AND INFARCTION, INITIAL ENCOUNTER (HCC): ICD-10-CM

## 2024-04-05 DIAGNOSIS — Z79.01 MONITORING FOR LONG-TERM ANTICOAGULANT USE: ICD-10-CM

## 2024-04-05 DIAGNOSIS — I26.99 IATROGENIC PULMONARY EMBOLISM AND INFARCTION, INITIAL ENCOUNTER (HCC): ICD-10-CM

## 2024-04-05 DIAGNOSIS — I48.91 CONTROL OF ATRIAL FIBRILLATION WITH PACEMAKER (HCC): Primary | ICD-10-CM

## 2024-04-05 DIAGNOSIS — Z51.81 MONITORING FOR LONG-TERM ANTICOAGULANT USE: ICD-10-CM

## 2024-04-05 LAB
INR: 1.6 (ref 0.8–1.2)
TEST STRIP EXPIRATION DATE: ABNORMAL DATE

## 2024-04-05 NOTE — PROGRESS NOTES
TTR 69.5%     Face to face.     Reports she missed one dose of warfarin last night. INR is minimally below therapeutic goal range. Has been trending low recently.      Per protocol, increase weekly dose 5-10%- actual increase 5.9% and recheck INR in 4 weeks.    5 mg every Mon, Wed, Fri; 7.5 mg all other days

## 2024-04-18 ENCOUNTER — ORDER TRANSCRIPTION (OUTPATIENT)
Dept: PHYSICAL THERAPY | Facility: HOSPITAL | Age: 84
End: 2024-04-18

## 2024-04-18 DIAGNOSIS — R49.9 CHANGE IN VOICE: Primary | ICD-10-CM

## 2024-04-19 ENCOUNTER — NURSE TRIAGE (OUTPATIENT)
Dept: INTERNAL MEDICINE CLINIC | Facility: CLINIC | Age: 84
End: 2024-04-19

## 2024-04-19 ENCOUNTER — OFFICE VISIT (OUTPATIENT)
Dept: INTERNAL MEDICINE CLINIC | Facility: CLINIC | Age: 84
End: 2024-04-19

## 2024-04-19 VITALS
HEIGHT: 67 IN | BODY MASS INDEX: 36.1 KG/M2 | HEART RATE: 87 BPM | WEIGHT: 230 LBS | OXYGEN SATURATION: 96 % | DIASTOLIC BLOOD PRESSURE: 72 MMHG | SYSTOLIC BLOOD PRESSURE: 138 MMHG

## 2024-04-19 DIAGNOSIS — J43.9 PULMONARY EMPHYSEMA, UNSPECIFIED EMPHYSEMA TYPE (HCC): ICD-10-CM

## 2024-04-19 DIAGNOSIS — R06.09 DYSPNEA ON EXERTION: Primary | ICD-10-CM

## 2024-04-19 PROCEDURE — 99214 OFFICE O/P EST MOD 30 MIN: CPT

## 2024-04-19 RX ORDER — FLUTICASONE FUROATE AND VILANTEROL 100; 25 UG/1; UG/1
1 POWDER RESPIRATORY (INHALATION) DAILY
Qty: 1 EACH | Refills: 1 | Status: SHIPPED | OUTPATIENT
Start: 2024-04-19

## 2024-04-19 NOTE — PROGRESS NOTES
Subjective:   Tara Lockwood is a 83 year old female who presents for Follow - Up (Following up on visit from 3/20)     F/u on dyspnea with exertion. Still experiencing dyspnea, even sitting her breathing feels a little labored and worse with walking. This has been a chronic problem for years but seems to be worsening recently. She saw her cardiologist in March and everything looked good. Echo 8/2023 with normal LV function. CT chest with stable previous nodules and new small lung nodules to follow up in 6-12 months. Will see her pulmonologist Dr. Reyes in September - last saw him in 2022.   Dr. Tavarez who she saw on 3/20 believes this may be asthma. Patient does have documented history of mild COPD and emphysema. Uses albuterol about twice a day but doesn't seem to help much. Denies palpitations, chest pain, dizziness. She hears wheezing with exertion.     Also c/o chronic left knee pain- has moderate left and right knee arthritis. Was wearing compression socks for leg swelling but they dig into her knee and worsen her knee pain. Uses voltaren gel which helps sometimes. Has tried knee injections with no improvement.   Also has a sore on the bottom of her foot which is draining and bleeding. She is seeing a podiatrist and has an appointment with him later today.    History/Other:    Chief Complaint Reviewed and Verified  Nursing Notes Reviewed and   Verified  Tobacco Reviewed  Allergies Reviewed  Medications Reviewed    Problem List Reviewed  Medical History Reviewed  Surgical History   Reviewed  OB Status Reviewed  Family History Reviewed         Tobacco:  She has never smoked tobacco.    Current Outpatient Medications   Medication Sig Dispense Refill    fluticasone furoate-vilanterol (BREO ELLIPTA) 100-25 MCG/ACT Inhalation Aerosol Powder, Breath Activated Inhale 1 puff into the lungs daily. 1 each 1    ergocalciferol 1.25 MG (80184 UT) Oral Cap Take 1 capsule (50,000 Units total) by mouth once a week.  12 capsule 0    albuterol (PROAIR HFA) 108 (90 Base) MCG/ACT Inhalation Aero Soln Inhale 2 puffs into the lungs every 4 (four) hours as needed for Wheezing. 1 each 1    warfarin 5 MG Oral Tab Take as directed by INR clinic or take one & 1/2 tabs Tuesdays, Thursdays, Saturdays. Take one tab all other days 120 tablet 3    hydroxyurea 500 MG Oral Cap GERRY 1 CAPSULE ON SUN,TUES, WEDS, THUR AND SAT AND 2 CAPSULES ON MON AND FRI AS DIRECTED 117 capsule 3    warfarin 5 MG Oral Tab Take 1-1.5 tablets (5-7.5 mg total) by mouth nightly. Take as directed by the coumadin clinic. Take 1.5 tabs (7.5mg) by mouth Tues, Thurs and 1 tab (5mg) all other days per Coumadin Clinic 105 tablet 1    dilTIAZem HCl ER Beads 120 MG Oral Capsule SR 24 Hr Take 1 capsule (120 mg total) by mouth daily. 30 capsule 11    Multiple Vitamins-Minerals (MULTIVITAMIN ADULT OR) Take 1 tablet by mouth daily.      acetaminophen (TYLENOL) 325 MG Oral Tab Take 500 mg by mouth every 6 (six) hours as needed for Pain.             Review of Systems:  Review of Systems   Constitutional: Negative.    Respiratory:  Positive for shortness of breath and wheezing.    Cardiovascular: Negative.    Gastrointestinal: Negative.    Skin: Negative.    Neurological: Negative.      Objective:   /72   Pulse 87   Ht 5' 7\" (1.702 m)   Wt 230 lb (104.3 kg)   SpO2 96%   BMI 36.02 kg/m²  Estimated body mass index is 36.02 kg/m² as calculated from the following:    Height as of this encounter: 5' 7\" (1.702 m).    Weight as of this encounter: 230 lb (104.3 kg).  Physical Exam  Vitals reviewed.   Constitutional:       General: She is not in acute distress.     Appearance: Normal appearance. She is well-developed.   Cardiovascular:      Rate and Rhythm: Normal rate and regular rhythm.      Heart sounds: Normal heart sounds.   Pulmonary:      Effort: Pulmonary effort is normal.      Breath sounds: Normal breath sounds.   Skin:     General: Skin is warm and dry.   Neurological:       Mental Status: She is alert and oriented to person, place, and time.       Ambulated in hallway ~100 feet, resting SpO2 98% and with exertion 97%. Able to maintain conversation during ambulating but did become slightly dyspneic and breathing heavier.     Assessment & Plan:   1. Dyspnea on exertion (Primary)  2. Pulmonary emphysema, unspecified emphysema type (HCC)  Overview:  Overview:   Mild COPD  Orders:  -     Fluticasone Furoate-Vilanterol; Inhale 1 puff into the lungs daily.  Dispense: 1 each; Refill: 1  Start daily breo inhaler  D/w Dr. Reyes pulmonology who agrees with plan    ARMOND He, 4/19/2024, 11:09 AM

## 2024-05-03 ENCOUNTER — ANTI-COAG VISIT (OUTPATIENT)
Dept: ANTICOAGULATION | Facility: CLINIC | Age: 84
End: 2024-05-03

## 2024-05-03 DIAGNOSIS — I48.91 CONTROL OF ATRIAL FIBRILLATION WITH PACEMAKER (HCC): Primary | ICD-10-CM

## 2024-05-03 DIAGNOSIS — I26.99 IATROGENIC PULMONARY EMBOLISM AND INFARCTION, INITIAL ENCOUNTER (HCC): ICD-10-CM

## 2024-05-03 DIAGNOSIS — Z95.0 CONTROL OF ATRIAL FIBRILLATION WITH PACEMAKER (HCC): Primary | ICD-10-CM

## 2024-05-03 DIAGNOSIS — Z79.01 MONITORING FOR LONG-TERM ANTICOAGULANT USE: ICD-10-CM

## 2024-05-03 DIAGNOSIS — Z51.81 MONITORING FOR LONG-TERM ANTICOAGULANT USE: ICD-10-CM

## 2024-05-03 DIAGNOSIS — T81.718A IATROGENIC PULMONARY EMBOLISM AND INFARCTION, INITIAL ENCOUNTER (HCC): ICD-10-CM

## 2024-05-03 LAB
INR: 2.9 (ref 0.8–1.2)
TEST STRIP EXPIRATION DATE: ABNORMAL DATE

## 2024-05-03 PROCEDURE — 85610 PROTHROMBIN TIME: CPT | Performed by: FAMILY MEDICINE

## 2024-05-03 PROCEDURE — 93793 ANTICOAG MGMT PT WARFARIN: CPT | Performed by: FAMILY MEDICINE

## 2024-05-03 NOTE — PROGRESS NOTES
TTR 69.5%     Face to face.      Per protocol, continue current dose- as adjusted at last visit- and recheck INR in 4 weeks.    5 mg every Mon, Wed, Fri; 7.5 mg all other days

## 2024-05-07 ENCOUNTER — HOSPITAL ENCOUNTER (EMERGENCY)
Facility: HOSPITAL | Age: 84
Discharge: HOME | End: 2024-05-07
Attending: EMERGENCY MEDICINE

## 2024-05-07 ENCOUNTER — TELEPHONE (OUTPATIENT)
Dept: INTERNAL MEDICINE CLINIC | Facility: CLINIC | Age: 84
End: 2024-05-07

## 2024-05-07 ENCOUNTER — APPOINTMENT (OUTPATIENT)
Dept: ULTRASOUND IMAGING | Facility: HOSPITAL | Age: 84
End: 2024-05-07
Attending: EMERGENCY MEDICINE

## 2024-05-07 VITALS
HEART RATE: 79 BPM | SYSTOLIC BLOOD PRESSURE: 170 MMHG | RESPIRATION RATE: 18 BRPM | TEMPERATURE: 97 F | HEIGHT: 67 IN | OXYGEN SATURATION: 96 % | WEIGHT: 220 LBS | BODY MASS INDEX: 34.53 KG/M2 | DIASTOLIC BLOOD PRESSURE: 59 MMHG

## 2024-05-07 DIAGNOSIS — S80.10XA HEMATOMA OF LOWER EXTREMITY, UNSPECIFIED LATERALITY, INITIAL ENCOUNTER: Primary | ICD-10-CM

## 2024-05-07 LAB
APTT PPP: 36.4 SECONDS (ref 23–36)
INR BLD: 2.36 (ref 0.8–1.2)
PROTHROMBIN TIME: 27.3 SECONDS (ref 11.6–14.8)

## 2024-05-07 PROCEDURE — 36415 COLL VENOUS BLD VENIPUNCTURE: CPT

## 2024-05-07 PROCEDURE — 85610 PROTHROMBIN TIME: CPT | Performed by: EMERGENCY MEDICINE

## 2024-05-07 PROCEDURE — 99283 EMERGENCY DEPT VISIT LOW MDM: CPT

## 2024-05-07 PROCEDURE — 99284 EMERGENCY DEPT VISIT MOD MDM: CPT

## 2024-05-07 PROCEDURE — 10160 PNXR ASPIR ABSC HMTMA BULLA: CPT

## 2024-05-07 PROCEDURE — 85730 THROMBOPLASTIN TIME PARTIAL: CPT | Performed by: EMERGENCY MEDICINE

## 2024-05-07 PROCEDURE — 20610 DRAIN/INJ JOINT/BURSA W/O US: CPT

## 2024-05-07 PROCEDURE — 93971 EXTREMITY STUDY: CPT | Performed by: EMERGENCY MEDICINE

## 2024-05-07 RX ORDER — LIDOCAINE AND PRILOCAINE 25; 25 MG/G; MG/G
CREAM TOPICAL ONCE
Status: COMPLETED | OUTPATIENT
Start: 2024-05-07 | End: 2024-05-07

## 2024-05-07 RX ORDER — ACETAMINOPHEN 500 MG
1000 TABLET ORAL ONCE
Status: COMPLETED | OUTPATIENT
Start: 2024-05-07 | End: 2024-05-07

## 2024-05-07 NOTE — ED INITIAL ASSESSMENT (HPI)
Pt to ED A&O x 4 w/ c/o R calf pain, as well as a \"lump\" to back of R calf.  Pt w/ hx of DVT.  Pt currently takes Coumadin.  Pt denies any chest pain or NATHALIE.

## 2024-05-07 NOTE — ED PROVIDER NOTES
Patient Seen in: [facility] Emergency Department      History     Chief Complaint   Patient presents with    Lump Mass    Leg Pain     Stated Complaint: Ruleout Blood Clot- Right Calf    Subjective:   HPI  Pt is 82 yo F who p/w lump to back of right leg that she noticed today. No numbness, pain or trauma. No fevers or redness. On coumadin.     Objective:   Past Medical History:    Acid reflux    Acute medial meniscus tear of right knee    Acute medial meniscus tear of right knee    Acute midline low back pain with left-sided sciatica    Age-related nuclear cataract of both eyes    Arthritis    Degenerative disc disease, lumbar    Diverticulosis large intestine w/o perforation or abscess w/o bleeding    Diverticulosis large intestine w/o perforation or abscess w/o bleeding    Family history of glaucoma in mother    Family history of glaucoma in mother    Family history of macular degeneration    Gastric polyps    Gastric polyps    Hiatal hernia    History of hip replacement, total    right hip    Iatrogenic pulmonary embolism and infarction, initial encounter (HCC)    Internal hemorrhoids    Kidney problem    Long term (current) use of anticoagulants    Lumbar herniated disc    Lung cancer (HCC)    Obesity, unspecified    Osteoarthrosis    Osteoarthrosis, unspecified whether generalized or localized, unspecified site    Post-menopausal bleeding    Primary osteoarthritis of left knee    Primary osteoarthritis of right knee    Pulmonary embolism (HCC)    S/P colonoscopy with polypectomy    Spinal stenosis of lumbar region without neurogenic claudication    Spondylolisthesis of lumbar region    Thyroid condition              Past Surgical History:   Procedure Laterality Date    Cholecystectomy      Colonoscopy      Colonoscopy N/A 9/8/2017    Procedure: COLONOSCOPY;  Surgeon: Clare Victor MD;  Location: [facility] ENDOSCOPY    Egd      Foot surgery Bilateral     Bunion Surgery X6    Foot/toes surgery  proc unlisted Left     Pt had surgery to straighten toes on left foot.    Hip replacement surgery Right     Hip surgery Right 2015    right total hip arthroplasty    Hysterectomy      Laparoscopic cholecystectomy            x7 (Twins x1) Max weight 9 1/2 lbs    Other surgical history      parotid gland removed left benigh    Other surgical history      Removal Skin Mole    Other surgical history      SAB/ D&C    Other surgical history      Ear Surgery    Other surgical history      Neg EM Bx    Removal of lung,lobectomy Left 2016    Pt had upper left lobe removed.                Social History     Socioeconomic History    Marital status:    Tobacco Use    Smoking status: Never     Passive exposure: Never    Smokeless tobacco: Never   Vaping Use    Vaping status: Never Used   Substance and Sexual Activity    Alcohol use: Not Currently     Alcohol/week: 0.8 standard drinks of alcohol     Types: 1 Glasses of wine per week     Comment: 1-2x/month    Drug use: No   Other Topics Concern    Caffeine Concern Yes     Comment: 2 cups coffee daily    Pt has a pacemaker No    Pt has a defibrillator No    Reaction to local anesthetic No              Review of Systems    Positive for stated complaint: Ruleout Blood Clot- Right Calf  Other systems are as noted in HPI.  Constitutional and vital signs reviewed.      All other systems reviewed and negative except as noted above.    Physical Exam     ED Triage Vitals [24]   /68   Pulse 71   Resp 16   Temp 97.4 °F (36.3 °C)   Temp src Temporal   SpO2 98 %   O2 Device None (Room air)       Current Vitals:   Vital Signs  BP: 155/61  Pulse: 67  Resp: 16  Temp: 97.4 °F (36.3 °C)  Temp src: Temporal  MAP (mmHg): 89    Oxygen Therapy  SpO2: 96 %  O2 Device: None (Room air)            Physical Exam  GENERAL: No acute distress, awake and alert  HEENT: EOMI, PERRL  Neck: supple  CV: DP pulse palpable, normal cap refill  Extremities: FROM of all extremities, TTP  in right posterior calf with palpable 4 cm sized cystic structure. Compartments soft.   Neuro: CN intact, normal speech, normal gait, 5/5 motor strength in all extremities, no focal deficits, normal sensation  SKIN: warm, dry, no rashes    ED Course     Labs Reviewed   PROTHROMBIN TIME (PT) - Abnormal; Notable for the following components:       Result Value    PT 27.3 (*)     INR 2.36 (*)     All other components within normal limits   PTT, ACTIVATED - Abnormal; Notable for the following components:    PTT 36.4 (*)     All other components within normal limits          MDM         Medical Decision Making  Patient notified of ultrasound results.  Unclear etiology of complex fluid and patient agreeable to needle aspiration.  Area anesthetized with Emla cream.  Needle aspiration obtained at bedside and 3 mL of blood evacuated from hematoma with no complications.    Patient notified of INR results.  No further bleeding in emergency department and appropriate for discharge at this time with close follow-up.    Amount and/or Complexity of Data Reviewed  External Data Reviewed: labs.     Details: INR 5/3/2024 reviewed  Radiology: ordered.     Details: US VENOUS DOPPLER LEG RIGHT - DIAG IMG (CPT=93971)    Result Date: 5/7/2024  CONCLUSION:   1. No evidence of acute deep venous thrombosis in the imaged veins of the right lower extremity.  2. Nonocclusive superficial venous thrombus in the right small saphenous vein.  3. Probable residua of chronic thrombus in the left greater saphenous vein near the junction with the common femoral vein.  4. Complicated fluid collection in the right posterior calf corresponding to the palpable abnormality measuring 7.0 cm long axis.       elm-remote  Dictated by (CST): Lavon Sherman MD on 5/07/2024 at 7:30 PM     Finalized by (CST): Lavon Sherman MD on 5/07/2024 at 7:39 PM                  Disposition and Plan     Clinical Impression:  1. Hematoma of lower extremity, unspecified laterality,  initial encounter         Disposition:  Discharge  5/7/2024  8:44 pm    Follow-up:  Robbie Hubbard MD  [address] [place] [id number] [phone]    Follow up in 2 day(s)      Robbie Hubbard MD  [address] [place] [id number] [phone]          We recommend that you schedule follow up care with a primary care provider within the next three months to obtain basic health screening including reassessment of your blood pressure.      Medications Prescribed:  Current Discharge Medication List

## 2024-05-07 NOTE — TELEPHONE ENCOUNTER
Her daughter Park contacted on call provider due to her mom Tara having swelling of her right calf and a lump that is present. Per her daughter she has a history of blood clots in the past. She states there is no redness present. At the time of the phone call the paramedics were accessing the patient. I recommended the patient go to the UC or ER for further evaluation. Daughter verbalized understanding.

## 2024-05-10 ENCOUNTER — PATIENT OUTREACH (OUTPATIENT)
Dept: CASE MANAGEMENT | Age: 84
End: 2024-05-10

## 2024-05-10 NOTE — PROGRESS NOTES
1st attempt ER f/up apt request    Taylor LEAHY  PCP  [address] [facility] [address]  [phone]  Apt: May 11 @ 11:30am     Confirmed w/ pt  Closing encounter

## 2024-05-11 ENCOUNTER — OFFICE VISIT (OUTPATIENT)
Dept: INTERNAL MEDICINE CLINIC | Facility: CLINIC | Age: 84
End: 2024-05-11

## 2024-05-11 VITALS
DIASTOLIC BLOOD PRESSURE: 75 MMHG | HEIGHT: 67 IN | HEART RATE: 85 BPM | SYSTOLIC BLOOD PRESSURE: 122 MMHG | BODY MASS INDEX: 34.53 KG/M2 | WEIGHT: 220 LBS | OXYGEN SATURATION: 95 %

## 2024-05-11 DIAGNOSIS — I82.811 VENOUS EMBOLISM AND THROMBOSIS OF SUPERFICIAL VESSELS OF RIGHT LOWER EXTREMITY: Primary | ICD-10-CM

## 2024-05-11 PROCEDURE — 99213 OFFICE O/P EST LOW 20 MIN: CPT

## 2024-05-11 RX ORDER — AMOXICILLIN AND CLAVULANATE POTASSIUM 875; 125 MG/1; MG/1
TABLET, FILM COATED ORAL
COMMUNITY
Start: 2024-05-06 | End: 2024-05-20 | Stop reason: ALTCHOICE

## 2024-05-11 NOTE — PROGRESS NOTES
Subjective:   Tara Lockwood is a 83 year old female who presents for ER F/U (Was in ER on 5/7 for Hematoma of lower extremity, unspecified laterality, initial encounter)     ER f/u for hematoma of lower leg and fluid collection which she was told was a cyst and was partially drained in ER - patient states 3ml of blood were removed  A DVT was ruled out with ultrasound  Does not remember hitting her leg, remembers saying \"ouch\" one time but can't remember why   Started with a lump on her calf and called the office and was told to go to the ER   Has not increased in size since the ER,  to touch   Has seen Dr. Najjar in the past - wanted to try compression socks before operating   On augmentin for infection in her foot and will be seeing the foot doctor again soon    History/Other:    Chief Complaint Reviewed and Verified  Nursing Notes Reviewed and   Verified  Tobacco Reviewed  Allergies Reviewed  Medications Reviewed    Problem List Reviewed  Medical History Reviewed  Surgical History   Reviewed  OB Status Reviewed  Family History Reviewed         Tobacco:  She has never smoked tobacco.    Current Outpatient Medications   Medication Sig Dispense Refill    amoxicillin clavulanate 875-125 MG Oral Tab Take 1 tablet every 12 hours by MOUTH with meal(s) for 7 days.      fluticasone furoate-vilanterol (BREO ELLIPTA) 100-25 MCG/ACT Inhalation Aerosol Powder, Breath Activated Inhale 1 puff into the lungs daily. 1 each 1    ergocalciferol 1.25 MG (43736 UT) Oral Cap Take 1 capsule (50,000 Units total) by mouth once a week. 12 capsule 0    albuterol (PROAIR HFA) 108 (90 Base) MCG/ACT Inhalation Aero Soln Inhale 2 puffs into the lungs every 4 (four) hours as needed for Wheezing. 1 each 1    warfarin 5 MG Oral Tab Take as directed by INR clinic or take one & 1/2 tabs Tuesdays, Thursdays, Saturdays. Take one tab all other days 120 tablet 3    hydroxyurea 500 MG Oral Cap GERRY 1 CAPSULE ON SUN,TUES, WEDS, THUR  AND SAT AND 2 CAPSULES ON MON AND FRI AS DIRECTED 117 capsule 3    warfarin 5 MG Oral Tab Take 1-1.5 tablets (5-7.5 mg total) by mouth nightly. Take as directed by the coumadin clinic. Take 1.5 tabs (7.5mg) by mouth Tues, Thurs and 1 tab (5mg) all other days per Coumadin Clinic 105 tablet 1    dilTIAZem HCl ER Beads 120 MG Oral Capsule SR 24 Hr Take 1 capsule (120 mg total) by mouth daily. 30 capsule 11    Multiple Vitamins-Minerals (MULTIVITAMIN ADULT OR) Take 1 tablet by mouth daily.      acetaminophen (TYLENOL) 325 MG Oral Tab Take 500 mg by mouth every 6 (six) hours as needed for Pain.        atorvastatin 80 MG Oral Tab Take 1 tablet (80 mg total) by mouth nightly. 30 tablet 1         Review of Systems:  Review of Systems   Constitutional: Negative.    Respiratory: Negative.     Cardiovascular: Negative.    Gastrointestinal: Negative.    Musculoskeletal:         Right calf bruising and cyst   Skin: Negative.    Neurological: Negative.      Objective:   /75   Pulse 85   Ht 5' 7\" (1.702 m)   Wt 220 lb (99.8 kg)   SpO2 95%   BMI 34.46 kg/m²  Estimated body mass index is 34.46 kg/m² as calculated from the following:    Height as of this encounter: 5' 7\" (1.702 m).    Weight as of this encounter: 220 lb (99.8 kg).  Physical Exam  Vitals reviewed.   Constitutional:       General: She is not in acute distress.     Appearance: Normal appearance. She is well-developed.   Cardiovascular:      Rate and Rhythm: Normal rate and regular rhythm.      Heart sounds: Normal heart sounds.   Pulmonary:      Effort: Pulmonary effort is normal.      Breath sounds: Normal breath sounds.   Skin:     General: Skin is warm and dry.      Comments: Right calf bruising and ~4cm hematoma tender to touch  Not warm, no surrounding erythema   Neurological:      Mental Status: She is alert and oriented to person, place, and time.       Assessment & Plan:   1. Venous embolism and thrombosis of superficial vessels of right lower  extremity (Primary)  -     Vascular Surgery - In Network  D/w Dr. VIRGILIO Evans who also stepped in to examine patient's leg - recommend ice and monitoring as it seems to have reduced in size, and follow up with Dr. Najjar Sarah Soliz, APRN, 5/11/2024, 11:46 AM

## 2024-05-13 ENCOUNTER — APPOINTMENT (OUTPATIENT)
Dept: GENERAL RADIOLOGY | Facility: HOSPITAL | Age: 84
DRG: 065 | End: 2024-05-13
Attending: EMERGENCY MEDICINE
Payer: MEDICARE

## 2024-05-13 ENCOUNTER — HOSPITAL ENCOUNTER (INPATIENT)
Facility: HOSPITAL | Age: 84
LOS: 1 days | Discharge: HOME | DRG: 065 | End: 2024-05-14
Attending: EMERGENCY MEDICINE | Admitting: HOSPITALIST
Payer: MEDICARE

## 2024-05-13 ENCOUNTER — HOSPITAL ENCOUNTER (OUTPATIENT)
Dept: MRI IMAGING | Facility: HOSPITAL | Age: 84
Discharge: HOME | DRG: 065 | End: 2024-05-13
Attending: OTOLARYNGOLOGY
Payer: MEDICARE

## 2024-05-13 DIAGNOSIS — I63.9 ACUTE CVA (CEREBROVASCULAR ACCIDENT) (HCC): Primary | ICD-10-CM

## 2024-05-13 DIAGNOSIS — H90.A32 MIXED CONDUCTIVE AND SENSORINEURAL HEARING LOSS OF LEFT EAR WITH RESTRICTED HEARING OF RIGHT EAR: ICD-10-CM

## 2024-05-13 LAB
ANION GAP SERPL CALC-SCNC: 6 MMOL/L (ref 0–18)
APTT PPP: 33.4 SECONDS (ref 23–36)
BUN BLD-MCNC: 25 MG/DL (ref 9–23)
BUN/CREAT SERPL: 17.9 (ref 10–20)
CALCIUM BLD-MCNC: 9.3 MG/DL (ref 8.7–10.4)
CHLORIDE SERPL-SCNC: 109 MMOL/L (ref 98–112)
CHOLEST SERPL-MCNC: 186 MG/DL (ref ?–200)
CO2 SERPL-SCNC: 25 MMOL/L (ref 21–32)
CREAT BLD-MCNC: 1.4 MG/DL
EGFRCR SERPLBLD CKD-EPI 2021: 37 ML/MIN/1.73M2 (ref 60–?)
GLUCOSE BLD-MCNC: 83 MG/DL (ref 70–99)
GLUCOSE BLDC GLUCOMTR-MCNC: 84 MG/DL (ref 70–99)
HDLC SERPL-MCNC: 56 MG/DL (ref 40–59)
INR BLD: 2.24 (ref 0.8–1.2)
LDLC SERPL CALC-MCNC: 101 MG/DL (ref ?–100)
NONHDLC SERPL-MCNC: 130 MG/DL (ref ?–130)
OSMOLALITY SERPL CALC.SUM OF ELEC: 294 MOSM/KG (ref 275–295)
POTASSIUM SERPL-SCNC: 4.3 MMOL/L (ref 3.5–5.1)
PROTHROMBIN TIME: 26.2 SECONDS (ref 11.6–14.8)
SODIUM SERPL-SCNC: 140 MMOL/L (ref 136–145)
TRIGL SERPL-MCNC: 166 MG/DL (ref 30–149)
TROPONIN I SERPL HS-MCNC: 12 NG/L
VLDLC SERPL CALC-MCNC: 28 MG/DL (ref 0–30)

## 2024-05-13 PROCEDURE — A9575 INJ GADOTERATE MEGLUMI 0.1ML: HCPCS | Performed by: OTOLARYNGOLOGY

## 2024-05-13 PROCEDURE — 70553 MRI BRAIN STEM W/O & W/DYE: CPT | Performed by: OTOLARYNGOLOGY

## 2024-05-13 PROCEDURE — 71045 X-RAY EXAM CHEST 1 VIEW: CPT | Performed by: EMERGENCY MEDICINE

## 2024-05-13 PROCEDURE — 99223 1ST HOSP IP/OBS HIGH 75: CPT | Performed by: HOSPITALIST

## 2024-05-13 RX ORDER — LABETALOL HYDROCHLORIDE 5 MG/ML
10 INJECTION, SOLUTION INTRAVENOUS EVERY 10 MIN PRN
Status: DISCONTINUED | OUTPATIENT
Start: 2024-05-13 | End: 2024-05-14

## 2024-05-13 RX ORDER — ACETAMINOPHEN 650 MG/1
650 SUPPOSITORY RECTAL EVERY 4 HOURS PRN
Status: DISCONTINUED | OUTPATIENT
Start: 2024-05-13 | End: 2024-05-14

## 2024-05-13 RX ORDER — ATORVASTATIN CALCIUM 80 MG/1
80 TABLET, FILM COATED ORAL NIGHTLY
Status: DISCONTINUED | OUTPATIENT
Start: 2024-05-13 | End: 2024-05-14

## 2024-05-13 RX ORDER — ACETAMINOPHEN 325 MG/1
650 TABLET ORAL EVERY 4 HOURS PRN
Status: DISCONTINUED | OUTPATIENT
Start: 2024-05-13 | End: 2024-05-14

## 2024-05-13 RX ORDER — METOCLOPRAMIDE HYDROCHLORIDE 5 MG/ML
5 INJECTION INTRAMUSCULAR; INTRAVENOUS EVERY 8 HOURS PRN
Status: DISCONTINUED | OUTPATIENT
Start: 2024-05-13 | End: 2024-05-14

## 2024-05-13 RX ORDER — GADOTERATE MEGLUMINE 376.9 MG/ML
20 INJECTION INTRAVENOUS
Status: COMPLETED | OUTPATIENT
Start: 2024-05-13 | End: 2024-05-13

## 2024-05-13 RX ORDER — HYDRALAZINE HYDROCHLORIDE 20 MG/ML
10 INJECTION INTRAMUSCULAR; INTRAVENOUS EVERY 2 HOUR PRN
Status: DISCONTINUED | OUTPATIENT
Start: 2024-05-13 | End: 2024-05-14

## 2024-05-13 RX ORDER — SODIUM CHLORIDE 9 MG/ML
INJECTION, SOLUTION INTRAVENOUS CONTINUOUS
Status: DISCONTINUED | OUTPATIENT
Start: 2024-05-13 | End: 2024-05-14

## 2024-05-13 RX ORDER — WARFARIN SODIUM 5 MG/1
5 TABLET ORAL NIGHTLY
Status: DISCONTINUED | OUTPATIENT
Start: 2024-05-14 | End: 2024-05-14

## 2024-05-13 RX ORDER — ONDANSETRON 2 MG/ML
4 INJECTION INTRAMUSCULAR; INTRAVENOUS EVERY 6 HOURS PRN
Status: DISCONTINUED | OUTPATIENT
Start: 2024-05-13 | End: 2024-05-14

## 2024-05-13 RX ADMIN — GADOTERATE MEGLUMINE 20 ML: 376.9 INJECTION INTRAVENOUS at 11:55:00

## 2024-05-13 NOTE — ED INITIAL ASSESSMENT (HPI)
Pt presents to ed with c/o  abnormal mri. Pt states she had an MRI done of her ear today and was told that the MRI showed something abnormal in the back of her head and told to come to ed. Pt reports the back of her head has been hurting for months.     Pt aox4,speaking in full sentences. Speech clear, face symmetrical, strength equal bilaterally

## 2024-05-13 NOTE — ED QUICK NOTES
Orders for admission, patient is aware of plan and ready to go upstairs. Any questions, please call ED RN kenton at extension [phone].     Patient Covid vaccination status: Fully vaccinated     COVID Test Ordered in ED: None    COVID Suspicion at Admission: N/A    Running Infusions:  None    Mental Status/LOC at time of transport: aox4    Other pertinent information:   CIWA score: N/A   NIH score:  N/A

## 2024-05-13 NOTE — ED PROVIDER NOTES
Patient Seen in: [facility] Emergency Department      History     Chief Complaint   Patient presents with    Abnormal Result     Stated Complaint: abnormal MRI    Subjective:   HPI    83-year-old female with history of prior DVT and pulmonary embolism for which she takes warfarin, spinal stenosis, arthritis, and lung cancer status post lobectomy presents with an abnormal MRI.  The patient had an MRI of her brain done today as ordered by her otolaryngologist.  The patient had hearing loss from her left ear for many months.  She had an outpatient MRI done today after which she was called and told to come to the emergency department.  MRI shows an acute lacunar type infarct in the right cerebellum.  The patient is without any acute complaints.  She denies any vision changes.  She denies dizziness or discoordination.  She denies any speech changes.  She denies focal weakness or numbness.    Objective:   Past Medical History:    Acid reflux    Acute medial meniscus tear of right knee    Acute medial meniscus tear of right knee    Acute midline low back pain with left-sided sciatica    Age-related nuclear cataract of both eyes    Arthritis    Degenerative disc disease, lumbar    Diverticulosis large intestine w/o perforation or abscess w/o bleeding    Diverticulosis large intestine w/o perforation or abscess w/o bleeding    Family history of glaucoma in mother    Family history of glaucoma in mother    Family history of macular degeneration    Gastric polyps    Gastric polyps    Hiatal hernia    History of hip replacement, total    right hip    Iatrogenic pulmonary embolism and infarction, initial encounter (HCC)    Internal hemorrhoids    Kidney problem    Long term (current) use of anticoagulants    Lumbar herniated disc    Lung cancer (HCC)    Obesity, unspecified    Osteoarthrosis    Osteoarthrosis, unspecified whether generalized or localized, unspecified site    Post-menopausal bleeding    Primary  osteoarthritis of left knee    Primary osteoarthritis of right knee    Pulmonary embolism (HCC)    S/P colonoscopy with polypectomy    Spinal stenosis of lumbar region without neurogenic claudication    Spondylolisthesis of lumbar region    Thyroid condition              Past Surgical History:   Procedure Laterality Date    Cholecystectomy      Colonoscopy      Colonoscopy N/A 2017    Procedure: COLONOSCOPY;  Surgeon: Clare Victor MD;  Location: [facility] ENDOSCOPY    Egd      Foot surgery Bilateral     Bunion Surgery X6    Foot/toes surgery proc unlisted Left     Pt had surgery to straighten toes on left foot.    Hip replacement surgery Right     Hip surgery Right 2015    right total hip arthroplasty    Hysterectomy      Laparoscopic cholecystectomy            x7 (Twins x1) Max weight 9 1/2 lbs    Other surgical history      parotid gland removed left benigh    Other surgical history      Removal Skin Mole    Other surgical history      SAB/ D&C    Other surgical history      Ear Surgery    Other surgical history      Neg EM Bx    Removal of lung,lobectomy Left 2016    Pt had upper left lobe removed.                Social History     Socioeconomic History    Marital status:    Tobacco Use    Smoking status: Never     Passive exposure: Never    Smokeless tobacco: Never   Vaping Use    Vaping status: Never Used   Substance and Sexual Activity    Alcohol use: Not Currently     Alcohol/week: 0.8 standard drinks of alcohol     Types: 1 Glasses of wine per week     Comment: 1-2x/month    Drug use: No   Other Topics Concern    Caffeine Concern Yes     Comment: 2 cups coffee daily    Pt has a pacemaker No    Pt has a defibrillator No    Reaction to local anesthetic No              Review of Systems    Positive for stated complaint: abnormal MRI  Other systems are as noted in HPI.  Constitutional and vital signs reviewed.      All other systems reviewed and negative except as noted  above.    Physical Exam     ED Triage Vitals [05/13/24 1602]   /69   Pulse 81   Resp 22   Temp 97.3 °F (36.3 °C)   Temp src Temporal   SpO2 98 %   O2 Device None (Room air)       Current Vitals:   Vital Signs  BP: 121/69  Pulse: 81  Resp: 22  Temp: 97.3 °F (36.3 °C)  Temp src: Temporal    Oxygen Therapy  SpO2: 98 %  O2 Device: None (Room air)            Physical Exam      General Appearance: alert, no distress  Eyes: pupils equal and round no pallor or injection  ENT, Mouth: mucous membranes moist  Respiratory: there are no retractions, lungs are clear to auscultation  Cardiovascular: regular rate and rhythm  Gastrointestinal:  abdomen is soft and non tender, no masses, bowel sounds normal  Neurological: Speech normal.  Cranial nerves II through XII intact.  No focal motor or sensory deficits to extremities x 4.  Cerebellum intact to finger-to-nose.  Skin: warm and dry, no rashes.  Musculoskeletal: neck is supple non tender        Extremities are symmetrical, full range of motion  Psychiatric: patient is oriented X 3, there is no agitation    DIFFERENTIAL DIAGNOSIS: After history and physical exam differential diagnosis was considered for CVA or other        ED Course     Labs Reviewed   BASIC METABOLIC PANEL (8) - Abnormal; Notable for the following components:       Result Value    BUN 25 (*)     Creatinine 1.40 (*)     eGFR-Cr 37 (*)     All other components within normal limits   PROTHROMBIN TIME (PT) - Abnormal; Notable for the following components:    PT 26.2 (*)     INR 2.24 (*)     All other components within normal limits   CBC W/ DIFFERENTIAL - Abnormal; Notable for the following components:    RBC 3.29 (*)     .0 (*)     MCH 38.3 (*)     RDW-SD 53.0 (*)     All other components within normal limits   TROPONIN I HIGH SENSITIVITY - Normal   PTT, ACTIVATED - Normal   CBC WITH DIFFERENTIAL WITH PLATELET    Narrative:     The following orders were created for panel order CBC With Differential With  Platelet.  Procedure                               Abnormality         Status                     ---------                               -----------         ------                     CBC W/ DIFFERENTIAL[[id number]]          Abnormal            Preliminary result           Please view results for these tests on the individual orders.   MD BLOOD SMEAR CONSULT     EKG    Rate, intervals and axes as noted on EKG Report.  Rate: 79  Rhythm: Sinus Rhythm  Axis: Normal  Reading: Nonspecific EKG                        MDM      MRI results from earlier today were reviewed.  Lab, x-ray, and EKG results noted.  Plan to admit for further evaluation of acute CVA.  Discussed with Dr. Mcqueen, hospitalist.  Also communicated with [place] [facility] [place].  Also communicated with Dr. Lopez, neurology.  Admission disposition: 5/13/2024  6:06 PM                                        Medical Decision Making      Disposition and Plan     Clinical Impression:  1. Acute CVA (cerebrovascular accident) (HCC)         Disposition:  Admit  5/13/2024  6:06 pm    Follow-up:  No follow-up provider specified.  We recommend that you schedule follow up care with a primary care provider within the next three months to obtain basic health screening including reassessment of your blood pressure.      Medications Prescribed:  Current Discharge Medication List                            Hospital Problems       Present on Admission  Date Reviewed: 5/11/2024            ICD-10-CM Noted POA    * (Principal) Acute CVA (cerebrovascular accident) (HCC) I63.9 5/13/2024 Unknown

## 2024-05-13 NOTE — IMAGING NOTE
1115: Patient to MRI department for scheduled exam with known [employer] pacemaker. Jack here from [employer] to change settings to MRI safe mode. Baseline vitals: HR 81, 97% ON RA, /115. Pacemaker set to 90 DOO. Pt has auto device pacemaker which will automatically return pt back to original settings when patient is removed from magnetic field.    1126: MRI start  1128: HR 90, 98% ON RA, /85  1138: HR 91, 97% ON RA, /79  1148: HR 91, 97% ON RA, /85  1158: HR 91, 98% ON RA, /79  1208: HR 91, 98% ON RA, /83  1218: HR 90, 97% ON RA, /78. MRI END    SETTINGS RETURNED TO NORMAL AUTOMATICALLY

## 2024-05-14 ENCOUNTER — APPOINTMENT (OUTPATIENT)
Dept: CT IMAGING | Facility: HOSPITAL | Age: 84
DRG: 065 | End: 2024-05-14
Attending: HOSPITALIST
Payer: MEDICARE

## 2024-05-14 ENCOUNTER — APPOINTMENT (OUTPATIENT)
Dept: CV DIAGNOSTICS | Facility: HOSPITAL | Age: 84
DRG: 065 | End: 2024-05-14
Attending: HOSPITALIST
Payer: MEDICARE

## 2024-05-14 VITALS
TEMPERATURE: 98 F | SYSTOLIC BLOOD PRESSURE: 157 MMHG | OXYGEN SATURATION: 97 % | DIASTOLIC BLOOD PRESSURE: 63 MMHG | RESPIRATION RATE: 18 BRPM | HEART RATE: 80 BPM | WEIGHT: 242.63 LBS | HEIGHT: 67 IN | BODY MASS INDEX: 38.08 KG/M2

## 2024-05-14 LAB
ANION GAP SERPL CALC-SCNC: 4 MMOL/L (ref 0–18)
ATRIAL RATE: 79 BPM
BASOPHILS # BLD AUTO: 0.05 X10(3) UL (ref 0–0.2)
BASOPHILS # BLD AUTO: 0.06 X10(3) UL (ref 0–0.2)
BASOPHILS NFR BLD AUTO: 1 %
BASOPHILS NFR BLD AUTO: 1 %
BUN BLD-MCNC: 23 MG/DL (ref 9–23)
BUN/CREAT SERPL: 19.8 (ref 10–20)
CALCIUM BLD-MCNC: 9 MG/DL (ref 8.7–10.4)
CHLORIDE SERPL-SCNC: 109 MMOL/L (ref 98–112)
CO2 SERPL-SCNC: 26 MMOL/L (ref 21–32)
CREAT BLD-MCNC: 1.16 MG/DL
DEPRECATED RDW RBC AUTO: 53 FL (ref 35.1–46.3)
DEPRECATED RDW RBC AUTO: 53.4 FL (ref 35.1–46.3)
EGFRCR SERPLBLD CKD-EPI 2021: 46 ML/MIN/1.73M2 (ref 60–?)
EOSINOPHIL # BLD AUTO: 0.05 X10(3) UL (ref 0–0.7)
EOSINOPHIL # BLD AUTO: 0.07 X10(3) UL (ref 0–0.7)
EOSINOPHIL NFR BLD AUTO: 0.8 %
EOSINOPHIL NFR BLD AUTO: 1.4 %
ERYTHROCYTE [DISTWIDTH] IN BLOOD BY AUTOMATED COUNT: 12.9 % (ref 11–15)
ERYTHROCYTE [DISTWIDTH] IN BLOOD BY AUTOMATED COUNT: 13.1 % (ref 11–15)
EST. AVERAGE GLUCOSE BLD GHB EST-MCNC: 100 MG/DL (ref 68–126)
GLUCOSE BLD-MCNC: 93 MG/DL (ref 70–99)
GLUCOSE BLDC GLUCOMTR-MCNC: 110 MG/DL (ref 70–99)
GLUCOSE BLDC GLUCOMTR-MCNC: 96 MG/DL (ref 70–99)
HBA1C MFR BLD: 5.1 % (ref ?–5.7)
HCT VFR BLD AUTO: 35.8 %
HCT VFR BLD AUTO: 36.2 %
HGB BLD-MCNC: 12.3 G/DL
HGB BLD-MCNC: 12.6 G/DL
IMM GRANULOCYTES # BLD AUTO: 0.02 X10(3) UL (ref 0–1)
IMM GRANULOCYTES # BLD AUTO: 0.02 X10(3) UL (ref 0–1)
IMM GRANULOCYTES NFR BLD: 0.3 %
IMM GRANULOCYTES NFR BLD: 0.4 %
INR BLD: 2.28 (ref 0.8–1.2)
LYMPHOCYTES # BLD AUTO: 1.18 X10(3) UL (ref 1–4)
LYMPHOCYTES # BLD AUTO: 1.23 X10(3) UL (ref 1–4)
LYMPHOCYTES NFR BLD AUTO: 19.6 %
LYMPHOCYTES NFR BLD AUTO: 24 %
MCH RBC QN AUTO: 38.3 PG (ref 26–34)
MCH RBC QN AUTO: 38.6 PG (ref 26–34)
MCHC RBC AUTO-ENTMCNC: 34.4 G/DL (ref 31–37)
MCHC RBC AUTO-ENTMCNC: 34.8 G/DL (ref 31–37)
MCV RBC AUTO: 110 FL
MCV RBC AUTO: 112.2 FL
MONOCYTES # BLD AUTO: 0.43 X10(3) UL (ref 0.1–1)
MONOCYTES # BLD AUTO: 0.51 X10(3) UL (ref 0.1–1)
MONOCYTES NFR BLD AUTO: 8.4 %
MONOCYTES NFR BLD AUTO: 8.5 %
NEUTROPHILS # BLD AUTO: 3.33 X10 (3) UL (ref 1.5–7.7)
NEUTROPHILS # BLD AUTO: 3.33 X10(3) UL (ref 1.5–7.7)
NEUTROPHILS # BLD AUTO: 4.19 X10 (3) UL (ref 1.5–7.7)
NEUTROPHILS # BLD AUTO: 4.19 X10(3) UL (ref 1.5–7.7)
NEUTROPHILS NFR BLD AUTO: 64.8 %
NEUTROPHILS NFR BLD AUTO: 69.8 %
OSMOLALITY SERPL CALC.SUM OF ELEC: 291 MOSM/KG (ref 275–295)
P AXIS: 59 DEGREES
P-R INTERVAL: 170 MS
PLATELET # BLD AUTO: 213 10(3)UL (ref 150–450)
PLATELET # BLD AUTO: 257 10(3)UL (ref 150–450)
POTASSIUM SERPL-SCNC: 4.7 MMOL/L (ref 3.5–5.1)
PROTHROMBIN TIME: 26.5 SECONDS (ref 11.6–14.8)
Q-T INTERVAL: 346 MS
QRS DURATION: 96 MS
QTC CALCULATION (BEZET): 396 MS
R AXIS: 56 DEGREES
RBC # BLD AUTO: 3.19 X10(6)UL
RBC # BLD AUTO: 3.29 X10(6)UL
SODIUM SERPL-SCNC: 139 MMOL/L (ref 136–145)
T AXIS: 32 DEGREES
VENTRICULAR RATE: 79 BPM
WBC # BLD AUTO: 5.1 X10(3) UL (ref 4–11)
WBC # BLD AUTO: 6 X10(3) UL (ref 4–11)

## 2024-05-14 PROCEDURE — 70496 CT ANGIOGRAPHY HEAD: CPT | Performed by: HOSPITALIST

## 2024-05-14 PROCEDURE — 93306 TTE W/DOPPLER COMPLETE: CPT | Performed by: HOSPITALIST

## 2024-05-14 PROCEDURE — 70498 CT ANGIOGRAPHY NECK: CPT | Performed by: HOSPITALIST

## 2024-05-14 PROCEDURE — 99223 1ST HOSP IP/OBS HIGH 75: CPT | Performed by: OTHER

## 2024-05-14 PROCEDURE — 99239 HOSP IP/OBS DSCHRG MGMT >30: CPT | Performed by: HOSPITALIST

## 2024-05-14 RX ORDER — ATORVASTATIN CALCIUM 80 MG/1
80 TABLET, FILM COATED ORAL NIGHTLY
Qty: 30 TABLET | Refills: 1 | Status: SHIPPED | OUTPATIENT
Start: 2024-05-14

## 2024-05-14 NOTE — PLAN OF CARE
Problem: Patient Centered Care  Goal: Patient preferences are identified and integrated in the patient's plan of care  Description: Interventions:  - What would you like us to know as we care for you? From home with daughter  - Provide timely, complete, and accurate information to patient/family  - Incorporate patient and family knowledge, values, beliefs, and cultural backgrounds into the planning and delivery of care  - Encourage patient/family to participate in care and decision-making at the level they choose  - Honor patient and family perspectives and choices  5/14/2024 1741 by Danielle Odonnell RN  Outcome: Adequate for Discharge  5/14/2024 1200 by Danielle Odonnell RN  Outcome: Progressing       Problem: NEUROLOGICAL - ADULT  Goal: Achieves stable or improved neurological status  Description: INTERVENTIONS  - Assess for and report changes in neurological status  - Initiate measures to prevent increased intracranial pressure  - Maintain blood pressure and fluid volume within ordered parameters to optimize cerebral perfusion and minimize risk of hemorrhage  - Monitor temperature, glucose, and sodium. Initiate appropriate interventions as ordered  5/14/2024 1741 by Danielle Odonnell RN  Outcome: Adequate for Discharge  5/14/2024 1200 by Danielle Odonnell RN  Outcome: Progressing  Goal: Achieves maximal functionality and self care  Description: INTERVENTIONS  - Monitor swallowing and airway patency with patient fatigue and changes in neurological status  - Encourage and assist patient to increase activity and self care with guidance from PT/OT  - Encourage visually impaired, hearing impaired and aphasic patients to use assistive/communication devices  5/14/2024 1741 by Danielle Odonnell RN  Outcome: Adequate for Discharge  5/14/2024 1200 by Danielle Odonnell RN  Outcome: Progressing     Problem: PAIN - ADULT  Goal: Verbalizes/displays adequate comfort level or patient's stated pain  goal  Description: INTERVENTIONS:  - Encourage pt to monitor pain and request assistance  - Assess pain using appropriate pain scale  - Administer analgesics based on type and severity of pain and evaluate response  - Implement non-pharmacological measures as appropriate and evaluate response  - Consider cultural and social influences on pain and pain management  - Manage/alleviate anxiety  - Utilize distraction and/or relaxation techniques  - Monitor for opioid side effects  - Notify MD/LIP if interventions unsuccessful or patient reports new pain  - Anticipate increased pain with activity and pre-medicate as appropriate  5/14/2024 1741 by Danielle Odonnell RN  Outcome: Adequate for Discharge  5/14/2024 1200 by Danielle Odonnell RN  Outcome: Progressing     Problem: Discharge Barriers  Goal: Patient's discharge needs are met  Description: Collaborate with interdisciplinary team and initiate plans and interventions as needed.   5/14/2024 1741 by Danielle Odonnell RN  Outcome: Adequate for Discharge  5/14/2024 1200 by Danielle Odonnell RN  Outcome: Progressing

## 2024-05-14 NOTE — CONSULTS
Cardiology Consult Note    Tara Lockwood Patient Status:  Inpatient    1940 MRN [id number]   Location [facility] 3W/SW Attending Jd Kimbrough MD   Hosp Day # 1 PCP Robbie Hubbard MD     [facility].  Tara Lockwood is a 83 year old female with a history of pacemaker placement, paroxysmal atrial fibrillation/sick sinus syndrome, DVT, pulmonary embolism, chronic anticoagulation with warfarin, spinal stenosis, lung cancer, polycythemia/myeloproliferative disorder with prior lobectomy who presented to the hospital  for evaluation of an abnormal MRI.  MRI revealed acute lacunar type infarct of the right cerebellum.  Triage vitals pertinent for blood pressure 121/69 mmHg, pulse 81, respiratory 20, afebrile.  Lab work revealed creatinine 1.4, INR 2.24.    Last echocardiogram was 2023 indicated for atrial fibrillation revealed EF 65 to 70%, mildly dilated left atrium otherwise normal.  EKG this admission shows normal sinus rhythm.          --------------------------------------------------------------------------------------------------------------------------------  ROS 10 systems reviewed, pertinent findings above.  ROS    History:  Past Medical History:    Acid reflux    Acute medial meniscus tear of right knee    Acute medial meniscus tear of right knee    Acute midline low back pain with left-sided sciatica    Age-related nuclear cataract of both eyes    Arthritis    Degenerative disc disease, lumbar    Diverticulosis large intestine w/o perforation or abscess w/o bleeding    Diverticulosis large intestine w/o perforation or abscess w/o bleeding    Family history of glaucoma in mother    Family history of glaucoma in mother    Family history of macular degeneration    Gastric polyps    Gastric polyps    Hiatal hernia    History of hip replacement, total    right hip    Iatrogenic pulmonary embolism and infarction, initial encounter ([facility])    Internal hemorrhoids    Kidney problem    Long term (current)  use of anticoagulants    Lumbar herniated disc    Lung cancer (HCC)    Obesity, unspecified    Osteoarthrosis    Osteoarthrosis, unspecified whether generalized or localized, unspecified site    Post-menopausal bleeding    Primary osteoarthritis of left knee    Primary osteoarthritis of right knee    Pulmonary embolism (HCC)    S/P colonoscopy with polypectomy    Spinal stenosis of lumbar region without neurogenic claudication    Spondylolisthesis of lumbar region    Thyroid condition     Past Surgical History:   Procedure Laterality Date    Cholecystectomy      Colonoscopy      Colonoscopy N/A 2017    Procedure: COLONOSCOPY;  Surgeon: Clare Victor MD;  Location: [facility] ENDOSCOPY    Egd      Foot surgery Bilateral     Bunion Surgery X6    Foot/toes surgery proc unlisted Left     Pt had surgery to straighten toes on left foot.    Hip replacement surgery Right     Hip surgery Right 2015    right total hip arthroplasty    Hysterectomy      Laparoscopic cholecystectomy            x7 (Twins x1) Max weight 9 1/2 lbs    Other surgical history      parotid gland removed left benigh    Other surgical history      Removal Skin Mole    Other surgical history      SAB/ D&C    Other surgical history      Ear Surgery    Other surgical history      Neg EM Bx    Removal of lung,lobectomy Left     Pt had upper left lobe removed.     Family History   Problem Relation Age of Onset    Cancer Father 87        Lung  -  smoker    Macular degeneration Mother     Glaucoma Mother     Other (Alzheimer's Disease) Mother 93    Other (Bladder Cancer) Mother     Macular degeneration Brother     Other (Pancreatic Cancer) Sister 73    Macular degeneration Brother     Other (Myocardial Infarction) Brother 69    Macular degeneration Maternal Aunt     Glaucoma Maternal Aunt     Breast Cancer Daughter 48    Diabetes Neg       reports that she has never smoked. She has never been exposed to tobacco smoke. She has never used  smokeless tobacco. She reports that she does not currently use alcohol after a past usage of about 0.8 standard drinks of alcohol per week. She reports that she does not use drugs.    Objective:   Temp: 97.7 °F (36.5 °C)  Pulse: 73  Resp: 18  BP: 152/65    Intake/Output:     Intake/Output Summary (Last 24 hours) at 5/14/2024 1034  Last data filed at 5/14/2024 0754  Gross per 24 hour   Intake 638.75 ml   Output 1300 ml   Net -661.25 ml       Physical Exam:     General: Alert and oriented x 3  HEENT: Normocephalic, anicteric sclera, neck supple.  Neck: No JVD, carotids 2+, no bruits.  Cardiac: Regular rate and rhythm. S1, S2 normal. No murmur, pericardial rub, S3.  Lungs: Clear without wheezes, rales, rhonchi or dullness.  Normal excursions and effort.  Abdomen: Soft, non-tender. BS-present.  Extremities: Without clubbing, cyanosis or edema.  Peripheral pulses are 2+.  Neurologic: Non-focal  Skin: Warm and dry.       Assessment:    Acute right cerebellar lacunar stroke  Chronic anticoagulation with warfarin  History of DVT/PE  Sick sinus syndrome, paroxysmal atrial fibrillation status post pacemaker placement  Polycythemia/myeloproliferative disorder        Plan:  Patient with incidental finding of right cerebellar lacunar infarct on MRI ordered by ENT to investigate inner ear issues.  Patient is asymptomatic from stroke standpoint  She has history of paroxysmal atrial fibrillation and is chronically anticoagulated, doubt stroke is cardioembolic.  Will recommend echocardiogram and pacemaker interrogation  Can likely go home today if echo is normal and follow-up in [facility] clinic with EP/cardiology in 1 to 2 weeks    Thank you for allowing me to take part in the care of Tara HUAN Lockwood. Please call with any questions of concerns.    Level of care: C5    Dr. Cira Dumont, DO  May 14, 2024  10:34 AM

## 2024-05-14 NOTE — PLAN OF CARE
Problem: Patient Centered Care  Goal: Patient preferences are identified and integrated in the patient's plan of care  Description: Interventions:  - What would you like us to know as we care for you? From home with daughter  - Provide timely, complete, and accurate information to patient/family  - Incorporate patient and family knowledge, values, beliefs, and cultural backgrounds into the planning and delivery of care  - Encourage patient/family to participate in care and decision-making at the level they choose  - Honor patient and family perspectives and choices  Outcome: Progressing     Problem: Patient/Family Goals  Goal: Patient/Family Long Term Goal  Description: Patient's Long Term Goal: to go home    Interventions:  - follow MD orders  - See additional Care Plan goals for specific interventions  Outcome: Progressing  Goal: Patient/Family Short Term Goal  Description: Patient's Short Term Goal: manage hearing loss    Interventions:   - follow MD orders  - See additional Care Plan goals for specific interventions  Outcome: Progressing

## 2024-05-14 NOTE — CM/SW NOTE
05/14/24 1500   CM/SW Referral Data   Referral Source Social Work (self-referral);Physician   Reason for Referral Discharge planning   Informant Patient   Medical Hx   Does patient have an established PCP? Yes   Patient Info   Patient's Current Mental Status at Time of Assessment Alert;Oriented   Patient's Home Environment House   Number of Levels in Home 1   Number of Stair in Home 0   Patient lives with Daughter   Patient Status Prior to Admission   Independent with ADLs and Mobility Yes   Services in place prior to admission DME/Supplies at home   Type of DME/Supplies Wheeled Walker   Discharge Needs   Anticipated D/C needs Home health care   Choice of Post-Acute Provider   Informed patient of right to choose their preferred provider Yes   List of appropriate post-acute services provided to patient/family with quality data Yes   [facility]/family choice [facility]   Information given to Patient     Social work was able to meet with the patient at bedside to discuss discharge planning.    The patient lives with her daughter in a 1 level home.  The patient is independent with all ADLs at baseline.  The patient owns a rolling walker.    Social work advised the patient that the Anticipated therapy need: Home with Home Healthcare.  The patient is in agreement.  The patient was provided with the HH list and is in agreement to using [facility] again.  Social work reserved [facility] [place] and informed liaison.    Social work will follow up if there are any additional needs.    SW/CM to remain available for support and/or discharge planning.     Selma Selby MSW, LSW  Discharge Planner [id number]

## 2024-05-14 NOTE — SLP NOTE
ADULT SWALLOWING EVALUATION    ASSESSMENT    ASSESSMENT/OVERALL IMPRESSION:  PPE REQUIRED. THIS THERAPIST WORE GLOVES AND MASK FOR DURATION OF EVALUATION. HANDS WASHED UPON ENTRANCE/EXIT.    Per MD H&P, \"Patient is an 83-year-old  female who was evaluated recently by ENT for progressive loss of hearing in her left ear. She had an MRI done of the brain which showed no abnormalities of the inner ear but there is an acute 1 cm lacunar-type infarct in the right cerebellum. Patient was sent to the emergency department for evaluation.\"    SLP BSSE orders received and acknowledged. A swallow evaluation warranted per stroke protocol. Pt afebrile with clear vocal quality, on room air, with oxygen saturation at 99%. Pt with no hx of dysphagia at [facility].   Pt positioned 90 degrees in bed, alert/cooperative. Pt with no complaints of pain. Oral motor skills within functional limits. Pt presented with trials of hard solids and thin liquids via straw. Pt with adequate oral acceptance and bilabial seal across all trials. Pt with intact bite, mastication of solids, and timely A-P transit. Pt's swallow response appears timely with adequate hyolaryngeal elevation/excursion. No clinical signs of aspiration (e.g., immediate/delayed throat clear, immediate/delayed cough, wet vocal quality, increased O2 effort) observed across all trials. 5/13 CXR indicates \"1. No acute airspace disease.  Heart size upper limits of normal to mildly enlarged, and the pulmonary vascularity is normal.  Linear left basal scarring/atelectasis.  Mild blunting of left costophrenic angle unchanged\". Oxygen status remained stable t/o the entire evaluation.     At this time, pt presents with functional oral and probable pharyngeal swallow stages. Recommend a regular diet and thin liquids with strict adherence to safe swallowing compensatory strategies. Results and recommendations reviewed with RN, pt. Pt v/u to all results/recommendations. No further swallow  services warranted at this time. Please re consult if needed.            RECOMMENDATIONS   Diet Recommendations - Solids: Regular  Diet Recommendations - Liquids: Thin Liquids                        Compensatory Strategies Recommended: Slow rate  Aspiration Precautions: Upright position;Slow rate  Medication Administration Recommendations: No restrictions  Treatment Plan/Recommendations: Communication evaluation;Aspiration precautions    HISTORY   MEDICAL HISTORY  Reason for Referral: Stroke protocol    Problem List  Principal Problem:    Acute CVA (cerebrovascular accident) ([facility])  Active Problems:    CVA (cerebral vascular accident) ([facility])      Past Medical History  Past Medical History:    Acid reflux    Acute medial meniscus tear of right knee    Acute medial meniscus tear of right knee    Acute midline low back pain with left-sided sciatica    Age-related nuclear cataract of both eyes    Arthritis    Degenerative disc disease, lumbar    Diverticulosis large intestine w/o perforation or abscess w/o bleeding    Diverticulosis large intestine w/o perforation or abscess w/o bleeding    Family history of glaucoma in mother    Family history of glaucoma in mother    Family history of macular degeneration    Gastric polyps    Gastric polyps    Hiatal hernia    History of hip replacement, total    right hip    Iatrogenic pulmonary embolism and infarction, initial encounter ([facility])    Internal hemorrhoids    Kidney problem    Long term (current) use of anticoagulants    Lumbar herniated disc    Lung cancer ([facility])    Obesity, unspecified    Osteoarthrosis    Osteoarthrosis, unspecified whether generalized or localized, unspecified site    Post-menopausal bleeding    Primary osteoarthritis of left knee    Primary osteoarthritis of right knee    Pulmonary embolism ([facility])    S/P colonoscopy with polypectomy    Spinal stenosis of lumbar region without neurogenic claudication    Spondylolisthesis of lumbar region    Thyroid  condition       Prior Living Situation: Home with support  Diet Prior to Admission: Regular;Thin liquids  Precautions: Aspiration    Patient/Family Goals: did not state    SWALLOWING HISTORY  Current Diet Consistency: Regular;Thin liquids  Dysphagia History: none  Imaging Results: 5/13 MRI BRAIN  CONCLUSION:   1. Acute 1 cm lacunar type infarct in the right cerebellum.  Neurology evaluation of this finding is recommended.  Findings were discussed with Dr. Santos at 1457 on 05/13/2024   2. Unremarkable MR appearance of the internal auditory canals.  No suspicious retrocochlear and/or cerebellopontine angle mass.    3. Postoperative changes of left mastoidectomy are suspected.  Partially imaged asymmetric fatty atrophy of the left parotid gland.   4. Background mild presumed sequelae of chronic microangiopathy throughout both cerebral hemispheres.  There is an additional chronic right cerebellar lacunar infarct.   5. Lesser incidental findings as above.     SUBJECTIVE       OBJECTIVE   ORAL MOTOR EXAMINATION  Dentition: Functional  Symmetry: Within Functional Limits  Strength: Within Functional Limits     Range of Motion: Within Functional Limits  Rate of Motion: Within Functional Limits    Voice Quality: Clear  Respiratory Status: Unlabored  Consistencies Trialed: Thin liquids;Hard solid  Method of Presentation: Self presentation;Straw  Patient Positioning: Upright;Midline    Oral Phase of Swallow: Within Functional Limits                      Pharyngeal Phase of Swallow: Within Functional Limits           (Please note: Silent aspiration cannot be evaluated clinically. Videofluoroscopic Swallow Study is required to rule-out silent aspiration.)    Esophageal Phase of Swallow: No complaints consistent with possible esophageal involvement  Comments: NA              GOALS: NA    FOLLOW UP  Treatment Plan/Recommendations: Communication evaluation;Aspiration precautions  Number of Visits to Meet Established Goals:  1  Follow Up Needed (Documentation Required): Yes  SLP Follow-up Date: 05/16/24    Thank you for your referral.   If you have any questions, please contact KIKE Lopez M.S., CCC-SLP  Speech Language Pathologist  Phone Number [phone]

## 2024-05-14 NOTE — HISTORICAL OFFICE NOTE
[facility]  Outside Information  Continuity of Care Document  4/2/2024  Tara Lockwood - 83 y.o. Female; born [birth year]Summary of episode note, generated on Apr. 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 Has battery life until 2032 discussed routine follow-up. With no recurrent atrial fibrillation with flecainide and can consider treatment options if recurrent. Continues on warfarin. I took care of the patient's  before he passed away. Previously noted: Because of the paroxysmal atrial fibrillation and elevated rates on Cardizem we tried flecainide. Initial Holter showed all sinus rhythm good heart rate control but subsequently had 2 syncope episodes. Syncope episodes with minimal warning feeling fine afterwards fortunately no injury but consistent with tacky bradycardia postconversion pauses. Discussed and daughters suggested pacemaker. Discussed these options.She was diagnosed in August 2023 in the hospital with atrial fibrillation that was not symptomatic. Rate was under control with diltiazem she was on Coumadin chronically. She had an echo in the hospital which is unremarkable with normal ejection fraction and then she had a nuclear stress test normal perfusion. She had a monitor that did not show any atrial fibrillation but she is currently in atrial fibrillation 117 beats a minute. She has dyspnea on exertion now when she is in A-fib and feels some palpitations. She has no chest pain.     PROBLEMS  Reconcile with Patient's ChartPROBLEMS  Problem Effective Dates Date resolved Problem Status   Preop testing, [SNOMED-CT: 228640889] 12/18/2023 - Active   Encounter for monitoring warfarin therapy,  [SNOMED-CT: 000287284] 12/18/2023 - Active   GARCIA (dyspnea on exertion), [SNOMED-CT: 273758602] 12/18/2023 - Active   Dwayne-tachy syndrome -SSS, [SNOMED-CT: 03366396] 12/18/2023 - Active   Syncope and collapse, [SNOMED-CT: 378269472] 12/18/2023 - Active   Bilateral leg edema, [SNOMED-CT: 328326655] 8/24/2023 - Active   FH: pulmonary embolism, [SNOMED-CT: 718535278] 10/26/2021 - Active   Tachycardia, [SNOMED-CT: 6399577] 10/26/2021 - Active     ENCOUNTER DIAGNOSIS    ENCOUNTER DIAGNOSIS  Problem Effective Dates Date resolved Problem Status   GARCIA (dyspnea on exertion), [SNOMED-CT: 825631304] 12/18/2023 - Active   Dwayne-tachy syndrome -SSS, [SNOMED-CT: 95521298] 12/18/2023 - Active   Syncope and collapse, [SNOMED-CT: 038379870] 12/18/2023 - Active   Venous insufficiency, [SNOMED-CT: 60863375] 9/26/2023 - Active   Bilateral leg edema, [SNOMED-CT: 295435749] 8/24/2023 - Active   PAF (paroxysmal atrial fibrillation), [SNOMED-CT: 096873765] 8/24/2023 - Active   FH: pulmonary embolism, [SNOMED-CT: 080102954] 10/26/2021 - Active     VITAL SIGNS    VITAL SIGNS  Date / Time: 4/3/2024   BP Systolic 130 mmHg   BP Diastolic 80 mmHg   Height 69 inches   Weight 220 lbs   Pulse Rate 83 bpm   BSA (Body Surface Area) 2.2 cc/m2   BMI (Body Mass Index) 32.5 cc/m2   Blood Pressure 130 / 80 mmHg     PHYSICAL EXAMINATION    PHYSICAL EXAMINATION  Header Details   Constitutional 97% O2 RA   Vitals Left Arm Sitting  / 80 mmHg, Pulse rate 83 bpm, Regular, Height in 5' 9\", BMI: 32.5, Weight in 220.46 lbs (or) 100 kgs, BSA : 2.24 cc/m²   General Appearance No Acute Distress, Appropriate   Head/Eyes/Ears/Nose/Mouth/Throat Conjunctiva pink, Sclera Clear, Mucous membranes Moist   Neck Normal carotid pulsations   Respiratory Unlabored, Equal bilaterally   Cardiovascular Regular rhythm. Normal and normal S1 and S2   Gastrointestinal Abdomen soft, Non-tender, Normoactive bowel sounds   Musculoskeletal Normal spine   Gait Normal gait   Strength  and tone Normal muscle strength   Upper Extremities No clubbing, No cyanosis   Lower Extremities Pulses 2+ and equal bilaterally, No edema   Skin Warm and dry, No rashes or lesions   Neurologic / Psychiatric Alert and Oriented   Speech Normal speech     ALLERGIES, ADVERSE REACTIONS, ALERTS    ALLERGIES, ADVERSE REACTIONS, ALERTS  Type Substance Reaction Severity Status   Allergies fentanyl - Ingredient anaphylaxis Severe Active   Intolerant flecainide \"Ingredient (IN)\" [RxNorm:4441], [SNOMED: 4441] short of breath Severe Active   Allergies hydrocodone - Ingredient rash Moderate Active   Allergies morphine - Ingredient unknown Moderate Active   Allergies Phenol - Allergen anaphylaxis Severe Active     MEDICATIONS ADMINISTERED DURING VISIT    No data available    MEDICATIONS    MEDICATIONS  Medication Start Date Route/Frequency Status   acetaminophen 325 mg tablet, [RxNorm: 649407] 10/26/2021 Take 1 tablet orally once a day as needed. Active   dilTIAZem 120 mg tablet, [RxNorm: 134537] 8/24/2023 Take 1 tablet orally once a day. Active   esomeprazole magnesium 20 mg capsule,delayed release, [RxNorm: 172990] 10/26/2021 Take 1 capsule orally once a day. Active   hydroxyurea 500 mg capsule, [RxNorm: 500356] 10/26/2021 Take 1 capsule orally once a day. Active   Multiple Vitamins tablet, [RxNorm: 0] 10/26/2021 Take 1 tablet orally once a day. Active   warfarin 5 mg tablet, [RxNorm: 025932] 10/26/2021 Take 1 tablet orally once a day. Active     ASSESSMENT    Atrial fibrillation, and sinus history of paroxysmal  Now with a pacemaker since December 2023 and off flecainide no evidence of recurrent A-fib  With 43% atrial pacing no evidence of tacky bradycardia and no recurrent syncope  If recurrent atrial fibrillation does have poor tolerance to medications but can consider going back to antiarrhythmic medical treatment or considering AV node ablationSick sinus syndrome  With history of syncope now status post pacemaker no  recurrent symptomsDyspnea on exertion  Improved  Has EF normal on PET scan normal perfusion September 2023  Does have history of 1 lung and history of surgery probably related pulse ox has been okay  Possibly related A-fib tacky bradycardia or medications will reevaluate after pacemaker  Also follows with Dr. YatesDVT history  Long history of DVTs on chronic warfarinPlan  Follow-up annually with device clinic  Follow-up Dr. Yates as scheduled     FAMILY HISTORY    FAMILY HISTORY  Relationship Age Diagnosis   Father 0 No history of Cardiac disease   Mother 0 No history of Cardiac disease     GENERAL STATUS    No data available    PAST MEDICAL HISTORY    PAST MEDICAL HISTORY  Problem Date diagonsed Date resolved Status   Preop testing, [SNOMED-CT: 094551738] 12/18/2023 - Active   Encounter for monitoring warfarin therapy, [SNOMED-CT: 976221301] 12/18/2023 - Active   GARCIA (dyspnea on exertion), [SNOMED-CT: 608308316] 12/18/2023 - Active   Dwayne-tachy syndrome -SSS, [SNOMED-CT: 83214752] 12/18/2023 - Active   Syncope and collapse, [SNOMED-CT: 267640471] 12/18/2023 - Active   Bilateral leg edema, [SNOMED-CT: 427955071] 8/24/2023 - Active   FH: pulmonary embolism, [SNOMED-CT: 869123380] 10/26/2021 - Active   Tachycardia, [SNOMED-CT: 6670904] 10/26/2021 - Active     HISTORY OF PRESENT ILLNESS    Patient here for follow-up regarding 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 Has battery life until 2032 discussed routine follow-up. With no recurrent atrial fibrillation with flecainide and can consider treatment options if recurrent. Continues on warfarin. I took care of the patient's  before he passed away. Previously noted: Because of the paroxysmal atrial fibrillation and elevated rates on Cardizem we tried flecainide. Initial Holter showed all sinus rhythm good heart rate control but  subsequently had 2 syncope episodes. Syncope episodes with minimal warning feeling fine afterwards fortunately no injury but consistent with tacky bradycardia postconversion pauses. Discussed and daughters suggested pacemaker. Discussed these options.She was diagnosed in August 2023 in the hospital with atrial fibrillation that was not symptomatic. Rate was under control with diltiazem she was on Coumadin chronically. She had an echo in the hospital which is unremarkable with normal ejection fraction and then she had a nuclear stress test normal perfusion. She had a monitor that did not show any atrial fibrillation but she is currently in atrial fibrillation 117 beats a minute. She has dyspnea on exertion now when she is in A-fib and feels some palpitations. She has no chest pain.     IMMUNIZATIONS    No data available    PLAN OF CARE    PLAN OF CARE  Planned Care Date   EKG (electrocardiogram) 1/1/1900   Referral Visit - Robbie Hubbard ([email or web address]) : 1/1/1900   Follow up visit - Jackson Brooks MD 4/18/2024     PROCEDURES    No data available    RESULTS    RESULTS  Name Result Date Location - Ordered By   PROTIME [LOINC: 5902-2] 16.5 seconds [High] 12/26/2023 08:56:00 AM [facility] ([facility])  Address: [address]  [facility]  [address]  tel:   INR [LOINC: 50986-9] 1.25 [High] 12/26/2023 08:56:00 AM [facility] ([facility])  Address: [address]  [facility]  [address]  tel:   MRSA SCREEN BY PCR [LOINC: 75501-3] Negative 12/21/2023 11:50:00 AM [facility] ([facility])  Address: [address]  [facility]  [address]  tel:   GLUCOSE [LOINC: 2339-0] 91 mg/dL 12/21/2023 11:50:00 AM [facility] ([facility])  Address: [address]  [facility]  [address]  tel:   SODIUM [LOINC: 2951-2] 139 mmol/L 12/21/2023 11:50:00 AM [facility] ([facility])  Address: [address]  [facility]  [address]  tel:   POTASSIUM [LOINC: 2823-3] 4.4 mmol/L 12/21/2023 11:50:00 AM [facility] ([facility])  Address: [address]  [facility]  [address]  tel:   CHLORIDE [LOINC: 2075-0] 108 mmol/L 12/21/2023 11:50:00 AM [facility] ([facility])  Address: [address]  [facility]  [address]  tel:   CO2 [LOINC: 2028-9] 27.0 mmol/L 12/21/2023 11:50:00 AM [facility] ([facility])  Address: [address]  [facility]  [address]  tel:   ANION GAP [LOINC: 33037-3] 4 mmol/L 12/21/2023 11:50:00 AM [facility] ([facility])  Address: [address]  [facility]  [address]  tel:   BUN [LOINC: 6299-2] 24 mg/dL [High] 12/21/2023 11:50:00 AM [facility] ([facility])  Address: [address]  [facility]  [address]  tel:   CREATININE [LOINC: 22825-6] 1.34 mg/dL [High] 12/21/2023 11:50:00 AM [facility] ([facility])  Address: [address]  [facility]  [address]  tel:   BUN/ CREAT RATIO [LOINC: 3097-3] 17.9 12/21/2023 11:50:00 AM [facility] ([facility])  Address: [address]  [facility]  [address]  tel:   CALCIUM [LOINC: 67254-2] 9.7 mg/dL 12/21/2023 11:50:00 AM [facility] ([facility])  Address: [address]  [facility]  [address]  tel:   OSMOLALITY CALCULATED [LOINC: 94450-0] 292 mOsm/kg 12/21/2023 11:50:00 AM [facility] ([facility])  Address: [address]  [facility]  [address]  tel:   E GFR CR [LOINC: 60278-8] 39 mL/min/1.73m2 [Low] 12/21/2023 11:50:00 AM [facility] ([facility])  Address: [address]  [facility]  [address]  tel:   FASTING PATIENT BMP ANSWER [LOINC: 15772-8] No 12/21/2023 11:50:00 AM [facility] ([facility])  Address: [address]  [facility]  [address]  tel:   WBC [LOINC: 6690-2] 5.6 x10(3) uL 12/21/2023  11:50:00 AM [facility] ([facility])  Address: [address]  [facility]  [address]  tel:   RED BLOOD COUNT [LOINC: 789-8] 3.75 x10(6)uL [Low] 12/21/2023 11:50:00 AM [facility] ([facility])  Address: [address]  [facility]  [address]  tel:   HGB [LOINC: 718-7] 13.7 g/dL 12/21/2023 11:50:00 AM [facility] ([facility])  Address: [address]  [facility]  [address]  tel:   HCT [LOINC: 4544-3] 40.2 % 12/21/2023 11:50:00 AM [facility] ([facility])  Address: [address]  [facility]  [address]  tel:   MEAN CELL VOLUME [LOINC: 787-2] 107.2 fL [High] 12/21/2023 11:50:00 AM [facility] ([facility])  Address: [address]  [facility]  [address]  tel:   MEAN CORPUSCULAR HEMOGLOBIN [LOINC: 785-6] 36.5 pg [High] 12/21/2023 11:50:00 AM [facility] ([facility])  Address: [address]  [facility]  [address]  tel:   MEAN CORPUSCULAR HGB CONC [LOINC: 786-4] 34.1 g/dL 12/21/2023 11:50:00 AM [facility] ([facility])  Address: [address]  [facility]  [address]  tel:   RED CELL DISTRIBUTION WIDTH-SD [LOINC: 70192-4] 50.9 fL [High] 12/21/2023 11:50:00 AM [facility] ([facility])  Address: [address]  [facility]  [address]  tel:   RED CELL DISTRIBUTION WIDTH CV [LOINC: 788-0] 12.9 % 12/21/2023 11:50:00 AM [facility] ([facility])  Address: [address]  [facility]  [address]  tel:   PLATELETS [LOINC: 777-3] 249.0 10(3)uL 12/21/2023 11:50:00 AM [facility] ([facility])  Address: [address]  [facility]  [address]  tel:   NEUTROPHIL ABS PRELIM [LOINC: 751-8] 3.89 x10 (3) uL 12/21/2023 11:50:00 AM [facility] ([facility])  Address: [address]  [facility]  [address]  tel:   NEUTROPHIL ABSOLUTE [LOINC: 751-8] 3.89 x10(3) uL  12/21/2023 11:50:00 AM [facility] ([facility])  Address: [address]  [facility]  [address]  tel:   LYMPHOCYTE ABSOLUTE [LOINC: 731-0] 1.15 x10(3) uL 12/21/2023 11:50:00 AM [facility] ([facility])  Address: [address]  [facility]  [address]  tel:   MONOCYTE ABSOLUTE [LOINC: 742-7] 0.42 x10(3) uL 12/21/2023 11:50:00 AM [facility] ([facility])  Address: [address]  [facility]  [address]  tel:   EOSINOPHIL ABSOLUTE [LOINC: 711-2] 0.06 x10(3) uL 12/21/2023 11:50:00 AM [facility] ([facility])  Address: [address]  [facility]  [address]  tel:   BASOPHIL ABSOLUTE [LOINC: 704-7] 0.06 x10(3) uL 12/21/2023 11:50:00 AM [facility] ([facility])  Address: [address]  [facility]  [address]  tel:   IMMATURE GRANULOCYTE COUNT [LOINC: 29585-0] 0.02 x10(3) uL 12/21/2023 11:50:00 AM [facility] ([facility])  Address: [address]  [facility]  [address]  tel:   NEUTROPHIL % [LOINC: 770-8] 69.4 % 12/21/2023 11:50:00 AM [facility] ([facility])  Address: [address]  [facility]  [address]  tel:   LYMPHOCYTE % [LOINC: 736-9] 20.5 % 12/21/2023 11:50:00 AM [facility] ([facility])  Address: [address]  [facility]  [address]  tel:   MONOCYTE % [LOINC: 5905-5] 7.5 % 12/21/2023 11:50:00 AM [facility] ([facility])  Address: [address]  [facility]  [address]  tel:   EOSINOPHIL % [LOINC: 713-8] 1.1 % 12/21/2023 11:50:00 AM [facility] ([facility])  Address: [address]  [facility]  [address]  tel:   BASOPHIL % [LOINC: 706-2] 1.1 % 12/21/2023 11:50:00 AM [facility] ([facility])  Address: [address]  [facility]  [address]  tel:   IMMATURE GRANULOCYTE RATIO % [LOINC: 06929-8] 0.4 % 12/21/2023 11:50:00 AM [facility]  [facility] ([facility])  Address: [address]  [facility]  [address]  tel:   TSH [LOINC: 38776-3] 2.990 mIU/mL 08/10/2023 06:59:00 AM [facility] ([facility])  Address: [address]  [facility]  [address]  tel:   PRO-BETA NATRIURETIC PEPTIDE [LOINC: 42921-6] 9461 pg/mL [High] 08/10/2023 06:59:00 AM [facility] ([facility])  Address: [address]  [facility]  [address]  tel:   Nuclear PET 1.Stress EKG is non-diagnostic secondary to LVH on baseline EKG. 2.Pt denied chest pain.3.Rare PAC's.1.This is a normal perfusion study, no perfusion defects noted. 2.The left ventricular cavity is noted to be normal on the stress studies. The stress left ventricular ejection fraction was calculated to be 74% and left ventricular global function is normal. The rest left ventricular cavity is noted to be normal. The rest left ventricular ejection fraction was calculated to be 76% and rest left ventricular global function is normal. 3.When compared to the resting ejection fraction (76%), the stress ejection fraction (74%) has decreased. 4.The study quality is average. 9/11/2023 11:00:00 AM Abdulaziz Young MD   Myocardial Perfusion Imaging 1.Stress ECG normal with less than 1mm additional ST depression as compared to baseline ECG2.No chest pain3.No arrhythmias1.This is a normal perfusion study, no perfusion defects noted. 2.The left ventricular cavity is noted to be normal on the stress study. The left ventricular ejection fraction was calculated to be 69% and left ventricular global function is normal. 3.This scan is suggestive of low risk for future cardiovascular events. 4.The study quality is average. 10/27/2021 8:30:00 AM Syed John MD   Lower Extremity Venous Ultrasound 1.The study quality is good. 2.Exam performed with the patient in reverse Trendelenburg position.3.Normal compressibility, augmentation, and phasic flow in the bilateral DEEP lower extremity  venous system. Negative study for DVT.4.Right baker's cyst: 3.6 x 0.6cm.5.--------------6.Reflux is noted in the right and left GSV.7.Bilateral small sapheanous vein appears to be heavily calcified with absence of color flow identified and non-compressible, suggestive of chronic thrombus.8.Multiple perforators visualized right proximal-distal calf with no reflux found, refer to the body of the report.9.There is an incompetent perforators seen left distal calf = 2.2mm with 0.4sec reflux. 9/5/2023 11:00:00 AM Felix Mena MD   Ambulatory Telemetry Monitor 1.This is a poor quality study. 2.Predominant rhythm is normal sinus rhythm. 3.The minimum heart rate recorded was 51 beats / minute. The maximum heart rate is 111 beats / minute. The mean heart rate is 70 beats / minute. 4.No evidence of AV block is noted. 5.Rare premature atrial contractions noted. 6.Rare premature ventricular contractions noted. 7.No evidence of ventricular tachycardia is noted.8.No pauses were noted. 9.Sinus  bpm  PVC's 0.4%  PAC's 1%  44 atrial runs up to 8 seconds and 140 bpm  No atrial fibrillation  Shortness of breath during sinus 10/9/2023 2:15:00 PM Jackson Brooks MD   Holter Monitoring 1.This is an excellent quality study. 2.Predominant rhythm is normal sinus rhythm. 3.The minimum heart rate recorded was 64 beats / minute (12/9/2023). The maximum heart rate is 134 beats / minute (12/9/2023). The mean heart rate is 77 beats / minute. 4.First degree AV block noted. 5.Frequent premature atrial contractions noted. 6.No evidence of atrial fibrillation noted. 7.Afib [place] 0%8.Rare premature ventricular contractions noted. 9.No evidence of ventricular tachycardia is noted.10.No pauses were noted. 11.Sinus  bpm PVC's none PAC's 3% 8 atrial triplets No symptoms 12/8/2023 10:00:00 AM Jackson Brooks MD     REVIEW OF SYSTEMS    No data available    SOCIAL HISTORY    SOCIAL HISTORY  Social History Element Description Effective Dates   Smoking  status Never smoked -     FUNCTIONAL STATUS    No data available    MEDICAL EQUIPMENT    No data available    Goals Sections    No data available    REASON FOR REFERRAL             Health Concerns Section    No data available    COGNITIVE/MENTAL STATUS    No data available    Patient Demographics    Patient Demographics  Patient Address Patient Name Communication   [address]  [place] [address] Tara Lockwood [phone] (Home)     Patient Demographics  Language Race / Ethnicity Marital Status   English - Spoken (Preferred) White / Unknown      Document Information    Primary Care Provider Other Service Providers Document Coverage Dates   Jackson Brooks  NPI: [id number]  [phone] (Work)  [address] [place] [address]  [place] [address]  Interpreting Physicians  [facility]  [phone] (Work)  [address] [place] [address] Zainab Wren  NPI: [id number]  [phone] (Work)  [address] [place] [address]  [place] [address]  Nurses     Rupa Baxter  NPI: [id number]  [phone] (Work)  [address] [place] [address]  [place] [address]  Nurses Apr. 03, 2024April 03, 2024      Organization   [facility]  [phone] (Work)  [address] [place] [address]  [place] [address]     Encounter Providers Encounter Date    Apr. 03, 2024April 03, 2024     Legal Authenticator    Jackson Brooks  NPI: [id number]  [phone] (Work)  [address] [place] [address]  [place] [address]

## 2024-05-14 NOTE — PROGRESS NOTES
[facility] Emergency Department    [address]    [facility] [address]    Phone:  [phone]    Fax:  [phone]                                       Krys Vincent   MRN: [id number]    Department:  [facility] Emergency Department   Date of Visit:  7/13/2017           After Visit Summary Signature Page     I have received my discharge instructions, and my questions have been answered. I have discussed any challenges I see with this plan with the nurse or doctor.    ..........................................................................................................................................  Patient/Patient Representative Signature      ..........................................................................................................................................  Patient Representative Print Name and Relationship to Patient    ..................................................               ................................................  Date                                            Time    ..........................................................................................................................................  Reviewed by Signature/Title    ...................................................              ..............................................  Date                                                            Time           This RN called [employer] rep to interrogate pacemaker today, awaiting for rep arrival.     Received call from [employer] rep, arrival in approximately 45 min.       Danielle ATKINSON RN

## 2024-05-14 NOTE — HOME CARE LIAISON
[facility]. Spoke w/ patient who is agreeable with [facility]. Contact information placed on AVS.

## 2024-05-14 NOTE — PROGRESS NOTES
Discharge instructions reviewed with patient, tele and PIV removed. All personal belongings packed and taken with patient. Patient escorted to private vehicle via wheelchair.    Danielle ATKINSON RN

## 2024-05-14 NOTE — H&P
[facility]    PATIENT'S NAME: KAMILA HARPER   ATTENDING PHYSICIAN: Montrell Mcqueen MD   PATIENT ACCOUNT#:   [id number]    LOCATION:  [address] [place] [address]  MEDICAL RECORD #:   [id number]       YOB: 1940  ADMISSION DATE:       05/13/2024    HISTORY AND PHYSICAL EXAMINATION    CHIEF COMPLAINT:  Right cerebrovascular accident.    HISTORY OF PRESENT ILLNESS:  Patient is an 83-year-old  female who was evaluated recently by ENT for progressive loss of hearing in her left ear.  She had an MRI done of the brain which showed no abnormalities of the inner ear but there is an acute 1 cm lacunar-type infarct in the right cerebellum.  Patient was sent to the emergency department for evaluation.  Chemistry showed GFR of 37.  CBC was unremarkable.  INR therapeutic at 2.24.  Chest x-ray showed no acute disease.  EKG showed normal sinus rhythm.  Patient will be admitted to the hospital for further management.    PAST MEDICAL HISTORY:  Paroxysmal atrial fibrillation with sick sinus syndrome status post dual-chamber pacemaker, osteoarthritis, hypertension, chronic kidney disease stage 3, history of DVT and multiple PEs chronically anticoagulated with Coumadin, degenerative joint disease of lumbar spine, left ventricular diastolic dysfunction with moderate tricuspid regurgitation and mild to moderate pulmonary artery hypertension.  She had Oneal-positive polycythemia myeloproliferative disorder being treated with hydroxyurea.    PAST SURGICAL HISTORY:  Left upper lobectomy for lung cancer, cholecystectomy, hysterectomy, left parotidectomy, bilateral foot bunionectomy procedure, and right total hip arthroplasty.    MEDICATIONS:  Please see medication reconciliation list.    ALLERGIES:  Fentanyl, hydrocodone, and side effects to morphine.    FAMILY HISTORY:  Mother had Alzheimer dementia and bladder cancer.  Father with lung cancer.    SOCIAL HISTORY:  No tobacco, alcohol, or drug use.  Lives with her family.   Independent for basic activities of daily living.    REVIEW OF SYSTEMS:  Patient said that she feels lightheaded sometimes and she uses a walker to ambulate around.  Her balance has been off recently, but she cannot determine if this is acute or chronic and she is not certain if there was a sudden change in her balance.  She denies any diplopia.  Other 12-point review of systems is negative.      PHYSICAL EXAMINATION:    GENERAL:  Alert and oriented to time, place, and person.  No acute distress.  VITAL SIGNS:  Temperature 97.3, pulse 81, respiratory rate 22, blood pressure 120/69, pulse ox 98% on room air.  HEENT:  Atraumatic.  Oropharynx clear.  Moist mucous membranes.  Ears, Nose:  Normal.  Eyes:  Anicteric sclerae.  NECK:  Supple.  No lymphadenopathy.  Trachea midline.  Full range of motion.  LUNGS:  Clear to auscultation bilaterally.  Normal respiratory effort.  HEART:  Regular rate and rhythm.  S1 and S2 auscultated.  No murmur.  ABDOMEN:  Soft, nondistended.  No tenderness.  Obese.  Positive bowel sounds.  EXTREMITIES:  Right leg +1 to 2 edema.  Known underlying Baker cyst.  Left leg, trace edema.  NEUROLOGIC:  Motor and sensory intact.    ASSESSMENT AND PLAN:    1.   Right cerebellar acute 1 cm cerebrovascular accident incidentally found on MRI scan of the brain for workup of left ear progressive loss of hearing.  2.   History of atrial fibrillation with history of deep venous thrombosis and pulmonary embolism, therapeutic INR.  3.   Essential hypertension.  4.   Mixed connective tissue disease.    Patient will be admitted to telemetry floor.  Obtain CT angiogram of the head and neck.  Continue Coumadin hold on using any other antiplatelet agents until seen by Neurology and Cardiology.  Obtain 2D echocardiogram with Doppler.  Physical, occupational, and speech therapy.  Further recommendations to follow.    Dictated By Montrell Mcqueen MD  d: 05/13/2024 17:59:41  t: 05/13/2024  21:35:43  [facility] [id number]  /

## 2024-05-14 NOTE — PLAN OF CARE
Device interrogation requested (Rollerwallronik), RN to perform remote to assess AF burden. Will follow on report after received,       ARMOND Yu  05/14/24  10:22 AM  [phone] [place]  [phone] [place]

## 2024-05-14 NOTE — PHYSICAL THERAPY NOTE
PHYSICAL THERAPY EVALUATION - INPATIENT     Room Number: 334/334-A  Evaluation Date: 5/14/2024  Type of Evaluation: Initial   Physician Order: PT Eval and Treat    Presenting Problem: abnormal MRI, right lacunar infarct, right LE hematoma  Co-Morbidities : ppm, dvt/pe, spinal stenosis, lung cancer s/p left lobectomy  Reason for Therapy: Mobility Dysfunction and Discharge Planning    PHYSICAL THERAPY ASSESSMENT   Patient is a 83 year old female admitted 5/13/2024 for abnormal MRI, right lacunar infarct, pt also with right LE hematoma, pt unsure of how this occurred, but reports she has had it for approx one week.  Prior to admission, patient's baseline is independent, ambulates with rollator in her condo, uses cane when out of home.  Patient is currently functioning near baseline with transfers and gait.  Patient is requiring contact guard assist as a result of the following impairments: decreased functional strength and decreased endurance/aerobic capacity.  Physical Therapy will continue to follow for duration of hospitalization.    Patient will benefit from continued skilled PT Services at discharge to promote functional independence in home.  Anticipate patient will return home with home health PT.    PLAN  PT Treatment Plan: Endurance;Patient education;Energy conservation;Gait training  Rehab Potential : Good  Frequency (Obs): 3-5x/week    PHYSICAL THERAPY MEDICAL/SOCIAL HISTORY        Problem List  Principal Problem:    Acute CVA (cerebrovascular accident) (HCC)  Active Problems:    CVA (cerebral vascular accident) (HCC)      HOME SITUATION  Home Situation  Type of Home: Condo  Home Layout: One level  Lives With: Daughter (and grandsobn)  Drives: Yes  Patient Owned Equipment: Rollator;Cane  Patient Regularly Uses: Glasses     Prior Level of Mathe[place]: Prior to admission, patient's baseline is independent, ambulates with rollator in her condo, uses cane when out of home.     SUBJECTIVE  \"My mother lived to  her 90's and she didn't exercise\"    PHYSICAL THERAPY EXAMINATION   OBJECTIVE     Fall Risk: Standard fall risk    WEIGHT BEARING RESTRICTION  Weight Bearing Restriction: None                PAIN ASSESSMENT  Rating: Unable to rate  Location: right LE  Management Techniques: Activity promotion    COGNITION  Overall Cognitive Status:  WFL - within functional limits    RANGE OF MOTION AND STRENGTH ASSESSMENT  Upper extremity ROM and strength are within functional limits   Lower extremity ROM is within functional limits   Lower extremity strength is within functional limits     BALANCE  Static Sitting: Good  Dynamic Sitting: Good  Static Standing: Fair -  Dynamic Standing: Fair -    NEUROLOGICAL FINDINGS--none       ACTIVITY TOLERANCE  Pulse: 98       O2 WALK  Oxygen Therapy  SPO2% on Room Air at Rest: 98  SPO2% Ambulation on Room Air: 98    AM-PAC '6-Clicks' INPATIENT SHORT FORM - BASIC MOBILITY  How much difficulty does the patient currently have...  Patient Difficulty: Turning over in bed (including adjusting bedclothes, sheets and blankets)?: None   Patient Difficulty: Sitting down on and standing up from a chair with arms (e.g., wheelchair, bedside commode, etc.): None   Patient Difficulty: Moving from lying on back to sitting on the side of the bed?: None   How much help from another person does the patient currently need...   Help from Another: Moving to and from a bed to a chair (including a wheelchair)?: A Little   Help from Another: Need to walk in hospital room?: A Little   Help from Another: Climbing 3-5 steps with a railing?: A Little     AM-PAC Score:  Raw Score: 21   Approx Degree of Impairment: 28.97%   Standardized Score (AM-PAC Scale): 50.25   CMS Modifier (G-Code): CJ    FUNCTIONAL ABILITY STATUS  Functional Mobility/Gait Assessment  Gait Assistance: Supervision  Distance (ft): 150  Assistive Device: Rolling walker  Pattern: Shuffle  Rolling: independent  Supine to Sit: independent  Sit to Supine:  NT  Sit to Stand: contact guard assist    Exercise/Education Provided:  Energy conservation  Functional activity tolerated  Gait training  Posture    Patient ok to see per rn.   Pt recd in supine, on room air, educated in role of physical therapy, goals for session, importance of consistent mobility.  Pt agreeable to physical therapy.    Pt educated in pacing, energy conservation, safe use of rw, upright posture.     Pt tolerated ambulation well, although she did score her RPE 8/10, O2sat with ambulation on room air 98%.   Pt returned to bedside chair, encouraged to also ambulate with [facility] staff in addition to PT in order to improve endurance, strength, and activity tolerance.   Rn aware of session and assist needed, recommendations for activity.     The patient's Approx Degree of Impairment: 28.97% has been calculated based on documentation in the [facility] '6 clicks' Inpatient Basic Mobility Short Form.  Research supports that patients with this level of impairment may benefit from home with home PT.  Final disposition will be made by interdisciplinary medical team.    Patient End of Session: Up in chair;Needs met;Call light within reach;RN aware of session/findings;All patient questions and concerns addressed    CURRENT GOALS  Goals to be met by: 5/16/24  Patient Goal Patient's self-stated goal is: to go home   Goal #1 Patient is able to demonstrate supine - sit EOB @ level: independent  Met 5/14/24   Goal #1   Current Status    Goal #2 Patient is able to demonstrate transfers Sit to/from Stand at assistance level: modified independent with walker - rolling     Goal #2  Current Status    Goal #3 Patient is able to ambulate 100 feet with assist device: walker - rolling at assistance level: modified independent   Goal #3   Current Status      Patient Evaluation Complexity Level:  History Moderate - 1 or 2 personal factors and/or co-morbidities   Examination of body systems Moderate - addressing a total of 3 or more  elements   Clinical Presentation  Moderate - Evolving   Clinical Decision Making  Moderate Complexity     Therapeutic Activity:  15 minutes

## 2024-05-14 NOTE — DISCHARGE INSTRUCTIONS
HOME HEALTH:  Sometimes managing your health at home requires assistance.  The [facility] team has recognized your preference to use [facility].  They can be reached by phone at [phone].  The fax number for your reference is [phone].  A representative from the home health agency will contact you or your family to schedule your first visit.      Next INR to be drawn on Tuesday 5/21/2024

## 2024-05-14 NOTE — CONSULTS
[facility]  [address] [place] [address], [address]  [facility] [address]  [phone]            Tara Lockwood Patient Status:  Inpatient    1940 MRN [id number]   Location [facility] 3W/SW Attending Montrell Mcqueen MD   Hosp Day # 1 PCP Robbie Hubbard MD     Date of Admission:  2024  Date of Consult:  2024  Reason for Consultation:   Incidental finding of a stroke on MRI.      History of Present Illness:   Patient is a 83 year old female who was admitted to the hospital for Acute CVA (cerebrovascular accident) (HCC):  Patient with a history of hypertension, pulmonary embolism, has been on warfarin.  During workup for her hearing loss in the left side she had the MRI done.  And that showed incidental finding of acute lacunar stroke about 1 cm in the right cerebellum.  She denies any focal neurological symptoms otherwise.    Past Medical History  Past Medical History:    Acid reflux    Acute medial meniscus tear of right knee    Acute medial meniscus tear of right knee    Acute midline low back pain with left-sided sciatica    Age-related nuclear cataract of both eyes    Arthritis    Degenerative disc disease, lumbar    Diverticulosis large intestine w/o perforation or abscess w/o bleeding    Diverticulosis large intestine w/o perforation or abscess w/o bleeding    Family history of glaucoma in mother    Family history of glaucoma in mother    Family history of macular degeneration    Gastric polyps    Gastric polyps    Hiatal hernia    History of hip replacement, total    right hip    Iatrogenic pulmonary embolism and infarction, initial encounter (HCC)    Internal hemorrhoids    Kidney problem    Long term (current) use of anticoagulants    Lumbar herniated disc    Lung cancer (HCC)    Obesity, unspecified    Osteoarthrosis    Osteoarthrosis, unspecified whether generalized or localized, unspecified site    Post-menopausal bleeding    Primary osteoarthritis of  left knee    Primary osteoarthritis of right knee    Pulmonary embolism (HCC)    S/P colonoscopy with polypectomy    Spinal stenosis of lumbar region without neurogenic claudication    Spondylolisthesis of lumbar region    Thyroid condition       Past Surgical History  Past Surgical History:   Procedure Laterality Date    Cholecystectomy      Colonoscopy      Colonoscopy N/A 2017    Procedure: COLONOSCOPY;  Surgeon: Clare Victor MD;  Location: [facility] ENDOSCOPY    Egd      Foot surgery Bilateral     Bunion Surgery X6    Foot/toes surgery proc unlisted Left     Pt had surgery to straighten toes on left foot.    Hip replacement surgery Right     Hip surgery Right 2015    right total hip arthroplasty    Hysterectomy      Laparoscopic cholecystectomy            x7 (Twins x1) Max weight 9 1/2 lbs    Other surgical history      parotid gland removed left benigh    Other surgical history      Removal Skin Mole    Other surgical history      SAB/ D&C    Other surgical history      Ear Surgery    Other surgical history      Neg EM Bx    Removal of lung,lobectomy Left 2016    Pt had upper left lobe removed.       Family History  Family History   Problem Relation Age of Onset    Cancer Father 87        Lung  -  smoker    Macular degeneration Mother     Glaucoma Mother     Other (Alzheimer's Disease) Mother 93    Other (Bladder Cancer) Mother     Macular degeneration Brother     Other (Pancreatic Cancer) Sister 73    Macular degeneration Brother     Other (Myocardial Infarction) Brother 69    Macular degeneration Maternal Aunt     Glaucoma Maternal Aunt     Breast Cancer Daughter 48    Diabetes Neg        Social History  Pediatric History   Patient Parents    Not on file     Other Topics Concern     Service Not Asked    Blood Transfusions Not Asked    Caffeine Concern Yes     Comment: 2 cups coffee daily    Occupational Exposure Not Asked    Hobby Hazards Not Asked    Sleep Concern Not Asked     Stress Concern Not Asked    Weight Concern Not Asked    Special Diet Not Asked    Back Care Not Asked    Exercise Not Asked    Bike Helmet Not Asked    Seat Belt Not Asked    Self-Exams Not Asked    Grew up on a farm Not Asked    History of tanning Not Asked    Outdoor occupation Not Asked    Pt has a pacemaker No    Pt has a defibrillator No    Breast feeding Not Asked    Reaction to local anesthetic No   Social History Narrative    Not on file           Current Medications:  Current Facility-Administered Medications   Medication Dose Route Frequency    ondansetron (Zofran) 4 MG/2ML injection 4 mg  4 mg Intravenous Q6H PRN    metoclopramide (Reglan) 5 mg/mL injection 5 mg  5 mg Intravenous Q8H PRN    sodium chloride 0.9% infusion   Intravenous Continuous    acetaminophen (Tylenol) tab 650 mg  650 mg Oral Q4H PRN    Or    acetaminophen (Tylenol) rectal suppository 650 mg  650 mg Rectal Q4H PRN    labetalol (Trandate) 5 mg/mL injection 10 mg  10 mg Intravenous Q10 Min PRN    hydrALAzine (Apresoline) 20 mg/mL injection 10 mg  10 mg Intravenous Q2H PRN    atorvastatin (Lipitor) tab 80 mg  80 mg Oral Nightly    warfarin (Coumadin) tab 5 mg  5 mg Oral Nightly     Medications Prior to Admission   Medication Sig    hydroxyurea 500 MG Oral Cap GERRY 1 CAPSULE ON SUN,TUES, WEDS, THUR AND SAT AND 2 CAPSULES ON MON AND FRI AS DIRECTED    warfarin 5 MG Oral Tab Take 1-1.5 tablets (5-7.5 mg total) by mouth nightly. Take as directed by the coumadin clinic. Take 1.5 tabs (7.5mg) by mouth Tues, Thurs and 1 tab (5mg) all other days per Coumadin Clinic    dilTIAZem HCl ER Beads 120 MG Oral Capsule SR 24 Hr Take 1 capsule (120 mg total) by mouth daily.    amoxicillin clavulanate 875-125 MG Oral Tab Take 1 tablet every 12 hours by MOUTH with meal(s) for 7 days.    fluticasone furoate-vilanterol (BREO ELLIPTA) 100-25 MCG/ACT Inhalation Aerosol Powder, Breath Activated Inhale 1 puff into the lungs daily.    ergocalciferol 1.25 MG (75588  UT) Oral Cap Take 1 capsule (50,000 Units total) by mouth once a week.    albuterol (PROAIR HFA) 108 (90 Base) MCG/ACT Inhalation Aero Soln Inhale 2 puffs into the lungs every 4 (four) hours as needed for Wheezing.    warfarin 5 MG Oral Tab Take as directed by INR clinic or take one & 1/2 tabs , , . Take one tab all other days    [] ofloxacin 0.3 % Otic Solution Place 5 drops into the left ear 2 (two) times daily for 10 days.    Multiple Vitamins-Minerals (MULTIVITAMIN ADULT OR) Take 1 tablet by mouth daily.    acetaminophen (TYLENOL) 325 MG Oral Tab Take 500 mg by mouth every 6 (six) hours as needed for Pain.         Allergies  Allergies   Allergen Reactions    Fentanyl ANAPHYLAXIS    Hydrocodone RASH    Morphine OTHER (SEE COMMENTS)     Chest tightness     Phenol ANAPHYLAXIS       Review of Systems:   As in HPI, the rest of the 14 system review was done and was negative    Physical Exam:     Vitals:    24 0000 24 0200 24 0400 24 0430   BP: 157/66 149/60 155/66    Pulse: 73 77 77    Resp: 20 20 20    Temp: 98.2 °F (36.8 °C) 97.7 °F (36.5 °C) 98.2 °F (36.8 °C)    TempSrc: Oral Oral Oral    SpO2: 99% 97% 98%    Weight:    242 lb 9.6 oz (110 kg)   Height:           General: No apparent distress, well nourished, well groomed.  Head- Normocephalic, atraumatic  Eyes- No redness or swelling  ENT- Hearing intake, smell preserved, normal glutition  Neck- No masses or adenopathy  Cv: pulses were palpable and normal, no cyanosis or edema     Neurological:     Mental Status- Alert and oriented x3.  Normal attention span and concentration  Thought process intact  Memory intact- recent and remote  Mood intact  Fund of knowledge appropriate for education and age    Language intact including: comprehension, naming, repetition, vocabulary    Cranial Nerves:  II.- Visual fields full to confrontation    III, IV, VI- EOM intact, SHAHANA  V. Facial sensation intact  VII. Face  symmetric, no facial weakness  VIII. Hearing intact.  IX. Pallet elevates symmetrically.  XI. Shoulder shrug is intact  XII. Tongue is midline    Motor Exam:  Muscle tone normal  No atrophy or fasciculations  Strength- upper extremities 5/5 proximally and distally                  - lower  extremities 5/5 proximally and distally    Sensory Exam:  Light touch sensation- intact in all 4 extremities    Coordination:  Finger to nose intact  Rapid alternating movements intact    Gait:  Normal posture    Results:     Laboratory Data:  Lab Results   Component Value Date    WBC 5.1 05/14/2024    HGB 12.3 05/14/2024    HCT 35.8 05/14/2024    .0 05/14/2024    CREATSERUM 1.16 (H) 05/14/2024    BUN 23 05/14/2024     05/14/2024    K 4.7 05/14/2024     05/14/2024    CO2 26.0 05/14/2024    GLU 93 05/14/2024    CA 9.0 05/14/2024    ALB 3.9 02/26/2024    ALKPHO 69 02/26/2024    TP 6.6 02/26/2024    AST 22 02/26/2024    ALT 23 02/26/2024    PTT 33.4 05/13/2024    INR 2.28 (H) 05/14/2024    PTP 26.5 (H) 05/14/2024    T4F 1.0 05/03/2022    TSH 3.493 01/20/2024    CRP < 0.5 05/29/2014    MG 2.3 03/20/2024    B12 454 03/20/2024         Imaging:    XR CHEST AP PORTABLE  (CPT=71045)    Result Date: 5/13/2024  CONCLUSION:  1. No acute airspace disease.  Heart size upper limits of normal to mildly enlarged, and the pulmonary vascularity is normal.  Linear left basal scarring/atelectasis.  Mild blunting of left costophrenic angle unchanged.    Dictated by (CST): Chon Lucero MD on 5/13/2024 at 5:29 PM     Finalized by (CST): Chon Lucero MD on 5/13/2024 at 5:31 PM          MRI BRAIN/IAC (ALL W+WO CNTRST) (CPT=70553)    Result Date: 5/13/2024  CONCLUSION:  1. Acute 1 cm lacunar type infarct in the right cerebellum.  Neurology evaluation of this finding is recommended.  Findings were discussed with Dr. Santos at 1457 on 05/13/2024 2. Unremarkable MR appearance of the internal auditory canals.  No suspicious  retrocochlear and/or cerebellopontine angle mass.  3. Postoperative changes of left mastoidectomy are suspected.  Partially imaged asymmetric fatty atrophy of the left parotid gland. 4. Background mild presumed sequelae of chronic microangiopathy throughout both cerebral hemispheres.  There is an additional chronic right cerebellar lacunar infarct. 5. Lesser incidental findings as above.   elm-remote  Dictated by (CST): Ted Darden MD on 5/13/2024 at 2:26 PM     Finalized by (CST): Ted Darden MD on 5/13/2024 at 2:59 PM         EKG    Result Date: 5/14/2024  Normal sinus rhythm Nonspecific T wave abnormality Abnormal ECG When compared with ECG of 09-AUG-2023 15:52, Sinus rhythm has replaced Atrial fibrillation Vent. rate has decreased BY  39 BPM T wave inversion no longer evident in Inferior leads Nonspecific T wave abnormality has replaced inverted T waves in Lateral leads     MRI of the brain showed lacunar stroke in the right cerebellum, it was independently reviewed.    Impression:     Acute CVA (cerebrovascular accident) (HCC)    -patient was not a thrombolysis candidate because there were no symptoms  -patient did not receive thrombectomy because there were no symptoms     patient will continue with anticoagulation with warfarin, we discussed possibility of adding aspirin patient decided against it at this time.    - Atorvastatin 40 mg daily   -MRI brain as above  -CTA head and neck pending  -Echocardiogram pending  -LDL is above 70 and therefore atorvastatin was started  -PT, OT and speech therapy   -Blood pressure goals: Normotension    STROKE RISK FACTORS:   *Hypertension - Target blood pressure <140/90, <130/85 for high risk, normal 120/80  *Physical inactivity - target exercise at least 3 times per week  *Obesity - target ideal body weight and girth <35\" for women, <40\" for men  *Diabetes - Target <6.5-7%   *Smoking - Target smoking cessation  *Hyperlipidemia - Target total cholesterol < 200,  Target LDL <100, < 70 for high risk, Target HDL >45 for men, >55 for women, Target triglycerides <150    Once echocardiogram and CT angiogram are done, potentially could be discharged home with follow-up in a month in the clinic.    Thank you for allowing me to participate in the care of your patient.    Du Alfaro MD  5/14/2024

## 2024-05-14 NOTE — OCCUPATIONAL THERAPY NOTE
OCCUPATIONAL THERAPY EVALUATION - INPATIENT     Room Number: 334/334-A  Evaluation Date: 5/14/2024  Type of Evaluation: Initial  Presenting Problem: Acute CVA    Physician Order: IP Consult to Occupational Therapy  Reason for Therapy: ADL/IADL Dysfunction and Discharge Planning    OCCUPATIONAL THERAPY ASSESSMENT   Patient is a 83 year old female admitted 5/13/2024 for acute CVA.  Prior to admission, patient's baseline is independent and home with family; pt uses a rolator at home and cane in the community; she still drives and manages IADLS at Mod I level; Patient is currently functioning near baseline with self care and transfers.  Patient is requiring up to Min A as a result of the following impairments: activity tolerance- pt does report feeling more SOB at home with activities (HR/O2 stable), pt reports tasks like getting to the bathroom and to kitchen are \"easy\" but as soon as she walks further distances, she gets winded easily; reports she was never told why this is happening. Occupational Therapy will continue to follow for duration of hospitalization.    Patient will benefit from continued skilled OT Services For duration of hospitalization, however, given the patient is functioning near baseline level do not anticipate skilled therapy needs at discharge for OT, could benefit from PT for conditioning    PLAN  OT Treatment Plan: Balance activities;ADL training;Energy conservation/work simplification techniques;Functional transfer training;Endurance training;Patient/Family training;Patient/Family education;Compensatory technique education  OT Device Recommendations: None    OCCUPATIONAL THERAPY MEDICAL/SOCIAL HISTORY   Problem List   Principal Problem:    Acute CVA (cerebrovascular accident) (HCC)  Active Problems:    CVA (cerebral vascular accident) (HCC)    HOME SITUATION  Type of Home: Condo  Home Layout: One level  Lives With: Daughter  Drives: Yes  Patient Regularly Uses: Glasses    SUBJECTIVE  I just got  back from the bathroom, that toilet is low! I have a high one at home     OCCUPATIONAL THERAPY EXAMINATION   OBJECTIVE  Precautions: Bed/chair alarm  Fall Risk: Standard fall risk    WEIGHT BEARING RESTRICTION     PAIN ASSESSMENT  Ratin    ACTIVITIES OF DAILY LIVING ASSESSMENT  AM-PAC ‘6-Clicks’ Inpatient Daily Activity Short Form  How much help from another person does the patient currently need…  -   Putting on and taking off regular lower body clothing?: A Little  -   Bathing (including washing, rinsing, drying)?: A Little  -   Toileting, which includes using toilet, bedpan or urinal? : A Little  -   Putting on and taking off regular upper body clothing?: A Little  -   Taking care of personal grooming such as brushing teeth?: None  -   Eating meals?: None    AM-PAC Score:  Score: 20  Approx Degree of Impairment: 38.32%  Standardized Score (AM-PAC Scale): 42.03  CMS Modifier (G-Code): CJ    FUNCTIONAL TRANSFER ASSESSMENT  Sit to Stand: Edge of Bed  Edge of Bed: Stand-by Assist  Toilet Transfer: Contact Guard Assist    BED MOBILITY  Supine to Sit : Minimal Assist    BALANCE ASSESSMENT  Static Sitting: Stand-by Assist  Static Standing: Contact Guard Assist    FUNCTIONAL ADL ASSESSMENT  Eating: Independent  Grooming Seated: Stand-by Assist  Bathing Seated: Minimal Assist  UB Dressing Seated: Supervision  LB Dressing Seated: Minimal Assist  Toileting Seated: Stand-by Assist    EDUCATION PROVIDED  Patient: Role of Occupational Therapy; Plan of Care; Discharge Recommendations  Patient's Response to Education: Verbalized Understanding    The patient's Approx Degree of Impairment: 38.32% has been calculated based on documentation in the [facility] '6 clicks' Inpatient Daily Activity Short Form.  Research supports that patients with this level of impairment may benefit from home with support; will benefit from continued PT to address deconditioning (defer to PT note).  Final disposition will be made by interdisciplinary  medical team.    Patient End of Session: Up in chair;Needs met;Call light within reach;RN aware of session/findings;All patient questions and concerns addressed;Alarm set    OT Goals  Patient self-stated goal is: to return home     Patient will complete LE dressing with SBA  Comment:     Patient will complete toilet transfer with SBA   Comment:     Patient will complete self care task at sink level with SBA    Comment:     Comment:         Goals  on:   Frequency:3x week    Patient Evaluation Complexity Level:   Occupational Profile/Medical History LOW - Brief history including review of medical or therapy records    Specific performance deficits impacting engagement in ADL/IADL LOW  1 - 3 performance deficits    Client Assessment/Performance Deficits LOW - No comorbidities nor modifications of tasks    Clinical Decision Making LOW - Analysis of occupational profile, problem-focused assessments, limited treatment options    Overall Complexity LOW     OT Session Time: 24 minutes  Therapeutic Activity: 24 minutes    Abraham Marie, Occupational Therapist, OTR/L ext [phone]

## 2024-05-14 NOTE — ED QUICK NOTES
Primary RN out for lunch break. Monitoring and assuming temporary care for patient. Patient awaiting transport to inpatient unit.

## 2024-05-15 ENCOUNTER — PATIENT OUTREACH (OUTPATIENT)
Dept: CASE MANAGEMENT | Age: 84
End: 2024-05-15

## 2024-05-15 ENCOUNTER — ANTI-COAG VISIT (OUTPATIENT)
Dept: ANTICOAGULATION | Facility: CLINIC | Age: 84
End: 2024-05-15

## 2024-05-15 ENCOUNTER — TELEPHONE (OUTPATIENT)
Dept: ANTICOAGULATION | Facility: CLINIC | Age: 84
End: 2024-05-15

## 2024-05-15 DIAGNOSIS — Z02.9 ENCOUNTERS FOR UNSPECIFIED ADMINISTRATIVE PURPOSE: ICD-10-CM

## 2024-05-15 DIAGNOSIS — Z79.01 MONITORING FOR LONG-TERM ANTICOAGULANT USE: ICD-10-CM

## 2024-05-15 DIAGNOSIS — Z95.0 CONTROL OF ATRIAL FIBRILLATION WITH PACEMAKER (HCC): Primary | ICD-10-CM

## 2024-05-15 DIAGNOSIS — Z51.81 MONITORING FOR LONG-TERM ANTICOAGULANT USE: ICD-10-CM

## 2024-05-15 DIAGNOSIS — I63.9 ACUTE CVA (CEREBROVASCULAR ACCIDENT) (HCC): Primary | ICD-10-CM

## 2024-05-15 DIAGNOSIS — I48.91 CONTROL OF ATRIAL FIBRILLATION WITH PACEMAKER (HCC): Primary | ICD-10-CM

## 2024-05-15 PROCEDURE — 1111F DSCHRG MED/CURRENT MED MERGE: CPT

## 2024-05-15 NOTE — PROGRESS NOTES
Initial Post Discharge Follow Up   Discharge Date: 5/14/24  Contact Date: 5/15/2024    Consent Verification:  Assessment Completed With: Patient  HIPAA Verified?  Yes    Discharge Dx:   Acute CVA     General:   How have you been since your discharge from the hospital? Pt feeling better, since hospital discharge--ambulating with walker at home, appetite good, staying hydrated. Pt denies fever, chills, headache, vision changes, dizziness, nausea, vomiting, diarrhea, bleeding, chest pain or shortness of breath at this time. Pt does report intermittent right calf pain (recent hematoma)--taking Tylenol, as needed--aware to avoid NSAIDs d/t Warfarin, picking up Atorvastatin today. Pt confirms [facility] RN is scheduled to see her today, for start of care.  Do you have any pain since discharge?  Yes  Where: right calf   Rating on pain scale 1-10, 10 being the worst pain you have ever experienced, what is current pain: 5  Alleviating factors: rest, elevation, Tylenol  Aggravating factors: positional  Is the pain manageable at home? Yes  How well was your pain managed while in the hospital?   On a scale of 1-5   1- Very Poor and 5- Very well   Very Well  When you were leaving the hospital were your discharge instructions reviewed with you? Yes  How well were your discharge instructions explained to you?   On a scale of 1-5   1- Very Poor and 5- Very well   Very Well  Do you have any questions about your discharge instructions?  No  Before leaving the hospital was your diagnoses explained to you? Yes  Do you have any questions about your diagnoses? No  Are you able to perform normal daily activities of living as you have prior to your hospital stay (dressing, bathing, ambulating to the bathroom, etc)? yes, with walker  (NCM) Was patient given a different diet per AVS? yes  If so, which diet?  Post stroke diet  Are there any barriers to following this diet? no    Medications:   Current Outpatient Medications   Medication  Sig Dispense Refill    atorvastatin 80 MG Oral Tab Take 1 tablet (80 mg total) by mouth nightly. 30 tablet 1    amoxicillin clavulanate 875-125 MG Oral Tab Take 1 tablet every 12 hours by MOUTH with meal(s) for 7 days.      fluticasone furoate-vilanterol (BREO ELLIPTA) 100-25 MCG/ACT Inhalation Aerosol Powder, Breath Activated Inhale 1 puff into the lungs daily. 1 each 1    ergocalciferol 1.25 MG (95299 UT) Oral Cap Take 1 capsule (50,000 Units total) by mouth once a week. 12 capsule 0    albuterol (PROAIR HFA) 108 (90 Base) MCG/ACT Inhalation Aero Soln Inhale 2 puffs into the lungs every 4 (four) hours as needed for Wheezing. 1 each 1    warfarin 5 MG Oral Tab Take as directed by INR clinic or take one & 1/2 tabs Tuesdays, Thursdays, Saturdays. Take one tab all other days 120 tablet 3    hydroxyurea 500 MG Oral Cap GERRY 1 CAPSULE ON SUN,TUES, WEDS, THUR AND SAT AND 2 CAPSULES ON MON AND FRI AS DIRECTED 117 capsule 3    warfarin 5 MG Oral Tab Take 1-1.5 tablets (5-7.5 mg total) by mouth nightly. Take as directed by the coumadin clinic. Take 1.5 tabs (7.5mg) by mouth Tues, Thurs and 1 tab (5mg) all other days per Coumadin Clinic 105 tablet 1    dilTIAZem HCl ER Beads 120 MG Oral Capsule SR 24 Hr Take 1 capsule (120 mg total) by mouth daily. 30 capsule 11    Multiple Vitamins-Minerals (MULTIVITAMIN ADULT OR) Take 1 tablet by mouth daily.      acetaminophen (TYLENOL) 325 MG Oral Tab Take 500 mg by mouth every 6 (six) hours as needed for Pain.         Were there any changes to your current medication(s) noted on the AVS? Yes  START taking:  atorvastatin (Lipitor)  STOP taking:  ofloxacin 0.3 % Soln (Floxin)  If so, were these medication changes discussed with you prior to leaving the hospital? Yes  If a new medication was prescribed:    Was the new medication's purpose & side effects reviewed? Yes  Do you have any questions about your new medication? No  Did you  your discharge medications when you left the  hospital?  Picking up today  Let's go over your medications together to make sure we are not missing anything. Medications Reviewed  Are there any reasons that keep you from taking your medication as prescribed? No  Are you having any concerns with constipation? No  Did patient receive their flu shot (Sept-March)? Yes--10/30/2023    Discharge medications reviewed/discussed/and reconciled against outpatient medications with patient.  Any changes or updates to medications sent to PCP.  Patient Acknowledged     Referrals/orders at D/C:  Referrals/orders placed at D/C? yes  What services:   Home health--RN, PT   (If HH was ordered) Has HH been set up?  Yes    If Yes: With Whom:   [facility]. They can be reached by phone at [phone]. The fax number for your reference is [phone].  DME ordered at D/C? No    Discharge orders, AVS reviewed and discussed with patient. Any changes or updates to orders sent to PCP.  Patient Acknowledged      SDOH:   Transportation:    Transportation Needs: No Transportation Needs (5/15/2024)    Transportation Needs     Lack of Transportation: No     Car Seat: Not on file     Financial Strain:   Financial Resource Strain: Low Risk  (5/15/2024)    Financial Resource Strain     Difficulty of Paying Living Expenses: Not very hard     Med Affordability: No        Diagnosis specifics:   CVA: CVA:   Do you feel you have adequate education regarding your diagnosis of stroke? Yes  Warfarin/Coumadin: Warfarin/Coumadin:   Next PT/INR Date:  Next INR to be drawn on Tuesday 5/21/2024   Current Warfarin Dose: pt taking 5 mg tonight, 7.5 mg tomorrow night and will get INR drawn 5/17/24--see anti-coag notes   Does patient know who to follow-up with Warfarin/Coumadin management?  yes     Who manages Warfarin/Coumadin? PCP    Who is monitoring PT/INR?  [facility], HH, PCP  NCM Reviewed next PT/INR appointment with pt:  Yes  Interventions: none needed     Follow up  appointments:    Follow-up Information    Follow up With Specialties Details Why Contact Info   Du Alfaro MD NEUROLOGY Go on 6/6/2024 @11:30 for your Neuro/Stroke follow up appointment [address]  [address]  [facility] [address]  [phone]   Robbie Hubbard MD Internal Medicine Follow up  [address] [facility] [address]  [phone]   Cira Dumont DO Interventional, Cardiology Follow up  [address]  [facility] [address]  [phone]     follow-up in [facility] clinic with EP/cardiology in 1 to 2 weeks     Your appointments       Date & Time Appointment Department ([place])    May 17, 2024 12:00 PM CDT Hospital Follow Up with Taylor Bagley APRN [facility] ([facility])        May 31, 2024 1:00 PM CDT Anticoagulation Visit with Aranza Coleman RN [facility] ([facility])        Jun 06, 2024 11:30 AM CDT Exam - Established with Du Alfaro MD [facility] ([facility])        Jun 25, 2024 10:15 AM CDT Lab Visit with [facility] ([facility])        Jun 25, 2024 11:15 AM CDT HEMATOLOGY ONCOLOGY FOLLOW UP with Herman Bhandari MD [facility]ematology Oncology ([facility])        Aug 19, 2024 2:20 PM CDT Follow Up Visit with Robbie Hubbard MD [facility] ([facility])        Sep 16, 2024 3:15 PM CDT Follow Up Visit with Nathan Reyes MD [facility] ([facility])    Contact your primary care provider if your insurance requires a referral.    Please arrive 15 minutes prior to your scheduled appointment. Be sure to bring your current Insurance card, Photo ID, and medication bottles or a list of your current medications.      A 24  hour notice is required to cancel any appointment or you may be charged a $40 No Show Fee.     Important: 24 hour notice is required to cancel any appointment or you may be charged a $40 No Show Fee. Please notify your physician office.               [facility] Hematology Oncology  [facility]  [address]  [facility] [address]  [phone] [facility]  [address]  [facility] [id number] [phone] [facility]  [address]  [facility] [id number] [phone]    [facility]  [address] [facility] [address] [phone] [facility]  [address] [address] [phone] [facility]  [address]  [facility] [id number] [phone]            TCC  Was TCC ordered: No    PCP (If no TCC appointment)  Does patient already have a PCP appointment scheduled? Yes  NCM Confirmed PCP office TCM appointment with patient on 5/17/2024 with HELEN Bagley       Specialist    Does the patient have any other follow up appointment(s) needing to be scheduled? Yes  If yes: NCM reviewed upcoming specialist appointment with patient: Yes  Does the patient need assistance scheduling appointment(s): No--pt will call Dr. Dumont's office, herself--declines appt assist    Is there any reason as to why you cannot make your appointment(s)?  No     Needs post D/C:   Now that you are home, are there any needs or concerns you need addressed before your next visit with your PCP?  (DME, meds, questions, etc.): No    Interventions by NCM:   Discussed diet, activity, medications and need for f/u visits. Pt confirms 5/17/2024 TCM appt  with HELEN Bagley (contacted via secure chat today-ok for 30 minute appt, per provider) and repeat PT/INR. Pt also confirms 6/06/2024 neuro f/u with Dr. Valladares. Offered to call [facility] with pt--pt prefers to call [facility] herself for EP/cardiology f/u appt in 1-2 weeks. Pt will also call for appt with vascular surgery, Dr. Najjar, as ordered by HELEN Bagley at 5/11/2024 office vist.  Patient aware when to contact PCP/specialists and when to seek emergency care. No further questions/concerns at this time.    Overall Rating:   How would you rate the care you received while in the hospital? excellent    CCM referral placed:    No    BOOK BY DATE: 5/28/2024

## 2024-05-15 NOTE — TELEPHONE ENCOUNTER
Received a call from Samina RN- [facility]. States that HHC visits will be every 2 weeks as they are more therapy related than nursing related. She will check INR in 2 weeks.

## 2024-05-15 NOTE — TELEPHONE ENCOUNTER
Chart reviewed- recent hospitalization 5/13-5/14:  Hospital Discharge Diagnoses:     Right cerebellar acute 1 cm cerebrovascular accident incidentally found on MRI scan of the brain for workup of left ear progressive loss of hearing.    I spoke with Tara- she is doing well and was glad to get a full nights sleep in her own bed last night!    INR while in the hospital was therapeutic. New medication prescribed- atorvastatin- per [facility]- no known interaction with warfarin.     States that [facility] is scheduled for a visit today. Advised that last INR was done yesterday 5/14= 2.2. [facility] does not need to check INR today. If they will continue to see her, they may check it next week, otherwise she is scheduled in the clinic on 5/31. She does not have any driving restrictions at this time.     I let her know that I will call her in 1 week for an update and plan for next INR.

## 2024-05-15 NOTE — PROGRESS NOTES
Discharged yesterday and will be following up with [facility] on 5/15/24 and 5/21/24.

## 2024-05-15 NOTE — DISCHARGE SUMMARY
[facility]  part of [facility]    Discharge Summary    Tara Lockwood Patient Status:  Inpatient    1940 MRN [id number]   Location [facility] 3W/SW Attending No att. providers found   Hosp Day # 1 PCP Robbie Hubbard MD     Date of Admission: 2024 Disposition:   Home or Self Care     Date of Discharge:  2024  5:56 PM    Admitting Diagnosis:   Acute CVA (cerebrovascular accident) (HCC) [I63.9]    Hospital Discharge Diagnoses:    Right cerebellar acute 1 cm cerebrovascular accident incidentally found on MRI scan of the brain for workup of left ear progressive loss of hearing.  History of atrial fibrillation  Hx of DVT and PE  Hx of HTN  Hx of mixed connective tissue disease.       Problem List:     Patient Active Problem List   Diagnosis    Obesity    Pulmonary emphysema (HCC)    Nontoxic multinodular goiter    Gastroesophageal reflux disease    Stage 3a chronic kidney disease (HCC)    Control of atrial fibrillation with pacemaker (HCC)    Atrial fibrillation with rapid ventricular response ([facility])    Osteoarthritis of hip, unspecified    Monitoring for long-term anticoagulant use    Iatrogenic pulmonary embolism and infarction, initial encounter ([facility])    Bilateral leg edema    Bone spur of left foot    Chronic diastolic (congestive) heart failure (HCC)    Chronic dyspnea    Chronic myeloproliferative disease ([facility])    Displaced fracture (avulsion) of medial epicondyle of right humerus, subsequent encounter for fracture with routine healing    Tachycardia    Tachycardia-bradycardia (HCC)    Unsteadiness on feet    Acute CVA (cerebrovascular accident) (HCC)    CVA (cerebral vascular accident) ([facility])       Physical Exam:      /63 (BP Location: Left arm)   Pulse 80   Temp 97.5 °F (36.4 °C) (Oral)   Resp 18   Ht 5' 7\" (1.702 m)   Wt 242 lb 9.6 oz (110 kg)   SpO2 97%   BMI 38.00 kg/m²     Gen:  NAD.  A and O x  3  CV:   RRR.  No m/g/r  Pulm:   CTA bilat  Abd:   +bs,  soft, NT, ND  LE:  No c/c/e  Neuro:  nonfocal      Reason for Admission:   Right cerebrovascular accident.     HISTORY OF PRESENT ILLNESS:  Patient is an 83-year-old  female who was evaluated recently by ENT for progressive loss of hearing in her left ear.  She had an MRI done of the brain which showed no abnormalities of the inner ear but there is an acute 1 cm lacunar-type infarct in the right cerebellum.  Patient was sent to the emergency department for evaluation.  Chemistry showed GFR of 37.  CBC was unremarkable.  INR therapeutic at 2.24.  Chest x-ray showed no acute disease.  EKG showed normal sinus rhythm.  Patient will be admitted to the hospital for further management.    Hospital Course:     1.       Right cerebellar acute 1 cm cerebrovascular accident incidentally found on MRI scan of the brain for workup of left ear progressive loss of hearing.  Pt does not seem to have any symptoms from this.  Neuro was consulted.  Echo with normal EF, grade 1 diastolic dysfunction   CTA head and neck ok.  Pt to continue coumadin.  Pt declined the addition of aspirin.  Discharged in good condition.    2.       History of atrial fibrillation with history of deep venous thrombosis and pulmonary embolism, therapeutic INR.  3.       Essential hypertension.  4.       Mixed connective tissue disease.         Consultations:   Neurology     Discharge Condition:  Good    Discharge Medications:      Discharge Medications        START taking these medications        Instructions Prescription details   atorvastatin 80 MG Tabs  Commonly known as: Lipitor      Take 1 tablet (80 mg total) by mouth nightly.   Quantity: 30 tablet  Refills: 1            CONTINUE taking these medications        Instructions Prescription details   acetaminophen 325 MG Tabs  Commonly known as: Tylenol      Take 500 mg by mouth every 6 (six) hours as needed for Pain.   Refills: 0     albuterol 108 (90 Base) MCG/ACT Aers  Commonly known as: ProAir  HFA      Inhale 2 puffs into the lungs every 4 (four) hours as needed for Wheezing.   Quantity: 1 each  Refills: 1     amoxicillin clavulanate 875-125 MG Tabs  Commonly known as: Augmentin      Take 1 tablet every 12 hours by MOUTH with meal(s) for 7 days.   Refills: 0     dilTIAZem HCl ER Beads 120 MG Cp24  Commonly known as: TIAZAC      Take 1 capsule (120 mg total) by mouth daily.   Quantity: 30 capsule  Refills: 11     ergocalciferol 1.25 MG (27749 UT) Caps  Commonly known as: ERGOCALCIFEROL      Take 1 capsule (50,000 Units total) by mouth once a week.   Stop taking on: June 19, 2024  Quantity: 12 capsule  Refills: 0     fluticasone furoate-vilanterol 100-25 MCG/ACT Aepb  Commonly known as: Breo Ellipta      Inhale 1 puff into the lungs daily.   Quantity: 1 each  Refills: 1     hydroxyurea 500 MG Caps  Commonly known as: Hydrea      GERRY 1 CAPSULE ON SUN,TUES, WEDS, THUR AND SAT AND 2 CAPSULES ON MON AND FRI AS DIRECTED   Quantity: 117 capsule  Refills: 3     MULTIVITAMIN ADULT OR      Take 1 tablet by mouth daily.   Refills: 0     warfarin 5 MG Tabs  Commonly known as: Coumadin      Take as directed. If you are unsure how to take this medication, talk to your nurse or doctor.  Original instructions: Take 1-1.5 tablets (5-7.5 mg total) by mouth nightly. Take as directed by the coumadin clinic. Take 1.5 tabs (7.5mg) by mouth Tues, Thurs and 1 tab (5mg) all other days per Coumadin Clinic   Quantity: 105 tablet  Refills: 1     warfarin 5 MG Tabs  Commonly known as: Coumadin      Take as directed. If you are unsure how to take this medication, talk to your nurse or doctor.  Original instructions: Take as directed by INR clinic or take one & 1/2 tabs Tuesdays, Thursdays, Saturdays. Take one tab all other days   Quantity: 120 tablet  Refills: 3            STOP taking these medications      ofloxacin 0.3 % Soln  Commonly known as: Floxin                  Where to Get Your Medications        These medications were sent  to [id number] - [place], [place] - [phone], [phone]  [address] , [facility] [address]      Phone: [phone]   atorvastatin 80 MG Tabs         Follow up Visits:     Follow-up Information       Du Alfaro MD. Go on 6/6/2024.    Specialty: NEUROLOGY  Why: @11:30 for your Neuro/Stroke follow up appointment  Contact information:  [address]  [place] [address]  [phone]               Robbie Hubbard MD Follow up.    Specialty: Internal Medicine  Contact information:  [address] [place] [address]  [phone]               Cira Dumont,  Follow up.    Specialty: Interventional, Cardiology  Contact information:  [address]  [place] [address]  [phone]                             Hospital Discharge Diagnoses:  Acute CVA    Lace+ Score: 76  59-90 High Risk  29-58 Medium Risk  0-28   Low Risk.    TCM Follow-Up Recommendation:  LACE > 58: High Risk of readmission after discharge from the hospital.    >35 minutes spent preparing this discharge.    Jd Wilkerson MD  5/14/2024  7:48 PM

## 2024-05-20 ENCOUNTER — TELEPHONE (OUTPATIENT)
Dept: INTERNAL MEDICINE CLINIC | Facility: CLINIC | Age: 84
End: 2024-05-20

## 2024-05-20 ENCOUNTER — OFFICE VISIT (OUTPATIENT)
Dept: INTERNAL MEDICINE CLINIC | Facility: CLINIC | Age: 84
End: 2024-05-20

## 2024-05-20 VITALS
BODY MASS INDEX: 37.98 KG/M2 | OXYGEN SATURATION: 98 % | HEIGHT: 67 IN | HEART RATE: 108 BPM | DIASTOLIC BLOOD PRESSURE: 82 MMHG | WEIGHT: 242 LBS | SYSTOLIC BLOOD PRESSURE: 134 MMHG

## 2024-05-20 DIAGNOSIS — I63.9 ACUTE CVA (CEREBROVASCULAR ACCIDENT) (HCC): Primary | ICD-10-CM

## 2024-05-20 DIAGNOSIS — N32.81 OVERACTIVE BLADDER: ICD-10-CM

## 2024-05-20 DIAGNOSIS — R06.09 DYSPNEA ON EXERTION: ICD-10-CM

## 2024-05-20 PROCEDURE — 99496 TRANSJ CARE MGMT HIGH F2F 7D: CPT

## 2024-05-20 RX ORDER — FLUTICASONE PROPIONATE AND SALMETEROL 113; 14 UG/1; UG/1
1 POWDER, METERED RESPIRATORY (INHALATION) DAILY
Qty: 1 EACH | Refills: 3 | Status: SHIPPED | OUTPATIENT
Start: 2024-05-20

## 2024-05-20 RX ORDER — OXYBUTYNIN CHLORIDE 5 MG/1
5 TABLET, EXTENDED RELEASE ORAL DAILY
Qty: 90 TABLET | Refills: 0 | Status: SHIPPED | OUTPATIENT
Start: 2024-05-20

## 2024-05-20 NOTE — TELEPHONE ENCOUNTER
Fluticasone salmeterol requires prior auth    Please answer question below:  Our records show the patient is taking Breo Ellipta 100 mcg-25 mcg/dose powder for inhalation in the same therapeutic/pharmacologic class as the requested drug.

## 2024-05-20 NOTE — PROGRESS NOTES
Subjective:   Tara Lockwood is a 83 year old female who presents for hospital follow up.   She was discharged from [facility] to Home or Self Care  Admission Date: 5/13/24   Discharge Date: 5/14/24   Hospital Discharge Diagnosis: acute CVA    Interactive contact within 2 business days post discharge first initiated on Date: 5/15/2024    During the visit, the following was completed:  Obtained and reviewed discharge summary, continuity of care documents, Hospitalist notes, Cardiology notes, and Neurology notes  Reviewed Labs (CBC, CMP), Labs (Cardiac markers), CT radiology results, and X-Ray radiology results    HPI: HISTORY OF PRESENT ILLNESS:  Patient is an 83-year-old  female who was evaluated recently by ENT for progressive loss of hearing in her left ear.  She had an MRI done of the brain which showed no abnormalities of the inner ear but there is an acute 1 cm lacunar-type infarct in the right cerebellum.  Patient was sent to the emergency department for evaluation.  Chemistry showed GFR of 37.  CBC was unremarkable.  INR therapeutic at 2.24.  Chest x-ray showed no acute disease.  EKG showed normal sinus rhythm.  Patient will be admitted to the hospital for further management.     Hospital Course:      1.       Right cerebellar acute 1 cm cerebrovascular accident incidentally found on MRI scan of the brain for workup of left ear progressive loss of hearing.  Pt does not seem to have any symptoms from this.  Neuro was consulted.  Echo with normal EF, grade 1 diastolic dysfunction   CTA head and neck ok.  Pt to continue coumadin.  Pt declined the addition of aspirin.  Discharged in good condition.     2.       History of atrial fibrillation with history of deep venous thrombosis and pulmonary embolism, therapeutic INR.  3.       Essential hypertension.  4.       Mixed connective tissue disease.      TODAY 5/20/2024  Feeling well - never had stroke-like symptoms  No dizziness, trouble walking, balance  issues   Was started on atorvastatin 80mg  She will see the neurologist on 6/6    Bruising to her right calf is almost gone and the swelling is down   Using voltaren gel and heat or ice   Still having dyspnea on exertion and never received her inhaler - called pharmacy while in visit with patient and they stated it was cancelled due to high cost - will order alternative  Uses albuterol when she gets up to walk around     Urine leaking has gotten a lot worse - mix or urge and stress incontinence   Up four times a night to urinate     History/Other:   Current Medications:  Medication Reconciliation:  I am aware of an inpatient discharge within the last 30 days.  The discharge medication list has been reconciled with the patient's current medication list and reviewed by me.  See medication list for additions of new medication, and changes to current doses of medications and discontinued medications.  Outpatient Medications Marked as Taking for the 5/20/24 encounter (Office Visit) with Taylor Bagley APRN   Medication Sig    Fluticasone-Salmeterol 113-14 MCG/ACT Inhalation Aerosol Powder, Breath Activated Inhale 1 Inhalation into the lungs daily.    oxybutynin ER 5 MG Oral Tablet 24 Hr Take 1 tablet (5 mg total) by mouth daily.    atorvastatin 80 MG Oral Tab Take 1 tablet (80 mg total) by mouth nightly.    ergocalciferol 1.25 MG (76410 UT) Oral Cap Take 1 capsule (50,000 Units total) by mouth once a week.    warfarin 5 MG Oral Tab Take as directed by INR clinic or take one & 1/2 tabs Tuesdays, Thursdays, Saturdays. Take one tab all other days    hydroxyurea 500 MG Oral Cap GERRY 1 CAPSULE ON SUN,TUES, WEDS, THUR AND SAT AND 2 CAPSULES ON MON AND FRI AS DIRECTED    warfarin 5 MG Oral Tab Take 1-1.5 tablets (5-7.5 mg total) by mouth nightly. Take as directed by the coumadin clinic. Take 1.5 tabs (7.5mg) by mouth Tues, Thurs and 1 tab (5mg) all other days per Coumadin Clinic    dilTIAZem HCl ER Beads 120 MG Oral Capsule SR 24  Hr Take 1 capsule (120 mg total) by mouth daily.    Multiple Vitamins-Minerals (MULTIVITAMIN ADULT OR) Take 1 tablet by mouth daily.    acetaminophen (TYLENOL) 325 MG Oral Tab Take 500 mg by mouth every 6 (six) hours as needed for Pain.         Review of Systems   Constitutional: Negative.    Respiratory:  Positive for shortness of breath (on exertion).    Cardiovascular: Negative.    Gastrointestinal: Negative.    Skin: Negative.    Neurological: Negative.      Objective:   Physical Exam  Vitals reviewed.   Constitutional:       General: She is not in acute distress.     Appearance: Normal appearance. She is well-developed.   Cardiovascular:      Rate and Rhythm: Tachycardia present. Rhythm irregular.      Heart sounds: Normal heart sounds.   Pulmonary:      Effort: Pulmonary effort is normal.      Breath sounds: Normal breath sounds.   Musculoskeletal:      Comments: Right leg edema with raised area ~3cm   No redness or ecchymoses   Skin:     General: Skin is warm and dry.   Neurological:      Mental Status: She is alert and oriented to person, place, and time.       /82   Pulse 108   Ht 5' 7\" (1.702 m)   Wt 242 lb (109.8 kg)   SpO2 98%   BMI 37.90 kg/m²  Estimated body mass index is 37.9 kg/m² as calculated from the following:    Height as of this encounter: 5' 7\" (1.702 m).    Weight as of this encounter: 242 lb (109.8 kg).    Assessment & Plan:   1. Acute CVA (cerebrovascular accident) (HCC) (Primary)  Started on atorvastatin, decided against ASA after discussion with neurology  2. Overactive bladder  -     oxyBUTYnin Chloride ER; Take 1 tablet (5 mg total) by mouth daily.  Dispense: 90 tablet; Refill: 0  -     Urology Referral - In Network  3. Dyspnea on exertion  -     Fluticasone-Salmeterol; Inhale 1 Inhalation into the lungs daily.  Dispense: 1 each; Refill: 3    Follow up at scheduled appt with Dr. Hubbard.

## 2024-05-22 NOTE — TELEPHONE ENCOUNTER
Approved    Prior authorization approved  Payer: Julian Case ID: [id number]    [id number]    [phone]  Note from payer: [place] Case: [id number], Status: Approved, Coverage Starts on: 1/1/2024 12:00:00 AM, Coverage Ends on: 12/31/2024 12:00:00 AM. Questions? Contact [phone].  Approval Details    Authorized from January 1, 2024 to December 31, 2024  Electronic appeal: Not supported  View History

## 2024-05-23 ENCOUNTER — MED REC SCAN ONLY (OUTPATIENT)
Dept: INTERNAL MEDICINE CLINIC | Facility: CLINIC | Age: 84
End: 2024-05-23

## 2024-05-29 ENCOUNTER — ANTI-COAG VISIT (OUTPATIENT)
Dept: ANTICOAGULATION | Facility: CLINIC | Age: 84
End: 2024-05-29

## 2024-05-29 ENCOUNTER — TELEPHONE (OUTPATIENT)
Dept: INTERNAL MEDICINE CLINIC | Facility: CLINIC | Age: 84
End: 2024-05-29

## 2024-05-29 DIAGNOSIS — Z79.01 MONITORING FOR LONG-TERM ANTICOAGULANT USE: ICD-10-CM

## 2024-05-29 DIAGNOSIS — Z51.81 MONITORING FOR LONG-TERM ANTICOAGULANT USE: ICD-10-CM

## 2024-05-29 DIAGNOSIS — I48.91 CONTROL OF ATRIAL FIBRILLATION WITH PACEMAKER (HCC): Primary | ICD-10-CM

## 2024-05-29 DIAGNOSIS — Z95.0 CONTROL OF ATRIAL FIBRILLATION WITH PACEMAKER (HCC): Primary | ICD-10-CM

## 2024-05-29 LAB — INR: 4.1 (ref 2–3)

## 2024-05-29 NOTE — TELEPHONE ENCOUNTER
Samina, RN with [facility] called stating patient requested to have home health discharged, she saw cardiologist yesterday and there is nothing home health can do further. I made her aware I will convey the above to Dr. Hubbard. She states no need to call her back.     FYGANGA Hubbard, advise if any

## 2024-05-29 NOTE — PROGRESS NOTES
Samina JORDAN [facility] / INR 4.1, supra therapeutic. (Goal 2.5 ) TTR 69.5 %     Etiology: [facility] will discharge patient this week. No changes.    PLAN: Hold today then reduce to 2.5mg on Saturday.    Recheck INR one week. (At Clinic)    Pt reports:    No sign of unusual bruising or bleeding.  Any missed doses: No   Medications changes: No    Contacted  Samina JORDAN by phone  who verbalized understanding and agreement.    WARFARIN Plan per protocol: 5/29: Hold; Otherwise 7.5 mg every Sun, Tue, Thu; 5 mg all other days

## 2024-06-03 ENCOUNTER — TELEPHONE (OUTPATIENT)
Dept: INTERNAL MEDICINE CLINIC | Facility: CLINIC | Age: 84
End: 2024-06-03

## 2024-06-03 NOTE — TELEPHONE ENCOUNTER
Dr. Messi Sethi, physical therapist with [facility] calling to inform you that patient will be evaluated for physical therapy this week- week of 6/2/24.

## 2024-06-04 ENCOUNTER — ANTI-COAG VISIT (OUTPATIENT)
Dept: ANTICOAGULATION | Facility: CLINIC | Age: 84
End: 2024-06-04

## 2024-06-04 ENCOUNTER — TELEPHONE (OUTPATIENT)
Dept: HEMATOLOGY/ONCOLOGY | Facility: HOSPITAL | Age: 84
End: 2024-06-04

## 2024-06-04 DIAGNOSIS — I26.99 IATROGENIC PULMONARY EMBOLISM AND INFARCTION, INITIAL ENCOUNTER (HCC): ICD-10-CM

## 2024-06-04 DIAGNOSIS — Z51.81 MONITORING FOR LONG-TERM ANTICOAGULANT USE: ICD-10-CM

## 2024-06-04 DIAGNOSIS — Z95.0 CONTROL OF ATRIAL FIBRILLATION WITH PACEMAKER (HCC): Primary | ICD-10-CM

## 2024-06-04 DIAGNOSIS — Z79.01 MONITORING FOR LONG-TERM ANTICOAGULANT USE: ICD-10-CM

## 2024-06-04 DIAGNOSIS — I48.91 CONTROL OF ATRIAL FIBRILLATION WITH PACEMAKER (HCC): Primary | ICD-10-CM

## 2024-06-04 DIAGNOSIS — T81.718A IATROGENIC PULMONARY EMBOLISM AND INFARCTION, INITIAL ENCOUNTER (HCC): ICD-10-CM

## 2024-06-04 LAB
INR: 4.3 (ref 0.8–1.2)
TEST STRIP EXPIRATION DATE: ABNORMAL DATE

## 2024-06-04 PROCEDURE — 85610 PROTHROMBIN TIME: CPT | Performed by: FAMILY MEDICINE

## 2024-06-04 PROCEDURE — 93793 ANTICOAG MGMT PT WARFARIN: CPT | Performed by: FAMILY MEDICINE

## 2024-06-04 NOTE — TELEPHONE ENCOUNTER
Called patient and left voicemail to call back regarding appointment on 6-25-24, patient to see Dr. Zuñiga or Dr. Ceron

## 2024-06-04 NOTE — PROGRESS NOTES
TTR 69.3%    Face to face.     Reports that cardiologist discontinued diltiazem and started her on metoprolol yesterday. Per [employer], there is no known interaction with warfarin. She has swelling in her arms/legs/face, she is not taking a diuretic at this time.     Denies any other change in meds. Reports she is eating a little bit less than usual, \"I just don't have much of an appetite.\"    Per protocol, hold one dose of warfarin tonight, decrease weekly dose 10-15%- actual decrease 5.9% and recheck INR in 1 week.     6/4: Hold; Otherwise 7.5 mg every Tue, Thu; 5 mg all other days

## 2024-06-06 ENCOUNTER — TELEPHONE (OUTPATIENT)
Dept: NEUROLOGY | Facility: CLINIC | Age: 84
End: 2024-06-06

## 2024-06-06 NOTE — TELEPHONE ENCOUNTER
Pt came in for her appt 6/6 at 1200 but appt was at 11:30, arriving 30 mins late. Pt informed she isn't able to wait to know whether dr forte will be able to see her today or not. Pt was able to reschedule her hsp f/up stroke but not until 6/27 at 3:45pm. Pls advise whether appt is fine or if she needs a sooner appt.

## 2024-06-11 ENCOUNTER — ANTI-COAG VISIT (OUTPATIENT)
Dept: ANTICOAGULATION | Facility: CLINIC | Age: 84
End: 2024-06-11

## 2024-06-11 DIAGNOSIS — Z79.01 MONITORING FOR LONG-TERM ANTICOAGULANT USE: ICD-10-CM

## 2024-06-11 DIAGNOSIS — I48.91 CONTROL OF ATRIAL FIBRILLATION WITH PACEMAKER (HCC): Primary | ICD-10-CM

## 2024-06-11 DIAGNOSIS — Z95.0 CONTROL OF ATRIAL FIBRILLATION WITH PACEMAKER (HCC): Primary | ICD-10-CM

## 2024-06-11 DIAGNOSIS — I26.99 IATROGENIC PULMONARY EMBOLISM AND INFARCTION, INITIAL ENCOUNTER (HCC): ICD-10-CM

## 2024-06-11 DIAGNOSIS — T81.718A IATROGENIC PULMONARY EMBOLISM AND INFARCTION, INITIAL ENCOUNTER (HCC): ICD-10-CM

## 2024-06-11 DIAGNOSIS — Z51.81 MONITORING FOR LONG-TERM ANTICOAGULANT USE: ICD-10-CM

## 2024-06-11 LAB
INR: 2.8 (ref 0.8–1.2)
TEST STRIP EXPIRATION DATE: ABNORMAL DATE

## 2024-06-11 PROCEDURE — 85610 PROTHROMBIN TIME: CPT | Performed by: FAMILY MEDICINE

## 2024-06-11 PROCEDURE — 93793 ANTICOAG MGMT PT WARFARIN: CPT | Performed by: FAMILY MEDICINE

## 2024-06-11 NOTE — PROGRESS NOTES
TTR 69.3%     Face to face.      Per protocol, continue current dose as adjusted at last visit (decreased weekly dose) and recheck INR in 3 weeks- due to holiday week plans, she will come in 4 weeks.    7.5 mg every Tue, Thu; 5 mg all other days

## 2024-06-25 ENCOUNTER — OFFICE VISIT (OUTPATIENT)
Dept: HEMATOLOGY/ONCOLOGY | Facility: HOSPITAL | Age: 84
End: 2024-06-25
Attending: INTERNAL MEDICINE
Payer: MEDICARE

## 2024-06-25 VITALS
OXYGEN SATURATION: 98 % | HEIGHT: 70 IN | RESPIRATION RATE: 18 BRPM | BODY MASS INDEX: 34.02 KG/M2 | SYSTOLIC BLOOD PRESSURE: 150 MMHG | HEART RATE: 73 BPM | DIASTOLIC BLOOD PRESSURE: 61 MMHG | TEMPERATURE: 98 F | WEIGHT: 237.63 LBS

## 2024-06-25 DIAGNOSIS — D45 POLYCYTHEMIA VERA (HCC): Primary | ICD-10-CM

## 2024-06-25 DIAGNOSIS — Z79.01 ANTICOAGULATION MANAGEMENT ENCOUNTER: ICD-10-CM

## 2024-06-25 DIAGNOSIS — Z51.81 ANTICOAGULATION MANAGEMENT ENCOUNTER: ICD-10-CM

## 2024-06-25 DIAGNOSIS — Z86.718 HISTORY OF DVT (DEEP VEIN THROMBOSIS): ICD-10-CM

## 2024-06-25 DIAGNOSIS — Z86.711 HISTORY OF PULMONARY EMBOLISM: ICD-10-CM

## 2024-06-25 DIAGNOSIS — D45 POLYCYTHEMIA VERA (HCC): ICD-10-CM

## 2024-06-25 DIAGNOSIS — C34.12 MALIGNANT NEOPLASM OF UPPER LOBE OF LEFT LUNG (HCC): ICD-10-CM

## 2024-06-25 DIAGNOSIS — Z51.11 CHEMOTHERAPY MANAGEMENT, ENCOUNTER FOR: ICD-10-CM

## 2024-06-25 DIAGNOSIS — Z79.01 ANTICOAGULANT LONG-TERM USE: ICD-10-CM

## 2024-06-25 DIAGNOSIS — R91.8 LUNG NODULES: ICD-10-CM

## 2024-06-25 LAB
ALBUMIN SERPL-MCNC: 3.9 G/DL (ref 3.2–4.8)
ALBUMIN/GLOB SERPL: 1.4 {RATIO} (ref 1–2)
ALP LIVER SERPL-CCNC: 79 U/L
ALT SERPL-CCNC: 21 U/L
ANION GAP SERPL CALC-SCNC: 5 MMOL/L (ref 0–18)
AST SERPL-CCNC: 30 U/L (ref ?–34)
BASOPHILS # BLD AUTO: 0.06 X10(3) UL (ref 0–0.2)
BASOPHILS NFR BLD AUTO: 1.1 %
BILIRUB SERPL-MCNC: 0.9 MG/DL (ref 0.2–1.1)
BUN BLD-MCNC: 19 MG/DL (ref 9–23)
BUN/CREAT SERPL: 16.7 (ref 10–20)
CALCIUM BLD-MCNC: 9.2 MG/DL (ref 8.7–10.4)
CHLORIDE SERPL-SCNC: 108 MMOL/L (ref 98–112)
CO2 SERPL-SCNC: 26 MMOL/L (ref 21–32)
CREAT BLD-MCNC: 1.14 MG/DL
DEPRECATED RDW RBC AUTO: 49.1 FL (ref 35.1–46.3)
EGFRCR SERPLBLD CKD-EPI 2021: 47 ML/MIN/1.73M2 (ref 60–?)
EOSINOPHIL # BLD AUTO: 0.07 X10(3) UL (ref 0–0.7)
EOSINOPHIL NFR BLD AUTO: 1.3 %
ERYTHROCYTE [DISTWIDTH] IN BLOOD BY AUTOMATED COUNT: 12.3 % (ref 11–15)
GLOBULIN PLAS-MCNC: 2.7 G/DL (ref 2–3.5)
GLUCOSE BLD-MCNC: 95 MG/DL (ref 70–99)
HCT VFR BLD AUTO: 38.5 %
HGB BLD-MCNC: 13.6 G/DL
IMM GRANULOCYTES # BLD AUTO: 0.01 X10(3) UL (ref 0–1)
IMM GRANULOCYTES NFR BLD: 0.2 %
LYMPHOCYTES # BLD AUTO: 1.22 X10(3) UL (ref 1–4)
LYMPHOCYTES NFR BLD AUTO: 22.5 %
MCH RBC QN AUTO: 38.3 PG (ref 26–34)
MCHC RBC AUTO-ENTMCNC: 35.3 G/DL (ref 31–37)
MCV RBC AUTO: 108.5 FL
MONOCYTES # BLD AUTO: 0.38 X10(3) UL (ref 0.1–1)
MONOCYTES NFR BLD AUTO: 7 %
NEUTROPHILS # BLD AUTO: 3.68 X10 (3) UL (ref 1.5–7.7)
NEUTROPHILS # BLD AUTO: 3.68 X10(3) UL (ref 1.5–7.7)
NEUTROPHILS NFR BLD AUTO: 67.9 %
OSMOLALITY SERPL CALC.SUM OF ELEC: 290 MOSM/KG (ref 275–295)
PLATELET # BLD AUTO: 225 10(3)UL (ref 150–450)
POTASSIUM SERPL-SCNC: 4.5 MMOL/L (ref 3.5–5.1)
PROT SERPL-MCNC: 6.6 G/DL (ref 5.7–8.2)
RBC # BLD AUTO: 3.55 X10(6)UL
SODIUM SERPL-SCNC: 139 MMOL/L (ref 136–145)
WBC # BLD AUTO: 5.4 X10(3) UL (ref 4–11)

## 2024-06-25 PROCEDURE — G2211 COMPLEX E/M VISIT ADD ON: HCPCS | Performed by: INTERNAL MEDICINE

## 2024-06-25 PROCEDURE — 99215 OFFICE O/P EST HI 40 MIN: CPT | Performed by: INTERNAL MEDICINE

## 2024-06-25 PROCEDURE — 85025 COMPLETE CBC W/AUTO DIFF WBC: CPT

## 2024-06-25 PROCEDURE — 80053 COMPREHEN METABOLIC PANEL: CPT

## 2024-06-25 PROCEDURE — 36415 COLL VENOUS BLD VENIPUNCTURE: CPT

## 2024-06-25 NOTE — PROGRESS NOTES
Hematology Oncology Progress Note    Patient Name: Tara Lockwood   YOB: 1940   Medical Record Number: [id number]   Attending Physician: Yamilex Ceron MD     Date of Visit: 6/25/2024     Chief Complaint:  Lung cancer  P. 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 Perioperatively she had a removable IVC filter placed however still had DVT and PE in the postop setting of her thoracotomy while not on anticoagulation.  Her filter has since been retrieved.    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 On hydrea    Interval History:  Patient returns for follow up.   Recently diagnosed with CVA incidentally found on brain MRI done for left ear hearing loss. She has A fib, DVT and PE on lifelong anticoagulation.   Warfarin managed by the coag clinic. Tolerating it well with no bleeding issues. Has easy bruising.   Taking hydrea 500 mg daily, and BID on Mon and Fri. Tolerating it well with no new side effects.   Chronic diarrhea in the mornings for many years; 1 BM loose to watery stools. Otherwise no nausea, vomiting, abdominal pain.   No skin rash, lesions or ulcerations. No leg swelling, dyspnea at rest, cough, sputums, hemoptysis. No fevers, infections. No focal weakness or falls.   She used to follow with Dr. Reyes. CT chest 4/1/24 reviewed with patient.       Performance Status: ECOG 1    Current Medications:    Current Outpatient Medications:     Fluticasone-Salmeterol 113-14 MCG/ACT Inhalation Aerosol Powder, Breath Activated, Inhale 1 Inhalation into the lungs daily., Disp: 1 each, Rfl: 3    oxybutynin ER 5 MG Oral Tablet 24 Hr, Take 1 tablet (5 mg total) by mouth  daily., Disp: 90 tablet, Rfl: 0    atorvastatin 80 MG Oral Tab, Take 1 tablet (80 mg total) by mouth nightly., Disp: 30 tablet, Rfl: 1    warfarin 5 MG Oral Tab, Take as directed by INR clinic or take one & 1/2 tabs Tuesdays, Thursdays, Saturdays. Take one tab all other days, Disp: 120 tablet, Rfl: 3    hydroxyurea 500 MG Oral Cap, GERRY 1 CAPSULE ON SUN,TUES, WEDS, THUR AND SAT AND 2 CAPSULES ON MON AND FRI AS DIRECTED, Disp: 117 capsule, Rfl: 3    warfarin 5 MG Oral Tab, Take 1-1.5 tablets (5-7.5 mg total) by mouth nightly. Take as directed by the coumadin clinic. Take 1.5 tabs (7.5mg) by mouth Tues, Thurs and 1 tab (5mg) all other days per Coumadin Clinic, Disp: 105 tablet, Rfl: 1    Multiple Vitamins-Minerals (MULTIVITAMIN ADULT OR), Take 1 tablet by mouth daily., Disp: , Rfl:     acetaminophen (TYLENOL) 325 MG Oral Tab, Take 500 mg by mouth every 6 (six) hours as needed for Pain.  , Disp: , Rfl:     albuterol (PROAIR HFA) 108 (90 Base) MCG/ACT Inhalation Aero Soln, Inhale 2 puffs into the lungs every 4 (four) hours as needed for Wheezing. (Patient not taking: Reported on 5/20/2024), Disp: 1 each, Rfl: 1    Review of Systems:  All other systems reviewed and negative x12    Vital Signs:  /61 (BP Location: Left arm, Patient Position: Sitting, Cuff Size: large)   Pulse 73   Temp 98.1 °F (36.7 °C) (Oral)   Resp 18   Ht 1.778 m (5' 10\")   Wt 107.8 kg (237 lb 9.6 oz)   SpO2 98%   BMI 34.09 kg/m²     Physical Examination:  General: Patient is alert and oriented x 3, not in acute distress.  Psych:  Mood and affect appropriate  HEENT: EOMs intact.  Oropharynx is clear.   Neck: No palpable lymphadenopathy. Neck is supple.  Lymphatics: There is no palpable peripheral lymphadenopathy   Chest: Symmetric expansion.  Nonlabored  Cardiovascular: Good distal pulses  Abdomen: Soft, non tender.   Extremities: No edema. Small scattered bruises on upper extremities.   Neurological: 5/5 motor x4.        Laboratory:  Lab  Results   Component Value Date    WBC 5.4 06/25/2024    RBC 3.55 (L) 06/25/2024    HGB 13.6 06/25/2024    HCT 38.5 06/25/2024    .5 (H) 06/25/2024    MCH 38.3 (H) 06/25/2024    MCHC 35.3 06/25/2024    RDW 12.3 06/25/2024    .0 06/25/2024    MPV 9.0 09/12/2018     Lab Results   Component Value Date/Time    HGB 13.6 06/25/2024 09:59 AM    HGB 12.3 05/14/2024 05:56 AM    HGB 12.6 05/13/2024 05:05 PM    HGB 13.6 02/26/2024 08:54 AM    HGB 13.7 12/21/2023 11:50 AM    HGB 13.7 11/28/2023 12:37 PM    HGB 13.0 08/29/2023 12:44 PM    HGB 13.7 08/12/2023 06:09 AM    HGB 14.4 08/11/2023 07:29 AM    HGB 13.3 08/10/2023 06:59 AM     Lab Results   Component Value Date     06/25/2024    K 4.5 06/25/2024     06/25/2024    CO2 26.0 06/25/2024    BUN 19 06/25/2024    CREATSERUM 1.14 (H) 06/25/2024    GLU 95 06/25/2024    CA 9.2 06/25/2024    ALKPHO 79 06/25/2024    ALT 21 06/25/2024    AST 30 06/25/2024    BILT 0.9 06/25/2024    ALB 3.9 06/25/2024    TP 6.6 06/25/2024            Radiology:  CTA BRAIN + CTA CAROTIDS (CPT=70496/77245)    Result Date: 5/14/2024  CONCLUSION:   No evidence of large vessel intracranial arterial occlusion or proximal flow limiting stenosis.  Patent bilateral cervical carotid and vertebral arteries without hemodynamically significant stenosis or evidence to suggest dissection.  Lesser incidental findings as above.    Dictated by (CST): Bakari Stevenson MD on 5/14/2024 at 8:42 AM     Finalized by (CST): Bakari Stevenson MD on 5/14/2024 at 9:02 AM          XR CHEST AP PORTABLE  (CPT=71045)    Result Date: 5/13/2024  CONCLUSION:  1. No acute airspace disease.  Heart size upper limits of normal to mildly enlarged, and the pulmonary vascularity is normal.  Linear left basal scarring/atelectasis.  Mild blunting of left costophrenic angle unchanged.    Dictated by (CST): Chon Lucero MD on 5/13/2024 at 5:29 PM     Finalized by (CST): Chon Lucero MD on 5/13/2024 at 5:31 PM           MRI BRAIN/IAC (ALL W+WO CNTRST) (CPT=70553)    Result Date: 5/13/2024  CONCLUSION:  1. Acute 1 cm lacunar type infarct in the right cerebellum.  Neurology evaluation of this finding is recommended.  Findings were discussed with Dr. Santos at 1457 on 05/13/2024 2. Unremarkable MR appearance of the internal auditory canals.  No suspicious retrocochlear and/or cerebellopontine angle mass.  3. Postoperative changes of left mastoidectomy are suspected.  Partially imaged asymmetric fatty atrophy of the left parotid gland. 4. Background mild presumed sequelae of chronic microangiopathy throughout both cerebral hemispheres.  There is an additional chronic right cerebellar lacunar infarct. 5. Lesser incidental findings as above.   elm-remote  Dictated by (CST): Ted Darden MD on 5/13/2024 at 2:26 PM     Finalized by (CST): Ted Darden MD on 5/13/2024 at 2:59 PM          US VENOUS DOPPLER LEG RIGHT - DIAG IMG (CPT=93971)    Result Date: 5/7/2024  CONCLUSION:   1. No evidence of acute deep venous thrombosis in the imaged veins of the right lower extremity.  2. Nonocclusive superficial venous thrombus in the right small saphenous vein.  3. Probable residua of chronic thrombus in the left greater saphenous vein near the junction with the common femoral vein.  4. Complicated fluid collection in the right posterior calf corresponding to the palpable abnormality measuring 7.0 cm long axis.       elm-remote  Dictated by (CST): Lavon Sherman MD on 5/07/2024 at 7:30 PM     Finalized by (CST): Lavon Sherman MD on 5/07/2024 at 7:39 PM          CT CHEST (ZYW=05117)    Result Date: 4/2/2024  CONCLUSION:  1. Postoperative changes of left upper lobectomy are again identified.  Small 4 mm left lower lobe lung nodules (x2) are new since comparison chest CT from May, 2023.  Although the small size and morphology of these nodules suggests benign etiology (such  as atelectasis or scarring), continued 6-12 month chest CT  surveillance is recommended given interval change.  No other CT findings of recurrent disease in the thorax. 2. Other scattered noncalcified bilateral lung nodules, which measure up to 4 mm in the right upper lobe are unchanged over serial prior chest CT exams.  Long-term stability of these remaining suggests benign etiology. 3. Multi-vessel coronary artery calcification with left chest wall pacemaker. 4. Stable appearance of bilateral thyroid nodules and coarse calcifications. 5. Stable small bilateral adrenal adenomas. 6. Small retrocardiac hiatal hernia. 7. Lesser incidental findings as above.     elm-remote  Dictated by (CST): Ted Darden MD on 4/01/2024 at 4:26 PM     Finalized by (CST): Ted Darden MD on 4/02/2024 at 10:24 AM              Impression and Plan:    Adenocarcinoma of the left lung, Stage IB  -- s/p left lateral thoracotomy with left upper lobectomy and mediastinal lymph node dissection 6/14/16; 4.3 cm well differentiated, no LVI, pT2aN0  -- s/p R0 resection (however close margin noted).  She did not receive any adjuvant treatment.  -- she is 8 years out with no evidence of disease recurrence. CT chest 4/2024 showed post op changes in WILL, 2 new small LLL nodules since 5/2023. Otherwise stable.   -- continue observation and repeat CT chest in 6 months.     History of recurrent bilateral DVT/PE  -- multiple episodes in the postoperative setting including recently as noted above  -- continue lifelong anticoagulation with warfarin   -- will need anticoagulation bridging for warfarin interruption perioperatively if surgery is needed in the future  - would recommend admission with bridging with heparin or lovenox and temp IVC filter based on previous history    Polycythemia vera JAK2 V617 F+   -- diagnosed fall 2020, high risk based on age and history of thrombosis.  -- on hydrea since 11/2020 and already on anticoagulation for VTE.  -- abs adequate, platelets at goal. Continue Hydrea 500 mg  daily 1000 mg MF. No dose adjustment.     COPD, pulm nodules  -- followed by pulm (Dr. Reyes)  -- repeat CT chest as above    Return to clinic in 3 months      Medical decision making: High risk. myeloproliferative neoplasm requiringmyelosuppressive therapy and monitoring  Lung cancer surveillance, anticoagulation management   we will continue to be focal point of care in setting of MPN undergoing ongoing treatment.       Yamilex Ceron MD  Hematology Oncology

## 2024-07-09 ENCOUNTER — ANTI-COAG VISIT (OUTPATIENT)
Dept: ANTICOAGULATION | Facility: CLINIC | Age: 84
End: 2024-07-09

## 2024-07-09 DIAGNOSIS — T81.718A IATROGENIC PULMONARY EMBOLISM AND INFARCTION, INITIAL ENCOUNTER (HCC): ICD-10-CM

## 2024-07-09 DIAGNOSIS — I26.99 IATROGENIC PULMONARY EMBOLISM AND INFARCTION, INITIAL ENCOUNTER (HCC): ICD-10-CM

## 2024-07-09 DIAGNOSIS — Z95.0 CONTROL OF ATRIAL FIBRILLATION WITH PACEMAKER (HCC): Primary | ICD-10-CM

## 2024-07-09 DIAGNOSIS — I48.91 CONTROL OF ATRIAL FIBRILLATION WITH PACEMAKER (HCC): Primary | ICD-10-CM

## 2024-07-09 DIAGNOSIS — Z79.01 MONITORING FOR LONG-TERM ANTICOAGULANT USE: ICD-10-CM

## 2024-07-09 DIAGNOSIS — Z51.81 MONITORING FOR LONG-TERM ANTICOAGULANT USE: ICD-10-CM

## 2024-07-09 LAB
INR: 2.7 (ref 0.8–1.2)
TEST STRIP EXPIRATION DATE: ABNORMAL DATE

## 2024-07-09 PROCEDURE — 93793 ANTICOAG MGMT PT WARFARIN: CPT | Performed by: FAMILY MEDICINE

## 2024-07-09 PROCEDURE — 85610 PROTHROMBIN TIME: CPT | Performed by: FAMILY MEDICINE

## 2024-07-09 NOTE — PROGRESS NOTES
TTR 69.5%     Face to face.      Per protocol, continue current dose a and recheck INR in 4 weeks.     7.5 mg every Tue, Thu; 5 mg all other days

## 2024-07-11 ENCOUNTER — OFFICE VISIT (OUTPATIENT)
Dept: SURGERY | Facility: CLINIC | Age: 84
End: 2024-07-11

## 2024-07-11 DIAGNOSIS — R39.15 URGENCY OF URINATION: Primary | ICD-10-CM

## 2024-07-11 PROCEDURE — 99204 OFFICE O/P NEW MOD 45 MIN: CPT | Performed by: UROLOGY

## 2024-07-11 NOTE — PROGRESS NOTES
Abby Franco MD  Department of Urology  [address] [place] [address] [address] [place] [address]    T: [phone]  F: [phone]    To: Robbie Hubbard MD   [address] [facility] [address]    Re: Tara Lockwood   MRN: [id number]  : 1940    Dear Robbie Hubbard MD,    Today I had the pleasure of seeing Tara Lockwood in my clinic. As you know, Ms. Lockwood is a pleasant 83 year old year old female who I am seeing for LUTS. Patient was last seen in this department on Visit date not found.    Briefly, patient states that she has urgency, frequency which has been going on for several years.  She also has incontinence when she gets out of the chair.  She has been on oxybutynin every day which does help.       PAST MEDICAL HISTORY:  Past Medical History:    Acid reflux    Acute medial meniscus tear of right knee    Acute medial meniscus tear of right knee    Acute midline low back pain with left-sided sciatica    Age-related nuclear cataract of both eyes    Arthritis    Degenerative disc disease, lumbar    Diverticulosis large intestine w/o perforation or abscess w/o bleeding    Diverticulosis large intestine w/o perforation or abscess w/o bleeding    Family history of glaucoma in mother    Family history of glaucoma in mother    Family history of macular degeneration    Gastric polyps    Gastric polyps    Hiatal hernia    History of hip replacement, total    right hip    Iatrogenic pulmonary embolism and infarction, initial encounter (HCC)    Internal hemorrhoids    Kidney problem    Long term (current) use of anticoagulants    Lumbar herniated disc    Lung cancer (HCC)    Obesity, unspecified    Osteoarthrosis    Osteoarthrosis, unspecified whether generalized or localized, unspecified site    Post-menopausal bleeding    Primary osteoarthritis of left knee    Primary osteoarthritis of right knee    Pulmonary embolism (HCC)    S/P colonoscopy with polypectomy    Spinal stenosis of lumbar region without  neurogenic claudication    Spondylolisthesis of lumbar region    Thyroid condition        PAST SURGICAL HISTORY:  Past Surgical History:   Procedure Laterality Date    Cholecystectomy      Colonoscopy      Colonoscopy N/A 2017    Procedure: COLONOSCOPY;  Surgeon: Clare Victor MD;  Location: [facility] ENDOSCOPY    Egd      Foot surgery Bilateral     Bunion Surgery X6    Foot/toes surgery proc unlisted Left     Pt had surgery to straighten toes on left foot.    Hip replacement surgery Right     Hip surgery Right 2015    right total hip arthroplasty    Hysterectomy      Laparoscopic cholecystectomy            x7 (Twins x1) Max weight 9 1/2 lbs    Other surgical history      parotid gland removed left benigh    Other surgical history      Removal Skin Mole    Other surgical history      SAB/ D&C    Other surgical history      Ear Surgery    Other surgical history      Neg EM Bx    Removal of lung,lobectomy Left 2016    Pt had upper left lobe removed.         ALLERGIES:  Allergies   Allergen Reactions    Fentanyl ANAPHYLAXIS    Hydrocodone RASH    Morphine OTHER (SEE COMMENTS)     Chest tightness     Phenol ANAPHYLAXIS         MEDICATIONS:  Current Outpatient Medications   Medication Instructions    acetaminophen (TYLENOL) 500 mg, Oral, Every 6 hours PRN    albuterol (PROAIR HFA) 108 (90 Base) MCG/ACT Inhalation Aero Soln 2 puffs, Inhalation, Every 4 hours PRN    atorvastatin (LIPITOR) 80 mg, Oral, Nightly    Fluticasone-Salmeterol 113-14 MCG/ACT Inhalation Aerosol Powder, Breath Activated 1 Inhalation, Inhalation, Daily    hydroxyurea 500 MG Oral Cap GERRY 1 CAPSULE ON SUN,TUES, WEDS, THUR AND SAT AND 2 CAPSULES ON MON AND FRI AS DIRECTED    Multiple Vitamins-Minerals (MULTIVITAMIN ADULT OR) 1 tablet, Oral, Daily    oxybutynin ER (DITROPAN-XL) 5 mg, Oral, Daily    warfarin (COUMADIN) 5-7.5 mg, Oral, Nightly, Take as directed by the coumadin clinic. Take 1.5 tabs (7.5mg) by mouth es, Th and 1 tab  (5mg) all other days per Coumadin Clinic    warfarin 5 MG Oral Tab Take as directed by INR clinic or take one & 1/2 tabs Tuesdays, Thursdays, Saturdays. Take one tab all other days        FAMILY HISTORY:  Family History   Problem Relation Age of Onset    Cancer Father 87        Lung  -  smoker    Macular degeneration Mother     Glaucoma Mother     Other (Alzheimer's Disease) Mother 93    Other (Bladder Cancer) Mother     Macular degeneration Brother     Other (Pancreatic Cancer) Sister 73    Macular degeneration Brother     Other (Myocardial Infarction) Brother 69    Macular degeneration Maternal Aunt     Glaucoma Maternal Aunt     Breast Cancer Daughter 48    Diabetes Neg         SOCIAL HISTORY:  Social History     Socioeconomic History    Marital status:    Tobacco Use    Smoking status: Never     Passive exposure: Never    Smokeless tobacco: Never   Vaping Use    Vaping status: Never Used   Substance and Sexual Activity    Alcohol use: Not Currently     Alcohol/week: 0.8 standard drinks of alcohol     Types: 1 Glasses of wine per week     Comment: 1-2x/month    Drug use: No   Other Topics Concern    Caffeine Concern Yes     Comment: 2 cups coffee daily    Pt has a pacemaker No    Pt has a defibrillator No    Reaction to local anesthetic No     Social Determinants of Health     Financial Resource Strain: Low Risk  (5/15/2024)    Financial Resource Strain     Difficulty of Paying Living Expenses: Not very hard     Med Affordability: No   Food Insecurity: No Food Insecurity (5/13/2024)    Food Insecurity     Food Insecurity: Never true   Transportation Needs: No Transportation Needs (5/15/2024)    Transportation Needs     Lack of Transportation: No   Housing Stability: Low Risk  (5/13/2024)    Housing Stability     Housing Instability: No          PHYSICAL EXAMINATION:  There were no vitals filed for this visit.  CONSTITUTIONAL: No apparent distress, cooperative and communicative  NEUROLOGIC: Alert and  oriented   HEAD: Normocephalic, atraumatic   EYES: Sclera non-icteric   ENT: Hearing intact, moist mucous membranes   NECK: No obvious goiter or masses   RESPIRATORY: Normal respiratory effort, Nonlabored breathing on room air  SKIN: No evident rashes   ABDOMEN: Soft, nontender, nondistended, no rebound tenderness, no guarding, no masses      REVIEW OF SYSTEMS:    A comprehensive 10-point review of systems was completed.  Pertinent positives and negatives are noted in the the HPI.       IMAGING REVIEW:  NR  0.8 - 1.2 2.7 Abnormal    Test Strip Lot #  Numeric [id number]   Test Strip Expiration Date  Date 5,312,025         Component  Ref Range & Units 6/25/24  9:59 AM   Glucose  70 - 99 mg/dL 95   Sodium  136 - 145 mmol/L 139   Potassium  3.5 - 5.1 mmol/L 4.5   Chloride  98 - 112 mmol/L 108   CO2  21.0 - 32.0 mmol/L 26.0   Anion Gap  0 - 18 mmol/L 5   BUN  9 - 23 mg/dL 19   Creatinine  0.55 - 1.02 mg/dL 1.14 High    BUN/CREA Ratio  10.0 - 20.0 16.7   Calcium, Total  8.7 - 10.4 mg/dL 9.2   Calculated Osmolality  275 - 295 mOsm/kg 290   eGFR-Cr  >=60 mL/min/1.73m2 47 Low             Component  Ref Range & Units 6/25/24  9:59 AM   WBC  4.0 - 11.0 x10(3) uL 5.4   RBC  3.80 - 5.30 x10(6)uL 3.55 Low    HGB  12.0 - 16.0 g/dL 13.6   HCT  35.0 - 48.0 % 38.5   MCV  80.0 - 100.0 fL 108.5 High    MCH  26.0 - 34.0 pg 38.3 High    MCHC  31.0 - 37.0 g/dL 35.3   RDW-SD  35.1 - 46.3 fL 49.1 High    RDW  11.0 - 15.0 % 12.3   PLT  150.0 - 450.0 10(3)uL 225.0   Neutrophil Absolute Prelim  1.50 - 7.70 x10 (3) uL 3.68        Narrative   PROCEDURE: CTA BRAIN + CTA CAROTIDS (CPT=70496/08844)     COMPARISON: [facility], MRI BRAIN/IAC (ALL W+WO CNTRST) (CPT=70553), 5/13/2024, 11:21 AM.  [facility], CT BRAIN HEAD WWO CONTRAST, 4/19/2016, 11:43 AM.     INDICATIONS: cva     TECHNIQUE:   CT images of the neck and brain were obtained without and with non-ionic intravenous contrast material. Multi-planar  reformatted/3-D images were created to optimize visualization of vascular anatomy. Automated exposure control for dose  reduction was used. Dose information is transmitted to the ACR (American College of Radiology) NRDR (National Radiology Data Registry) which includes the Dose Index Registry.Evaluation of internal carotid stenosis is based on NASCET Criteria.       FINDINGS:  TECHNIQUE: CT images of the neck and brain were obtained with non-ionic intravenous contrast material. Multi-planar reformatted and 3-D images were created with vessel analysis performed on an independent workstation. Automated exposure control for dose  reduction was used. Evaluation of internal carotid stenosis is based on NASCET Criteria.       FINDINGS:  CT ANGIOGRAPHY OF THE NECK:     CAROTID ARTERIES:  RIGHT COMMON CAROTID: No hemodynamically significant stenosis or dissection.  RIGHT INTERNAL CAROTID: No hemodynamically significant stenosis or dissection.     LEFT COMMON CAROTID: No hemodynamically significant stenosis or dissection.  LEFT INTERNAL CAROTID: No hemodynamically significant stenosis or dissection.     VERTEBRAL ARTERIES:  RIGHT VERTEBRAL ARTERY: No hemodynamically significant stenosis or dissection.  LEFT VERTEBRAL ARTERY: No hemodynamically significant stenosis or dissection.     CT ANGIOGRAPHY OF THE BRAIN:     ANTERIOR CIRCULATION:  DISTAL INTERNAL CAROTIDS: Flow identified. No significant stenosis.  Mild bilateral calcific atherosclerosis.  ANTERIOR CEREBRALS: Flow identified. No significant stenosis.  Mild multifocal irregularity of the bilateral A1 segments.  MIDDLE CEREBRALS: Flow identified. No significant stenosis.  Mild multifocal irregularity of the bilateral M1 segments.     POSTERIOR CIRCULATION:  RIGHT VERTEBRAL: Flow identified. No significant stenosis.  LEFT VERTEBRAL: Flow identified. No significant stenosis.  BASILAR: Flow identified. No significant stenosis.  POSTERIOR CEREBRALS: Flow identified. No  significant stenosis.  Left fetal origin.  Mild multifocal irregularity bilateral P1 segments.     ANEURYSMS: None.  VASCULAR MALFORMATIONS: None.  OTHER: Negative.          AORTIC ARCH (Limited): No aneurysm or dissection is identified in the imaged volume.    MEDIASTINUM/APICES (Limited): No mass or adenopathy.  Partially imaged cardiac leads.  OTHER: The unenhanced portion of the examination demonstrates no evidence of acute intracranial abnormality.  Decreased attenuation bilateral white matter likely chronic microvascular white matter ischemia with chronic right occipital lacunar infarct  noted.  Multifocal heterogeneity of the thyroid.  Multilevel degenerative change cervical spine.                  Impression   CONCLUSION:     No evidence of large vessel intracranial arterial occlusion or proximal flow limiting stenosis.     Patent bilateral cervical carotid and vertebral arteries without hemodynamically significant stenosis or evidence to suggest dissection.     Lesser incidental findings as above.              OTHER RELEVANT DATA:   none     IMPRESSION: Irritative voiding symptoms currently on oxybutynin with good results-recommend behavioral management and trial of mirabegron. She wanted to continue with behaviors and oxybutynin. She declined estrace cream.     Discussed treatment options for urgency urinary incontinence including behavioral management (timed voiding, double voiding, avoiding bladder irritants, constipation management), antimuscarinics (side effects include dry eyes, dry mouth, constipation, mental fogginess), beta 3 agonist (the side effects include hypertension), bladder Botox, PTNS, sacral neuromodulation.    Behavioral management includes timed voiding (going to the bathroom on a schedule every 1-4 hours), double voiding (trying to pee twice), avoiding bladder irritants (coffee, tea, soda, pop, alcohol), compression stockings, elevation of feet, voiding prior to bedtime, avoiding  fluids 2 to 4 hours before bedtime and constipation management.     For constipation management she can consider fruits (especially ones that start with \"P\"), vegetables, flaxseeds, hydration, fiber, MiraLAX, Colace or senna.  She can also use a squatty potty to help with efficiency of stooling.     I also suggested she try Estrace cream every night for 1 week followed by every other night indefinitely for genitourinary syndrome of menopause.  This may help with her urgency, frequency and urinary incontinence and help prevent recurrent urinary tract infections.    She knows that behavioral management and Estrace cream can take 6 to 12 months to work.  It may not be a complete solution but should improve her symptoms.  If she has absolutely no improvement in her symptoms, we can proceed with the evaluation test including a cystoscopy, pelvic examination and possible urodynamic evaluation versus trialing medical therapy.    Patient understands antimuscarinics and beta 3 agonist take 6 to 8 weeks to start working.  Patient also understands the bladder Botox has the side effect of urinary retention in 10 to 12% of patients and does not start working for about 2 weeks post procedure.  Additionally it wears off with time and repeat treatments may be required every 3 to 12 months.  Patient understand that sacroneuromodulation is a surgical treatment, and that PTNS requires a significant amount of time commitment coming in every week for 12 weeks followed by monthly treatments if it is effective.       PLAN:  Behavioral management  Estrace cream - she declined  Mirabegron 50 mg daily offered - she declined    Thank you for referring this very pleasant patient to my clinic. If you have any questions or concerns, please do not hesitate to contact me.    Sincerely,  Abby Franco MD    30 minutes were spent on this patient at this visit obtaining a history, reviewing medical records, developing a treatment plan, counseling  and discussing treatment strategy with patient, coordination of care and documentation.     The 21st Century Cures Act makes medical notes available to patients in the interest of transparency.  However, please be advised that this is a medical document.  It is intended as a peer to peer communication.  It is written in medical language and may contain abbreviations or verbiage that are technical and unfamiliar.  It may appear blunt or direct.  Medical documents are intended to carry relevant information, facts as evident, and the clinical opinion of the practitioner.

## 2024-08-06 ENCOUNTER — ANTI-COAG VISIT (OUTPATIENT)
Dept: ANTICOAGULATION | Facility: CLINIC | Age: 84
End: 2024-08-06

## 2024-08-06 DIAGNOSIS — I26.99 IATROGENIC PULMONARY EMBOLISM AND INFARCTION, INITIAL ENCOUNTER (HCC): ICD-10-CM

## 2024-08-06 DIAGNOSIS — T81.718A IATROGENIC PULMONARY EMBOLISM AND INFARCTION, INITIAL ENCOUNTER (HCC): ICD-10-CM

## 2024-08-06 DIAGNOSIS — Z51.81 MONITORING FOR LONG-TERM ANTICOAGULANT USE: ICD-10-CM

## 2024-08-06 DIAGNOSIS — I48.91 CONTROL OF ATRIAL FIBRILLATION WITH PACEMAKER (HCC): Primary | ICD-10-CM

## 2024-08-06 DIAGNOSIS — Z79.01 MONITORING FOR LONG-TERM ANTICOAGULANT USE: ICD-10-CM

## 2024-08-06 DIAGNOSIS — Z95.0 CONTROL OF ATRIAL FIBRILLATION WITH PACEMAKER (HCC): Primary | ICD-10-CM

## 2024-08-06 LAB
INR: 3.1 (ref 0.8–1.2)
TEST STRIP EXPIRATION DATE: ABNORMAL DATE

## 2024-08-06 PROCEDURE — 93793 ANTICOAG MGMT PT WARFARIN: CPT | Performed by: FAMILY MEDICINE

## 2024-08-06 PROCEDURE — 85610 PROTHROMBIN TIME: CPT | Performed by: FAMILY MEDICINE

## 2024-08-06 NOTE — PROGRESS NOTES
TTR 69.5%     Face to face.     INR is minimally above therapeutic goal range. Denies any change in diet/meds.      Per protocol, continue current dose and recheck INR in 4 weeks.     7.5 mg every Tue, Thu; 5 mg all other days

## 2024-08-30 ENCOUNTER — TELEPHONE (OUTPATIENT)
Dept: ANTICOAGULATION | Facility: CLINIC | Age: 84
End: 2024-08-30

## 2024-08-30 DIAGNOSIS — Z79.01 MONITORING FOR LONG-TERM ANTICOAGULANT USE: ICD-10-CM

## 2024-08-30 DIAGNOSIS — Z51.81 MONITORING FOR LONG-TERM ANTICOAGULANT USE: ICD-10-CM

## 2024-08-30 DIAGNOSIS — I26.99 IATROGENIC PULMONARY EMBOLISM AND INFARCTION, INITIAL ENCOUNTER (HCC): Primary | ICD-10-CM

## 2024-08-30 DIAGNOSIS — T81.718A IATROGENIC PULMONARY EMBOLISM AND INFARCTION, INITIAL ENCOUNTER (HCC): Primary | ICD-10-CM

## 2024-08-30 NOTE — TELEPHONE ENCOUNTER
Anticoag referral expires soon. Order pended. Please sign, thank you.      Dx: Iatrogenic pulmonary embolism and infarction, initial encounter ([facility]) (T81.718A,I26.99)  Monitoring for long-term anticoagulant use (Z51.81,Z79.01

## 2024-09-03 ENCOUNTER — ANTI-COAG VISIT (OUTPATIENT)
Dept: ANTICOAGULATION | Facility: CLINIC | Age: 84
End: 2024-09-03

## 2024-09-03 DIAGNOSIS — Z51.81 MONITORING FOR LONG-TERM ANTICOAGULANT USE: ICD-10-CM

## 2024-09-03 DIAGNOSIS — T81.718A IATROGENIC PULMONARY EMBOLISM AND INFARCTION, INITIAL ENCOUNTER (HCC): ICD-10-CM

## 2024-09-03 DIAGNOSIS — Z95.0 CONTROL OF ATRIAL FIBRILLATION WITH PACEMAKER (HCC): Primary | ICD-10-CM

## 2024-09-03 DIAGNOSIS — Z79.01 MONITORING FOR LONG-TERM ANTICOAGULANT USE: ICD-10-CM

## 2024-09-03 DIAGNOSIS — I26.99 IATROGENIC PULMONARY EMBOLISM AND INFARCTION, INITIAL ENCOUNTER (HCC): ICD-10-CM

## 2024-09-03 DIAGNOSIS — I48.91 CONTROL OF ATRIAL FIBRILLATION WITH PACEMAKER (HCC): Primary | ICD-10-CM

## 2024-09-03 LAB
INR: 2.7 (ref 0.8–1.2)
TEST STRIP EXPIRATION DATE: ABNORMAL DATE

## 2024-09-03 PROCEDURE — 93793 ANTICOAG MGMT PT WARFARIN: CPT | Performed by: FAMILY MEDICINE

## 2024-09-03 PROCEDURE — 85610 PROTHROMBIN TIME: CPT | Performed by: FAMILY MEDICINE

## 2024-09-03 NOTE — PROGRESS NOTES
TTR 69.6%    Face to face.     Per protocol, continue current dose and recheck INR in 4 weeks.     7.5 mg every Tue, Thu; 5 mg all other days

## 2024-09-04 ENCOUNTER — OFFICE VISIT (OUTPATIENT)
Dept: INTERNAL MEDICINE CLINIC | Facility: CLINIC | Age: 84
End: 2024-09-04

## 2024-09-04 VITALS
WEIGHT: 231 LBS | HEIGHT: 70 IN | BODY MASS INDEX: 33.07 KG/M2 | DIASTOLIC BLOOD PRESSURE: 74 MMHG | SYSTOLIC BLOOD PRESSURE: 133 MMHG | HEART RATE: 76 BPM

## 2024-09-04 DIAGNOSIS — J43.9 PULMONARY EMPHYSEMA, UNSPECIFIED EMPHYSEMA TYPE (HCC): ICD-10-CM

## 2024-09-04 DIAGNOSIS — N39.46 MIXED STRESS AND URGE URINARY INCONTINENCE: Primary | ICD-10-CM

## 2024-09-04 DIAGNOSIS — I63.9 ACUTE CVA (CEREBROVASCULAR ACCIDENT) (HCC): ICD-10-CM

## 2024-09-04 DIAGNOSIS — R06.09 CHRONIC DYSPNEA: ICD-10-CM

## 2024-09-04 DIAGNOSIS — D47.1 CHRONIC MYELOPROLIFERATIVE DISEASE (HCC): ICD-10-CM

## 2024-09-04 DIAGNOSIS — Z85.118 HISTORY OF LUNG CANCER: ICD-10-CM

## 2024-09-04 DIAGNOSIS — I49.5 TACHYCARDIA-BRADYCARDIA (HCC): ICD-10-CM

## 2024-09-04 PROCEDURE — 99214 OFFICE O/P EST MOD 30 MIN: CPT | Performed by: INTERNAL MEDICINE

## 2024-09-04 RX ORDER — MIRABEGRON 25 MG/1
25 TABLET, FILM COATED, EXTENDED RELEASE ORAL DAILY
COMMUNITY
Start: 2024-09-04 | End: 2024-09-04

## 2024-09-05 ENCOUNTER — LAB ENCOUNTER (OUTPATIENT)
Dept: LAB | Age: 84
End: 2024-09-05
Attending: INTERNAL MEDICINE
Payer: MEDICARE

## 2024-09-05 DIAGNOSIS — Z79.01 MONITORING FOR LONG-TERM ANTICOAGULANT USE: ICD-10-CM

## 2024-09-05 DIAGNOSIS — I26.99 IATROGENIC PULMONARY EMBOLISM AND INFARCTION, INITIAL ENCOUNTER (HCC): ICD-10-CM

## 2024-09-05 DIAGNOSIS — Z51.81 MONITORING FOR LONG-TERM ANTICOAGULANT USE: ICD-10-CM

## 2024-09-05 DIAGNOSIS — I48.0 PAROXYSMAL ATRIAL FIBRILLATION (HCC): ICD-10-CM

## 2024-09-05 DIAGNOSIS — T81.718A IATROGENIC PULMONARY EMBOLISM AND INFARCTION, INITIAL ENCOUNTER (HCC): ICD-10-CM

## 2024-09-05 DIAGNOSIS — R06.09 DYSPNEA ON EXERTION: Primary | ICD-10-CM

## 2024-09-05 DIAGNOSIS — R55 SYNCOPE AND COLLAPSE: ICD-10-CM

## 2024-09-05 LAB
ALBUMIN SERPL-MCNC: 3.7 G/DL (ref 3.2–4.8)
ALBUMIN/GLOB SERPL: 1.2 {RATIO} (ref 1–2)
ALP LIVER SERPL-CCNC: 93 U/L
ALT SERPL-CCNC: 35 U/L
ANION GAP SERPL CALC-SCNC: 7 MMOL/L (ref 0–18)
AST SERPL-CCNC: 85 U/L (ref ?–34)
BASOPHILS # BLD AUTO: 0.04 X10(3) UL (ref 0–0.2)
BASOPHILS NFR BLD AUTO: 0.6 %
BILIRUB SERPL-MCNC: 0.7 MG/DL (ref 0.2–1.1)
BUN BLD-MCNC: 29 MG/DL (ref 9–23)
BUN/CREAT SERPL: 24.6 (ref 10–20)
CALCIUM BLD-MCNC: 9.4 MG/DL (ref 8.7–10.4)
CHLORIDE SERPL-SCNC: 106 MMOL/L (ref 98–112)
CO2 SERPL-SCNC: 25 MMOL/L (ref 21–32)
CREAT BLD-MCNC: 1.18 MG/DL
DEPRECATED RDW RBC AUTO: 54.8 FL (ref 35.1–46.3)
EGFRCR SERPLBLD CKD-EPI 2021: 46 ML/MIN/1.73M2 (ref 60–?)
EOSINOPHIL # BLD AUTO: 0.06 X10(3) UL (ref 0–0.7)
EOSINOPHIL NFR BLD AUTO: 0.9 %
ERYTHROCYTE [DISTWIDTH] IN BLOOD BY AUTOMATED COUNT: 13.5 % (ref 11–15)
FASTING STATUS PATIENT QL REPORTED: NO
FOLATE SERPL-MCNC: >48 NG/ML (ref 5.4–?)
GLOBULIN PLAS-MCNC: 3 G/DL (ref 2–3.5)
GLUCOSE BLD-MCNC: 76 MG/DL (ref 70–99)
HCT VFR BLD AUTO: 36.2 %
HGB BLD-MCNC: 12 G/DL
HGB RETIC QN AUTO: 38.9 PG (ref 28.2–36.6)
IMM GRANULOCYTES # BLD AUTO: 0.01 X10(3) UL (ref 0–1)
IMM GRANULOCYTES NFR BLD: 0.1 %
IMM RETICS NFR: 0.17 RATIO (ref 0.1–0.3)
LYMPHOCYTES # BLD AUTO: 1.23 X10(3) UL (ref 1–4)
LYMPHOCYTES NFR BLD AUTO: 18.1 %
MCH RBC QN AUTO: 36.4 PG (ref 26–34)
MCHC RBC AUTO-ENTMCNC: 33.1 G/DL (ref 31–37)
MCV RBC AUTO: 109.7 FL
MONOCYTES # BLD AUTO: 0.63 X10(3) UL (ref 0.1–1)
MONOCYTES NFR BLD AUTO: 9.3 %
NEUTROPHILS # BLD AUTO: 4.84 X10 (3) UL (ref 1.5–7.7)
NEUTROPHILS # BLD AUTO: 4.84 X10(3) UL (ref 1.5–7.7)
NEUTROPHILS NFR BLD AUTO: 71 %
OSMOLALITY SERPL CALC.SUM OF ELEC: 291 MOSM/KG (ref 275–295)
PLATELET # BLD AUTO: 336 10(3)UL (ref 150–450)
POTASSIUM SERPL-SCNC: 4.9 MMOL/L (ref 3.5–5.1)
PROT SERPL-MCNC: 6.7 G/DL (ref 5.7–8.2)
RBC # BLD AUTO: 3.3 X10(6)UL
RETICS # AUTO: 75.6 X10(3) UL (ref 22.5–147.5)
RETICS/RBC NFR AUTO: 2.3 %
SODIUM SERPL-SCNC: 138 MMOL/L (ref 136–145)
TSI SER-ACNC: 3.52 MIU/ML (ref 0.55–4.78)
VIT B12 SERPL-MCNC: 734 PG/ML (ref 211–911)
WBC # BLD AUTO: 6.8 X10(3) UL (ref 4–11)

## 2024-09-05 PROCEDURE — 36415 COLL VENOUS BLD VENIPUNCTURE: CPT

## 2024-09-05 PROCEDURE — 85025 COMPLETE CBC W/AUTO DIFF WBC: CPT

## 2024-09-05 PROCEDURE — 84443 ASSAY THYROID STIM HORMONE: CPT

## 2024-09-05 PROCEDURE — 80053 COMPREHEN METABOLIC PANEL: CPT

## 2024-09-05 PROCEDURE — 85045 AUTOMATED RETICULOCYTE COUNT: CPT

## 2024-09-05 PROCEDURE — 82607 VITAMIN B-12: CPT

## 2024-09-05 PROCEDURE — 82746 ASSAY OF FOLIC ACID SERUM: CPT

## 2024-09-06 NOTE — PROGRESS NOTES
HPI:    Patient ID: Tara Lockwood is a 83 year old female.    HPI  Follow up  Chronic urinary incontinence  seen by urology  On oxybutinin   other meds are too expensive  Pt is having side effect  anticholinergic  dry mouth  Constipation  would like to stop oxybutynin     Chronic shortness of breath and cough  Pt with hx of lung cancer and emphysema  Due for follow up with [facility]    /74 (BP Location: Right arm, Patient Position: Sitting, Cuff Size: large)   Pulse 76   Ht 5' 10\" (1.778 m)   Wt 231 lb (104.8 kg)   BMI 33.15 kg/m²   Wt Readings from Last 6 Encounters:   09/04/24 231 lb (104.8 kg)   06/25/24 237 lb 9.6 oz (107.8 kg)   05/20/24 242 lb (109.8 kg)   05/14/24 242 lb 9.6 oz (110 kg)   05/11/24 220 lb (99.8 kg)   05/07/24 220 lb (99.8 kg)     Body mass index is 33.15 kg/m².  HGBA1C:    Lab Results   Component Value Date    A1C 5.1 05/13/2024    A1C 5.4 10/22/2014    A1C 5.8 06/20/2013     05/13/2024         Review of Systems   Constitutional:  Negative for diaphoresis, fatigue and fever.   HENT:  Negative for congestion, sinus pressure and sinus pain.    Respiratory:  Positive for shortness of breath (chronic). Cough: chronic occas.   Cardiovascular:  Negative for leg swelling.   Gastrointestinal:  Negative for abdominal pain.   Genitourinary:  Positive for urgency. Negative for difficulty urinating, dysuria, hematuria and pelvic pain.   Musculoskeletal:  Positive for gait problem.         Current Outpatient Medications   Medication Sig Dispense Refill    Fluticasone-Salmeterol 113-14 MCG/ACT Inhalation Aerosol Powder, Breath Activated Inhale 1 Inhalation into the lungs daily. 1 each 3    atorvastatin 80 MG Oral Tab Take 1 tablet (80 mg total) by mouth nightly. 30 tablet 1    warfarin 5 MG Oral Tab Take as directed by INR clinic or take one & 1/2 tabs Tuesdays, Thursdays, Saturdays. Take one tab all other days 120 tablet 3    hydroxyurea 500 MG Oral Cap GERRY 1 CAPSULE ON SUN,TUES, WEDS,  THUR AND SAT AND 2 CAPSULES ON MON AND FRI AS DIRECTED 117 capsule 3    Multiple Vitamins-Minerals (MULTIVITAMIN ADULT OR) Take 1 tablet by mouth daily.      acetaminophen (TYLENOL) 325 MG Oral Tab Take 500 mg by mouth every 6 (six) hours as needed for Pain.         Allergies:  Allergies   Allergen Reactions    Fentanyl ANAPHYLAXIS    Hydrocodone RASH    Morphine OTHER (SEE COMMENTS)     Chest tightness     Phenol ANAPHYLAXIS       HISTORY:  Past Medical History:    Acid reflux    Acute medial meniscus tear of right knee    Acute medial meniscus tear of right knee    Acute midline low back pain with left-sided sciatica    Age-related nuclear cataract of both eyes    Arthritis    Degenerative disc disease, lumbar    Displaced fracture (avulsion) of medial epicondyle of right humerus, subsequent encounter for fracture with routine healing    Diverticulosis large intestine w/o perforation or abscess w/o bleeding    Diverticulosis large intestine w/o perforation or abscess w/o bleeding    Family history of glaucoma in mother    Family history of glaucoma in mother    Family history of macular degeneration    Gastric polyps    Gastric polyps    Hiatal hernia    History of hip replacement, total    right hip    Iatrogenic pulmonary embolism and infarction, initial encounter (HCC)    Internal hemorrhoids    Kidney problem    Long term (current) use of anticoagulants    Lumbar herniated disc    Lung cancer (HCC)    Obesity, unspecified    Osteoarthrosis    Osteoarthrosis, unspecified whether generalized or localized, unspecified site    Post-menopausal bleeding    Primary osteoarthritis of left knee    Primary osteoarthritis of right knee    Pulmonary embolism (HCC)    S/P colonoscopy with polypectomy    Spinal stenosis of lumbar region without neurogenic claudication    Spondylolisthesis of lumbar region    Thyroid condition      Past Surgical History:   Procedure Laterality Date    Cholecystectomy      Colonoscopy       Colonoscopy N/A 2017    Procedure: COLONOSCOPY;  Surgeon: Clare Victor MD;  Location: [facility] ENDOSCOPY    Egd      Foot surgery Bilateral     Bunion Surgery X6    Foot/toes surgery proc unlisted Left     Pt had surgery to straighten toes on left foot.    Hip replacement surgery Right     Hip surgery Right     right total hip arthroplasty    Hysterectomy      Laparoscopic cholecystectomy            x7 (Twins x1) Max weight 9 1/2 lbs    Other surgical history      parotid gland removed left benigh    Other surgical history      Removal Skin Mole    Other surgical history      SAB/ D&C    Other surgical history      Ear Surgery    Other surgical history      Neg EM Bx    Removal of lung,lobectomy Left 2016    Pt had upper left lobe removed.      Family History   Problem Relation Age of Onset    Cancer Father 87        Lung  -  smoker    Macular degeneration Mother     Glaucoma Mother     Other (Alzheimer's Disease) Mother 93    Other (Bladder Cancer) Mother     Macular degeneration Brother     Other (Pancreatic Cancer) Sister 73    Macular degeneration Brother     Other (Myocardial Infarction) Brother 69    Macular degeneration Maternal Aunt     Glaucoma Maternal Aunt     Breast Cancer Daughter 48    Diabetes Neg       Social History:   Social History     Socioeconomic History    Marital status:    Tobacco Use    Smoking status: Never     Passive exposure: Never    Smokeless tobacco: Never   Vaping Use    Vaping status: Never Used   Substance and Sexual Activity    Alcohol use: Not Currently     Alcohol/week: 0.8 standard drinks of alcohol     Types: 1 Glasses of wine per week     Comment: 1-2x/month    Drug use: No   Other Topics Concern    Caffeine Concern Yes     Comment: 2 cups coffee daily    Pt has a pacemaker No    Pt has a defibrillator No    Reaction to local anesthetic No     Social Determinants of Health     Financial Resource Strain: Low Risk  (5/15/2024)    Financial  Resource Strain     Difficulty of Paying Living Expenses: Not very hard     Med Affordability: No   Food Insecurity: No Food Insecurity (5/13/2024)    Food Insecurity     Food Insecurity: Never true   Transportation Needs: No Transportation Needs (5/15/2024)    Transportation Needs     Lack of Transportation: No   Housing Stability: Low Risk  (5/13/2024)    Housing Stability     Housing Instability: No        PHYSICAL EXAM:    Physical Exam  Constitutional:       Appearance: She is well-developed. She is obese. She is not ill-appearing.   HENT:      Right Ear: Ear canal normal.      Left Ear: Ear canal normal.      Mouth/Throat:      Pharynx: Oropharynx is clear.   Eyes:      Extraocular Movements: Extraocular movements intact.      Conjunctiva/sclera: Conjunctivae normal.      Pupils: Pupils are equal, round, and reactive to light.   Cardiovascular:      Rate and Rhythm: Normal rate and regular rhythm.      Heart sounds: Normal heart sounds.   Pulmonary:      Effort: Pulmonary effort is normal.      Breath sounds: Normal breath sounds.   Abdominal:      General: Bowel sounds are normal.      Palpations: Abdomen is soft.   Skin:     General: Skin is warm and dry.   Neurological:      Mental Status: She is alert.   Psychiatric:         Mood and Affect: Mood normal.              ASSESSMENT/PLAN:   (N39.46) Mixed stress and urge urinary incontinence  (primary encounter diagnosis)  Plan: OP REFERRAL TO UROGYNECOLOGY CLINIC  Taper off oxybutinin and monitor  Pt referral to urogyne for eval and treatment    (J43.9) Pulmonary emphysema, unspecified emphysema type (HCC)  Plan: chronic stable monitor    (Z85.118) History of lung cancer  Plan: followed by pulmonary    (I63.9) Acute CVA (cerebrovascular accident) (HCC)  Plan: no recurence  monitor    (R06.09) Chronic dyspnea  Plan: chronic   from emphysem/copd  Deconditioned   SSS    Follow up with cardiology  as planned    (D47.1) Chronic myeloproliferative disease  ([facility])  Plan: stable monitor    (I49.5) Tachycardia-bradycardia (HCC)  Plan: stable  monitor s/p pacemaker    Patient voiced understanding  and agrees with plan         No orders of the defined types were placed in this encounter.      Meds This Visit:  Requested Prescriptions      No prescriptions requested or ordered in this encounter       Imaging & Referrals:  OP REFERRAL TO UROGYNECOLOGY CLINIC        ID#[id number]

## 2024-09-12 NOTE — PROGRESS NOTES
Rosamaria Russ is a 77year old female. HPI:     HPI     Pt complains of occasional blurred vision in her right eye and tired eyes towards the end of the after looking at the computer for a long time. Pt is not using any eye drops.  Pt states that she h Sister 68   • Macular degeneration Brother    • Other (Myocardial Infarction) Brother 71   • Macular degeneration Maternal Aunt    • Glaucoma Maternal Aunt    • Diabetes Neg        Social History: Social History    Tobacco Use      Smoking status: Never Sm Anterior Chamber Deep and quiet Deep and quiet    Iris Normal Normal    Lens 3+ Nuclear sclerosis, Trace Cortical cataract 3+ Nuclear sclerosis, Trace Cortical cataract    Vitreous Vitreous floaters Vitreous floaters          Fundus Exam       Right Left compresses on the eyelids, patient should gently rub the eyelashes and then rinse thoroughly with warm water. Family history of glaucoma in mother  No sign of glaucoma in this patient at this time.         No orders of the defined types were placed in Universal Safety Interventions

## 2024-09-16 ENCOUNTER — OFFICE VISIT (OUTPATIENT)
Dept: PULMONOLOGY | Facility: CLINIC | Age: 84
End: 2024-09-16

## 2024-09-16 VITALS
HEIGHT: 70 IN | OXYGEN SATURATION: 99 % | BODY MASS INDEX: 33.07 KG/M2 | HEART RATE: 75 BPM | SYSTOLIC BLOOD PRESSURE: 131 MMHG | WEIGHT: 231 LBS | DIASTOLIC BLOOD PRESSURE: 67 MMHG

## 2024-09-16 DIAGNOSIS — C34.12 MALIGNANT NEOPLASM OF UPPER LOBE OF LEFT LUNG (HCC): Primary | ICD-10-CM

## 2024-09-16 PROCEDURE — 99213 OFFICE O/P EST LOW 20 MIN: CPT | Performed by: INTERNAL MEDICINE

## 2024-09-16 NOTE — PROGRESS NOTES
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  She is being followed by oncology and has a follow-up CT scan of the chest planned for next month.  She had 2 new 4 mm nodules in the residual left lower lobe.  The other pulmonary nodules are stable.  She prefers to stay on warfarin as opposed to a NOAC.    Review of Systems:  Vision normal. Ear nose and throat normal. Bowel normal. Bladder function normal. No depression. No thyroid disease. No lymphatic system concerns.  No rash. Muscles and joints unremarkable. No weight loss no weight gain.    Physical Examination:  Vital signs normal. HEENT examination is unremarkable with pupils equal round and reactive to light and accommodation. Neck without adenopathy, thyromegaly, JVD nor bruit. Lungs clear to auscultation and percussion. Cardiac regular rate and rhythm no murmur. Abdomen nontender, without hepatosplenomegaly and no mass appreciable. Extremities and Musculoskeletal without clubbing cyanosis nor edema, and mobility acceptable. Neurologic grossly intact with symmetric tone and strength and reflex.    Assessment and plan:  1.  History of venous thromboembolism-long-term warfarin  2.  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  Will repeat CT scan chest next month.  3.  Pulmonary nodules-as above.  4.  Dyspnea on exertion-history of venous thromboembolism and intrinsic asthma.  The Breo may be contributing to hoarse voice and she needs to do a gargle rinse and spit.  Annual flu shot, COVID boosters, contact me promptly if new trouble and otherwise see me again at 6-month interval or sooner if needed.

## 2024-09-18 ENCOUNTER — MED REC SCAN ONLY (OUTPATIENT)
Dept: INTERNAL MEDICINE CLINIC | Facility: CLINIC | Age: 84
End: 2024-09-18

## 2024-09-19 ENCOUNTER — HOSPITAL ENCOUNTER (OUTPATIENT)
Dept: MRI IMAGING | Facility: HOSPITAL | Age: 84
Discharge: HOME | End: 2024-09-19
Attending: INTERNAL MEDICINE
Payer: MEDICARE

## 2024-09-19 DIAGNOSIS — I48.0 PAF (PAROXYSMAL ATRIAL FIBRILLATION) (HCC): ICD-10-CM

## 2024-09-19 DIAGNOSIS — I49.5 BRADY-TACHY SYNDROME (HCC): ICD-10-CM

## 2024-09-19 DIAGNOSIS — R00.0 TACHYCARDIA: ICD-10-CM

## 2024-09-19 DIAGNOSIS — R06.09 DOE (DYSPNEA ON EXERTION): ICD-10-CM

## 2024-09-19 PROCEDURE — A9575 INJ GADOTERATE MEGLUMI 0.1ML: HCPCS | Performed by: INTERNAL MEDICINE

## 2024-09-19 PROCEDURE — 75561 CARDIAC MRI FOR MORPH W/DYE: CPT | Performed by: INTERNAL MEDICINE

## 2024-09-19 RX ORDER — GADOTERATE MEGLUMINE 376.9 MG/ML
20 INJECTION INTRAVENOUS
Status: COMPLETED | OUTPATIENT
Start: 2024-09-19 | End: 2024-09-19

## 2024-09-19 RX ADMIN — GADOTERATE MEGLUMINE 20 ML: 376.9 INJECTION INTRAVENOUS at 10:15:00

## 2024-09-19 NOTE — IMAGING NOTE
0855 Met pt in MRI control room, confirmed name//procedure. Informed pt of MRI protection mode need for her PPM.     0858 /62 HR 78 O2 sat 96%.     0902 MRI tech Vitaliy connected remotely to Progression rep RAY Ontiveros, mode changed from DDD-CLS   to DOO HR 90, Auto-detect mode    0912 /78 HR 90 O2 sat 98%     0917 Scan started    0922 /72 HR 91 O2 sat 98%    0928 Scanning restarted after adjustment to EKG leads by MRI tech, metal was preventing heart rate detection    0931 /78 HR 91 O2 sat 98%    0941 /88 HR 90 O2 sat 98%    0951 /67 HR 90 O2 sat 97%     1001 /76 HR 91 O2 sat 97%     1011 /72 HR 91 O2 sat 95%     1021 /75 HR 90 O2 sat 96%     1031 /83 HR 90 O2 sat 98%     1036 scan completed, pt stable     1043 MRI staff to discharge pt

## 2024-09-25 ENCOUNTER — TELEPHONE (OUTPATIENT)
Dept: INTERNAL MEDICINE CLINIC | Facility: CLINIC | Age: 84
End: 2024-09-25

## 2024-09-25 DIAGNOSIS — N32.81 OVERACTIVE BLADDER: ICD-10-CM

## 2024-09-25 NOTE — TELEPHONE ENCOUNTER
Oxbutynin   Add as: unknown           Source:   Patient-reported   Patient's comments:    I was on this medication, but it was cancelled by Dr. Hubbard! Doctor did not gi…           Patient requesting new medicaton  Requested Medication Additions Start Date Reported By  Comments   Oxbutynin

## 2024-09-25 NOTE — TELEPHONE ENCOUNTER
Pt said oxybutinin is not working and it was giving her terrible dry mouth  If she would like to continue med it.Max  Follow up with uro gyne as planned

## 2024-09-26 RX ORDER — OXYBUTYNIN CHLORIDE 5 MG/1
5 TABLET, EXTENDED RELEASE ORAL DAILY
Qty: 90 TABLET | Refills: 1 | Status: SHIPPED | OUTPATIENT
Start: 2024-09-26

## 2024-09-26 NOTE — TELEPHONE ENCOUNTER
Patient called back, verified name/.  Patient was given message below.  She states she does want refill of  oxybutynin, despite the dry mouth (\"I'll deal with it\").  Please sent to her mail order pharmacy.  Per Dr Hubbard's order in message below, refill sent.  Reminded her to see UROGYN.

## 2024-10-01 ENCOUNTER — ANTI-COAG VISIT (OUTPATIENT)
Dept: ANTICOAGULATION | Facility: CLINIC | Age: 84
End: 2024-10-01

## 2024-10-01 DIAGNOSIS — I26.99 IATROGENIC PULMONARY EMBOLISM AND INFARCTION, INITIAL ENCOUNTER (HCC): ICD-10-CM

## 2024-10-01 DIAGNOSIS — Z51.81 MONITORING FOR LONG-TERM ANTICOAGULANT USE: ICD-10-CM

## 2024-10-01 DIAGNOSIS — Z95.0 CONTROL OF ATRIAL FIBRILLATION WITH PACEMAKER (HCC): Primary | ICD-10-CM

## 2024-10-01 DIAGNOSIS — I48.91 CONTROL OF ATRIAL FIBRILLATION WITH PACEMAKER (HCC): Primary | ICD-10-CM

## 2024-10-01 DIAGNOSIS — T81.718A IATROGENIC PULMONARY EMBOLISM AND INFARCTION, INITIAL ENCOUNTER (HCC): ICD-10-CM

## 2024-10-01 DIAGNOSIS — Z79.01 MONITORING FOR LONG-TERM ANTICOAGULANT USE: ICD-10-CM

## 2024-10-01 LAB
INR: 2.6 (ref 0.8–1.2)
TEST STRIP EXPIRATION DATE: ABNORMAL DATE

## 2024-10-01 PROCEDURE — 85610 PROTHROMBIN TIME: CPT | Performed by: FAMILY MEDICINE

## 2024-10-01 PROCEDURE — 93793 ANTICOAG MGMT PT WARFARIN: CPT | Performed by: FAMILY MEDICINE

## 2024-10-01 NOTE — PROGRESS NOTES
TTR 69.8%    Face to face.     Per protocol, continue current dose and recheck INR in 4 weeks.     7.5 mg every Tue, Thu; 5 mg all other days

## 2024-10-08 ENCOUNTER — HOSPITAL ENCOUNTER (OUTPATIENT)
Dept: CT IMAGING | Facility: HOSPITAL | Age: 84
Discharge: HOME | End: 2024-10-08
Attending: INTERNAL MEDICINE
Payer: MEDICARE

## 2024-10-08 DIAGNOSIS — C34.12 MALIGNANT NEOPLASM OF UPPER LOBE OF LEFT LUNG (HCC): ICD-10-CM

## 2024-10-08 DIAGNOSIS — R91.8 LUNG NODULES: ICD-10-CM

## 2024-10-08 PROCEDURE — 71250 CT THORAX DX C-: CPT | Performed by: INTERNAL MEDICINE

## 2024-10-17 ENCOUNTER — ORDER TRANSCRIPTION (OUTPATIENT)
Dept: PHYSICAL THERAPY | Facility: HOSPITAL | Age: 84
End: 2024-10-17

## 2024-10-17 DIAGNOSIS — R49.9 CHANGE IN VOICE: Primary | ICD-10-CM

## 2024-10-25 ENCOUNTER — NURSE ONLY (OUTPATIENT)
Dept: HEMATOLOGY/ONCOLOGY | Facility: HOSPITAL | Age: 84
End: 2024-10-25
Attending: INTERNAL MEDICINE
Payer: MEDICARE

## 2024-10-25 VITALS
BODY MASS INDEX: 33.36 KG/M2 | HEIGHT: 70 IN | DIASTOLIC BLOOD PRESSURE: 42 MMHG | RESPIRATION RATE: 18 BRPM | TEMPERATURE: 97 F | SYSTOLIC BLOOD PRESSURE: 122 MMHG | WEIGHT: 233 LBS | OXYGEN SATURATION: 96 % | HEART RATE: 77 BPM

## 2024-10-25 DIAGNOSIS — C34.12 MALIGNANT NEOPLASM OF UPPER LOBE OF LEFT LUNG (HCC): Primary | ICD-10-CM

## 2024-10-25 DIAGNOSIS — D45 POLYCYTHEMIA VERA (HCC): ICD-10-CM

## 2024-10-25 DIAGNOSIS — Z79.01 ANTICOAGULANT LONG-TERM USE: ICD-10-CM

## 2024-10-25 DIAGNOSIS — Z79.64 ENCOUNTER FOR MONITORING OF HYDROXYUREA THERAPY: ICD-10-CM

## 2024-10-25 DIAGNOSIS — Z79.01 ANTICOAGULATION MANAGEMENT ENCOUNTER: ICD-10-CM

## 2024-10-25 DIAGNOSIS — Z51.81 ANTICOAGULATION MANAGEMENT ENCOUNTER: ICD-10-CM

## 2024-10-25 DIAGNOSIS — Z51.81 ENCOUNTER FOR MONITORING OF HYDROXYUREA THERAPY: ICD-10-CM

## 2024-10-25 DIAGNOSIS — D50.9 IRON DEFICIENCY ANEMIA, UNSPECIFIED IRON DEFICIENCY ANEMIA TYPE: ICD-10-CM

## 2024-10-25 DIAGNOSIS — K92.2 LOWER GI BLEED: ICD-10-CM

## 2024-10-25 DIAGNOSIS — Z86.718 HISTORY OF DVT (DEEP VEIN THROMBOSIS): ICD-10-CM

## 2024-10-25 DIAGNOSIS — Z90.2 S/P LOBECTOMY OF LUNG: ICD-10-CM

## 2024-10-25 LAB
ALBUMIN SERPL-MCNC: 3.7 G/DL (ref 3.2–4.8)
ALBUMIN/GLOB SERPL: 1.4 {RATIO} (ref 1–2)
ALP LIVER SERPL-CCNC: 99 U/L
ALT SERPL-CCNC: 54 U/L
ANION GAP SERPL CALC-SCNC: 5 MMOL/L (ref 0–18)
AST SERPL-CCNC: 145 U/L (ref ?–34)
BASOPHILS # BLD AUTO: 0.05 X10(3) UL (ref 0–0.2)
BASOPHILS NFR BLD AUTO: 0.7 %
BILIRUB SERPL-MCNC: 0.8 MG/DL (ref 0.2–1.1)
BUN BLD-MCNC: 26 MG/DL (ref 9–23)
BUN/CREAT SERPL: 18.8 (ref 10–20)
CALCIUM BLD-MCNC: 9.4 MG/DL (ref 8.7–10.4)
CHLORIDE SERPL-SCNC: 106 MMOL/L (ref 98–112)
CO2 SERPL-SCNC: 26 MMOL/L (ref 21–32)
CREAT BLD-MCNC: 1.38 MG/DL
DEPRECATED HBV CORE AB SER IA-ACNC: 284 NG/ML
DEPRECATED RDW RBC AUTO: 55.8 FL (ref 35.1–46.3)
EGFRCR SERPLBLD CKD-EPI 2021: 38 ML/MIN/1.73M2 (ref 60–?)
EOSINOPHIL # BLD AUTO: 0.05 X10(3) UL (ref 0–0.7)
EOSINOPHIL NFR BLD AUTO: 0.7 %
ERYTHROCYTE [DISTWIDTH] IN BLOOD BY AUTOMATED COUNT: 13.6 % (ref 11–15)
GLOBULIN PLAS-MCNC: 2.7 G/DL (ref 2–3.5)
GLUCOSE BLD-MCNC: 169 MG/DL (ref 70–99)
HCT VFR BLD AUTO: 35.8 %
HGB BLD-MCNC: 11.6 G/DL
IMM GRANULOCYTES # BLD AUTO: 0.03 X10(3) UL (ref 0–1)
IMM GRANULOCYTES NFR BLD: 0.4 %
IRON SATN MFR SERPL: 11 %
IRON SERPL-MCNC: 31 UG/DL
LYMPHOCYTES # BLD AUTO: 0.7 X10(3) UL (ref 1–4)
LYMPHOCYTES NFR BLD AUTO: 9.3 %
MCH RBC QN AUTO: 36.1 PG (ref 26–34)
MCHC RBC AUTO-ENTMCNC: 32.4 G/DL (ref 31–37)
MCV RBC AUTO: 111.5 FL
MONOCYTES # BLD AUTO: 0.66 X10(3) UL (ref 0.1–1)
MONOCYTES NFR BLD AUTO: 8.8 %
NEUTROPHILS # BLD AUTO: 6.03 X10 (3) UL (ref 1.5–7.7)
NEUTROPHILS # BLD AUTO: 6.03 X10(3) UL (ref 1.5–7.7)
NEUTROPHILS NFR BLD AUTO: 80.1 %
OSMOLALITY SERPL CALC.SUM OF ELEC: 293 MOSM/KG (ref 275–295)
PLATELET # BLD AUTO: 359 10(3)UL (ref 150–450)
POTASSIUM SERPL-SCNC: 5 MMOL/L (ref 3.5–5.1)
PROT SERPL-MCNC: 6.4 G/DL (ref 5.7–8.2)
RBC # BLD AUTO: 3.21 X10(6)UL
SODIUM SERPL-SCNC: 137 MMOL/L (ref 136–145)
TIBC SERPL-MCNC: 295 UG/DL (ref 250–425)
TRANSFERRIN SERPL-MCNC: 198 MG/DL (ref 250–380)
WBC # BLD AUTO: 7.5 X10(3) UL (ref 4–11)

## 2024-10-25 PROCEDURE — G2211 COMPLEX E/M VISIT ADD ON: HCPCS | Performed by: INTERNAL MEDICINE

## 2024-10-25 PROCEDURE — 85060 BLOOD SMEAR INTERPRETATION: CPT

## 2024-10-25 PROCEDURE — 36415 COLL VENOUS BLD VENIPUNCTURE: CPT

## 2024-10-25 PROCEDURE — 99215 OFFICE O/P EST HI 40 MIN: CPT | Performed by: INTERNAL MEDICINE

## 2024-10-25 PROCEDURE — 80053 COMPREHEN METABOLIC PANEL: CPT

## 2024-10-25 PROCEDURE — 85025 COMPLETE CBC W/AUTO DIFF WBC: CPT

## 2024-10-25 RX ORDER — HYDROXYUREA 500 MG/1
500 CAPSULE ORAL DAILY
Qty: 90 CAPSULE | Refills: 3 | Status: SHIPPED | OUTPATIENT
Start: 2024-10-25

## 2024-10-25 NOTE — PROGRESS NOTES
Hematology Oncology Progress Note    Patient Name: Tara Lockwood   YOB: 1940   Medical Record Number: [id number]   Attending Physician: Yamilex Ceron MD     Date of Visit: 10/25/2024     Chief Complaint:  Lung cancer  P. Vera    Oncologic History:  84 year old female with a history of left upper lobe adenocarcinoma of the lung (well differentiated pT2aN0 4.3 cm No LVI) stage Ib s/p left lateral thoracotomy with left upper lobectomy and mediastinal lymph node dissection 6/14/16.    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 Perioperatively she had a removable IVC filter placed however still had DVT and PE in the postop setting of her thoracotomy while not on anticoagulation.  Her filter has since been retrieved.    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 On hydrea    Interval History:  Patient returns for follow up and review of labs. She is taking hydrea 500 mg daily, and BID on Mon and Fri. She is also on indefinite anticoagulation. warfarin managed by the coag clinic.   She noted rectal bleeding/ blood on wiping this morning after bowel movement. She has chronic diarrhea, but never had rectal bleeding. She denies any rectal pain, itching or staining. Denies any other abnormal bleeding. She is always tired.   No skin rash, lesions or ulcerations. No leg swelling, dyspnea at rest, cough, sputums, hemoptysis. No fevers, infections. No focal weakness or falls.     Performance Status: ECOG 1    Current Medications:    Current Outpatient Medications:     hydroxyurea 500 MG Oral Cap, Take 1 capsule (500 mg total) by mouth daily., Disp: 90 capsule, Rfl: 3    oxybutynin ER 5 MG Oral Tablet 24 Hr, Take 1 tablet (5 mg total) by mouth daily., Disp: 90 tablet, Rfl: 1    Fluticasone-Salmeterol 113-14 MCG/ACT Inhalation Aerosol Powder, Breath Activated, Inhale 1 Inhalation into the lungs daily., Disp: 1 each, Rfl: 3    atorvastatin  80 MG Oral Tab, Take 1 tablet (80 mg total) by mouth nightly., Disp: 30 tablet, Rfl: 1    warfarin 5 MG Oral Tab, Take as directed by INR clinic or take one & 1/2 tabs Tuesdays, Thursdays, Saturdays. Take one tab all other days, Disp: 120 tablet, Rfl: 3    Multiple Vitamins-Minerals (MULTIVITAMIN ADULT OR), Take 1 tablet by mouth daily., Disp: , Rfl:     acetaminophen (TYLENOL) 325 MG Oral Tab, Take 500 mg by mouth every 6 (six) hours as needed for Pain.  , Disp: , Rfl:     Review of Systems:  All other systems reviewed and negative x12    Vital Signs:  /42 (BP Location: Left arm, Patient Position: Sitting, Cuff Size: adult)   Pulse 77   Temp 97.4 °F (36.3 °C) (Oral)   Resp 18   Ht 1.778 m (5' 10\")   Wt 105.7 kg (233 lb)   SpO2 96%   BMI 33.43 kg/m²     Physical Examination:  General: Patient is alert and oriented x 3, not in acute distress.  Psych:  Mood and affect appropriate  HEENT: EOMs intact.  Oropharynx is clear.   Neck: No palpable lymphadenopathy. Neck is supple.  Lymphatics: There is no palpable peripheral lymphadenopathy   Chest: Symmetric expansion.  Nonlabored  Cardiovascular: Good distal pulses  Abdomen: Soft, non tender.   Extremities: No edema. Small scattered bruises on upper extremities.   Neurological: 5/5 motor x4.        Laboratory:  Lab Results   Component Value Date    WBC 7.5 10/25/2024    RBC 3.21 (L) 10/25/2024    HGB 11.6 (L) 10/25/2024    HCT 35.8 10/25/2024    .5 (H) 10/25/2024    MCH 36.1 (H) 10/25/2024    MCHC 32.4 10/25/2024    RDW 13.6 10/25/2024    .0 10/25/2024    MPV 9.0 09/12/2018     Lab Results   Component Value Date/Time    HGB 11.6 (L) 10/25/2024 09:23 AM    HGB 12.0 09/05/2024 03:51 PM    HGB 13.6 06/25/2024 09:59 AM    HGB 12.3 05/14/2024 05:56 AM    HGB 12.6 05/13/2024 05:05 PM    HGB 13.6 02/26/2024 08:54 AM    HGB 13.7 12/21/2023 11:50 AM    HGB 13.7 11/28/2023 12:37 PM    HGB 13.0 08/29/2023 12:44 PM    HGB 13.7 08/12/2023 06:09 AM     Lab  Results   Component Value Date     10/25/2024    K 5.0 10/25/2024     10/25/2024    CO2 26.0 10/25/2024    BUN 26 (H) 10/25/2024    CREATSERUM 1.38 (H) 10/25/2024     (H) 10/25/2024    CA 9.4 10/25/2024    ALKPHO 99 10/25/2024    ALT 54 (H) 10/25/2024     (H) 10/25/2024    BILT 0.8 10/25/2024    ALB 3.7 10/25/2024    TP 6.4 10/25/2024          Radiology:  CT CHEST (CPT=71250)    Result Date: 10/9/2024  CONCLUSION:  1. There is a history of lung cancer with stable postoperative changes from a left upper lobectomy.  No evidence of recurrent malignancy/metastases in the chest. 2. Left lower lobe micronodules there were noted to be new in April 2024 have resolved consistent with benign nodules.  Additional bilateral pulmonary micronodules are stable over a greater than 2 year time interval compatible with benign nodules. 3. Development of a small left pleural effusion in this patient with coronary atherosclerosis and a cardiac pacemaker suggesting mild CHF or fluid overload.  New region of ground-glass opacity in the dependent aspect of the right upper lobe is nonspecific but may represent early pulmonary edema. 4. Stable mild subpleural fibrotic changes throughout the right lung with unchanged areas of mild parenchymal scarring in both lungs. 5. Stable small adrenal adenomas bilaterally. 6. Stable multinodular thyroid gland. 7. Stable small hiatal hernia.    Dictated by (CST): Jordon Haney MD on 10/09/2024 at 11:30 AM     Finalized by (CST): Jordon Haney MD on 10/09/2024 at 11:39 AM            Colonoscopy 2017     POSTOPERATIVE DIAGNOSIS:    1.       Rule out microscopic colitis status post random colon biopsies.  2.       Status post polypectomy x2 diminutive polyps.  3.       Diverticular disease, few, left-sided, uncomplicated.  4.       Internal hemorrhoids.  PROCEDURE PERFORMED:  Colonoscopy with biopsies and polypectomy x2.    Impression and Plan:    Adenocarcinoma of the  left lung, Stage IB  -- s/p left lateral thoracotomy with left upper lobectomy and mediastinal lymph node dissection 6/14/16; 4.3 cm well differentiated, no LVI, pT2aN0  -- s/p R0 resection (however close margin noted).  She did not receive any adjuvant treatment.  -- she is 8 years out with no evidence of disease recurrence.   -- CT chest 10/2024 no evidence of disease. LLL nodules resolved.   -- continue surveillance and repeat CT chest annually.     History of recurrent bilateral DVT/PE  -- multiple episodes in the postoperative setting including recently as noted above  -- continue lifelong anticoagulation with warfarin   -- will need anticoagulation bridging for warfarin interruption perioperatively if surgery is needed in the future (admission for bridging with heparin or lovenox and temp IVC filter based on previous history).     Polycythemia vera JAK2 V617 F+   -- diagnosed fall 2020, high risk based on age and history of thrombosis.  -- on hydrea since 11/2020 and already on anticoagulation for VTE.  -- labs adequate, platelets at goal, but noted mild anemia.   -- decrease hydrea dose to 500 mg po daily and repeat labs in 3 months.     GI bleed   -- rectal bleeding noted x this am. No prior history per patient, but has internal hemorrhoids and diverticular disease based on last colonoscopy in 2017.   -- check iron studies. continue anticoagulation for now.   -- monitor symptoms and refer back to GI if recurrent bleeding.     COPD, pulm nodules  -- followed by pulm (Dr. Reyes)    Return in about 3 months (around 1/25/2025).       50 minutes spent on this encounter, including extensive review of records and tests, more than 50% of that time was spent on patient's counseling and/or coordination of care. The treatment plan was explained in details to the patient and her sister. All questions answered to their satisfaction.       Yamilex Ceron MD  Hematology Oncology

## 2024-10-30 ENCOUNTER — NURSE TRIAGE (OUTPATIENT)
Dept: CASE MANAGEMENT | Age: 84
End: 2024-10-30

## 2024-10-30 ENCOUNTER — TELEPHONE (OUTPATIENT)
Dept: HEMATOLOGY/ONCOLOGY | Facility: HOSPITAL | Age: 84
End: 2024-10-30

## 2024-10-30 NOTE — TELEPHONE ENCOUNTER
Dr Hubbard,     The patient's daughter Park called, she would like to know the name of the specialist you recommend the patient see for rectal bleeding.     Thank you

## 2024-10-30 NOTE — TELEPHONE ENCOUNTER
Received a call from daughter Qing - she is listed on SHREYA- was wondering about why her mom was referred to GI and what was recommended at the visit as mom was not remembering what was discussed.  Let her know Dr. Ceron reccommended a visit with GI as patient stated she had some rectal bleeding that am. Also discussed that her hydroxyurea dose is 500mg daily and that she is to repeat labs in 3 months.  To continue warfarin as directed recommending lifetime anticoagulation. She stated understanding.

## 2024-10-30 NOTE — TELEPHONE ENCOUNTER
I dont see rectal bleeding was discussed  She mainly complaint of  urinary incontinence and gave her a referral to urogynecologyf  Need more infor re recatl bleedinjg     How long  how bad is the bleeding  Hx of hemorrhoids  Could  be gastroenterology referral ot general surgeon  Need more infrmation  Or need OV  with me or available provider

## 2024-11-01 ENCOUNTER — HOSPITAL ENCOUNTER (INPATIENT)
Facility: HOSPITAL | Age: 84
LOS: 12 days | Discharge: HOME | End: 2024-11-13
Attending: EMERGENCY MEDICINE | Admitting: HOSPITALIST
Payer: MEDICARE

## 2024-11-01 ENCOUNTER — TELEPHONE (OUTPATIENT)
Dept: ORTHOPEDICS CLINIC | Facility: CLINIC | Age: 84
End: 2024-11-01

## 2024-11-01 DIAGNOSIS — K62.5 RECTAL BLEEDING: Primary | ICD-10-CM

## 2024-11-01 DIAGNOSIS — R19.7 DIARRHEA, UNSPECIFIED TYPE: ICD-10-CM

## 2024-11-01 DIAGNOSIS — I50.32 CHRONIC DIASTOLIC (CONGESTIVE) HEART FAILURE (HCC): ICD-10-CM

## 2024-11-01 PROBLEM — K92.1 MELENA: Status: ACTIVE | Noted: 2024-11-01

## 2024-11-01 PROBLEM — K92.1 MELENA: Status: ACTIVE | Noted: 2024-01-01

## 2024-11-01 LAB
ALBUMIN SERPL-MCNC: 3.4 G/DL (ref 3.2–4.8)
ALP LIVER SERPL-CCNC: 102 U/L
ALT SERPL-CCNC: 48 U/L
ANION GAP SERPL CALC-SCNC: 8 MMOL/L (ref 0–18)
AST SERPL-CCNC: 145 U/L (ref ?–34)
BASOPHILS # BLD AUTO: 0.03 X10(3) UL (ref 0–0.2)
BASOPHILS NFR BLD AUTO: 0.3 %
BILIRUB DIRECT SERPL-MCNC: 0.4 MG/DL (ref ?–0.3)
BILIRUB SERPL-MCNC: 0.8 MG/DL (ref 0.2–1.1)
BUN BLD-MCNC: 21 MG/DL (ref 9–23)
BUN/CREAT SERPL: 17.6 (ref 10–20)
CALCIUM BLD-MCNC: 9.4 MG/DL (ref 8.7–10.4)
CHLORIDE SERPL-SCNC: 106 MMOL/L (ref 98–112)
CO2 SERPL-SCNC: 23 MMOL/L (ref 21–32)
CREAT BLD-MCNC: 1.19 MG/DL
DEPRECATED RDW RBC AUTO: 57.2 FL (ref 35.1–46.3)
EGFRCR SERPLBLD CKD-EPI 2021: 45 ML/MIN/1.73M2 (ref 60–?)
EOSINOPHIL # BLD AUTO: 0.04 X10(3) UL (ref 0–0.7)
EOSINOPHIL NFR BLD AUTO: 0.5 %
ERYTHROCYTE [DISTWIDTH] IN BLOOD BY AUTOMATED COUNT: 13.9 % (ref 11–15)
GLUCOSE BLD-MCNC: 95 MG/DL (ref 70–99)
HCT VFR BLD AUTO: 34.3 %
HGB BLD-MCNC: 11.4 G/DL
IMM GRANULOCYTES # BLD AUTO: 0.03 X10(3) UL (ref 0–1)
IMM GRANULOCYTES NFR BLD: 0.3 %
INR BLD: 3.89 (ref 0.8–1.2)
LYMPHOCYTES # BLD AUTO: 0.79 X10(3) UL (ref 1–4)
LYMPHOCYTES NFR BLD AUTO: 9.1 %
MCH RBC QN AUTO: 37 PG (ref 26–34)
MCHC RBC AUTO-ENTMCNC: 33.2 G/DL (ref 31–37)
MCV RBC AUTO: 111.4 FL
MONOCYTES # BLD AUTO: 0.72 X10(3) UL (ref 0.1–1)
MONOCYTES NFR BLD AUTO: 8.3 %
NEUTROPHILS # BLD AUTO: 7.07 X10 (3) UL (ref 1.5–7.7)
NEUTROPHILS # BLD AUTO: 7.07 X10(3) UL (ref 1.5–7.7)
NEUTROPHILS NFR BLD AUTO: 81.5 %
OSMOLALITY SERPL CALC.SUM OF ELEC: 287 MOSM/KG (ref 275–295)
PLATELET # BLD AUTO: 299 10(3)UL (ref 150–450)
POTASSIUM SERPL-SCNC: 4.3 MMOL/L (ref 3.5–5.1)
PROT SERPL-MCNC: 6 G/DL (ref 5.7–8.2)
PROTHROMBIN TIME: 39.9 SECONDS (ref 11.6–14.8)
RBC # BLD AUTO: 3.08 X10(6)UL
SODIUM SERPL-SCNC: 137 MMOL/L (ref 136–145)
WBC # BLD AUTO: 8.7 X10(3) UL (ref 4–11)

## 2024-11-01 PROCEDURE — 99223 1ST HOSP IP/OBS HIGH 75: CPT | Performed by: HOSPITALIST

## 2024-11-01 RX ORDER — SODIUM CHLORIDE 9 MG/ML
INJECTION, SOLUTION INTRAVENOUS CONTINUOUS
Status: DISCONTINUED | OUTPATIENT
Start: 2024-11-01 | End: 2024-11-02

## 2024-11-01 RX ORDER — METOPROLOL SUCCINATE 25 MG/1
25 TABLET, EXTENDED RELEASE ORAL EVERY MORNING
COMMUNITY

## 2024-11-01 RX ORDER — METOCLOPRAMIDE HYDROCHLORIDE 5 MG/ML
5 INJECTION INTRAMUSCULAR; INTRAVENOUS EVERY 8 HOURS PRN
Status: DISCONTINUED | OUTPATIENT
Start: 2024-11-01 | End: 2024-11-13

## 2024-11-01 RX ORDER — ACETAMINOPHEN 500 MG
500 TABLET ORAL EVERY 4 HOURS PRN
Status: DISCONTINUED | OUTPATIENT
Start: 2024-11-01 | End: 2024-11-13

## 2024-11-01 RX ORDER — ONDANSETRON 2 MG/ML
4 INJECTION INTRAMUSCULAR; INTRAVENOUS EVERY 6 HOURS PRN
Status: DISCONTINUED | OUTPATIENT
Start: 2024-11-01 | End: 2024-11-13

## 2024-11-01 RX ORDER — MIDODRINE HYDROCHLORIDE 5 MG/1
5 TABLET ORAL 3 TIMES DAILY
COMMUNITY

## 2024-11-01 NOTE — TELEPHONE ENCOUNTER
Patient seen on 1/31/24 for bilateral knee pain. Patient was given bilateral cortisone injections at that time.    Please advise if patient could see you for bilateral knee cortisone injections

## 2024-11-01 NOTE — ED QUICK NOTES
Orders for admission, patient is aware of plan and ready to go upstairs. Any questions, please call ED RN Brianda at extension [phone].     Patient Covid vaccination status: Fully vaccinated     COVID Test Ordered in ED: None    COVID Suspicion at Admission: N/A    Running Infusions:  None    Mental Status/LOC at time of transport: AOx4    Other pertinent information:   CIWA score: N/A   NIH score:  N/A

## 2024-11-01 NOTE — TELEPHONE ENCOUNTER
Call attempt to patient. No answer. Advised to call office.     Call attempt to patient's daughter Park. No answer. Rings and then busy tone, no options for voicemail     Call attempt to daughter Qing.   RN Spoke with patient's daughter Qing.   RN Requesting to speak with patient, but she is not with patient.   Patient's date of birth and full name both confirmed.   Per Qing, in addition to concerns about rectal bleeding, patient says oncologist recommended that she shouldn't drive and patient verbalized concerns about driving as well.   With concerns about memory and forgetfulness. Qing believes patient is okay for now, but also Qing is unsure the amount of rectal bleeding and is not with patient. Per Qing, patient has diarrhea every day for about 1-2 months. Since RN cannot reach patient, RN offered to schedule appointment at least, as patient is more inclined to appointment instead of Emergency Room. Appointment made for tomorrow.     This RN received update from Emeli JORDAN that patient called our office reporting dizziness and black stool. And that Triage disposition is go to Emergency Room.   RN called jennifer parker on verbal release . Patient's date of birth and full name both confirmed.   Advised Emergency Room is recommended and appointment for tomorrow is cancelled.   She verbalizes understanding of all information, and agreeable to plan.       Future Appointments   Date Time Provider Department Center   11/5/2024  2:45 PM Aranza Coleman RN [facility]   1/24/2025 10:45 AM [facility] RESOURCE [facility] HEM ONC [facility]   1/24/2025 11:40 AM Yamilex Ceron MD [facility] HEM ONC [facility]   3/18/2025  1:00 PM Nathan Reyes MD [facility]

## 2024-11-01 NOTE — ED PROVIDER NOTES
Patient Seen in: [facility] Emergency Department    History     Chief Complaint   Patient presents with    Dizziness    Tarry Stools       HPI    84-year-old female presents to the ED for evaluation of black diarrhea.  Patient states that she had 6 episodes of diarrhea today and dark in color.  She states she chronically has diarrhea for the majority of the last year and does intermittently have dark-colored stool.  She chronically also has fatigue with some shortness of breath with exertion.  She feels this is unchanged at this time.  No lightheadedness, chills, abdominal pain.    History from Independent Source:       External Records Reviewed: Reviewed prior records available in EMR.  Patient saw her oncologist on the 25th and had lab work completed then showing that her hemoglobin was 11.6, down from 12 in September.  Patient did mention to her hematologist that she chronically has had diarrhea with intermittent dark stool.  She did also mention that she had some bright red blood per rectum her visit.    History reviewed.   Past Medical History:    Acid reflux    Acute medial meniscus tear of right knee    Acute medial meniscus tear of right knee    Acute midline low back pain with left-sided sciatica    Age-related nuclear cataract of both eyes    Arthritis    Degenerative disc disease, lumbar    Displaced fracture (avulsion) of medial epicondyle of right humerus, subsequent encounter for fracture with routine healing    Diverticulosis large intestine w/o perforation or abscess w/o bleeding    Diverticulosis large intestine w/o perforation or abscess w/o bleeding    Family history of glaucoma in mother    Family history of glaucoma in mother    Family history of macular degeneration    Gastric polyps    Gastric polyps    Hiatal hernia    History of hip replacement, total    right hip    Iatrogenic pulmonary embolism and infarction, initial encounter ([facility])    Internal hemorrhoids    Kidney problem    Long  term (current) use of anticoagulants    Lumbar herniated disc    Lung cancer (HCC)    Obesity, unspecified    Osteoarthrosis    Osteoarthrosis, unspecified whether generalized or localized, unspecified site    Post-menopausal bleeding    Primary osteoarthritis of left knee    Primary osteoarthritis of right knee    Pulmonary embolism (HCC)    S/P colonoscopy with polypectomy    Spinal stenosis of lumbar region without neurogenic claudication    Spondylolisthesis of lumbar region    Thyroid condition       History reviewed.   Past Surgical History:   Procedure Laterality Date    Cholecystectomy      Colonoscopy      Colonoscopy N/A 2017    Procedure: COLONOSCOPY;  Surgeon: Clare Victor MD;  Location: [facility] ENDOSCOPY    Egd      Foot surgery Bilateral     Bunion Surgery X6    Foot/toes surgery proc unlisted Left     Pt had surgery to straighten toes on left foot.    Hip replacement surgery Right     Hip surgery Right     right total hip arthroplasty    Hysterectomy      Laparoscopic cholecystectomy            x7 (Twins x1) Max weight 9 1/2 lbs    Other surgical history      parotid gland removed left benigh    Other surgical history      Removal Skin Mole    Other surgical history      SAB/ D&C    Other surgical history      Ear Surgery    Other surgical history      Neg EM Bx    Removal of lung,lobectomy Left     Pt had upper left lobe removed.         Medications :  Prescriptions Prior to Admission[1]     Family History   Problem Relation Age of Onset    Cancer Father 87        Lung  -  smoker    Macular degeneration Mother     Glaucoma Mother     Other (Alzheimer's Disease) Mother 93    Other (Bladder Cancer) Mother     Macular degeneration Brother     Other (Pancreatic Cancer) Sister 73    Macular degeneration Brother     Other (Myocardial Infarction) Brother 69    Macular degeneration Maternal Aunt     Glaucoma Maternal Aunt     Breast Cancer Daughter 48    Diabetes Neg         Smoking Status:   Social History     Socioeconomic History    Marital status:    Tobacco Use    Smoking status: Never     Passive exposure: Never    Smokeless tobacco: Never   Vaping Use    Vaping status: Never Used   Substance and Sexual Activity    Alcohol use: Not Currently     Alcohol/week: 0.8 standard drinks of alcohol     Types: 1 Glasses of wine per week     Comment: 1-2x/month    Drug use: No   Other Topics Concern    Caffeine Concern Yes     Comment: 2 cups coffee daily    Pt has a pacemaker No    Pt has a defibrillator No    Reaction to local anesthetic No       Constitutional and vital signs reviewed.      Social History and Family History elements reviewed from today, pertinent positives to the presenting problem noted.    Physical Exam     ED Triage Vitals [11/01/24 1520]   /55   Pulse 76   Resp 18   Temp 99.3 °F (37.4 °C)   Temp src    SpO2 98 %   O2 Device      Physical Exam   Constitutional: AAOx3, well nourished, NAD  HEENT: Normocephalic, PERRLA, MMM  CV: s1s2+, RRR, no m/r/g, normal distal pulses  Pulmonary/Chest: CTA b/l with no rales, wheezes.  No chest wall tenderness  Abdominal: Nontender.  Nondistended. Soft. Bowel sounds are normal.   Neck/Back:   : Rectal exam with yellow/green-colored stool, mildly guaiac positive.  Musculoskeletal: Normal range of motion. No deformity.   Neurological: Awake, alert. Normal reflexes. No cranial nerve deficit.    Skin: Skin is warm and dry. No rash noted. No erythema.   Psychiatric:      All measures to prevent infection transmission during my interaction with the patient were taken. The patient was already wearing a droplet mask on my arrival to the room. Personal protective equipment was worn throughout the duration of the exam.      ED Course        Labs Reviewed   BASIC METABOLIC PANEL (8) - Abnormal; Notable for the following components:       Result Value    Creatinine 1.19 (*)     eGFR-Cr 45 (*)     All other components within  normal limits   HEPATIC FUNCTION PANEL (7) - Abnormal; Notable for the following components:     (*)     Bilirubin, Direct 0.4 (*)     All other components within normal limits   CBC WITH DIFFERENTIAL WITH PLATELET - Abnormal; Notable for the following components:    RBC 3.08 (*)     HGB 11.4 (*)     HCT 34.3 (*)     .4 (*)     MCH 37.0 (*)     RDW-SD 57.2 (*)     Lymphocyte Absolute 0.79 (*)     All other components within normal limits   PROTHROMBIN TIME (PT)   C. DIFFICILE(TOXIGENIC)PCR     My Independent Interpretation of EKG (if performed): EKG    Rate, intervals and axes as noted on EKG Report.  Rate: 80 bpm  Rhythm: Sinus Rhythm  Reading: Normal sinus rhythm, frequent PVCs, axis and intervals, no             Monitor Interpretation:   normal sinus rhythm, occasional premature ventricular beats as interpreted by me.      Imaging Results Available and Reviewed while in ED: No results found.  ED Medications Administered:   Medications   pantoprazole (Protonix) 40 mg in sodium chloride 0.9% PF 10 mL IV push (has no administration in time range)             MDM     Vitals:    11/01/24 1520   BP: 133/55   Pulse: 76   Resp: 18   Temp: 99.3 °F (37.4 °C)   SpO2: 98%   Weight: 105.7 kg   Height: 170.2 cm (5' 7\")     *I personally reviewed and interpreted all ED vitals.    Independent Interpretation of Studies:     Social Determinants of Health:     Procedures:      Differential/MDM/Shared Decision Making: Differential Diagnosis includes C. difficile, gastroenteritis, upper GI bleed, others.      The patient already  has a past medical history of Acid reflux, Acute medial meniscus tear of right knee (03/06/2018), Acute medial meniscus tear of right knee (03/06/2018), Acute midline low back pain with left-sided sciatica (01/15/2019), Age-related nuclear cataract of both eyes (11/09/2017), Arthritis, Degenerative disc disease, lumbar (01/15/2019), Displaced fracture (avulsion) of medial epicondyle of right  humerus, subsequent encounter for fracture with routine healing (08/15/2023), Diverticulosis large intestine w/o perforation or abscess w/o bleeding (09/08/2017), Diverticulosis large intestine w/o perforation or abscess w/o bleeding (09/08/2017), Family history of glaucoma in mother (11/27/2018), Family history of glaucoma in mother (11/27/2018), Family history of macular degeneration (11/09/2017), Gastric polyps (09/08/2017), Gastric polyps (09/08/2017), Hiatal hernia, History of hip replacement, total, Iatrogenic pulmonary embolism and infarction, initial encounter ([facility]) (08/04/2022), Internal hemorrhoids (09/08/2017), Kidney problem, Long term (current) use of anticoagulants (07/13/2021), Lumbar herniated disc (01/24/2019), Lung cancer ([facility]) (06/2016), Obesity, unspecified, Osteoarthrosis (05/08/2014), Osteoarthrosis, unspecified whether generalized or localized, unspecified site, Post-menopausal bleeding, Primary osteoarthritis of left knee (03/23/2017), Primary osteoarthritis of right knee (03/27/2018), Pulmonary embolism ([facility]), S/P colonoscopy with polypectomy (09/08/2017), Spinal stenosis of lumbar region without neurogenic claudication (01/24/2019), Spondylolisthesis of lumbar region (01/15/2019), and Thyroid condition.  to contribute to the complexity of this ED evaluation.           Medications, Diagnostics, or Disposition considered but not done:     Initial hemoglobin is 11.4, down from 12 on previous labs.  Patient does have guaiac positive stool at this time.  I have ordered C. difficile culture as patient just finished a course of for cellulitis in her foot yesterday..  Management of case was discussed with [facility] [facility] hospitalist who has accepted patient for admission for further management of diarrhea and with [facility] gastroenterology who will see for consult.  Patient and family been updated.    Critical care time exclusive of procedure time spent on this patient was 35 min for taking history from  patient examining patient, medical decision-making, reviewing lab work and radiology studies, explaining results to patient and family, discussing with consultants/admitting physician, and documenting in patient's chart.      Condition upon leaving the department: Stable    Disposition and Plan     Clinical Impression:  1. Rectal bleeding    2. Diarrhea, unspecified type        Disposition:  Admit    Follow-up:  No follow-up provider specified.    Medications Prescribed:  Current Discharge Medication List          Hospital Problems       Present on Admission  Date Reviewed: 10/25/2024            ICD-10-CM Noted POA    * (Principal) Rectal bleeding K62.5 11/1/2024 Unknown               [1] (Not in a hospital admission)

## 2024-11-01 NOTE — ED INITIAL ASSESSMENT (HPI)
Patient reports dizziness and \"black stool\" for the last few months, multiple episodes of diarrhea today but states been having diarrhea \"for a long time now\". Advised by pcp to go to ER. Patient on warfarin.

## 2024-11-01 NOTE — PLAN OF CARE
Progress Note  Tara Lockwood Patient Status:  Emergency    1940 MRN [id number]   Location [facility] EMERGENCY DEPARTMENT Attending Momo Fitzgerald MD   Hosp Day # 0 PCP Robbie Hubbard MD     Subjective:  Mrs. 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 She presented to [facility] with reports of acute GI bleeding and a hgb of 11.4, prior 11.6 on 10/25. INR pending.       Diagnostics:   CARDIAC MRI 2024:  Normal size LV with normal systolic function, LV EF 64 percent   No inflammation or infarct or fibrosis   Normal resting perfusion     Trans Thoracic Echocardiogram 09/10/2024  1.The left ventricle is normal in size. Mild concentric hypertrophy is noted. Global left ventricular systolic function is normal. The left ventricular ejection fraction is 58%. There are no regional wall motion abnormalities. There is Grade I diastolic dysfunction.  2.The right ventricle is normal in size. Right ventricular systolic function is normal.  3.Mild to moderate tricuspid regurgitation.  4.The pulmonary artery systolic pressure is 27 mmHg.    Holter Monitoring 2023  1.This is an excellent quality study.  2.Predominant rhythm is normal sinus rhythm.  3.The minimum heart rate recorded was 64 beats / minute (2023). The maximum heart rate is 134 beats / minute (2023). The mean heart rate is 77 beats / minute.  4.First degree AV block noted.  5.Frequent premature atrial contractions noted.  6.No evidence of atrial fibrillation noted.  7.Afib [place] 0%  8.Rare premature ventricular contractions noted.  9.No evidence of ventricular tachycardia is noted.  10.No pauses were noted.  11.Sinus  bpm PVC's  none PAC's 3% 8 atrial triplets No symptoms    ACTMonitoring 10/09/2023  1.This is a poor quality study.  2.Predominant rhythm is normal sinus rhythm.  3.The minimum heart rate recorded was 51 beats / minute. The maximum heart rate is 111 beats / minute. The mean heart rate is 70 beats / minute.  4.No evidence of AV block is noted.  5.Rare premature atrial contractions noted.  6.Rare premature ventricular contractions noted.  7.No evidence of ventricular tachycardia is noted.  8.No pauses were noted.  9.Sinus  bpm PVC's 0.4% PAC's 1% 44 atrial runs up to 8 seconds and 140 bpm No atrial fibrillation Shortness of breath during sinus    Nuclear PET 09/11/2023  1.Stress EKG is non-diagnostic secondary to LVH on baseline EKG.  2.Pt denied chest pain.  3.Rare PAC's.  1.This is a normal perfusion study, no perfusion defects noted.  2.The left ventricular cavity is noted to be normal on the stress studies. The stress left ventricular ejection fraction was calculated to be 74% and left ventricular global function is normal. The rest left ventricular cavity is noted to be normal. The rest left ventricular ejection fraction was calculated to be 76% and rest left ventricular global function is normal.  3.When compared to the resting ejection fraction (76%), the stress ejection fraction (74%) has decreased.  4.The study quality is average.    Lower Extremity Venous Ultrasound 09/05/2023  1.The study quality is good.  2.Exam performed with the patient in reverse Trendelenburg position.  3.Normal compressibility, augmentation, and phasic flow in the bilateral DEEP lower extremity venous system. Negative study for DVT.  4.Right baker's cyst: 3.6 x 0.6cm.  5.--------------  6.Reflux is noted in the right and left GSV.  7.Bilateral small sapheanous vein appears to be heavily calcified with absence of color flow identified and non-compressible, suggestive of chronic thrombus.  8.Multiple perforators visualized right  proximal-distal calf with no reflux found, refer to the body of the report.  9.There is an incompetent perforators seen left distal calf = 2.2mm with 0.4sec reflux.    Exercise Myocardial Perfusion Imaging 10/27/2021  1.Stress ECG normal with less than 1mm additional ST depression as compared to baseline ECG  2.No chest pain  3.No arrhythmias  1.This is a normal perfusion study, no perfusion defects noted.  2.The left ventricular cavity is noted to be normal on the stress study. The left ventricular ejection fraction was calculated to be 69% and left ventricular global function is normal.  3.This scan is suggestive of low risk for future cardiovascular events.  4.The study quality is average.          Assessment/Plan:    Acute GI bleeding/black diarrhea with guaiac positive stool  INR pending  Hemodynamically stable and Hgb appears relatively stable currently  pAF w/ hx of tachy-kuldeep syndrome and dual chamber pacemaker in place  CHADs-VASc of at least 6  Reasonable to hold warfarin if acute GI bleeding  On metoprolol succinate 25 mg daily for rate control outpatient  Hx of PE/DVT's on warfarin   HX of lacunar infarct on MRI brain 5/13/2024  Hx of orthostatic hypotension on midodrine  CKD III  MATT positive myeloproliferative disorder on Hydroxyurea  Hx of adenocarcinoma of the lung s/p L upper lobectomy    PLAN  Reasonable to hold warfarin in case of acute GI bleeding  Follow INR  Consider IV heparin once INR below 2.0 given elevated CHADs-VASc and hx of recurrent PE's once OK from a GI and primary perspective  Cardiology team to see in the morning    Casey Ochoa PA-C  11/1/2024  6:22 PM

## 2024-11-01 NOTE — HISTORICAL OFFICE NOTE
[facility]ility [facility]  [address], [address] [place] [address]  [phone]      Tara Lockwood  Progress Note  Demographics:  Name: Tara Lockwood YOB: 1940  Age: 84, Female Medical Record No: [id number]  Visited Date/Time: 10/17/2024 02:15 PM    Chief Complaints  f/u  History of Present Illness  Ms. Lockwood is here for follow-up to an office visit from September 5.  We have done additional testing to determine the cause of her shortness of breath with exertion.  An echocardiogram, CT of the chest, blood work, and a cardiac MRI did not provide an underlying cause for this symptom.  She does have some fluid retention in the legs.  Her lungs however are clear and I do not see JVD.  I would be hesitant to give her diuretics for the legs and have it worsen her orthostatic dizziness or cause dehydration and renal insufficiency.  Cardiac risk factors Never smoked  Past Medical History  1.Preop testing  2.Encounter for monitoring warfarin therapy  3.GARCIA (dyspnea on exertion)  4.Dwayne-tachy syndrome -SSS  5.Syncope and collapse  6.Bilateral leg edema  7.FH: pulmonary embolism  8.Tachycardia  Family History  1. Father - No history of Cardiac disease  2. Mother - No history of Cardiac disease  Social History  Smoking status Never smoked  Tobacco usage - No (Non-smoker for personal reasons (finding))  Review of systems  Constitutional No history of Weight Loss and Anorexia  Cardiovascular Edema  No history of Chest pain, GARCIA, Palpitations, Syncope, PND, Orthopnea and Claudication  Respiratory No history of SOB, Wheezing and Sputum  Hem/Lymphatic No history of Easy bruising, Blood clots, Hx of blood transfusion, Anemia and Bleeding problems  Physical Examination  Constitutional 94%o2  Vitals Right Arm Sitting  / 70 mmHg, Pulse rate 77 bpm, Regular, Height in 5' 7\", BMI: 36.3, Weight in 232 lbs (or) 105 kgs and BSA : 2.27 cc/m²  General Appearance No Acute  Distress  Head/Eyes/Ears/Nose/Mouth/Throat Mucous membranes Moist  Neck Normal carotid pulsations, No carotid bruits and No JVD  Respiratory Unlabored and Lungs clear with normal breath sounds  Cardiovascular Intact distal pulses and Regular rhythm. Normal rate present. Normal and normal S1 and S2    Gastrointestinal Abdomen soft, Non-tender, Normoactive bowel sounds and No organomegaly  Lower Extremities Pulses 2+ and equal bilaterally and Edema 2+  Skin Warm and dry  Neurologic / Psychiatric Alert and Oriented  Allergies  1.fentanyl - Ingredient(Reaction:anaphylaxis, Severity:Severe)  2.flecainide \"Ingredient (IN)\" [RxNorm:4441](Reaction:short of breath, Severity:Severe)  3.hydrocodone - Ingredient(Reaction:rash, Severity:Moderate)  4.morphine - Ingredient(Reaction:unknown, Severity:Moderate)  5.Phenol - Allergen(Reaction:anaphylaxis, Severity:Severe)  Medications (Info obtained by: Verbal)  1.acetaminophen 325 mg tablet, Take 1 tablet orally once a day as needed.  2.esomeprazole magnesium 20 mg capsule,delayed release, Take 1 capsule orally once a day.  3.hydroxyurea 500 mg capsule, Take 1 capsule orally once a day.  4.metoprolol succinate ER 25 mg tablet,extended release 24 hr, Take 1 tablet orally once a day every morning  5.midodrine 5 mg tablet, Take 1 tablet orally 3 times a day.  6.Multiple Vitamins tablet, Take 1 tablet orally once a day.  7.warfarin 5 mg tablet, Take 1 tablet orally once a day.  Impression  1.GARCIA (dyspnea on exertion)  2.Dwayne-tachy syndrome -SSS  3.Syncope and collapse  4.Venous insufficiency  5.Bilateral leg edema  6.PAF (paroxysmal atrial fibrillation)  7.FH: pulmonary embolism  Assessment & Plan  Assessment:  1.  Orthostatic hypotension, better on midodrine.    2.  Dyspnea on exertion of uncertain etiology.    Plan:  1.  Continue same cardiac medications.    2.  Office visit in 4 months  Future appointments  1.Referral Visit - Robbie Hubbard ([email or web address]) :  (Today)  2.Follow up visit - Max Yates MD (4 Months)  Miscellaneous  1.Weight monitoring (regime/therapy)  Nurses documentation  EKG: None  Refills: None  Upcoming surgeries: None  Use of assistive devices(s): None   Patient instructions  -Please bring in you medication bottles or updated medicine list to your next appointment  -Call [facility] if you have any problems or concerns at [phone]   Plan:  1.  Continue same cardiac medications.    2.  Office visit in 4 months  Lab Details  VITAMIN B12  09/05/2024 09:59:07 PM  VITAMIN B12 734 211-911 pg/mL  F  FOLIC ACID SERUM(FOLATE)  09/05/2024 09:59:07 PM  FOLATE (FOLIC ACID), SERUM >48.0 >5.4 ng/mL  F  COMP METABOLIC PANEL (14)  09/05/2024 09:39:07 PM  GLUCOSE 76 70-99 mg/dL  F  SODIUM 138 136-145 mmol/L  F  POTASSIUM 4.9 3.5-5.1 mmol/L  F  CHLORIDE 106  mmol/L  F  CO2 25.0 21.0-32.0 mmol/L  F  ANION GAP 7 0-18 mmol/L  F  BUN 29 9-23 mg/dL H F  CREATININE 1.18 0.55-1.02 mg/dL H F  BUN/ CREAT RATIO 24.6 10.0-20.0 H F  CALCIUM 9.4 8.7-10.4 mg/dL  F  OSMOLALITY CALCULATED 291 275-295 mOsm/kg  F  E GFR CR 46 >=60 mL/min/1.73m2 L F  ALT 35 10-49 U/L  F  AST 85 <34 U/L H F  ALKALINE PHOSPHATASE 93  U/L  F  BILIRUBIN, TOTAL 0.7 0.2-1.1 mg/dL  F  TOTAL PROTEIN 6.7 5.7-8.2 g/dL  F  ALBUMIN 3.7 3.2-4.8 g/dL  F  GLOBULIN 3.0 2.0-3.5 g/dL  F  A/G RATIO 1.2 1.0-2.0  F  FASTING PATIENT CMP ANSWER No   F  ASSAY, THYROID STIM HORMONE  09/05/2024 09:39:07 PM  TSH 3.521 0.550-4.780 mIU/mL  F  CBC WITH DIFFERENTIAL WITH PLATELET  09/05/2024 09:13:06 PM  WBC 6.8 4.0-11.0 x10(3) uL  F  RED BLOOD COUNT 3.30 3.80-5.30 x10(6)uL L F  HGB 12.0 12.0-16.0 g/dL  F  HCT 36.2 35.0-48.0 %  F  MEAN CELL VOLUME 109.7 80.0-100.0 fL H F  MEAN CORPUSCULAR HEMOGLOBIN 36.4 26.0-34.0 pg H F  MEAN CORPUSCULAR HGB CONC 33.1 31.0-37.0 g/dL  F  RED CELL DISTRIBUTION WIDTH-SD 54.8 35.1-46.3 fL H F  RED CELL DISTRIBUTION WIDTH CV 13.5 11.0-15.0 %  F  PLATELETS 336.0 150.0-450.0 10(3)uL  F  NEUTROPHIL  ABS PRELIM 4.84 1.50-7.70 x10 (3) uL  F  NEUTROPHIL ABSOLUTE 4.84 1.50-7.70 x10(3) uL  F  LYMPHOCYTE ABSOLUTE 1.23 1.00-4.00 x10(3) uL  F  MONOCYTE ABSOLUTE 0.63 0.10-1.00 x10(3) uL  F  EOSINOPHIL ABSOLUTE 0.06 0.00-0.70 x10(3) uL  F  BASOPHIL ABSOLUTE 0.04 0.00-0.20 x10(3) uL  F  IMMATURE GRANULOCYTE COUNT 0.01 0.00-1.00 x10(3) uL  F  NEUTROPHIL % 71.0 %  F  LYMPHOCYTE % 18.1 %  F  MONOCYTE % 9.3 %  F  EOSINOPHIL % 0.9 %  F  BASOPHIL % 0.6 %  F  IMMATURE GRANULOCYTE RATIO % 0.1 %  F  RETICULOCYTE COUNT  09/05/2024 09:13:06 PM  RETIC% 2.3 0.5-2.5 %  F  RETIC ABS CALC AUTO 75.6 22.5-147.5 x10(3) uL  F  RETIC IRF CALC 0.169 0.100-0.300 Ratio  F  RETICULOCYTE HEMOGLOBIN EQUIVALENT 38.9 28.2-36.6 pg H F  PROTHROMBIN TIME (PT)  12/26/2023 09:51:01 AM  PROTIME 16.5 11.6-14.8 seconds H F  INR 1.25 0.80-1.20 H F  MRSA SCREEN BY PCR  12/22/2023 01:10:55 PM  MRSA SCREEN BY PCR Negative Negative  F  BASIC METABOLIC PANEL (8)  12/21/2023 04:33:34 PM  GLUCOSE 91 70-99 mg/dL  F  SODIUM 139 136-145 mmol/L  F  POTASSIUM 4.4 3.5-5.1 mmol/L  F  CHLORIDE 108  mmol/L  F  CO2 27.0 21.0-32.0 mmol/L  F  ANION GAP 4 0-18 mmol/L  F  BUN 24 9-23 mg/dL H F  CREATININE 1.34 0.55-1.02 mg/dL H F  BUN/ CREAT RATIO 17.9 10.0-20.0  F  CALCIUM 9.7 8.7-10.4 mg/dL  F  OSMOLALITY CALCULATED 292 275-295 mOsm/kg  F  E GFR CR 39 >=60 mL/min/1.73m2 L F  FASTING PATIENT BMP ANSWER No   F  CBC W/ DIFFERENTIAL  12/21/2023 04:24:37 PM  WBC 5.6 4.0-11.0 x10(3) uL  F  RED BLOOD COUNT 3.75 3.80-5.30 x10(6)uL L F  HGB 13.7 12.0-16.0 g/dL  F  HCT 40.2 35.0-48.0 %  F  MEAN CELL VOLUME 107.2 80.0-100.0 fL H F  MEAN CORPUSCULAR HEMOGLOBIN 36.5 26.0-34.0 pg H F  MEAN CORPUSCULAR HGB CONC 34.1 31.0-37.0 g/dL  F  RED CELL DISTRIBUTION WIDTH-SD 50.9 35.1-46.3 fL H F  RED CELL DISTRIBUTION WIDTH CV 12.9 11.0-15.0 %  F  PLATELETS 249.0 150.0-450.0 10(3)uL  F  NEUTROPHIL ABS PRELIM 3.89 1.50-7.70 x10 (3) uL  F  NEUTROPHIL ABSOLUTE 3.89 1.50-7.70 x10(3)  uL  F  LYMPHOCYTE ABSOLUTE 1.15 1.00-4.00 x10(3) uL  F  MONOCYTE ABSOLUTE 0.42 0.10-1.00 x10(3) uL  F  EOSINOPHIL ABSOLUTE 0.06 0.00-0.70 x10(3) uL  F  BASOPHIL ABSOLUTE 0.06 0.00-0.20 x10(3) uL  F  IMMATURE GRANULOCYTE COUNT 0.02 0.00-1.00 x10(3) uL  F  NEUTROPHIL % 69.4 %  F  LYMPHOCYTE % 20.5 %  F  MONOCYTE % 7.5 %  F  EOSINOPHIL % 1.1 %  F  BASOPHIL % 1.1 %  F  IMMATURE GRANULOCYTE RATIO % 0.4 %  F  Diagnostics Details  Trans Thoracic Echocardiogram 09/10/2024  1.The left ventricle is normal in size. Mild concentric hypertrophy is noted. Global left ventricular systolic function is normal. The left ventricular ejection fraction is 58%. There are no regional wall motion abnormalities. There is Grade I diastolic dysfunction.    2.The right ventricle is normal in size. Right ventricular systolic function is normal.    3.Mild to moderate tricuspid regurgitation.    4.The pulmonary artery systolic pressure is 27 mmHg.    Holter Monitoring 12/08/2023  1.This is an excellent quality study.    2.Predominant rhythm is normal sinus rhythm.    3.The minimum heart rate recorded was 64 beats / minute (12/9/2023). The maximum heart rate is 134 beats / minute (12/9/2023). The mean heart rate is 77 beats / minute.    4.First degree AV block noted.    5.Frequent premature atrial contractions noted.    6.No evidence of atrial fibrillation noted.    7.Afib Maurice 0%    8.Rare premature ventricular contractions noted.    9.No evidence of ventricular tachycardia is noted.    10.No pauses were noted.    11.Sinus  bpm PVC's none PAC's 3% 8 atrial triplets No symptoms    ACTMonitoring 10/09/2023  1.This is a poor quality study.    2.Predominant rhythm is normal sinus rhythm.    3.The minimum heart rate recorded was 51 beats / minute. The maximum heart rate is 111 beats / minute. The mean heart rate is 70 beats / minute.    4.No evidence of AV block is noted.    5.Rare premature atrial contractions noted.    6.Rare premature  ventricular contractions noted.    7.No evidence of ventricular tachycardia is noted.    8.No pauses were noted.    9.Sinus  bpm PVC's 0.4% PAC's 1% 44 atrial runs up to 8 seconds and 140 bpm No atrial fibrillation Shortness of breath during sinus    Nuclear PET 09/11/2023  1.Stress EKG is non-diagnostic secondary to LVH on baseline EKG.    2.Pt denied chest pain.    3.Rare PAC's.    1.This is a normal perfusion study, no perfusion defects noted.    2.The left ventricular cavity is noted to be normal on the stress studies. The stress left ventricular ejection fraction was calculated to be 74% and left ventricular global function is normal. The rest left ventricular cavity is noted to be normal. The rest left ventricular ejection fraction was calculated to be 76% and rest left ventricular global function is normal.    3.When compared to the resting ejection fraction (76%), the stress ejection fraction (74%) has decreased.    4.The study quality is average.    Lower Extremity Venous Ultrasound 09/05/2023  1.The study quality is good.    2.Exam performed with the patient in reverse Trendelenburg position.    3.Normal compressibility, augmentation, and phasic flow in the bilateral DEEP lower extremity venous system. Negative study for DVT.    4.Right baker's cyst: 3.6 x 0.6cm.    5.--------------    6.Reflux is noted in the right and left GSV.    7.Bilateral small sapheanous vein appears to be heavily calcified with absence of color flow identified and non-compressible, suggestive of chronic thrombus.    8.Multiple perforators visualized right proximal-distal calf with no reflux found, refer to the body of the report.    9.There is an incompetent perforators seen left distal calf = 2.2mm with 0.4sec reflux.    Exercise Myocardial Perfusion Imaging 10/27/2021  1.Stress ECG normal with less than 1mm additional ST depression as compared to baseline ECG    2.No chest pain    3.No arrhythmias    1.This is a normal  perfusion study, no perfusion defects noted.    2.The left ventricular cavity is noted to be normal on the stress study. The left ventricular ejection fraction was calculated to be 69% and left ventricular global function is normal.    3.This scan is suggestive of low risk for future cardiovascular events.    4.The study quality is average.    Care Providers: Max Yates MD, Rupa SMITH and Coby SMITH  Electronically Authenticated by  Max Yates MD  10/18/2024 03:39:06 PM  Disclaimer: Components of this note were documented using voice recognition system and are subject to errors not corrected at proofreading. Contact the author of this note for any clarifications.

## 2024-11-01 NOTE — TELEPHONE ENCOUNTER
Patient asking if she can get injections in both knees again. Last OV was 1/31 and no notes indicated instructions for future injections. Please call.

## 2024-11-01 NOTE — TELEPHONE ENCOUNTER
This nurse spoke with patient.  She reports 5-6 loose, black bowel movement per day.  She is feeling dizzy, lightheaded and weak.  Her voice is audibly weak.  Nurse advised emergency room evaluation to check for anemia and GI bleed.  She agreed and will have her daughter take her. Dr. Stevie SUTTON.  Flagged for follow up.

## 2024-11-02 LAB
ANION GAP SERPL CALC-SCNC: 9 MMOL/L (ref 0–18)
BASOPHILS # BLD AUTO: 0.04 X10(3) UL (ref 0–0.2)
BASOPHILS NFR BLD AUTO: 0.6 %
BUN BLD-MCNC: 19 MG/DL (ref 9–23)
BUN/CREAT SERPL: 18.8 (ref 10–20)
CALCIUM BLD-MCNC: 8.9 MG/DL (ref 8.7–10.4)
CHLORIDE SERPL-SCNC: 106 MMOL/L (ref 98–112)
CO2 SERPL-SCNC: 21 MMOL/L (ref 21–32)
CREAT BLD-MCNC: 1.01 MG/DL
DEPRECATED HBV CORE AB SER IA-ACNC: 285 NG/ML
DEPRECATED RDW RBC AUTO: 56.6 FL (ref 35.1–46.3)
EGFRCR SERPLBLD CKD-EPI 2021: 55 ML/MIN/1.73M2 (ref 60–?)
EOSINOPHIL # BLD AUTO: 0.06 X10(3) UL (ref 0–0.7)
EOSINOPHIL NFR BLD AUTO: 0.9 %
ERYTHROCYTE [DISTWIDTH] IN BLOOD BY AUTOMATED COUNT: 13.8 % (ref 11–15)
GLUCOSE BLD-MCNC: 76 MG/DL (ref 70–99)
HCT VFR BLD AUTO: 32.9 %
HGB BLD-MCNC: 10.4 G/DL
IMM GRANULOCYTES # BLD AUTO: 0.03 X10(3) UL (ref 0–1)
IMM GRANULOCYTES NFR BLD: 0.4 %
INR BLD: 4.05 (ref 0.8–1.2)
IRON SATN MFR SERPL: 10 %
IRON SERPL-MCNC: 27 UG/DL
LYMPHOCYTES # BLD AUTO: 0.85 X10(3) UL (ref 1–4)
LYMPHOCYTES NFR BLD AUTO: 12.7 %
MCH RBC QN AUTO: 35.6 PG (ref 26–34)
MCHC RBC AUTO-ENTMCNC: 31.6 G/DL (ref 31–37)
MCV RBC AUTO: 112.7 FL
MONOCYTES # BLD AUTO: 0.61 X10(3) UL (ref 0.1–1)
MONOCYTES NFR BLD AUTO: 9.1 %
NEUTROPHILS # BLD AUTO: 5.08 X10 (3) UL (ref 1.5–7.7)
NEUTROPHILS # BLD AUTO: 5.08 X10(3) UL (ref 1.5–7.7)
NEUTROPHILS NFR BLD AUTO: 76.3 %
OSMOLALITY SERPL CALC.SUM OF ELEC: 283 MOSM/KG (ref 275–295)
PLATELET # BLD AUTO: 283 10(3)UL (ref 150–450)
POTASSIUM SERPL-SCNC: 3.9 MMOL/L (ref 3.5–5.1)
PROTHROMBIN TIME: 41.2 SECONDS (ref 11.6–14.8)
RBC # BLD AUTO: 2.92 X10(6)UL
SODIUM SERPL-SCNC: 136 MMOL/L (ref 136–145)
TIBC SERPL-MCNC: 270 UG/DL (ref 250–425)
TRANSFERRIN SERPL-MCNC: 181 MG/DL (ref 250–380)
WBC # BLD AUTO: 6.7 X10(3) UL (ref 4–11)

## 2024-11-02 PROCEDURE — 99223 1ST HOSP IP/OBS HIGH 75: CPT | Performed by: INTERNAL MEDICINE

## 2024-11-02 PROCEDURE — 99233 SBSQ HOSP IP/OBS HIGH 50: CPT | Performed by: HOSPITALIST

## 2024-11-02 RX ORDER — METOPROLOL SUCCINATE 25 MG/1
25 TABLET, EXTENDED RELEASE ORAL EVERY MORNING
Status: DISCONTINUED | OUTPATIENT
Start: 2024-11-02 | End: 2024-11-13

## 2024-11-02 RX ORDER — ATORVASTATIN CALCIUM 80 MG/1
80 TABLET, FILM COATED ORAL NIGHTLY
Status: DISCONTINUED | OUTPATIENT
Start: 2024-11-02 | End: 2024-11-13

## 2024-11-02 RX ORDER — HYDROXYUREA 500 MG/1
500 CAPSULE ORAL DAILY
Status: DISCONTINUED | OUTPATIENT
Start: 2024-11-02 | End: 2024-11-13

## 2024-11-02 NOTE — PHYSICAL THERAPY NOTE
PHYSICAL THERAPY EVALUATION - INPATIENT     Room Number: 525/525-A  Evaluation Date: 2024  Type of Evaluation: Initial   Physician Order: PT Eval and Treat    Presenting Problem: rectal bleed     Reason for Therapy: Mobility Dysfunction and Discharge Planning    PHYSICAL THERAPY ASSESSMENT   Patient is a 84 year old female admitted 2024 for rectal bleed.  Prior to admission, patient's baseline is indep.  Patient is currently functioning near baseline with bed mobility, transfers, and gait.    PLAN  Patient has been evaluated and presents with no skilled Physical Therapy needs at this time.  Patient will be discharged from Physical Therapy services. Please re-order if a new functional limitation presents during this admission.    PT Device Recommendation: None    PHYSICAL THERAPY MEDICAL/SOCIAL HISTORY   History related to current admission: ER with black diarrhea     Problem List  Principal Problem:    Rectal bleeding  Active Problems:    Diarrhea, unspecified type    Melena      HOME SITUATION  Type of Home: Condo  Home Layout: One level                     Lives With: Daughter    Drives: No          Prior Level of [place]: lives with dtr in 0 step condo.  Indep w/o device.  Dtr nearby in RN    SUBJECTIVE  I need to go to toilet    PHYSICAL THERAPY EXAMINATION   OBJECTIVE  Precautions:  (cdiff)  Fall Risk: Standard fall risk    WEIGHT BEARING RESTRICTION            PAIN ASSESSMENT  Ratin          COGNITION  Overall Cognitive Status:  WFL - within functional limits    RANGE OF MOTION AND STRENGTH ASSESSMENT  Upper extremity ROM and strength are within functional limits   Lower extremity ROM is within functional limits   Lower extremity strength is within functional limits     BALANCE  Static Sitting: Good  Dynamic Sitting: Good  Static Standing: Good  Dynamic Standing: Fair +    ADDITIONAL TESTS                                    NEUROLOGICAL FINDINGS                      ACTIVITY TOLERANCE                          O2 WALK       AM-PAC '6-Clicks' INPATIENT SHORT FORM - BASIC MOBILITY  How much difficulty does the patient currently have...  Patient Difficulty: Turning over in bed (including adjusting bedclothes, sheets and blankets)?: None   Patient Difficulty: Sitting down on and standing up from a chair with arms (e.g., wheelchair, bedside commode, etc.): None   Patient Difficulty: Moving from lying on back to sitting on the side of the bed?: A Little   How much help from another person does the patient currently need...   Help from Another: Moving to and from a bed to a chair (including a wheelchair)?: A Little   Help from Another: Need to walk in hospital room?: A Little   Help from Another: Climbing 3-5 steps with a railing?: A Little     AM-PAC Score:  Raw Score: 20   Approx Degree of Impairment: 35.83%   Standardized Score (AM-PAC Scale): 47.67   CMS Modifier (G-Code): CJ    FUNCTIONAL ABILITY STATUS  Functional Mobility/Gait Assessment  Gait Assistance: Supervision  Distance (ft): 30  Assistive Device: Rolling walker  Pattern: Shuffle  Rolling: supervision  Supine to Sit: supervision  Sit to Supine: supervision  Sit to Stand: supervision    Exercise/Education Provided:  Bed mobility  Body mechanics  Energy conservation  Functional activity tolerated  Gait training  ROM  Transfer training    Skilled Therapy Provided: Chart reviewed. RN aware. Patient up in bed.  A&O 3/3.  Sup with bed mob, sit to stand.  Walked 30 ft sup to toilet.  RN aware.  All needs left within reach.     The patient's Approx Degree of Impairment: 35.83% has been calculated based on documentation in the [facility] '6 clicks' Inpatient Basic Mobility Short Form.  Research supports that patients with this level of impairment may benefit from DC to home.  Final disposition will be made by interdisciplinary medical team.    Patient End of Session: Needs met;Call light within reach;RN aware of session/findings;All patient questions and  concerns addressed;In bathroom - nursing staff aware    Patient was able to achieve the following ...   Patient able to transfer  Safely and independently    Patient able to ambulate on level surfaces  Safely and independently     Patient Evaluation Complexity Level:  History Moderate - 1 or 2 personal factors and/or co-morbidities   Examination of body systems Moderate - addressing a total of 3 or more elements   Clinical Presentation  Moderate - Evolving   Clinical Decision Making  Moderate Complexity     Gait Trainin minutes  Therapeutic Activity:  25 minutes  Neuromuscular Re-education: 0 minutes  Therapeutic Exercise: 0 minutes  Canalith Repositionin minutes  Manual Therapy: 0 minutes  Can add/delete any of these

## 2024-11-02 NOTE — CONSULTS
[facility] Cardiology  Report of Consultation    Tara Lockwood Patient Status:  Inpatient    1940 MRN [id number]   Location [facility] 5SW/SE Attending Loy Vences MD   Hosp Day # 1 PCP Robbie Hubbard MD     Reason for Consultation:  Rectal bleed.      History of Present Illness:  Tara Lockwood is a a(n) 84 year old female, known to our group, especially my partner Dr. Yates.  She presented to the emergency room yesterday afternoon with dizziness and diarrhea with dark black stools.  She was found to have melena and anemia.  She was admitted to the hospital.  Cardiology was asked see her for opinion and advice regarding anticoagulation management.  She has a history of sick sinus syndrome with paroxysmal atrial fibrillation and prior pacemaker, DVT with recurrent pulmonary embolism and lacunar stroke.  She has been on systemic anticoagulation with warfarin.    History:  Past Medical History:    Acid reflux    Acute medial meniscus tear of right knee    Acute medial meniscus tear of right knee    Acute midline low back pain with left-sided sciatica    Age-related nuclear cataract of both eyes    Arthritis    Degenerative disc disease, lumbar    Displaced fracture (avulsion) of medial epicondyle of right humerus, subsequent encounter for fracture with routine healing    Diverticulosis large intestine w/o perforation or abscess w/o bleeding    Diverticulosis large intestine w/o perforation or abscess w/o bleeding    Family history of glaucoma in mother    Family history of glaucoma in mother    Family history of macular degeneration    Gastric polyps    Gastric polyps    Hiatal hernia    History of hip replacement, total    right hip    Iatrogenic pulmonary embolism and infarction, initial encounter (HCC)    Internal hemorrhoids    Kidney problem    Long term (current) use of anticoagulants    Lumbar herniated disc    Lung cancer (HCC)    Obesity, unspecified    Osteoarthrosis    Osteoarthrosis,  unspecified whether generalized or localized, unspecified site    Post-menopausal bleeding    Primary osteoarthritis of left knee    Primary osteoarthritis of right knee    Pulmonary embolism (HCC)    S/P colonoscopy with polypectomy    Spinal stenosis of lumbar region without neurogenic claudication    Spondylolisthesis of lumbar region    Thyroid condition     Past Surgical History:   Procedure Laterality Date    Cholecystectomy      Colonoscopy      Colonoscopy N/A 2017    Procedure: COLONOSCOPY;  Surgeon: Clare Victor MD;  Location: [facility] ENDOSCOPY    Egd      Foot surgery Bilateral     Bunion Surgery X6    Foot/toes surgery proc unlisted Left     Pt had surgery to straighten toes on left foot.    Hip replacement surgery Right     Hip surgery Right 2015    right total hip arthroplasty    Hysterectomy      Laparoscopic cholecystectomy            x7 (Twins x1) Max weight 9 1/2 lbs    Other surgical history      parotid gland removed left benigh    Other surgical history      Removal Skin Mole    Other surgical history      SAB/ D&C    Other surgical history      Ear Surgery    Other surgical history      Neg EM Bx    Removal of lung,lobectomy Left 2016    Pt had upper left lobe removed.     Family Hx: No cardiac disease.   reports that she has never smoked. She has never been exposed to tobacco smoke. She has never used smokeless tobacco. She reports that she does not currently use alcohol after a past usage of about 0.8 standard drinks of alcohol per week. She reports that she does not use drugs.    Allergies:  Allergies[1]    Medications:    Current Facility-Administered Medications:     atorvastatin (Lipitor) tab 80 mg, 80 mg, Oral, Nightly    hydroxyurea (Hydrea) cap 500 mg, 500 mg, Oral, Daily    metoprolol succinate ER (Toprol XL) 24 hr tab 25 mg, 25 mg, Oral, QAM    acetaminophen (Tylenol Extra Strength) tab 500 mg, 500 mg, Oral, Q4H PRN    ondansetron (Zofran) 4 MG/2ML injection 4  mg, 4 mg, Intravenous, Q6H PRN    metoclopramide (Reglan) 5 mg/mL injection 5 mg, 5 mg, Intravenous, Q8H PRN    pantoprazole (Protonix) 40 mg in sodium chloride 0.9% PF 10 mL IV push, 40 mg, Intravenous, Daily    influenza virus trivalent high dose PF (Fluzone HD) 0.5 mL injection (ages >/= 65 years) 0.5 mL, 0.5 mL, Intramuscular, Prior to discharge  Home medications as per Dr. Yates scanned office note from 10/17/2024.    Review of Systems:  All systems were reviewed and are negative except as described above in HPI.    Physical Exam:  Blood pressure 149/58, pulse 80, temperature 98.3 °F (36.8 °C), temperature source Oral, resp. rate 18, height 5' 7\" (1.702 m), weight 230 lb 12.8 oz (104.7 kg), SpO2 96%.  Temp (24hrs), Av.4 °F (36.9 °C), Min:97.9 °F (36.6 °C), Max:99.3 °F (37.4 °C)    Wt Readings from Last 3 Encounters:   24 230 lb 12.8 oz (104.7 kg)   10/25/24 233 lb (105.7 kg)   24 231 lb (104.8 kg)       Telemetry: NSR.  General: Alert and in no apparent distress.  HEENT: No scleral icterus.  Conjunctiva pale.  Neck: No JVD, carotids 2+ no bruits.  Cardiac: Regular rhythm, S1, S2 normal, no murmur, rub or gallop.  Lungs: Clear without wheezes.  Abdomen: Soft, non-tender.   Extremities: Without clubbing, cyanosis or edema.  Peripheral pulses are 2+.  Neurologic: Alert and oriented, normal affect.  Skin: Warm and dry.     Laboratories and Data:  Diagnostics:  EKG: Normal sinus rhythm with PVCs.  Echo: 2024, normal LVEF 55-60%.  Normal RVEF.  Stress Test: PET MPI 2023 normal LVEF 74%.  Normal perfusion.  Cardiac MRI 2024 normal LVEF 64%.  No inflammation or fibrosis.  CXR: Not done.  Last x-ray 2024 by report with atelectasis only.    Labs:   Recent Labs   Lab 24  1618 24  0513   GLU 95 76   BUN 21 19   CREATSERUM 1.19* 1.01   CA 9.4 8.9    136   K 4.3 3.9    106   CO2 23.0 21.0      Lab Results   Component Value Date    WBC 6.7 2024    RBC 2.92 2024     HGB 10.4 11/02/2024    HCT 32.9 11/02/2024    .7 11/02/2024    MCH 35.6 11/02/2024    MCHC 31.6 11/02/2024    RDW 13.8 11/02/2024    .0 11/02/2024     Lab Results   Component Value Date    PT 13.1 05/11/2016    PT 15.7 (H) 07/13/2015    PT 22.6 (H) 04/13/2015    INR 4.05 (H) 11/02/2024    INR 3.89 (H) 11/01/2024    INR 2.6 (A) 10/01/2024       Assessment/Plan:  Patient Active Problem List   Diagnosis    Obesity    Pulmonary emphysema (HCC)    Nontoxic multinodular goiter    Gastroesophageal reflux disease    Stage 3a chronic kidney disease (HCC)    Control of atrial fibrillation with pacemaker (HCC)    Atrial fibrillation with rapid ventricular response (HCC)    Osteoarthritis of hip, unspecified    Monitoring for long-term anticoagulant use    Iatrogenic pulmonary embolism and infarction, initial encounter (HCC)    Bilateral leg edema    Bone spur of left foot    Chronic diastolic (congestive) heart failure (HCC)    Chronic dyspnea    Chronic myeloproliferative disease (HCC)    Displaced fracture (avulsion) of medial epicondyle of right humerus, subsequent encounter for fracture with routine healing    Tachycardia    Tachycardia-bradycardia (HCC)    Unsteadiness on feet    Acute CVA (cerebrovascular accident) (HCC)    CVA (cerebral vascular accident) (HCC)    Polycythemia vera (HCC)    Malignant neoplasm of upper lobe of left lung (HCC)    Chemotherapy management, encounter for    Lung nodules    History of pulmonary embolism    History of DVT (deep vein thrombosis)    Anticoagulant long-term use    Anticoagulation management encounter    Rectal bleeding    Diarrhea, unspecified type    Melena       Diarrhea with melena and anemia.  Hemoglobin 10.4 today.  Last EGD/colonoscopy 2017.  Long-term systemic anticoagulation, INR 4.05 today.  Sick sinus syndrome with history of prior pacemaker.  Currently in normal sinus rhythm.  Hold warfarin given melena.  Will let INR trend down on its own.  Tentative  plan is for endoscopic evaluation on Monday, 11/4.  Reviewed with patient and nursing.  Will follow.  Thanks.    Demond Amor MD  11/2/2024  11:54 AM  C5       [1]   Allergies  Allergen Reactions    Fentanyl ANAPHYLAXIS    Hydrocodone RASH    Morphine OTHER (SEE COMMENTS)     Chest tightness     Phenol ANAPHYLAXIS

## 2024-11-02 NOTE — H&P
[facility]    PATIENT'S NAME: KAMILA HARPER   ATTENDING PHYSICIAN: Loy Vences MD   PATIENT ACCOUNT#:   [id number]    LOCATION:  [address] [place] [address]  MEDICAL RECORD #:   [id number]       YOB: 1940  ADMISSION DATE:       11/01/2024    HISTORY AND PHYSICAL EXAMINATION    CHIEF COMPLAINT:  Melena and hemoglobin drop.    HISTORY OF PRESENT ILLNESS:  Patient is an 84-year-old  female who was sent today to the emergency department for evaluation of progressive dizziness, fatigue, and dark melanotic bowel movements.  CBC today showed hemoglobin of 11.4 which has dropped from 12.5 at baseline around a month ago.  Chemistry showed BUN of 21 and creatinine of 1.19.  BUN and creatinine ratio is elevated but within normal range.  GFR at baseline 45.  Liver function tests were unremarkable.  .4.  Patient takes hydroxyurea.  Rectal exam done by the emergency room physician showed dark brown to dark bowel movement.  Positive for Hemoccult blood.    PAST MEDICAL HISTORY:  Reviewing the records, patient had a colonoscopy and EGD in 2017 which showed mild gastritis, hiatal hernia, diverticulosis.  No other acute findings at that time.  She had history of chronic atrial fibrillation and recurrent DVT and PE, anticoagulated chronically with Coumadin.  She had chronic kidney disease stage 3, hypertension, generalized osteoarthritis, degenerative joint disease of lumbar spine, left ventricular diastolic dysfunction with moderate tricuspid regurgitation and mild to moderate pulmonary artery hypertension.  She had MATT positive polycythemia with myeloproliferative disorder chronically treated with hydroxyurea.    PAST SURGICAL HISTORY:  Left upper lobectomy for lung cancer, cholecystectomy, hysterectomy, left parotidectomy, bilateral foot bunionectomy procedures, and right total hip arthroplasty.    MEDICATIONS:  Please see medication reconciliation list.    ALLERGIES:  Fentanyl, hydrocodone, and side  effects to morphine.    FAMILY HISTORY:  Mother had Alzheimer dementia and bladder cancer.  Father had lung cancer.    SOCIAL HISTORY:  No tobacco, alcohol, or drug use.  Lives with her family.  Independent for basic activities of daily living.    REVIEW OF SYSTEMS:  She does have chronic orthostatic hypotension and dizziness which has become worse lately.  She had chronic diarrhea, has become more frequent with more frequent episodes of dark to black bowel movements.  She does have chronic dyspepsia and discomfort when she eats.  No nausea or vomiting.  No syncopal episodes.  Other 12-point review of systems is negative.      PHYSICAL EXAMINATION:    GENERAL:  Alert and oriented to time, place, and person.  No acute distress.  VITAL SIGNS:  Temperature 99.3, pulse 76, respiratory rate 18, blood pressure 133/55, pulse ox 98% on room air.  HEENT:  Atraumatic.  Oropharynx clear.  Dry mucous membranes.  Ears, Nose:  Normal.  Eyes:  Anicteric sclerae.  NECK:  Supple.  No lymphadenopathy.  Trachea midline.  Full range of motion.  No jugular venous distention noted on neck exam.  LUNGS:  Clear to auscultation bilaterally.  Normal respiratory effort.  HEART:  Regular rate and rhythm.  S1 and S2 auscultated.  ABDOMEN:  Soft, nondistended.  No tenderness.  Positive bowel sounds.  EXTREMITIES:  No peripheral edema.  Has +2 edema.  No clubbing or cyanosis.  NEUROLOGIC:  Motor and sensory intact.    ASSESSMENT:    1.   Hemoccult positive.  Loose bowel movements.  Rule out significant gastrointestinal blood loss.  Patient is anticoagulated with Coumadin.  2.   Progressive mild anemia.  3.   Orthostatic hypotension with worsening symptoms recently indicative of intravascular hypovolemia.  4.   History of chronic atrial fibrillation.  Recurrent deep venous thrombosis and pulmonary embolism.  5.   Myeloproliferative disorder.  6.   Chronic kidney disease stage 3.    PLAN:  Patient will be admitted to general medical floor.  Monitor  hemodynamic status.  Gentle hydration.  Hold Coumadin.  Obtain cardiology and gastroenterology consult.  Place her on IV Protonix, clear liquid diet, fall precautions.  Stool for C difficile was ordered by the emergency room physician.  Further recommendations to follow.    Dictated By Montrell Mcqueen MD  d: 11/01/2024 18:21:38  t: 11/02/2024 13:50:36  Job [id number]  FB/

## 2024-11-02 NOTE — CONSULTS
Is this a shared or split note between Advanced Practice Provider and Physician? Yes       [facility]   Gastroenterology Consultation Note    Tara Lockwood  Patient Status:    Observation  Date of Admission:         11/1/2024, Hospital day #0  Attending:   Montrell Mcqueen MD  PCP:     Robbie Hubbard MD    Reason for Consultation:  Melena, anemia    History of Present Illness:  Tara Lockwood is a 84 year old female w/ PMHx of left upper lobe adenocarcinoma, DVT/PE on warfarin, polycythemia vera, COPD who presented to ER for dark stools. She notes over last 2 months has had on and off episodes of melena, however last 6 stools have been dark black watery. She notes she does have hx of GERD for which she takes Nexium on a daily basis. Denies nausea and vomiting. Denies globus, dysphagia. Denies bright red blood per rectum. Denies abdominal pain, fevers and chills. Denies sick contacts. Was recently on abx for cyst removal on foot. She notes fatigue and SOB but that is patient baseline. Denies CP, SOB.     Of note, had brown BM this am.     Pertinent Family Hx:  - No known history of esophageal, gastric or colon cancers  - No known IBD  - No known liver pathologies    Endoscopy Hx:  - EGD 2017  POSTOPERATIVE DIAGNOSIS:    1.       Mild gastritis, status post gastric biopsies.  2.       Fundic glandular polyps in the gastric body.  3.       Small hiatal hernia.    - Colonoscopy 2017  POSTOPERATIVE DIAGNOSIS:    1.       Rule out microscopic colitis status post random colon biopsies.  2.       Status post polypectomy x2 diminutive polyps.  3.       Diverticular disease, few, left-sided, uncomplicated.  4.       Internal hemorrhoids.    Social Hx:  - No tobacco use/No ETOH  - Denies illicit drug use  - Lives with: alone  - Occupation: retired       History:  Past Medical History:    Acid reflux    Acute medial meniscus tear of right knee    Acute medial meniscus tear of right knee    Acute midline low  back pain with left-sided sciatica    Age-related nuclear cataract of both eyes    Arthritis    Degenerative disc disease, lumbar    Displaced fracture (avulsion) of medial epicondyle of right humerus, subsequent encounter for fracture with routine healing    Diverticulosis large intestine w/o perforation or abscess w/o bleeding    Diverticulosis large intestine w/o perforation or abscess w/o bleeding    Family history of glaucoma in mother    Family history of glaucoma in mother    Family history of macular degeneration    Gastric polyps    Gastric polyps    Hiatal hernia    History of hip replacement, total    right hip    Iatrogenic pulmonary embolism and infarction, initial encounter ([facility])    Internal hemorrhoids    Kidney problem    Long term (current) use of anticoagulants    Lumbar herniated disc    Lung cancer (HCC)    Obesity, unspecified    Osteoarthrosis    Osteoarthrosis, unspecified whether generalized or localized, unspecified site    Post-menopausal bleeding    Primary osteoarthritis of left knee    Primary osteoarthritis of right knee    Pulmonary embolism (HCC)    S/P colonoscopy with polypectomy    Spinal stenosis of lumbar region without neurogenic claudication    Spondylolisthesis of lumbar region    Thyroid condition     Past Surgical History:   Procedure Laterality Date    Cholecystectomy      Colonoscopy      Colonoscopy N/A 2017    Procedure: COLONOSCOPY;  Surgeon: Clare Victor MD;  Location: [facility] ENDOSCOPY    Egd      Foot surgery Bilateral     Bunion Surgery X6    Foot/toes surgery proc unlisted Left     Pt had surgery to straighten toes on left foot.    Hip replacement surgery Right     Hip surgery Right     right total hip arthroplasty    Hysterectomy      Laparoscopic cholecystectomy            x7 (Twins x1) Max weight 9 1/2 lbs    Other surgical history      parotid gland removed left benigh    Other surgical history      Removal Skin Mole    Other surgical  history      SAB/ D&C    Other surgical history      Ear Surgery    Other surgical history  7/13    Neg EM Bx    Removal of lung,lobectomy Left 2016    Pt had upper left lobe removed.     Family History   Problem Relation Age of Onset    Cancer Father 87        Lung  -  smoker    Macular degeneration Mother     Glaucoma Mother     Other (Alzheimer's Disease) Mother 93    Other (Bladder Cancer) Mother     Macular degeneration Brother     Other (Pancreatic Cancer) Sister 73    Macular degeneration Brother     Other (Myocardial Infarction) Brother 69    Macular degeneration Maternal Aunt     Glaucoma Maternal Aunt     Breast Cancer Daughter 48    Diabetes Neg       reports that she has never smoked. She has never been exposed to tobacco smoke. She has never used smokeless tobacco. She reports that she does not currently use alcohol after a past usage of about 0.8 standard drinks of alcohol per week. She reports that she does not use drugs.    Allergies:  Allergies[1]    Medications:    Current Facility-Administered Medications:     acetaminophen (Tylenol Extra Strength) tab 500 mg, 500 mg, Oral, Q4H PRN    ondansetron (Zofran) 4 MG/2ML injection 4 mg, 4 mg, Intravenous, Q6H PRN    metoclopramide (Reglan) 5 mg/mL injection 5 mg, 5 mg, Intravenous, Q8H PRN    pantoprazole (Protonix) 40 mg in sodium chloride 0.9% PF 10 mL IV push, 40 mg, Intravenous, Daily    sodium chloride 0.9% infusion, , Intravenous, Continuous    influenza virus trivalent high dose PF (Fluzone HD) 0.5 mL injection (ages >/= 65 years) 0.5 mL, 0.5 mL, Intramuscular, Prior to discharge    Review of Systems:   CONSTITUTIONAL:  negative for fevers, chills, unintentional weight loss   EYES:  negative for diplopia or change in vision   RESPIRATORY:  negative for severe shortness of breath  CARDIOVASCULAR:  negative for crushing sub-sternal chest pain  GASTROINTESTINAL:  see HPI  GENITOURINARY:  negative for dysuria or gross hematuria  INTEGUMENT/BREAST:   SKIN:  negative for jaundice or new rash   ALLERGIC/IMMUNOLOGIC:  negative for hay fever   ENDOCRINE:  negative for cold intolerance and heat intolerance  MUSCULOSKELETAL:  negative for joint effusion/severe erythema  NEURO: negative for new loss of consciousness or dizziness   BEHAVIOR/PSYCH:  negative for psychotic behavior    Physical Exam:    Blood pressure 153/52, pulse 75, temperature 98.1 °F (36.7 °C), temperature source Oral, resp. rate 20, height 5' 7\" (1.702 m), weight 230 lb 12.8 oz (104.7 kg), SpO2 95%. Body mass index is 36.15 kg/m².    Gen: awake, alert patient, NAD  HEENT: EOMI, the sclera appears anicteric, oropharynx clear, mucus membranes appear moist  CV: RRR  Lung: no conversational dyspnea   Abdomen: soft NTND abdomen with NABS appreciated   Back: No CVA tenderness   Skin: dry, warm, no jaundice  Ext: no LE edema is evident  Neuro: Alert and interactive  Psych: calm, cooperative    Laboratory Data:  Lab Results   Component Value Date    WBC 6.7 11/02/2024    HGB 10.4 11/02/2024    HCT 32.9 11/02/2024    .0 11/02/2024    CREATSERUM 1.01 11/02/2024    BUN 19 11/02/2024     11/02/2024    K 3.9 11/02/2024     11/02/2024    CO2 21.0 11/02/2024    GLU 76 11/02/2024    CA 8.9 11/02/2024    ALB 3.4 11/01/2024    ALKPHO 102 11/01/2024    BILT 0.8 11/01/2024    TP 6.0 11/01/2024     11/01/2024    ALT 48 11/01/2024    INR 4.05 11/02/2024       Imaging:  No results found.    Assessment & Plan   Tara Lockwood is a 84 year old female w/ PMHx of left upper lobe adenocarcinoma, DVT/PE on warfarin, polycythemia vera, COPD who presented to ER for dark stools.     #anemia  #melena   #diarrhea   -been having 2-3 episodes of watery black stools per day over last 3 days  -on and off melena last 2 months, had episodes of brown stools  -hemoglobin in ER 11.4, today 10.4 no melena had brown BM this am  -last cln/EGD in 2017, HH, diverticulosis and polyps noted  -etiology of symptoms possibly  AVM, PUD, infectious, malignancy  -INR today 4.05, continue to work on correction  -EGD to be completed when INR theraputic   -stool studies pending, iron studies pending     #a fib  #hx of PE/DVT  -cardiology following, continue to hold warfarin   -will need cardiac optimization prior to EGD    Recommend:  -IV PPI BID  -iron studies pending  -trend hemoglobin, goal >7  -cardiology on consult  -EGD once INR improves  -stool studies pending      Thank you for the opportunity to participate in the care of this patient.    Case discussed with Blanco Dominguez MD.    Antonia Williamson PA-C  [facility] Gastroenterology  11/2/2024         [1]   Allergies  Allergen Reactions    Fentanyl ANAPHYLAXIS    Hydrocodone RASH    Morphine OTHER (SEE COMMENTS)     Chest tightness     Phenol ANAPHYLAXIS

## 2024-11-02 NOTE — H&P
[facility]  part of [facility]    History & Physical    Tara Lockwood Patient Status:  Observation    1940 MRN [id number]   Location [facility] 5SW/SE Attending Loy Vences MD   Hosp Day # 0 PCP Robbie Hubbard MD     Date:  2024  Date of Admission:  2024    History provided by:patient  Chief Complaint:     Chief Complaint   Patient presents with    Dizziness    Tarry Stools       HPI:   Tara Lockwood is an 84-year-old female with hx of chronic diarrhea, chronic dyspnea, paroxysmal atrial fibrillation on warfarin, chronic hypotension on midodrine, SSS w/ PPM, who presents with black diarrhea. Patient states she has intermittent diarrhea for a while, with painless black loose stools in the past several days. She denies vomiting, fall, or lightheadedness. In ED, stool was guaiac positive. Hgb 11 with normal vitals, no tachycardia or hypotension. GI consulted.    History     Past Medical History:    Acid reflux    Acute medial meniscus tear of right knee    Acute medial meniscus tear of right knee    Acute midline low back pain with left-sided sciatica    Age-related nuclear cataract of both eyes    Arthritis    Degenerative disc disease, lumbar    Displaced fracture (avulsion) of medial epicondyle of right humerus, subsequent encounter for fracture with routine healing    Diverticulosis large intestine w/o perforation or abscess w/o bleeding    Diverticulosis large intestine w/o perforation or abscess w/o bleeding    Family history of glaucoma in mother    Family history of glaucoma in mother    Family history of macular degeneration    Gastric polyps    Gastric polyps    Hiatal hernia    History of hip replacement, total    right hip    Iatrogenic pulmonary embolism and infarction, initial encounter (HCC)    Internal hemorrhoids    Kidney problem    Long term (current) use of anticoagulants    Lumbar herniated disc    Lung cancer (HCC)    Obesity, unspecified     Osteoarthrosis    Osteoarthrosis, unspecified whether generalized or localized, unspecified site    Post-menopausal bleeding    Primary osteoarthritis of left knee    Primary osteoarthritis of right knee    Pulmonary embolism (HCC)    S/P colonoscopy with polypectomy    Spinal stenosis of lumbar region without neurogenic claudication    Spondylolisthesis of lumbar region    Thyroid condition     Past Surgical History:   Procedure Laterality Date    Cholecystectomy      Colonoscopy      Colonoscopy N/A 2017    Procedure: COLONOSCOPY;  Surgeon: Clare Victor MD;  Location: [facility] ENDOSCOPY    Egd      Foot surgery Bilateral     Bunion Surgery X6    Foot/toes surgery proc unlisted Left     Pt had surgery to straighten toes on left foot.    Hip replacement surgery Right     Hip surgery Right 2015    right total hip arthroplasty    Hysterectomy      Laparoscopic cholecystectomy            x7 (Twins x1) Max weight 9 1/2 lbs    Other surgical history      parotid gland removed left benigh    Other surgical history      Removal Skin Mole    Other surgical history      SAB/ D&C    Other surgical history      Ear Surgery    Other surgical history      Neg EM Bx    Removal of lung,lobectomy Left 2016    Pt had upper left lobe removed.     Family History   Problem Relation Age of Onset    Cancer Father 87        Lung  -  smoker    Macular degeneration Mother     Glaucoma Mother     Other (Alzheimer's Disease) Mother 93    Other (Bladder Cancer) Mother     Macular degeneration Brother     Other (Pancreatic Cancer) Sister 73    Macular degeneration Brother     Other (Myocardial Infarction) Brother 69    Macular degeneration Maternal Aunt     Glaucoma Maternal Aunt     Breast Cancer Daughter 48    Diabetes Neg      Social History:  Social History     Socioeconomic History    Marital status:    Tobacco Use    Smoking status: Never     Passive exposure: Never    Smokeless tobacco: Never   Vaping Use     Vaping status: Never Used   Substance and Sexual Activity    Alcohol use: Not Currently     Alcohol/week: 0.8 standard drinks of alcohol     Types: 1 Glasses of wine per week     Comment: 1-2x/month    Drug use: No   Other Topics Concern    Caffeine Concern Yes     Comment: 2 cups coffee daily    Pt has a pacemaker No    Pt has a defibrillator No    Reaction to local anesthetic No     Social Drivers of Health     Financial Resource Strain: Low Risk  (5/15/2024)    Financial Resource Strain     Difficulty of Paying Living Expenses: Not very hard     Med Affordability: No   Food Insecurity: No Food Insecurity (2024)    Food Insecurity     Food Insecurity: Never true   Transportation Needs: No Transportation Needs (2024)    Transportation Needs     Lack of Transportation: No   Recent Concern: Transportation Needs - Unmet Transportation Needs (2024)    Transportation Needs     Lack of Transportation: Yes   Housing Stability: Low Risk  (2024)    Housing Stability     Housing Instability: No     Allergies/Medications:   Allergies: Allergies[1]  Medications Prior to Admission   Medication Sig    midodrine 5 MG Oral Tab Take 1 tablet (5 mg total) by mouth in the morning and 1 tablet (5 mg total) at noon and 1 tablet (5 mg total) in the evening.    metoprolol succinate ER 25 MG Oral Tablet 24 Hr Take 1 tablet (25 mg total) by mouth every morning.    [] amoxicillin clavulanate 875-125 MG Oral Tab Take 1 tablet by mouth 2 (two) times daily.    hydroxyurea 500 MG Oral Cap Take 1 capsule (500 mg total) by mouth daily.    oxybutynin ER 5 MG Oral Tablet 24 Hr Take 1 tablet (5 mg total) by mouth daily.    Fluticasone-Salmeterol 113-14 MCG/ACT Inhalation Aerosol Powder, Breath Activated Inhale 1 Inhalation into the lungs daily.    atorvastatin 80 MG Oral Tab Take 1 tablet (80 mg total) by mouth nightly.    warfarin 5 MG Oral Tab Take as directed by INR clinic or take one & 1/2 tabs , ,  Saturdays. Take one tab all other days    Multiple Vitamins-Minerals (MULTIVITAMIN ADULT OR) Take 1 tablet by mouth daily.    acetaminophen (TYLENOL) 325 MG Oral Tab Take 500 mg by mouth every 6 (six) hours as needed for Pain.         Review of Systems:   Negative except depicted in HPI.    A comprehensive 12 point review of systems was completed.  Pertinent positives and negatives noted in the the HPI.    Physical Exam:   Vital Signs:  Blood pressure 153/52, pulse 75, temperature 98.1 °F (36.7 °C), temperature source Oral, resp. rate 20, height 5' 7\" (1.702 m), weight 230 lb 12.8 oz (104.7 kg), SpO2 95%.     GENERAL:  The patient appeared to be in no distress.    SKIN:  Warm and hydrated  HEENT:  Head was atraumatic and normocephalic.  Eyes:  Extraocular muscles were intact.  Sclera was anicteric.  Pupils were equally reactive to light.  Ears:  There were no lesions.  Nose:  No lesions were noted.   NECK:  Supple.  There was no JVD.   CHEST:  Symmetrical movement on inspiration  HEART:  S1 and S2 heard.  RRR   LUNGS:  Air entry was good.  No crackles or wheezes   ABDOMEN: Soft and non-tender.  Bowel sounds were present.  MUSCULOSKELETAL:  There was no deformity.  There was full range of motion in all the extremities.   EXTREMITIES: There was no edema, clubbing or cyanosis  NEUROLOGICAL:  There was no focal deficit.  Cranial nerves II through XII were intact.  PSYCHIATRIC: Calm and cooperative     Results:     Lab Results   Component Value Date    WBC 6.7 11/02/2024    HGB 10.4 (L) 11/02/2024    HCT 32.9 (L) 11/02/2024    .0 11/02/2024    CREATSERUM 1.01 11/02/2024    BUN 19 11/02/2024     11/02/2024    K 3.9 11/02/2024     11/02/2024    CO2 21.0 11/02/2024    GLU 76 11/02/2024    CA 8.9 11/02/2024    ALB 3.4 11/01/2024    ALKPHO 102 11/01/2024    BILT 0.8 11/01/2024    TP 6.0 11/01/2024     (H) 11/01/2024    ALT 48 11/01/2024    PTT 33.4 05/13/2024    INR 4.05 (H) 11/02/2024    PT 13.1  05/11/2016    T4F 1.0 05/03/2022    TSH 3.521 09/05/2024    CRP < 0.5 05/29/2014    MG 2.3 03/20/2024    B12 734 09/05/2024       No results found.  EKG 12 Lead    Result Date: 11/2/2024  Sinus rhythm with frequent Premature ventricular complexes Possible Left atrial enlargement Borderline ECG When compared with ECG of 13-MAY-2024 17:07, Premature ventricular complexes are now Present Nonspecific T wave abnormality, improved in Lateral leads     Assessment/Plan:   Tara Lockwood is an 84-year-old female with hx of chronic diarrhea, chronic dyspnea, paroxysmal atrial fibrillation on warfarin, chronic hypotension on midodrine, SSS w/ PPM, who presents with black diarrhea.     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   - Oneal 2 V617 F+ polycythemia vera 11/2020, on hydroxyurea.  - patient follows Dr. Ceron  - continue home hydroxyurea    # History of ischemic stroke  - MRI of the brain 05/2024 showed lacunar stroke in the right cerebellum    Diet: CLD  PT/OT: consulted  DVT ppx: warfarin  Line: none  Code status: Full   Dispo: expect greater than 2 midnights    MDM: high Loy Vences MD, PhD  Message via Secure Chat  [facility]         [1]   Allergies  Allergen Reactions    Fentanyl ANAPHYLAXIS    Hydrocodone RASH    Morphine OTHER (SEE COMMENTS)     Chest tightness     Phenol ANAPHYLAXIS

## 2024-11-02 NOTE — PLAN OF CARE
Problem: Patient Centered Care  Goal: Patient preferences are identified and integrated in the patient's plan of care  Description: Interventions:  - What would you like us to know as we care for you? Patient states one daughter is a registered nurse and lives with other daughter; patient has 8 children  - Provide timely, complete, and accurate information to patient/family  - Incorporate patient and family knowledge, values, beliefs, and cultural backgrounds into the planning and delivery of care  - Encourage patient/family to participate in care and decision-making at the level they choose  - Honor patient and family perspectives and choices  Outcome: Progressing     Problem: Patient/Family Goals  Goal: Patient/Family Long Term Goal  Description: Patient's Long Term Goal: To get stronger; minimize fatigue    Interventions:  -Monitor VSS  -Monitor Labs  -Cards consult  -PT/OT consult   - See additional Care Plan goals for specific interventions  Outcome: Progressing  Goal: Patient/Family Short Term Goal  Description: Patient's Short Term Goal: To manage and/or stop rectal bleeding     Interventions:   -Monitor VSS  -Monitor Labs  -GI consult  -Monitor stools   -See additional Care Plan goals for specific interventions  Outcome: Progressing     Problem: PAIN - ADULT  Goal: Verbalizes/displays adequate comfort level or patient's stated pain goal  Description: INTERVENTIONS:  - Encourage pt to monitor pain and request assistance  - Assess pain using appropriate pain scale  - Administer analgesics based on type and severity of pain and evaluate response  - Implement non-pharmacological measures as appropriate and evaluate response  - Consider cultural and social influences on pain and pain management  - Manage/alleviate anxiety  - Utilize distraction and/or relaxation techniques  - Monitor for opioid side effects  - Notify MD/LIP if interventions unsuccessful or patient reports new pain  - Anticipate increased pain  with activity and pre-medicate as appropriate  Outcome: Progressing     Problem: SAFETY ADULT - FALL  Goal: Free from fall injury  Description: INTERVENTIONS:  - Assess pt frequently for physical needs  - Identify cognitive and physical deficits and behaviors that affect risk of falls.  - Sinclair fall precautions as indicated by assessment.  - Educate pt/family on patient safety including physical limitations  - Instruct pt to call for assistance with activity based on assessment  - Modify environment to reduce risk of injury  - Provide assistive devices as appropriate  - Consider OT/PT consult to assist with strengthening/mobility  - Encourage toileting schedule  Outcome: Progressing     Problem: DISCHARGE PLANNING  Goal: Discharge to home or other facility with appropriate resources  Description: INTERVENTIONS:  - Identify barriers to discharge w/pt and caregiver  - Include patient/family/discharge partner in discharge planning  - Arrange for needed discharge resources and transportation as appropriate  - Identify discharge learning needs (meds, wound care, etc)  - Arrange for interpreters to assist at discharge as needed  - Consider post-discharge preferences of patient/family/discharge partner  - Complete POLST form as appropriate  - Assess patient's ability to be responsible for managing their own health  - Refer to Case Management Department for coordinating discharge planning if the patient needs post-hospital services based on physician/LIP order or complex needs related to functional status, cognitive ability or social support system  Outcome: Progressing     Problem: METABOLIC/FLUID AND ELECTROLYTES - ADULT  Goal: Electrolytes maintained within normal limits  Description: INTERVENTIONS:  - Monitor labs and rhythm and assess patient for signs and symptoms of electrolyte imbalances  - Administer electrolyte replacement as ordered  - Monitor response to electrolyte replacements, including rhythm and repeat  lab results as appropriate  - Fluid restriction as ordered  - Instruct patient on fluid and nutrition restrictions as appropriate  Outcome: Progressing  Goal: Hemodynamic stability and optimal renal function maintained  Description: INTERVENTIONS:  - Monitor labs and assess for signs and symptoms of volume excess or deficit  - Monitor intake, output and patient weight  - Monitor urine specific gravity, serum osmolarity and serum sodium as indicated or ordered  - Monitor response to interventions for patient's volume status, including labs, urine output, blood pressure (other measures as available)  - Encourage oral intake as appropriate  - Instruct patient on fluid and nutrition restrictions as appropriate  Outcome: Progressing     Problem: SKIN/TISSUE INTEGRITY - ADULT  Goal: Skin integrity remains intact  Description: INTERVENTIONS  - Assess and document risk factors for pressure ulcer development  - Assess and document skin integrity  - Monitor for areas of redness and/or skin breakdown  - Initiate interventions, skin care algorithm/standards of care as needed  Outcome: Progressing  Goal: Oral mucous membranes remain intact  Description: INTERVENTIONS  - Assess oral mucosa and hygiene practices  - Implement preventative oral hygiene regimen  - Implement oral medicated treatments as ordered  Outcome: Progressing     Problem: HEMATOLOGIC - ADULT  Goal: Maintains hematologic stability  Description: INTERVENTIONS  - Assess for signs and symptoms of bleeding or hemorrhage  - Monitor labs and vital signs for trends  - Administer supportive blood products/factors, fluids and medications as ordered and appropriate  - Administer supportive blood products/factors as ordered and appropriate  Outcome: Progressing  Goal: Free from bleeding injury  Description: (Example usage: patient with low platelets)  INTERVENTIONS:  - Avoid intramuscular injections, enemas and rectal medication administration  - Ensure safe mobilization  of patient  - Hold pressure on venipuncture sites to achieve adequate hemostasis  - Assess for signs and symptoms of internal bleeding  - Monitor lab trends  - Patient is to report abnormal signs of bleeding to staff  - Avoid use of toothpicks and dental floss  - Use electric shaver for shaving  - Use soft bristle tooth brush  - Limit straining and forceful nose blowing  Outcome: Progressing     Problem: GASTROINTESTINAL - ADULT  Goal: Maintains or returns to baseline bowel function  Description: INTERVENTIONS:  - Assess bowel function  - Maintain adequate hydration with IV or PO as ordered and tolerated  - Evaluate effectiveness of GI medications  - Encourage mobilization and activity  - Obtain nutritional consult as needed  - Establish a toileting routine/schedule  - Consider collaborating with pharmacy to review patient's medication profile  Outcome: Not Progressing     Problem: Impaired Functional Mobility  Goal: Achieve highest/safest level of mobility/gait  Description: Interventions:  - Assess patient's functional ability and stability  - Promote increasing activity/tolerance for mobility and gait  - Educate and engage patient/family in tolerated activity level and precautions  - Recommend use of  RW for transfers and ambulation  Outcome: Not Progressing     Problem: Impaired Activities of Daily Living  Goal: Achieve highest/safest level of independence in self care  Description: Interventions:  - Assess ability and encourage patient to participate in ADLs to maximize function  - Promote sitting position while performing ADLs such as feeding, grooming, and bathing  - Educate and encourage patient/family in tolerated functional activity level and precautions during self-care  - Encourage patient to incorporate impaired side during daily activities to promote function  Outcome: Not Progressing

## 2024-11-02 NOTE — SPIRITUAL CARE NOTE
Spiritual Care Visit Note    Patient Name: Tara Lockwood Date of Spiritual Care Visit: 24   : 1940 Primary Dx: Rectal bleeding       Referred By: Referral From: Patient    Spiritual Care Taxonomy:    Intended Effects: Sharla affirmation    Methods: Encouraging spiritual/Yarsani practices    Interventions: Provide compassionate touch;Prayer for healing;Acknowledge response to difficult experience    Visit Type/Summary:     - Spiritual Care: Responded to a request for spiritual care and assessed the patient for spiritual care needs. Offered empathic listening and emotional support. Provided information regarding how to contact Spiritual Care and left a Spiritual Care information card. Provided support for Patient's spiritual/Yarsani requests. Coordinated Judaism Communion and verified NPO status. Offered prayer.    Spiritual Care support can be requested via an Epic consult. For urgent/immediate needs, please contact the On Call  at: [place]: ext [phone]    Rev. Jen Shepard MDiv

## 2024-11-02 NOTE — PLAN OF CARE
Problem: Patient Centered Care  Goal: Patient preferences are identified and integrated in the patient's plan of care  Description: Interventions:  - What would you like us to know as we care for you? Patient states one daughter is a registered nurse and lives with other daughter; patient has 8 children  - Provide timely, complete, and accurate information to patient/family  - Incorporate patient and family knowledge, values, beliefs, and cultural backgrounds into the planning and delivery of care  - Encourage patient/family to participate in care and decision-making at the level they choose  - Honor patient and family perspectives and choices  Outcome: Progressing     Problem: Patient/Family Goals  Goal: Patient/Family Long Term Goal  Description: Patient's Long Term Goal: To get stronger; minimize fatigue    Interventions:  -Monitor VSS  -Monitor Labs  -Cards consult  -PT/OT consult   - See additional Care Plan goals for specific interventions  Outcome: Progressing  Goal: Patient/Family Short Term Goal  Description: Patient's Short Term Goal: To manage and/or stop rectal bleeding     Interventions:   -Monitor VSS  -Monitor Labs  -GI consult  -Monitor stools   -See additional Care Plan goals for specific interventions  Outcome: Progressing     Problem: PAIN - ADULT  Goal: Verbalizes/displays adequate comfort level or patient's stated pain goal  Description: INTERVENTIONS:  - Encourage pt to monitor pain and request assistance  - Assess pain using appropriate pain scale  - Administer analgesics based on type and severity of pain and evaluate response  - Implement non-pharmacological measures as appropriate and evaluate response  - Consider cultural and social influences on pain and pain management  - Manage/alleviate anxiety  - Utilize distraction and/or relaxation techniques  - Monitor for opioid side effects  - Notify MD/LIP if interventions unsuccessful or patient reports new pain  - Anticipate increased pain  with activity and pre-medicate as appropriate  Outcome: Progressing     Problem: SAFETY ADULT - FALL  Goal: Free from fall injury  Description: INTERVENTIONS:  - Assess pt frequently for physical needs  - Identify cognitive and physical deficits and behaviors that affect risk of falls.  - [place] fall precautions as indicated by assessment.  - Educate pt/family on patient safety including physical limitations  - Instruct pt to call for assistance with activity based on assessment  - Modify environment to reduce risk of injury  - Provide assistive devices as appropriate  - Consider OT/PT consult to assist with strengthening/mobility  - Encourage toileting schedule  Outcome: Progressing     Problem: DISCHARGE PLANNING  Goal: Discharge to home or other facility with appropriate resources  Description: INTERVENTIONS:  - Identify barriers to discharge w/pt and caregiver  - Include patient/family/discharge partner in discharge planning  - Arrange for needed discharge resources and transportation as appropriate  - Identify discharge learning needs (meds, wound care, etc)  - Arrange for interpreters to assist at discharge as needed  - Consider post-discharge preferences of patient/family/discharge partner  - Complete POLST form as appropriate  - Assess patient's ability to be responsible for managing their own health  - Refer to Case Management Department for coordinating discharge planning if the patient needs post-hospital services based on physician/LIP order or complex needs related to functional status, cognitive ability or social support system  Outcome: Progressing     Problem: GASTROINTESTINAL - ADULT  Goal: Maintains or returns to baseline bowel function  Description: INTERVENTIONS:  - Assess bowel function  - Maintain adequate hydration with IV or PO as ordered and tolerated  - Evaluate effectiveness of GI medications  - Encourage mobilization and activity  - Obtain nutritional consult as needed  - Establish a  toileting routine/schedule  - Consider collaborating with pharmacy to review patient's medication profile  Outcome: Progressing     Problem: METABOLIC/FLUID AND ELECTROLYTES - ADULT  Goal: Electrolytes maintained within normal limits  Description: INTERVENTIONS:  - Monitor labs and rhythm and assess patient for signs and symptoms of electrolyte imbalances  - Administer electrolyte replacement as ordered  - Monitor response to electrolyte replacements, including rhythm and repeat lab results as appropriate  - Fluid restriction as ordered  - Instruct patient on fluid and nutrition restrictions as appropriate  Outcome: Progressing  Goal: Hemodynamic stability and optimal renal function maintained  Description: INTERVENTIONS:  - Monitor labs and assess for signs and symptoms of volume excess or deficit  - Monitor intake, output and patient weight  - Monitor urine specific gravity, serum osmolarity and serum sodium as indicated or ordered  - Monitor response to interventions for patient's volume status, including labs, urine output, blood pressure (other measures as available)  - Encourage oral intake as appropriate  - Instruct patient on fluid and nutrition restrictions as appropriate  Outcome: Progressing     Problem: SKIN/TISSUE INTEGRITY - ADULT  Goal: Skin integrity remains intact  Description: INTERVENTIONS  - Assess and document risk factors for pressure ulcer development  - Assess and document skin integrity  - Monitor for areas of redness and/or skin breakdown  - Initiate interventions, skin care algorithm/standards of care as needed  Outcome: Progressing  Goal: Oral mucous membranes remain intact  Description: INTERVENTIONS  - Assess oral mucosa and hygiene practices  - Implement preventative oral hygiene regimen  - Implement oral medicated treatments as ordered  Outcome: Progressing     Problem: HEMATOLOGIC - ADULT  Goal: Maintains hematologic stability  Description: INTERVENTIONS  - Assess for signs and  symptoms of bleeding or hemorrhage  - Monitor labs and vital signs for trends  - Administer supportive blood products/factors, fluids and medications as ordered and appropriate  - Administer supportive blood products/factors as ordered and appropriate  Outcome: Progressing  Goal: Free from bleeding injury  Description: (Example usage: patient with low platelets)  INTERVENTIONS:  - Avoid intramuscular injections, enemas and rectal medication administration  - Ensure safe mobilization of patient  - Hold pressure on venipuncture sites to achieve adequate hemostasis  - Assess for signs and symptoms of internal bleeding  - Monitor lab trends  - Patient is to report abnormal signs of bleeding to staff  - Avoid use of toothpicks and dental floss  - Use electric shaver for shaving  - Use soft bristle tooth brush  - Limit straining and forceful nose blowing  Outcome: Progressing     Problem: Impaired Functional Mobility  Goal: Achieve highest/safest level of mobility/gait  Description: Interventions:  - Assess patient's functional ability and stability  - Promote increasing activity/tolerance for mobility and gait  - Educate and engage patient/family in tolerated activity level and precautions  - Recommend use of  RW for transfers and ambulation  Outcome: Progressing     Problem: Impaired Activities of Daily Living  Goal: Achieve highest/safest level of independence in self care  Description: Interventions:  - Assess ability and encourage patient to participate in ADLs to maximize function  - Promote sitting position while performing ADLs such as feeding, grooming, and bathing  - Educate and encourage patient/family in tolerated functional activity level and precautions during self-care  - Encourage patient to incorporate impaired side during daily activities to promote function  Outcome: Progressing

## 2024-11-03 ENCOUNTER — APPOINTMENT (OUTPATIENT)
Dept: ULTRASOUND IMAGING | Facility: HOSPITAL | Age: 84
End: 2024-11-03
Attending: PHYSICIAN ASSISTANT
Payer: MEDICARE

## 2024-11-03 ENCOUNTER — APPOINTMENT (OUTPATIENT)
Dept: GENERAL RADIOLOGY | Facility: HOSPITAL | Age: 84
End: 2024-11-03
Attending: NURSE PRACTITIONER
Payer: MEDICARE

## 2024-11-03 LAB
AFP-TM SERPL-MCNC: 109.9 NG/ML
ANION GAP SERPL CALC-SCNC: 8 MMOL/L (ref 0–18)
ANTIBODY SCREEN: NEGATIVE
BUN BLD-MCNC: 15 MG/DL (ref 9–23)
BUN/CREAT SERPL: 16.9 (ref 10–20)
CALCIUM BLD-MCNC: 9.1 MG/DL (ref 8.7–10.4)
CANCER AG19-9 SERPL-ACNC: 18.2 U/ML (ref ?–35)
CEA SERPL-MCNC: 1.6 NG/ML (ref ?–5)
CHLORIDE SERPL-SCNC: 104 MMOL/L (ref 98–112)
CO2 SERPL-SCNC: 23 MMOL/L (ref 21–32)
CREAT BLD-MCNC: 0.89 MG/DL
DEPRECATED RDW RBC AUTO: 55 FL (ref 35.1–46.3)
EGFRCR SERPLBLD CKD-EPI 2021: 63 ML/MIN/1.73M2 (ref 60–?)
ERYTHROCYTE [DISTWIDTH] IN BLOOD BY AUTOMATED COUNT: 13.6 % (ref 11–15)
GLUCOSE BLD-MCNC: 106 MG/DL (ref 70–99)
HAV AB SER QL IA: NONREACTIVE
HBV CORE AB SERPL QL IA: NONREACTIVE
HBV SURFACE AB SER QL: NONREACTIVE
HBV SURFACE AB SERPL IA-ACNC: <3.1 MIU/ML
HBV SURFACE AG SERPL QL IA: NONREACTIVE
HCT VFR BLD AUTO: 35.4 %
HCV AB SERPL QL IA: NONREACTIVE
HGB BLD-MCNC: 11.6 G/DL
INR BLD: 4.07 (ref 0.8–1.2)
MCH RBC QN AUTO: 35.5 PG (ref 26–34)
MCHC RBC AUTO-ENTMCNC: 32.8 G/DL (ref 31–37)
MCV RBC AUTO: 108.3 FL
OSMOLALITY SERPL CALC.SUM OF ELEC: 281 MOSM/KG (ref 275–295)
PLATELET # BLD AUTO: 310 10(3)UL (ref 150–450)
POTASSIUM SERPL-SCNC: 3.8 MMOL/L (ref 3.5–5.1)
PROTHROMBIN TIME: 41.3 SECONDS (ref 11.6–14.8)
RBC # BLD AUTO: 3.27 X10(6)UL
RH BLOOD TYPE: NEGATIVE
SODIUM SERPL-SCNC: 135 MMOL/L (ref 136–145)
WBC # BLD AUTO: 7.3 X10(3) UL (ref 4–11)

## 2024-11-03 PROCEDURE — 76705 ECHO EXAM OF ABDOMEN: CPT | Performed by: PHYSICIAN ASSISTANT

## 2024-11-03 PROCEDURE — 71045 X-RAY EXAM CHEST 1 VIEW: CPT | Performed by: NURSE PRACTITIONER

## 2024-11-03 PROCEDURE — 99233 SBSQ HOSP IP/OBS HIGH 50: CPT | Performed by: HOSPITALIST

## 2024-11-03 PROCEDURE — 99233 SBSQ HOSP IP/OBS HIGH 50: CPT | Performed by: INTERNAL MEDICINE

## 2024-11-03 RX ORDER — FUROSEMIDE 10 MG/ML
40 INJECTION INTRAMUSCULAR; INTRAVENOUS ONCE
Status: COMPLETED | OUTPATIENT
Start: 2024-11-03 | End: 2024-11-03

## 2024-11-03 RX ORDER — FUROSEMIDE 10 MG/ML
20 INJECTION INTRAMUSCULAR; INTRAVENOUS ONCE
Status: DISCONTINUED | OUTPATIENT
Start: 2024-11-03 | End: 2024-11-03

## 2024-11-03 NOTE — PROGRESS NOTES
Progress Note  Tara Lockwood Patient Status:  Inpatient    1940 MRN [id number]   Location [facility] 5SW/SE Attending Loy Vences MD   Hosp Day # 2 PCP Robbie Hubbard MD     Subjective:  Pt stated she feels short of breath this am. Worse with activities.  Denies any associated chest pain.     Objective:  BP (!) 166/68 (BP Location: Left arm)   Pulse 80   Temp 98.4 °F (36.9 °C) (Oral)   Resp 18   Ht 5' 7\" (1.702 m)   Wt 230 lb 12.8 oz (104.7 kg)   SpO2 96%   BMI 36.15 kg/m²     Telemetry: none       Intake/Output:    Intake/Output Summary (Last 24 hours) at 11/3/2024 06  Last data filed at 11/3/2024 0456  Gross per 24 hour   Intake 2406 ml   Output 450 ml   Net 1956 ml       Last 3 Weights   24 1959 230 lb 12.8 oz (104.7 kg)   24 1520 233 lb (105.7 kg)   10/25/24 1008 233 lb (105.7 kg)   24 1528 231 lb (104.8 kg)       Labs:  Recent Labs   Lab 24  1618 24  0513 24  0511   GLU 95 76 106*   BUN 21 19 15   CREATSERUM 1.19* 1.01 0.89   EGFRCR 45* 55* 63   CA 9.4 8.9 9.1    136 135*   K 4.3 3.9 3.8    106 104   CO2 23.0 21.0 23.0     Recent Labs   Lab 24  1618 24  0512 24  0511   RBC 3.08* 2.92* 3.27*   HGB 11.4* 10.4* 11.6*   HCT 34.3* 32.9* 35.4   .4* 112.7* 108.3*   MCH 37.0* 35.6* 35.5*   MCHC 33.2 31.6 32.8   RDW 13.9 13.8 13.6   NEPRELIM 7.07 5.08  --    WBC 8.7 6.7 7.3   .0 283.0 310.0         No results for input(s): \"TROP\", \"TROPHS\", \"CK\" in the last 168 hours.  Lab Results   Component Value Date    PT 13.1 2016    PT 15.7 (H) 2015    PT 22.6 (H) 2015    INR 4.07 (H) 2024    INR 4.05 (H) 2024    INR 3.89 (H) 2024       Diagnostics:     Echo from 2024  Impression - TTE  The left ventricle is normal in size. Mild concentric hypertrophy is noted. Global left ventricular systolic function is normal. The left ventricular ejection fraction is 58%. There are no regional  wall motion abnormalities. There is Grade I diastolic dysfunction.   Impression - TTE  The right ventricle is normal in size. Right ventricular systolic function is normal.    Impression - TTE  Mild to moderate tricuspid regurgitation.    Impression - TTE  The pulmonary artery systolic pressure is 27 mmHg.      Review of Systems   Respiratory: Negative.     Cardiovascular: Negative.    Gastrointestinal:  Positive for blood in stool and melena.       Physical Exam:    Physical Exam  Vitals reviewed.   Constitutional:       General: She is not in acute distress.     Appearance: She is not ill-appearing.   Neck:      Vascular: No JVD.   Cardiovascular:      Rate and Rhythm: Normal rate and regular rhythm.      Pulses: Normal pulses.      Heart sounds: Normal heart sounds. No murmur heard.     No friction rub. No gallop.   Pulmonary:      Effort: Pulmonary effort is normal. No respiratory distress.      Breath sounds: No stridor. Examination of the left-upper field reveals decreased breath sounds. Decreased breath sounds present. No wheezing, rhonchi or rales.   Chest:      Chest wall: No tenderness.   Abdominal:      General: There is distension.      Tenderness: There is no abdominal tenderness.   Musculoskeletal:      Cervical back: Normal range of motion.      Right lower le+ Edema present.      Left lower le+ Edema present.   Neurological:      Mental Status: She is alert and oriented to person, place, and time.         Medications:   furosemide  40 mg Intravenous Once    atorvastatin  80 mg Oral Nightly    hydroxyurea  500 mg Oral Daily    metoprolol succinate ER  25 mg Oral QAM    pantoprazole  40 mg Intravenous Daily         Assessment/Plan:    Gi bleed   - rectal bleeding   - last colonoscopy  - internal hemorrhoids and diverticular disease   Atrial fibrillation  - GI following     Anemia secondary to GI bleed  - hemoglobin 11.4>10.4>11.6  - iron replacement per primary  - goal to keep Hgb above  8    Dyspnea  - Ct chest from 10/8: mild pleural effusion, pulmonary nodule,   - worse this am per patient   - will give a dose of lasix today    H/o DVT/PE  - multiple episodes postoperative settings   - on lifelong anticoagulation with warfarin   - if anticoagulation need to be interrupted may need bridging according to the hematology - OP note  - INR supratheraputic this admission    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  - s/p left upper lobe resection  - f/u with Dr Ceron and Dr Reyes as OP    H/o cerebellar lacunar stroke  - on Ac with warfarin   - on statin    SSS  PAF (Biot[employer])  - s/p PPM  - echo from 9/2024 with LVEF 58%, G1DD  - rate controlled on with BB  - AC with warfarin     Plan;  - give a dose of lasix now  - stat chest xray to evaluate dyspnea   - INR supra therapeutic at this time, will hold the warfarin, may need to bridge with heparin once INR reduces to prevent recurrent PE  - continuous tele monitoring     Plan discussed with pt and RN     ARMOND Johnson  [facility]  11/3/2024  6:13 AM

## 2024-11-03 NOTE — PLAN OF CARE
Problem: Patient Centered Care  Goal: Patient preferences are identified and integrated in the patient's plan of care  Description: Interventions:  - What would you like us to know as we care for you? Patient states one daughter is a registered nurse and lives with other daughter; patient has 8 children  - Provide timely, complete, and accurate information to patient/family  - Incorporate patient and family knowledge, values, beliefs, and cultural backgrounds into the planning and delivery of care  - Encourage patient/family to participate in care and decision-making at the level they choose  - Honor patient and family perspectives and choices  Outcome: Progressing     Problem: Patient/Family Goals  Goal: Patient/Family Long Term Goal  Description: Patient's Long Term Goal: To get stronger; minimize fatigue    Interventions:  -Monitor VSS  -Monitor Labs  -Cards consult  -PT/OT consult   - See additional Care Plan goals for specific interventions  Outcome: Progressing  Goal: Patient/Family Short Term Goal  Description: Patient's Short Term Goal: To manage and/or stop rectal bleeding     Interventions:   -Monitor VSS  -Monitor Labs  -GI consult  -Monitor stools   -See additional Care Plan goals for specific interventions  Outcome: Progressing     Problem: PAIN - ADULT  Goal: Verbalizes/displays adequate comfort level or patient's stated pain goal  Description: INTERVENTIONS:  - Encourage pt to monitor pain and request assistance  - Assess pain using appropriate pain scale  - Administer analgesics based on type and severity of pain and evaluate response  - Implement non-pharmacological measures as appropriate and evaluate response  - Consider cultural and social influences on pain and pain management  - Manage/alleviate anxiety  - Utilize distraction and/or relaxation techniques  - Monitor for opioid side effects  - Notify MD/LIP if interventions unsuccessful or patient reports new pain  - Anticipate increased pain  with activity and pre-medicate as appropriate  Outcome: Progressing     Problem: SAFETY ADULT - FALL  Goal: Free from fall injury  Description: INTERVENTIONS:  - Assess pt frequently for physical needs  - Identify cognitive and physical deficits and behaviors that affect risk of falls.  - [place] fall precautions as indicated by assessment.  - Educate pt/family on patient safety including physical limitations  - Instruct pt to call for assistance with activity based on assessment  - Modify environment to reduce risk of injury  - Provide assistive devices as appropriate  - Consider OT/PT consult to assist with strengthening/mobility  - Encourage toileting schedule  Outcome: Progressing     Problem: DISCHARGE PLANNING  Goal: Discharge to home or other facility with appropriate resources  Description: INTERVENTIONS:  - Identify barriers to discharge w/pt and caregiver  - Include patient/family/discharge partner in discharge planning  - Arrange for needed discharge resources and transportation as appropriate  - Identify discharge learning needs (meds, wound care, etc)  - Arrange for interpreters to assist at discharge as needed  - Consider post-discharge preferences of patient/family/discharge partner  - Complete POLST form as appropriate  - Assess patient's ability to be responsible for managing their own health  - Refer to Case Management Department for coordinating discharge planning if the patient needs post-hospital services based on physician/LIP order or complex needs related to functional status, cognitive ability or social support system  Outcome: Progressing     Problem: GASTROINTESTINAL - ADULT  Goal: Maintains or returns to baseline bowel function  Description: INTERVENTIONS:  - Assess bowel function  - Maintain adequate hydration with IV or PO as ordered and tolerated  - Evaluate effectiveness of GI medications  - Encourage mobilization and activity  - Obtain nutritional consult as needed  - Establish a  toileting routine/schedule  - Consider collaborating with pharmacy to review patient's medication profile  Outcome: Progressing     Problem: METABOLIC/FLUID AND ELECTROLYTES - ADULT  Goal: Electrolytes maintained within normal limits  Description: INTERVENTIONS:  - Monitor labs and rhythm and assess patient for signs and symptoms of electrolyte imbalances  - Administer electrolyte replacement as ordered  - Monitor response to electrolyte replacements, including rhythm and repeat lab results as appropriate  - Fluid restriction as ordered  - Instruct patient on fluid and nutrition restrictions as appropriate  Outcome: Progressing  Goal: Hemodynamic stability and optimal renal function maintained  Description: INTERVENTIONS:  - Monitor labs and assess for signs and symptoms of volume excess or deficit  - Monitor intake, output and patient weight  - Monitor urine specific gravity, serum osmolarity and serum sodium as indicated or ordered  - Monitor response to interventions for patient's volume status, including labs, urine output, blood pressure (other measures as available)  - Encourage oral intake as appropriate  - Instruct patient on fluid and nutrition restrictions as appropriate  Outcome: Progressing     Problem: SKIN/TISSUE INTEGRITY - ADULT  Goal: Skin integrity remains intact  Description: INTERVENTIONS  - Assess and document risk factors for pressure ulcer development  - Assess and document skin integrity  - Monitor for areas of redness and/or skin breakdown  - Initiate interventions, skin care algorithm/standards of care as needed  Outcome: Progressing  Goal: Oral mucous membranes remain intact  Description: INTERVENTIONS  - Assess oral mucosa and hygiene practices  - Implement preventative oral hygiene regimen  - Implement oral medicated treatments as ordered  Outcome: Progressing     Problem: HEMATOLOGIC - ADULT  Goal: Maintains hematologic stability  Description: INTERVENTIONS  - Assess for signs and  symptoms of bleeding or hemorrhage  - Monitor labs and vital signs for trends  - Administer supportive blood products/factors, fluids and medications as ordered and appropriate  - Administer supportive blood products/factors as ordered and appropriate  Outcome: Progressing  Goal: Free from bleeding injury  Description: (Example usage: patient with low platelets)  INTERVENTIONS:  - Avoid intramuscular injections, enemas and rectal medication administration  - Ensure safe mobilization of patient  - Hold pressure on venipuncture sites to achieve adequate hemostasis  - Assess for signs and symptoms of internal bleeding  - Monitor lab trends  - Patient is to report abnormal signs of bleeding to staff  - Avoid use of toothpicks and dental floss  - Use electric shaver for shaving  - Use soft bristle tooth brush  - Limit straining and forceful nose blowing  Outcome: Progressing     Problem: Impaired Functional Mobility  Goal: Achieve highest/safest level of mobility/gait  Description: Interventions:  - Assess patient's functional ability and stability  - Promote increasing activity/tolerance for mobility and gait  - Educate and engage patient/family in tolerated activity level and precautions  - Recommend use of  RW for transfers and ambulation  Outcome: Progressing     Problem: Impaired Activities of Daily Living  Goal: Achieve highest/safest level of independence in self care  Description: Interventions:  - Assess ability and encourage patient to participate in ADLs to maximize function  - Promote sitting position while performing ADLs such as feeding, grooming, and bathing  - Educate and encourage patient/family in tolerated functional activity level and precautions during self-care  - Encourage patient to incorporate impaired side during daily activities to promote function  Outcome: Progressing

## 2024-11-03 NOTE — PROGRESS NOTES
[facility]     Gastroenterology Progress Note    Tara Lockwood Patient Status:  Inpatient    1940 MRN [id number]   Location [facility] 5SW/SE Attending Loy Vences MD   Hosp Day # 2 PCP Robbie Hubbard MD       Assessment and Plan:   1.  Gastrointestinal bleeding  Clinically stable without signs of ongoing bleeding.  Hemoglobin is stabilizing with minimal decline (currently 11.6).  INR remains prolonged at 4.07.  Continue to monitor for recurrent bleeding.  Allow passive correction of INR.  Continue empiric PPI therapy.  May advance diet.  I would hold on endoscopy at this time in light of the prolonged INR and liver ultrasound findings (see below).    2.  Abnormal liver ultrasound  The patient's ultrasound reveals an apparent 16.7 cm solid mass of the right lobe of the liver.  CEA and CA 19-9 are normal.  Alpha-fetoprotein is elevated at 109.9.  The ultrasound finding and alpha-fetoprotein elevation are suspicious for but not diagnostic of hepatocellular carcinoma.  I would recommend an MRI of the liver with and without contrast for further evaluation.  Will check hepatitis serologies as well.    3.  Loose stools  Check C. difficile toxin assay if persistent in light of recent antibiotic therapy.    Recommend:  1.  Continue to follow CBC and INR.  2.  Allow passive correction of INR.  3.  May advance diet.  4.  Hold on endoscopic evaluation at this time for the reasons discussed above.  5.  MRI of the liver with and without contrast.  6.  Check hepatitis serologies.  7.  Check C. difficile toxin assay if loose stools continue.    Discussed with the patient and her family.    Subjective:   Seen with the patient's son and daughter.  Feels fine.  No GI symptoms.  Possible small amount of blood in the stool which may have been hemorrhoidal.  Stool was loose.  No black stools.    Objective:   Patient Vitals for the past 24 hrs:   BP Temp Temp src Pulse Resp SpO2   24 0828  (!) 169/87 98.4 °F (36.9 °C) Oral 99 20 95 %   11/03/24 0820 -- -- -- 104 -- --   11/03/24 0549 (!) 166/68 -- -- -- -- --   11/03/24 0456 (!) 169/70 98.4 °F (36.9 °C) Oral 80 18 96 %   11/02/24 2022 150/65 98.1 °F (36.7 °C) Oral 77 16 96 %   11/02/24 1735 147/63 98.8 °F (37.1 °C) Oral 77 18 95 %     Body mass index is 36.15 kg/m².    General: Patient appears comfortable and in no acute distress  HEENT:Negative for scleral icterus.  Mucous membranes are moist.  Cardiovascular: Regular rate and rhythm   Lung: Clear to auscultation bilaterally  Abdomen:Non-distended.  Bowel sounds are present.  There is no tenderness to palpation.  There are no masses appreciated.  Liver and spleen are not palpable.  Skin: No jaundice.  Warm and dry.  Ext: No cyanosis, clubbing or edema is evident.   Neuro- Alert and interactive, and gross movements of extremities normal  Affect:Normal      Results:     Recent Labs   Lab 11/01/24  1618 11/02/24  0512 11/03/24  0511   RBC 3.08* 2.92* 3.27*   HGB 11.4* 10.4* 11.6*   HCT 34.3* 32.9* 35.4   .4* 112.7* 108.3*   MCH 37.0* 35.6* 35.5*   MCHC 33.2 31.6 32.8   RDW 13.9 13.8 13.6   NEPRELIM 7.07 5.08  --    WBC 8.7 6.7 7.3   .0 283.0 310.0         Recent Labs   Lab 11/01/24  1618 11/02/24  0513 11/03/24  0511   GLU 95 76 106*   BUN 21 19 15   CREATSERUM 1.19* 1.01 0.89   CA 9.4 8.9 9.1   ALB 3.4  --   --     136 135*   K 4.3 3.9 3.8    106 104   CO2 23.0 21.0 23.0   ALKPHO 102  --   --    *  --   --    ALT 48  --   --    BILT 0.8  --   --    TP 6.0  --   --        Lab Results   Component Value Date    PT 13.1 05/11/2016    PT 15.7 (H) 07/13/2015    PT 22.6 (H) 04/13/2015    INR 4.07 (H) 11/03/2024    INR 4.05 (H) 11/02/2024    INR 3.89 (H) 11/01/2024       US LIVER (CPT=76705)    Result Date: 11/3/2024  CONCLUSION:   Large 16.7 cm solid mass of the inferior right hepatic lobe, most compatible with hepatocellular carcinoma.  Recommend further evaluation with  contrast enhanced MRI of the abdomen and/or staging scans.  Secure message regarding this finding sent to Antonia Williamson on 11/03/2024 at 9:09 a.m.   Status post cholecystectomy.  Mild extrahepatic common bile duct dilation, but with normal distal tapering, thought to be post operative/senescent appearance.  But no intrahepatic biliary ductal dilation.     Dictated by (CST): Estefani Martinez MD on 11/03/2024 at 9:02 AM     Finalized by (CST): Estefani Martinez MD on 11/03/2024 at 9:09 AM           EKG 12 Lead    Result Date: 11/3/2024  Sinus rhythm with frequent Premature ventricular complexes Possible Left atrial enlargement Borderline ECG When compared with ECG of 13-MAY-2024 17:07, Premature ventricular complexes are now Present Nonspecific T wave abnormality, improved in Lateral leads     Blanco Dominguez MD  11/3/2024

## 2024-11-03 NOTE — OCCUPATIONAL THERAPY NOTE
OCCUPATIONAL THERAPY EVALUATION - INPATIENT     Room Number: 525/525-A  Evaluation Date: 11/3/2024  Type of Evaluation: Initial  Presenting Problem: rectl bleed    Physician Order: IP Consult to Occupational Therapy  Reason for Therapy: ADL/IADL Dysfunction and Discharge Planning    OCCUPATIONAL THERAPY ASSESSMENT   Patient is a 84 year old female admitted 11/1/2024 for rectal bleed.  Prior to admission, patient's baseline is modified I with functional xfrs and household mobility using rollator, I for BADLs. Pt lives in a 1 level condo, no stairs. Pt's dtr and grdson live with her.   Patient is currently functioning near baseline with functional mobility and BADLs.  Patient is requiring minimal assist as a result of the following impairments: decreased functional reach, decreased endurance, pain, and limited with pt reporting ROM/flexibility/functional reach limited by chronic pain at BILknees and RT hip ROM. Occupational Therapy will continue to follow for duration of hospitalization.    Patient will benefit from continued skilled OT Services at discharge to promote prior level of function and safety with additional support and return home with home health OT.    PLAN DURING HOSPITALIZATION     OT Treatment Plan: Energy conservation/work simplification techniques;ADL training;Functional transfer training;UE strengthening/ROM;Endurance training;Patient/Family education;Equipment eval/education;Patient/Family training;Compensatory technique education     OCCUPATIONAL THERAPY MEDICAL/SOCIAL HISTORY   Problem List  Principal Problem:    Rectal bleeding  Active Problems:    Diarrhea, unspecified type    Melena    HOME SITUATION  Type of Home: Condo  Home Layout: One level  Lives With: Daughter (and grdson)  Toilet and Equipment: Comfort height toilet  Shower/Tub and Equipment: Walk-in shower; Shower chair  Other Equipment: Reacher; Sock aid; Long-handled shoehorn  Drives: No  Patient Regularly Uses: Glasses; Rollator  (R/W in community)    Stairs in Home: 0  Use of Assistive Device(s): rollator in home, R/W in community    Prior Level of [place]:  Prior to admission, patient's baseline is modified I with functional xfrs and household mobility using rollator, I for BADLs. Pt lives in a 1 level condo, no stairs. Pt's dtr and grdson live with her.  Patient is currently functioning near baseline with functional mobility and BADLs.    SUBJECTIVE  \"I get SOB just walking from room to room\"    OCCUPATIONAL THERAPY EXAMINATION      OBJECTIVE  Precautions: -- (cdiff)  Fall Risk: Standard fall risk      PAIN ASSESSMENT  Rating: Unable to rate  Location: chronic pain reported BILknees, RT hip  Management Techniques: Relaxation (raising seat height)      ACTIVITY TOLERANCE  Fair- demo SOB with bed side mobility   Benefits from pacing and rest breaks, PLB encouraged    COGNITION  A and O, follows commands    RANGE OF MOTION   Upper extremity ROM is within functional limits     STRENGTH ASSESSMENT  Upper extremity strength is within functional limits     ACTIVITIES OF DAILY LIVING ASSESSMENT  AM-PAC ‘6-Clicks’ Inpatient Daily Activity Short Form  How much help from another person does the patient currently need…  -   Putting on and taking off regular lower body clothing?: A Little  -   Bathing (including washing, rinsing, drying)?: A Little  -   Toileting, which includes using toilet, bedpan or urinal? : A Little  -   Putting on and taking off regular upper body clothing?: A Little  -   Taking care of personal grooming such as brushing teeth?: None  -   Eating meals?: None    AM-PAC Score:  Score: 20  Approx Degree of Impairment: 38.32%  Standardized Score (AM-PAC Scale): 42.03  CMS Modifier (G-Code): CJ    FUNCTIONAL TRANSFER ASSESSMENT  Sit to Stand: Edge of Bed; Chair  Edge of Bed: Supervision  Chair: Supervision  Toilet Transfer: Supervision (requires MAYTE arm rests)    BED MOBILITY  Supine to Sit : Supervision  Sit to Supine (OT): Not  Tested    FUNCTIONAL ADL ASSESSMENT  Grooming: SBA supported standing at sink  Toileting: SBA with cues for techniques to accomplish through hygiene. Pt struggles d/t body habitus and limited flexibility/functional reach  LE dressing: Max A for socks, set up and increased time to manage brief. Discussed use of LHAE for increased independence and improved energy conservation. Pt reports she owns LHAE and is aware of use.     Skilled Therapy Provided: Pt received resting in bed; no family present. Pt is pleasant and cooperative, benefits from encouragement for full participation rather than staff assist during ADLs. Observe MAYTE LE edema- pt verbalizes understnading of educated edema reduction including ankle pumps, elevation, and frequent activity. Pt agreeable to requesting staff assist to takes trips to the bathroom every 2 hours throughout the day.   Pt reports chronic pain and baseline SOB. Pt indicates independence at baseline though struggles at times. Anticiapte pt ould benefit from increased support and HHOT.     EDUCATION PROVIDED  Patient Education : Role of Occupational Therapy; Plan of Care; Discharge Recommendations; DME Recommendations; Functional Transfer Techniques; Fall Prevention; Posture/Positioning (AE recommendations)  Patient's Response to Education: Verbalized Understanding; Requires Further Education    The patient's Approx Degree of Impairment: 38.32% has been calculated based on documentation in the [facility] '6 clicks' Inpatient Daily Activity Short Form.  Research supports that patients with this level of impairment have no needs however anticiapte pt will benefit from increased support and HHOT at discharge. .  Final disposition will be made by interdisciplinary medical team.     Patient End of Session: Up in chair;Needs met;Call light within reach;All patient questions and concerns addressed;With  staff (pt preping for bedside chest xray upon completion of session)    OT Goals  Patients  self stated goal is: feel better     Patient will complete functional transfer with mod I  Comment:     Patient will complete toileting with mod I  Comment:     Patient will tolerate standing for 3-5 minutes in prep for adls with mod I   Comment:    Patient will complete LE dressing using LHAE as indicated  Comment:          Goals  on: 24  Frequency: 3-5x/week    Patient Evaluation Complexity Level:   Occupational Profile/Medical History MODERATE - Expanded review of history including review of medical or therapy record   Specific performance deficits impacting engagement in ADL/IADL MODERATE  3 - 5 performance deficits   Client Assessment/Performance Deficits MODERATE - Comorbidities and min to mod modifications of tasks    Clinical Decision Making LOW - Analysis of occupational profile, problem-focused assessments, limited treatment options    Overall Complexity LOW   Self-Care Home Management: 25 minutes

## 2024-11-03 NOTE — PLAN OF CARE
Problem: Patient Centered Care  Goal: Patient preferences are identified and integrated in the patient's plan of care  Description: Interventions:  - What would you like us to know as we care for you? Patient states one daughter is a registered nurse and lives with other daughter; patient has 8 children  - Provide timely, complete, and accurate information to patient/family  - Incorporate patient and family knowledge, values, beliefs, and cultural backgrounds into the planning and delivery of care  - Encourage patient/family to participate in care and decision-making at the level they choose  - Honor patient and family perspectives and choices  Outcome: Progressing     Problem: Patient/Family Goals  Goal: Patient/Family Long Term Goal  Description: Patient's Long Term Goal: To get stronger; minimize fatigue    Interventions:  -Monitor VSS  -Monitor Labs  -Cards consult  -PT/OT consult   - See additional Care Plan goals for specific interventions  Outcome: Progressing  Goal: Patient/Family Short Term Goal  Description: Patient's Short Term Goal: To manage and/or stop rectal bleeding     Interventions:   -Monitor VSS  -Monitor Labs  -GI consult  -Monitor stools   -See additional Care Plan goals for specific interventions  Outcome: Progressing     Problem: PAIN - ADULT  Goal: Verbalizes/displays adequate comfort level or patient's stated pain goal  Description: INTERVENTIONS:  - Encourage pt to monitor pain and request assistance  - Assess pain using appropriate pain scale  - Administer analgesics based on type and severity of pain and evaluate response  - Implement non-pharmacological measures as appropriate and evaluate response  - Consider cultural and social influences on pain and pain management  - Manage/alleviate anxiety  - Utilize distraction and/or relaxation techniques  - Monitor for opioid side effects  - Notify MD/LIP if interventions unsuccessful or patient reports new pain  - Anticipate increased pain  with activity and pre-medicate as appropriate  Outcome: Progressing     Problem: SAFETY ADULT - FALL  Goal: Free from fall injury  Description: INTERVENTIONS:  - Assess pt frequently for physical needs  - Identify cognitive and physical deficits and behaviors that affect risk of falls.  - [place] fall precautions as indicated by assessment.  - Educate pt/family on patient safety including physical limitations  - Instruct pt to call for assistance with activity based on assessment  - Modify environment to reduce risk of injury  - Provide assistive devices as appropriate  - Consider OT/PT consult to assist with strengthening/mobility  - Encourage toileting schedule  Outcome: Progressing     Problem: DISCHARGE PLANNING  Goal: Discharge to home or other facility with appropriate resources  Description: INTERVENTIONS:  - Identify barriers to discharge w/pt and caregiver  - Include patient/family/discharge partner in discharge planning  - Arrange for needed discharge resources and transportation as appropriate  - Identify discharge learning needs (meds, wound care, etc)  - Arrange for interpreters to assist at discharge as needed  - Consider post-discharge preferences of patient/family/discharge partner  - Complete POLST form as appropriate  - Assess patient's ability to be responsible for managing their own health  - Refer to Case Management Department for coordinating discharge planning if the patient needs post-hospital services based on physician/LIP order or complex needs related to functional status, cognitive ability or social support system  Outcome: Progressing     Problem: GASTROINTESTINAL - ADULT  Goal: Maintains or returns to baseline bowel function  Description: INTERVENTIONS:  - Assess bowel function  - Maintain adequate hydration with IV or PO as ordered and tolerated  - Evaluate effectiveness of GI medications  - Encourage mobilization and activity  - Obtain nutritional consult as needed  - Establish a  toileting routine/schedule  - Consider collaborating with pharmacy to review patient's medication profile  Outcome: Progressing     Problem: METABOLIC/FLUID AND ELECTROLYTES - ADULT  Goal: Electrolytes maintained within normal limits  Description: INTERVENTIONS:  - Monitor labs and rhythm and assess patient for signs and symptoms of electrolyte imbalances  - Administer electrolyte replacement as ordered  - Monitor response to electrolyte replacements, including rhythm and repeat lab results as appropriate  - Fluid restriction as ordered  - Instruct patient on fluid and nutrition restrictions as appropriate  Outcome: Progressing  Goal: Hemodynamic stability and optimal renal function maintained  Description: INTERVENTIONS:  - Monitor labs and assess for signs and symptoms of volume excess or deficit  - Monitor intake, output and patient weight  - Monitor urine specific gravity, serum osmolarity and serum sodium as indicated or ordered  - Monitor response to interventions for patient's volume status, including labs, urine output, blood pressure (other measures as available)  - Encourage oral intake as appropriate  - Instruct patient on fluid and nutrition restrictions as appropriate  Outcome: Progressing     Problem: SKIN/TISSUE INTEGRITY - ADULT  Goal: Skin integrity remains intact  Description: INTERVENTIONS  - Assess and document risk factors for pressure ulcer development  - Assess and document skin integrity  - Monitor for areas of redness and/or skin breakdown  - Initiate interventions, skin care algorithm/standards of care as needed  Outcome: Progressing  Goal: Oral mucous membranes remain intact  Description: INTERVENTIONS  - Assess oral mucosa and hygiene practices  - Implement preventative oral hygiene regimen  - Implement oral medicated treatments as ordered  Outcome: Progressing     Problem: HEMATOLOGIC - ADULT  Goal: Maintains hematologic stability  Description: INTERVENTIONS  - Assess for signs and  symptoms of bleeding or hemorrhage  - Monitor labs and vital signs for trends  - Administer supportive blood products/factors, fluids and medications as ordered and appropriate  - Administer supportive blood products/factors as ordered and appropriate  Outcome: Progressing  Goal: Free from bleeding injury  Description: (Example usage: patient with low platelets)  INTERVENTIONS:  - Avoid intramuscular injections, enemas and rectal medication administration  - Ensure safe mobilization of patient  - Hold pressure on venipuncture sites to achieve adequate hemostasis  - Assess for signs and symptoms of internal bleeding  - Monitor lab trends  - Patient is to report abnormal signs of bleeding to staff  - Avoid use of toothpicks and dental floss  - Use electric shaver for shaving  - Use soft bristle tooth brush  - Limit straining and forceful nose blowing  Outcome: Progressing     Problem: Impaired Functional Mobility  Goal: Achieve highest/safest level of mobility/gait  Description: Interventions:  - Assess patient's functional ability and stability  - Promote increasing activity/tolerance for mobility and gait  - Educate and engage patient/family in tolerated activity level and precautions  - Recommend use of  RW for transfers and ambulation  Outcome: Progressing     Problem: Impaired Activities of Daily Living  Goal: Achieve highest/safest level of independence in self care  Description: Interventions:  - Assess ability and encourage patient to participate in ADLs to maximize function  - Promote sitting position while performing ADLs such as feeding, grooming, and bathing  - Educate and encourage patient/family in tolerated functional activity level and precautions during self-care  - Encourage patient to incorporate impaired side during daily activities to promote function  Outcome: Progressing

## 2024-11-03 NOTE — PROGRESS NOTES
[facility]  part of [facility]    Progress Note    Tara Lockwood Patient Status:  Inpatient    1940 MRN [id number]   Location [facility] 5SW/SE Attending Loy Vences MD   Hosp Day # 2 PCP Robbie Hubbard MD       Subjective:   Tara Lockwood sat up in chair comfortably, diarrhea stopped. Patient and the daughter at bedside were aware of the liver ultrasound findings.    Review of Systems:   10 point ROS completed and was negative, except for pertinent positive and negatives stated in subjective.    Objective:     Vitals:    24 0456 24 0549 24 0820 24 0828   BP: (!) 169/70 (!) 166/68  (!) 169/87   BP Location: Left arm Left arm  Right arm   Pulse: 80  104 99   Resp: 18   20   Temp: 98.4 °F (36.9 °C)   98.4 °F (36.9 °C)   TempSrc: Oral   Oral   SpO2: 96%   95%   Weight:       Height:         Patient Weight for the past 72 hrs:   Weight   24 1520 233 lb (105.7 kg)   24 1959 230 lb 12.8 oz (104.7 kg)       Intake/Output Summary (Last 24 hours) at 11/3/2024 0835  Last data filed at 11/3/2024 0700  Gross per 24 hour   Intake 740 ml   Output 725 ml   Net 15 ml       GENERAL:  The patient appeared to be in no distress   SKIN:  Warm and hydrated  HEENT:  Head was atraumatic and normocephalic.    CHEST:  Symmetrical movement on inspiration  LUNGS:  No audible wheezing  ABDOMEN: Non-distended  MUSCULOSKELETAL:  There was no deformity.   EXTREMITIES: There was no edema  NEUROLOGICAL:  There was no focal deficit.    PSYCHIATRIC: Calm and cooperative     Current Scheduled Inpatient Meds:      atorvastatin  80 mg Oral Nightly    hydroxyurea  500 mg Oral Daily    metoprolol succinate ER  25 mg Oral QAM    pantoprazole  40 mg Intravenous Daily       Current PRN Inpatient Meds:        acetaminophen    ondansetron    metoclopramide    influenza virus vaccine PF    Results:     Lab Results   Component Value Date    WBC 7.3 2024    HGB 11.6 (L) 2024     HCT 35.4 11/03/2024    .0 11/03/2024    CREATSERUM 0.89 11/03/2024    BUN 15 11/03/2024     (L) 11/03/2024    K 3.8 11/03/2024     11/03/2024    CO2 23.0 11/03/2024     (H) 11/03/2024    CA 9.1 11/03/2024    ALB 3.4 11/01/2024    ALKPHO 102 11/01/2024    BILT 0.8 11/01/2024    TP 6.0 11/01/2024     (H) 11/01/2024    ALT 48 11/01/2024    PTT 33.4 05/13/2024    INR 4.07 (H) 11/03/2024    PT 13.1 05/11/2016    T4F 1.0 05/03/2022    TSH 3.521 09/05/2024    CRP < 0.5 05/29/2014    MG 2.3 03/20/2024    B12 734 09/05/2024       Recent Labs   Lab 11/01/24  1618 11/02/24  0512 11/03/24  0511   RBC 3.08* 2.92* 3.27*   HGB 11.4* 10.4* 11.6*   HCT 34.3* 32.9* 35.4   .4* 112.7* 108.3*   MCH 37.0* 35.6* 35.5*   MCHC 33.2 31.6 32.8   RDW 13.9 13.8 13.6   NEPRELIM 7.07 5.08  --    WBC 8.7 6.7 7.3   .0 283.0 310.0     Recent Labs   Lab 11/01/24  1618 11/02/24  0513 11/03/24  0511   GLU 95 76 106*   BUN 21 19 15   CREATSERUM 1.19* 1.01 0.89   CA 9.4 8.9 9.1   ALB 3.4  --   --     136 135*   K 4.3 3.9 3.8    106 104   CO2 23.0 21.0 23.0   ALKPHO 102  --   --    *  --   --    ALT 48  --   --    BILT 0.8  --   --    TP 6.0  --   --      Lab Results   Component Value Date    PT 13.1 05/11/2016    PT 15.7 (H) 07/13/2015    PT 22.6 (H) 04/13/2015    INR 4.07 (H) 11/03/2024    INR 4.05 (H) 11/02/2024    INR 3.89 (H) 11/01/2024       Culture:  No results found for this visit on 11/01/24.    Imaging/EKG:   No results found.  EKG 12 Lead    Result Date: 11/3/2024  Sinus rhythm with frequent Premature ventricular complexes Possible Left atrial enlargement Borderline ECG When compared with ECG of 13-MAY-2024 17:07, Premature ventricular complexes are now Present Nonspecific T wave abnormality, improved in Lateral leads   Assessment and Plan:   Tara Lockwood is an 84-year-old female with hx of chronic diarrhea, chronic dyspnea, paroxysmal atrial fibrillation on warfarin,  chronic hypotension on midodrine, SSS w/ PPM, who presents with black diarrhea.       # Anemia  # Melena  # Diarrhea  - baseline hgb 12-14, around 11 on admission  - GI on consult: IV PPI BID, EGD once INR improves  .  # Liver mass  - Liver ultrasound showed a large 16.7 cm solid mass of the inferior right hepatic lobe, most compatible with hepatocellular carcinoma.   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   - Oneal 2 V617 F+ polycythemia vera 11/2020, on hydroxyurea.  - patient follows Dr. Ceron  - continue home hydroxyurea     # History of ischemic stroke  - MRI of the brain 05/2024 showed lacunar stroke in the right cerebellum     Diet: regular  PT/OT: Patient has been evaluated and presents with no skilled Physical Therapy needs at this time.   DVT ppx: warfarin  Line: none  Code status: Full   Dispo: per clinical course     MDM: high Loy Vences MD, PhD  Message via Secure Chat  [facility] Hospitalist

## 2024-11-04 ENCOUNTER — APPOINTMENT (OUTPATIENT)
Dept: CV DIAGNOSTICS | Facility: HOSPITAL | Age: 84
End: 2024-11-04
Attending: NURSE PRACTITIONER
Payer: MEDICARE

## 2024-11-04 ENCOUNTER — APPOINTMENT (OUTPATIENT)
Dept: MRI IMAGING | Facility: HOSPITAL | Age: 84
End: 2024-11-04
Attending: INTERNAL MEDICINE
Payer: MEDICARE

## 2024-11-04 LAB
ALBUMIN SERPL-MCNC: 3.4 G/DL (ref 3.2–4.8)
ALBUMIN/GLOB SERPL: 1.2 {RATIO} (ref 1–2)
ALP LIVER SERPL-CCNC: 115 U/L
ALT SERPL-CCNC: 56 U/L
ANION GAP SERPL CALC-SCNC: 10 MMOL/L (ref 0–18)
AST SERPL-CCNC: 160 U/L (ref ?–34)
ATRIAL RATE: 80 BPM
BILIRUB SERPL-MCNC: 1.2 MG/DL (ref 0.2–1.1)
BUN BLD-MCNC: 16 MG/DL (ref 9–23)
BUN/CREAT SERPL: 15.7 (ref 10–20)
CALCIUM BLD-MCNC: 9.5 MG/DL (ref 8.7–10.4)
CHLORIDE SERPL-SCNC: 103 MMOL/L (ref 98–112)
CO2 SERPL-SCNC: 23 MMOL/L (ref 21–32)
CREAT BLD-MCNC: 1.02 MG/DL
DEPRECATED RDW RBC AUTO: 54.2 FL (ref 35.1–46.3)
EGFRCR SERPLBLD CKD-EPI 2021: 54 ML/MIN/1.73M2 (ref 60–?)
ERYTHROCYTE [DISTWIDTH] IN BLOOD BY AUTOMATED COUNT: 13.4 % (ref 11–15)
GLOBULIN PLAS-MCNC: 2.9 G/DL (ref 2–3.5)
GLUCOSE BLD-MCNC: 113 MG/DL (ref 70–99)
HCT VFR BLD AUTO: 39.6 %
HGB BLD-MCNC: 12.9 G/DL
INR BLD: 3.1 (ref 0.8–1.2)
MAGNESIUM SERPL-MCNC: 1.6 MG/DL (ref 1.6–2.6)
MCH RBC QN AUTO: 35.4 PG (ref 26–34)
MCHC RBC AUTO-ENTMCNC: 32.6 G/DL (ref 31–37)
MCV RBC AUTO: 108.8 FL
OSMOLALITY SERPL CALC.SUM OF ELEC: 284 MOSM/KG (ref 275–295)
P AXIS: 39 DEGREES
P-R INTERVAL: 154 MS
PLATELET # BLD AUTO: 401 10(3)UL (ref 150–450)
POTASSIUM SERPL-SCNC: 4.2 MMOL/L (ref 3.5–5.1)
PROT SERPL-MCNC: 6.3 G/DL (ref 5.7–8.2)
PROTHROMBIN TIME: 33.4 SECONDS (ref 11.6–14.8)
Q-T INTERVAL: 358 MS
QRS DURATION: 90 MS
QTC CALCULATION (BEZET): 412 MS
R AXIS: 41 DEGREES
RBC # BLD AUTO: 3.64 X10(6)UL
SODIUM SERPL-SCNC: 136 MMOL/L (ref 136–145)
T AXIS: 2 DEGREES
VENTRICULAR RATE: 80 BPM
WBC # BLD AUTO: 8.8 X10(3) UL (ref 4–11)

## 2024-11-04 PROCEDURE — 99233 SBSQ HOSP IP/OBS HIGH 50: CPT | Performed by: HOSPITALIST

## 2024-11-04 PROCEDURE — 99232 SBSQ HOSP IP/OBS MODERATE 35: CPT | Performed by: REGISTERED NURSE

## 2024-11-04 PROCEDURE — 74183 MRI ABD W/O CNTR FLWD CNTR: CPT | Performed by: INTERNAL MEDICINE

## 2024-11-04 RX ORDER — WARFARIN SODIUM 5 MG/1
TABLET ORAL
COMMUNITY

## 2024-11-04 RX ORDER — GADOTERATE MEGLUMINE 376.9 MG/ML
20 INJECTION INTRAVENOUS
Status: COMPLETED | OUTPATIENT
Start: 2024-11-04 | End: 2024-11-04

## 2024-11-04 RX ORDER — GADOTERATE MEGLUMINE 376.9 MG/ML
20 INJECTION INTRAVENOUS
Status: DISCONTINUED | OUTPATIENT
Start: 2024-11-04 | End: 2024-11-13

## 2024-11-04 RX ORDER — DILTIAZEM HYDROCHLORIDE 180 MG/1
180 CAPSULE, EXTENDED RELEASE ORAL DAILY
COMMUNITY

## 2024-11-04 RX ORDER — DILTIAZEM HYDROCHLORIDE 180 MG/1
180 CAPSULE, EXTENDED RELEASE ORAL DAILY
Status: DISCONTINUED | OUTPATIENT
Start: 2024-11-05 | End: 2024-11-13

## 2024-11-04 RX ORDER — MAGNESIUM OXIDE 400 MG/1
400 TABLET ORAL ONCE
Status: COMPLETED | OUTPATIENT
Start: 2024-11-04 | End: 2024-11-04

## 2024-11-04 NOTE — IMAGING NOTE
Patient here for scheduled exam with known biotronik pacemaker. Baseline vs: HR 86 96% on RA, /70. Jack remotely changed pt to MRI safe mode for duration of exam. When exam is complete and pt is out of magnetic field, settings will return to normal. There is no need to reprogram.    1239: exam begin  1240: HR 89. 97% on RA, /69  1250: HR 91. 97% on RA, /72  1300: HR 91. 98% on RA, /71  1308: exam complete.    1310: Patient moved back to bed. Pacemaker settings returned to original settings automatically.

## 2024-11-04 NOTE — PLAN OF CARE
Problem: Patient Centered Care  Goal: Patient preferences are identified and integrated in the patient's plan of care  Description: Interventions:  - What would you like us to know as we care for you? Patient states one daughter is a registered nurse and lives with other daughter; patient has 8 children  - Provide timely, complete, and accurate information to patient/family  - Incorporate patient and family knowledge, values, beliefs, and cultural backgrounds into the planning and delivery of care  - Encourage patient/family to participate in care and decision-making at the level they choose  - Honor patient and family perspectives and choices  Outcome: Progressing     Problem: Patient/Family Goals  Goal: Patient/Family Long Term Goal  Description: Patient's Long Term Goal: To get stronger; minimize fatigue    Interventions:  -Monitor VSS  -Monitor Labs  -Cards consult  -PT/OT consult   - See additional Care Plan goals for specific interventions  Outcome: Progressing  Goal: Patient/Family Short Term Goal  Description: Patient's Short Term Goal: To manage and/or stop rectal bleeding     Interventions:   -Monitor VSS  -Monitor Labs  -GI consult  -Monitor stools   -See additional Care Plan goals for specific interventions  Outcome: Progressing     Problem: PAIN - ADULT  Goal: Verbalizes/displays adequate comfort level or patient's stated pain goal  Description: INTERVENTIONS:  - Encourage pt to monitor pain and request assistance  - Assess pain using appropriate pain scale  - Administer analgesics based on type and severity of pain and evaluate response  - Implement non-pharmacological measures as appropriate and evaluate response  - Consider cultural and social influences on pain and pain management  - Manage/alleviate anxiety  - Utilize distraction and/or relaxation techniques  - Monitor for opioid side effects  - Notify MD/LIP if interventions unsuccessful or patient reports new pain  - Anticipate increased pain  with activity and pre-medicate as appropriate  Outcome: Progressing     Problem: SAFETY ADULT - FALL  Goal: Free from fall injury  Description: INTERVENTIONS:  - Assess pt frequently for physical needs  - Identify cognitive and physical deficits and behaviors that affect risk of falls.  - [place] fall precautions as indicated by assessment.  - Educate pt/family on patient safety including physical limitations  - Instruct pt to call for assistance with activity based on assessment  - Modify environment to reduce risk of injury  - Provide assistive devices as appropriate  - Consider OT/PT consult to assist with strengthening/mobility  - Encourage toileting schedule  Outcome: Progressing     Problem: DISCHARGE PLANNING  Goal: Discharge to home or other facility with appropriate resources  Description: INTERVENTIONS:  - Identify barriers to discharge w/pt and caregiver  - Include patient/family/discharge partner in discharge planning  - Arrange for needed discharge resources and transportation as appropriate  - Identify discharge learning needs (meds, wound care, etc)  - Arrange for interpreters to assist at discharge as needed  - Consider post-discharge preferences of patient/family/discharge partner  - Complete POLST form as appropriate  - Assess patient's ability to be responsible for managing their own health  - Refer to Case Management Department for coordinating discharge planning if the patient needs post-hospital services based on physician/LIP order or complex needs related to functional status, cognitive ability or social support system  Outcome: Progressing     Problem: GASTROINTESTINAL - ADULT  Goal: Maintains or returns to baseline bowel function  Description: INTERVENTIONS:  - Assess bowel function  - Maintain adequate hydration with IV or PO as ordered and tolerated  - Evaluate effectiveness of GI medications  - Encourage mobilization and activity  - Obtain nutritional consult as needed  - Establish a  toileting routine/schedule  - Consider collaborating with pharmacy to review patient's medication profile  Outcome: Progressing     Problem: METABOLIC/FLUID AND ELECTROLYTES - ADULT  Goal: Electrolytes maintained within normal limits  Description: INTERVENTIONS:  - Monitor labs and rhythm and assess patient for signs and symptoms of electrolyte imbalances  - Administer electrolyte replacement as ordered  - Monitor response to electrolyte replacements, including rhythm and repeat lab results as appropriate  - Fluid restriction as ordered  - Instruct patient on fluid and nutrition restrictions as appropriate  Outcome: Progressing  Goal: Hemodynamic stability and optimal renal function maintained  Description: INTERVENTIONS:  - Monitor labs and assess for signs and symptoms of volume excess or deficit  - Monitor intake, output and patient weight  - Monitor urine specific gravity, serum osmolarity and serum sodium as indicated or ordered  - Monitor response to interventions for patient's volume status, including labs, urine output, blood pressure (other measures as available)  - Encourage oral intake as appropriate  - Instruct patient on fluid and nutrition restrictions as appropriate  Outcome: Progressing     Problem: SKIN/TISSUE INTEGRITY - ADULT  Goal: Skin integrity remains intact  Description: INTERVENTIONS  - Assess and document risk factors for pressure ulcer development  - Assess and document skin integrity  - Monitor for areas of redness and/or skin breakdown  - Initiate interventions, skin care algorithm/standards of care as needed  Outcome: Progressing  Goal: Oral mucous membranes remain intact  Description: INTERVENTIONS  - Assess oral mucosa and hygiene practices  - Implement preventative oral hygiene regimen  - Implement oral medicated treatments as ordered  Outcome: Progressing     Problem: HEMATOLOGIC - ADULT  Goal: Maintains hematologic stability  Description: INTERVENTIONS  - Assess for signs and  symptoms of bleeding or hemorrhage  - Monitor labs and vital signs for trends  - Administer supportive blood products/factors, fluids and medications as ordered and appropriate  - Administer supportive blood products/factors as ordered and appropriate  Outcome: Progressing  Goal: Free from bleeding injury  Description: (Example usage: patient with low platelets)  INTERVENTIONS:  - Avoid intramuscular injections, enemas and rectal medication administration  - Ensure safe mobilization of patient  - Hold pressure on venipuncture sites to achieve adequate hemostasis  - Assess for signs and symptoms of internal bleeding  - Monitor lab trends  - Patient is to report abnormal signs of bleeding to staff  - Avoid use of toothpicks and dental floss  - Use electric shaver for shaving  - Use soft bristle tooth brush  - Limit straining and forceful nose blowing  Outcome: Progressing     Problem: Impaired Functional Mobility  Goal: Achieve highest/safest level of mobility/gait  Description: Interventions:  - Assess patient's functional ability and stability  - Promote increasing activity/tolerance for mobility and gait  - Educate and engage patient/family in tolerated activity level and precautions  - Recommend use of  RW for transfers and ambulation  Outcome: Progressing     Problem: Impaired Activities of Daily Living  Goal: Achieve highest/safest level of independence in self care  Description: Interventions:  - Assess ability and encourage patient to participate in ADLs to maximize function  - Promote sitting position while performing ADLs such as feeding, grooming, and bathing  - Educate and encourage patient/family in tolerated functional activity level and precautions during self-care  - Encourage patient to incorporate impaired side during daily activities to promote function  Outcome: Progressing

## 2024-11-04 NOTE — CM/SW NOTE
11/04/24 1500   CM/SW Referral Data   Referral Source Physician   Reason for Referral Discharge planning;PHQ4/PHQ9   Informant Patient   Medical Hx   Does patient have an established PCP? Yes  (Robbie Stevie)   Patient Info   Patient's Current Mental Status at Time of Assessment Alert;Oriented   Patient's Home Environment Condo/Apt no elevator   Number of Stair in Home 1str floor condo   Patient lives with Grandchild   Patient Status Prior to Admission   Independent with ADLs and Mobility No   Pt. requires assistance with Driving;Finances   Services in place prior to admission DME/Supplies at home   Type of DME/Supplies Wheeled Walker;Standard Walker;Wheelchair;Shower Chair;Raised Toilet Seat;Grab Bars   Discharge Needs   Anticipated D/C needs No anticipated discharge needs     Pt discussed during nursing rounds. Dx rectal bleed, new liver mass. Home w/grandson, independent w/walker at baseline. Pt's grandson moved in with and assists when needed. Anticipated therapy need: Home with Home Healthcare. Pt declining HH at [facility].    MDO received for PHQ4. Pt denies any depression and anxiety at this time, and does not want resources regarding either this admission.    Plan: Home w/grandson pending medical clearance.    / to remain available for support and/or discharge planning.     SALONI Briceño    [phone]

## 2024-11-04 NOTE — PROGRESS NOTES
[facility]  part of [facility]    Progress Note    Tara Lockwood Patient Status:  Inpatient    1940 MRN [id number]   Location [facility] 5SW/SE Attending Loy Vences MD   Hosp Day # 3 PCP Robbie Hubbard MD       Subjective:   Tara Lockwood remains stable, no further black stool, await MRI.    Review of Systems:   10 point ROS completed and was negative, except for pertinent positive and negatives stated in subjective.    Objective:     Vitals:    24 0450 24 0453 24 0500 24 0631   BP: (!) 161/65 (!) 166/68  153/52   BP Location: Right arm Left arm  Right arm   Pulse: 88   82   Resp: 18      Temp: 98.1 °F (36.7 °C)      TempSrc: Oral      SpO2: 97%      Weight:   228 lb 8 oz (103.6 kg)    Height:         Patient Weight for the past 72 hrs:   Weight   24 1520 233 lb (105.7 kg)   24 1959 230 lb 12.8 oz (104.7 kg)       Intake/Output Summary (Last 24 hours) at 2024 0824  Last data filed at 2024 0456  Gross per 24 hour   Intake 350 ml   Output 200 ml   Net 150 ml       GENERAL:  The patient appeared to be in no distress   SKIN:  Warm and hydrated  HEENT:  Head was atraumatic and normocephalic.    CHEST:  Symmetrical movement on inspiration  LUNGS:  No audible wheezing  ABDOMEN: Non-distended  MUSCULOSKELETAL:  There was no deformity.   EXTREMITIES: There was no edema  NEUROLOGICAL:  There was no focal deficit.    PSYCHIATRIC: Calm and cooperative     Current Scheduled Inpatient Meds:      atorvastatin  80 mg Oral Nightly    hydroxyurea  500 mg Oral Daily    metoprolol succinate ER  25 mg Oral QAM    pantoprazole  40 mg Intravenous Daily       Current PRN Inpatient Meds:        acetaminophen    ondansetron    metoclopramide    influenza virus vaccine PF    Results:     Lab Results   Component Value Date    WBC 8.8 2024    HGB 12.9 2024    HCT 39.6 2024    .0 2024    CREATSERUM 1.02 2024    BUN 16  11/04/2024     11/04/2024    K 4.2 11/04/2024     11/04/2024    CO2 23.0 11/04/2024     (H) 11/04/2024    CA 9.5 11/04/2024    ALB 3.4 11/04/2024    ALKPHO 115 11/04/2024    BILT 1.2 (H) 11/04/2024    TP 6.3 11/04/2024     (H) 11/04/2024    ALT 56 (H) 11/04/2024    PTT 33.4 05/13/2024    INR 3.10 (H) 11/04/2024    PT 13.1 05/11/2016    T4F 1.0 05/03/2022    TSH 3.521 09/05/2024    CRP < 0.5 05/29/2014    MG 2.3 03/20/2024    B12 734 09/05/2024       Recent Labs   Lab 11/01/24  1618 11/02/24  0512 11/03/24  0511 11/04/24  0528   RBC 3.08* 2.92* 3.27* 3.64*   HGB 11.4* 10.4* 11.6* 12.9   HCT 34.3* 32.9* 35.4 39.6   .4* 112.7* 108.3* 108.8*   MCH 37.0* 35.6* 35.5* 35.4*   MCHC 33.2 31.6 32.8 32.6   RDW 13.9 13.8 13.6 13.4   NEPRELIM 7.07 5.08  --   --    WBC 8.7 6.7 7.3 8.8   .0 283.0 310.0 401.0     Recent Labs   Lab 11/01/24  1618 11/02/24  0513 11/03/24  0511 11/04/24  0528   GLU 95 76 106* 113*   BUN 21 19 15 16   CREATSERUM 1.19* 1.01 0.89 1.02   CA 9.4 8.9 9.1 9.5   ALB 3.4  --   --  3.4    136 135* 136   K 4.3 3.9 3.8 4.2    106 104 103   CO2 23.0 21.0 23.0 23.0   ALKPHO 102  --   --  115   *  --   --  160*   ALT 48  --   --  56*   BILT 0.8  --   --  1.2*   TP 6.0  --   --  6.3     Lab Results   Component Value Date    PT 13.1 05/11/2016    PT 15.7 (H) 07/13/2015    PT 22.6 (H) 04/13/2015    INR 3.10 (H) 11/04/2024    INR 4.07 (H) 11/03/2024    INR 4.05 (H) 11/02/2024       Culture:  No results found for this visit on 11/01/24.    Imaging/EKG:   US LIVER (CPT=76705)    Result Date: 11/3/2024  CONCLUSION:   Large 16.7 cm solid mass of the inferior right hepatic lobe, most compatible with hepatocellular carcinoma.  Recommend further evaluation with contrast enhanced MRI of the abdomen and/or staging scans.  Secure message regarding this finding sent to Antonia Williamson on 11/03/2024 at 9:09 a.m.   Status post cholecystectomy.  Mild extrahepatic common bile  duct dilation, but with normal distal tapering, thought to be post operative/senescent appearance.  But no intrahepatic biliary ductal dilation.     Dictated by (CST): Estefani Martinez MD on 11/03/2024 at 9:02 AM     Finalized by (CST): Estefani Martinez MD on 11/03/2024 at 9:09 AM             Assessment and Plan:   Tara Lockwood is an 84-year-old female with hx of chronic diarrhea, chronic dyspnea, paroxysmal atrial fibrillation on warfarin, chronic hypotension on midodrine, SSS w/ PPM, who presents with black diarrhea.       # Anemia  # Melena  # Diarrhea  - baseline hgb 12-14, around 11 on admission  - GI on consult: IV PPI BID, EGD once INR improves  .  # Liver mass  - Liver ultrasound showed a large 16.7 cm solid mass of the inferior right hepatic lobe, most compatible with hepatocellular carcinoma.   - MRI showed a large centrally necrotic mass arising from the right hepatic lobe that measures 15.7 cm, highly concerning for malignancy.  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   - Oneal 2 V617 F+ polycythemia vera 11/2020, on hydroxyurea.  - patient follows Dr. Ceron  - continue home hydroxyurea     # History of ischemic stroke  - MRI of the brain 05/2024 showed lacunar stroke in the right cerebellum     Diet: regular  PT/OT: Patient has been evaluated and presents with no skilled Physical Therapy needs at this time.   DVT ppx: warfarin  Line: none  Code status: Full   Dispo: per clinical course     MDM: high Loy Vences MD, PhD  Message via Secure  [facility]Agnesian HealthCareist

## 2024-11-04 NOTE — PROGRESS NOTES
DEJAH    [facility] Cardiology Progress Note    Tarajesus Bustosmm Patient Status:  Inpatient    1940 MRN [id number]   Location [facility] 5SW/SE Attending Loy Vences MD   Hosp Day # 3 PCP Robbie Hubbard MD     Subjective:  Denies cp, sob, orthopnea. +palpitations. +BLE swelling (chronic per pt) +Blood in stool     Objective:  /52 (BP Location: Right arm)   Pulse 82   Temp 98.1 °F (36.7 °C) (Oral)   Resp 18   Ht 170.2 cm (5' 7\")   Wt 228 lb 8 oz (103.6 kg)   SpO2 97%   BMI 35.79 kg/m²     Telemetry: SR w/ occasional PVCs       Intake/Output:    Intake/Output Summary (Last 24 hours) at 2024 0905  Last data filed at 2024 0456  Gross per 24 hour   Intake 350 ml   Output 200 ml   Net 150 ml       Last 3 Weights   24 0500 228 lb 8 oz (103.6 kg)   24 1959 230 lb 12.8 oz (104.7 kg)   24 1520 233 lb (105.7 kg)   10/25/24 1008 233 lb (105.7 kg)   24 1528 231 lb (104.8 kg)       Labs:  Recent Labs   Lab 24  0513 24  0511 24  0528   GLU 76 106* 113*   BUN 19 15 16   CREATSERUM 1.01 0.89 1.02   EGFRCR 55* 63 54*   CA 8.9 9.1 9.5    135* 136   K 3.9 3.8 4.2    104 103   CO2 21.0 23.0 23.0     Recent Labs   Lab 24  1618 24  0512 24  0511 24  0528   RBC 3.08* 2.92* 3.27* 3.64*   HGB 11.4* 10.4* 11.6* 12.9   HCT 34.3* 32.9* 35.4 39.6   .4* 112.7* 108.3* 108.8*   MCH 37.0* 35.6* 35.5* 35.4*   MCHC 33.2 31.6 32.8 32.6   RDW 13.9 13.8 13.6 13.4   NEPRELIM 7.07 5.08  --   --    WBC 8.7 6.7 7.3 8.8   .0 283.0 310.0 401.0         No results for input(s): \"TROP\", \"TROPHS\", \"CK\" in the last 168 hours.    Diagnostics:     Trans Thoracic Echocardiogram 09/10/2024  1.The left ventricle is normal in size. Mild concentric hypertrophy is noted. Global left ventricular systolic function is normal. The left ventricular ejection fraction is 58%. There are no regional wall motion abnormalities. There is Grade I diastolic  dysfunction.     2.The right ventricle is normal in size. Right ventricular systolic function is normal.     3.Mild to moderate tricuspid regurgitation.     4.The pulmonary artery systolic pressure is 27 mmHg.    Review of Systems   Respiratory: Negative.     Cardiovascular:  Positive for palpitations and leg swelling. Negative for chest pain and orthopnea.   Gastrointestinal:  Positive for blood in stool.     Physical Exam:    General: Alert and oriented x 3. No apparent distress.   HEENT: Normocephalic, anicteric sclera, neck supple, no thyromegaly or adenopathy.  Neck: No JVD, carotids 2+, no bruits.  Cardiac: Regular rate & rhythm. S1, S2 normal. No murmur, pericardial rub, S3, or extra cardiac sounds.  Lungs: Clear without wheezes, rales, rhonchi or dullness.  Normal excursions and effort.  Abdomen: Soft, non-tender. No organosplenomegally, mass or rebound, BS-present.  Extremities: Without clubbing or cyanosis.  +1 BLE edema   Neurologic: Alert and oriented, normal affect. No focal defects  Skin: Warm and dry.       Medications:   atorvastatin  80 mg Oral Nightly    hydroxyurea  500 mg Oral Daily    metoprolol succinate ER  25 mg Oral QAM    pantoprazole  40 mg Intravenous Daily         Assessment:  85 y/o w/ PMH of SSS w/ PPM placement, PAF, DVT, recurrent PE, & lacunar stroke presented w/ melena, anemia & supratherapeutic INR     GIB / melena    - last cln/EGD in 2017, HH, diverticulosis and polyps noted   - pending EGD - however presently held due to supratherapuetic INR & recent abnormal liver ultrasound   - on IV PPI   - GI following      Anemia secondary to GI bleed  - Hgb now stable   - iron replacement per PMD  - goal to keep Hgb > 8     Dyspnea  - Ct chest from 10/8: mild pleural effusion, pulmonary nodule,   - given dose of lasix yesterday. 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 No need for repeat at this time   - on toprol xl . Coumadin on hold as above     Chronic BLE edema / venous insufficiency   - at baseline per pt   - per OV last month with Dr. Yates, held off on diuretic therapy due to orthostatic hypotension   - continue monitoring     Abnormal liver ultrasound   - ultrasound notable for 16.7 cm solid mass of the inferior right hepatic lobe, most compatible w/ hepatocellular carcinoma   - pending contrast enhanced MRI of the abd   - GI following     Plan:    Continues to report melena. INR 3.1 today , allowing for passive correction at this time  EGD on hold due to supratherapuetic INR & recent abnormal liver ultrasound   Pending contrast enhanced MRI of the abd   Due to hx of recurrent DVT/PE, per Hematology OP note, if anticoagulation needs to be interrupted may need bridging w/ heparin or lovenox .   Reports occasional palpitations. NSR w/ occasional PVCs noted on tele. K+ normal. Check Mg . Continue toprol xl   Apply compression stockings to BLE     LONG Miles  11/4/2024  9:05 AM  Ph [phone] ([place])  Ph [phone] ([place])      Addendum @ 1200 - Mg 1.6. Replenish per cardiac protocol. RN notified     Reviewed and patient examined independently.  Spoke with patient and her daughter today.    Liver MRI concerning for large malignancy.  Uncertain if primary or metastatic disease.    Will continue to hold warfarin.  Okay to give 1 to 2 units of fresh frozen  plasma if necessary to reverse warfarin and allow for liver biopsy and/or endoscopy.

## 2024-11-04 NOTE — PROGRESS NOTES
[facility]     Gastroenterology Progress Note    Tara Lockwood Patient Status:  Inpatient    1940 MRN [id number]   Location [facility] 5SW/SE Attending Loy Vences MD   Hosp Day # 3 PCP Robbie Hubbard MD       Subjective:   Pt resting in bed without diarrhea, nausea or vomiting today though states she had symptoms yesterday.  Presuming her symptoms are improved related to not eating.  No ABD pain  No fever, chills  No chest pain, SOB  Objective:   Blood pressure 148/54, pulse 83, temperature 98.6 °F (37 °C), temperature source Oral, resp. rate 20, height 5' 7\" (1.702 m), weight 228 lb 8 oz (103.6 kg), SpO2 95%. Body mass index is 35.79 kg/m².    Gen: awake, alert patient, NAD  HEENT: EOMI, the sclera appears anicteric, oropharynx clear, mucus membranes appear moist  CV: RRR  Lung: no conversational dyspnea   Abdomen: soft NTND abdomen with NABS appreciated   Skin: dry, warm, no jaundice  Ext: no LE edema is evident  Neuro: Alert, oriented x3 and interactive  Psych: calm, cooperative    Assessment and Plan:   Tara Lockwood is a 84 year old female w/ PMHx of left upper lobe adenocarcinoma, DVT/PE on warfarin, polycythemia vera, COPD who presented to ER for dark stools.     #Anemia  #Melena  -Clinically stable without signs of ongoing bleeding.    -HGB stable at 12, INR 3.10  Allow passive correction of INR.   -No plans for endoscopy at this time in light of the prolonged INR and liver ultrasound findings (see below).     #Abnormal liver ultrasound  -The patient's ultrasound reveals an apparent 16.7 cm solid mass of the right lobe of the liver.    -CEA and CA 19-9 are normal.  Alpha-fetoprotein is elevated at 109.9.    -The ultrasound finding and alpha-fetoprotein elevation are suspicious for but not diagnostic of hepatocellular carcinoma. MRI of the liver with and without contrast ordered for further evaluation.  Scheduled for today  -Hepatitis serologies negative     #Loose  stools  -Check C. Diff in light of recent ABX use     Recommend:  -MRI with and without contrast  -NPO for imaging, otherwise ok for diet as tolerated  -Continue to follow CBC and INR.  -Allow passive correction of INR.  -Hold on endoscopic evaluation at this time for the reasons discussed above.    Case discussed with Addie Chacon MD and Adelina JORDAN.    Eleanor Paiz [facility], FNP-[facility] Gastroenterology  11/4/2024      Results:     Lab Results   Component Value Date    WBC 8.8 11/04/2024    HGB 12.9 11/04/2024    HCT 39.6 11/04/2024    .0 11/04/2024    CREATSERUM 1.02 11/04/2024    BUN 16 11/04/2024     11/04/2024    K 4.2 11/04/2024     11/04/2024    CO2 23.0 11/04/2024     (H) 11/04/2024    CA 9.5 11/04/2024    ALB 3.4 11/04/2024    ALKPHO 115 11/04/2024    BILT 1.2 (H) 11/04/2024    TP 6.3 11/04/2024     (H) 11/04/2024    ALT 56 (H) 11/04/2024    PTT 33.4 05/13/2024    INR 3.10 (H) 11/04/2024    PT 13.1 05/11/2016    T4F 1.0 05/03/2022    TSH 3.521 09/05/2024    CRP < 0.5 05/29/2014    MG 1.6 11/04/2024    B12 734 09/05/2024       US LIVER (CPT=76705)    Result Date: 11/3/2024  CONCLUSION:   Large 16.7 cm solid mass of the inferior right hepatic lobe, most compatible with hepatocellular carcinoma.  Recommend further evaluation with contrast enhanced MRI of the abdomen and/or staging scans.  Secure message regarding this finding sent to Antonia Williamson on 11/03/2024 at 9:09 a.m.   Status post cholecystectomy.  Mild extrahepatic common bile duct dilation, but with normal distal tapering, thought to be post operative/senescent appearance.  But no intrahepatic biliary ductal dilation.     Dictated by (CST): Estefani Martinez MD on 11/03/2024 at 9:02 AM     Finalized by (CST): Estefani Martinez MD on 11/03/2024 at 9:09 AM

## 2024-11-05 LAB
ALBUMIN SERPL-MCNC: 3 G/DL (ref 3.2–4.8)
ALBUMIN/GLOB SERPL: 1.3 {RATIO} (ref 1–2)
ALP LIVER SERPL-CCNC: 99 U/L
ALT SERPL-CCNC: 46 U/L
ANION GAP SERPL CALC-SCNC: 8 MMOL/L (ref 0–18)
AST SERPL-CCNC: 130 U/L (ref ?–34)
BILIRUB SERPL-MCNC: 1 MG/DL (ref 0.2–1.1)
BUN BLD-MCNC: 17 MG/DL (ref 9–23)
BUN/CREAT SERPL: 18.7 (ref 10–20)
C DIFF GDH + TOXINS A+B STL QL IA.RAPID: DETECTED
C DIFF TOX B STL QL: POSITIVE
CALCIUM BLD-MCNC: 9.2 MG/DL (ref 8.7–10.4)
CHLORIDE SERPL-SCNC: 106 MMOL/L (ref 98–112)
CO2 SERPL-SCNC: 26 MMOL/L (ref 21–32)
CREAT BLD-MCNC: 0.91 MG/DL
DEPRECATED RDW RBC AUTO: 55.7 FL (ref 35.1–46.3)
EGFRCR SERPLBLD CKD-EPI 2021: 62 ML/MIN/1.73M2 (ref 60–?)
ERYTHROCYTE [DISTWIDTH] IN BLOOD BY AUTOMATED COUNT: 13.8 % (ref 11–15)
GLOBULIN PLAS-MCNC: 2.3 G/DL (ref 2–3.5)
GLUCOSE BLD-MCNC: 98 MG/DL (ref 70–99)
HCT VFR BLD AUTO: 33.9 %
HGB BLD-MCNC: 11.3 G/DL
INR BLD: 2.27 (ref 0.8–1.2)
MAGNESIUM SERPL-MCNC: 1.6 MG/DL (ref 1.6–2.6)
MCH RBC QN AUTO: 36.5 PG (ref 26–34)
MCHC RBC AUTO-ENTMCNC: 33.3 G/DL (ref 31–37)
MCV RBC AUTO: 109.4 FL
OSMOLALITY SERPL CALC.SUM OF ELEC: 292 MOSM/KG (ref 275–295)
PLATELET # BLD AUTO: 280 10(3)UL (ref 150–450)
POTASSIUM SERPL-SCNC: 3.7 MMOL/L (ref 3.5–5.1)
PROT SERPL-MCNC: 5.3 G/DL (ref 5.7–8.2)
PROTHROMBIN TIME: 26.1 SECONDS (ref 11.6–14.8)
RBC # BLD AUTO: 3.1 X10(6)UL
SODIUM SERPL-SCNC: 140 MMOL/L (ref 136–145)
WBC # BLD AUTO: 6.8 X10(3) UL (ref 4–11)

## 2024-11-05 PROCEDURE — 99233 SBSQ HOSP IP/OBS HIGH 50: CPT | Performed by: HOSPITALIST

## 2024-11-05 PROCEDURE — 99233 SBSQ HOSP IP/OBS HIGH 50: CPT | Performed by: INTERNAL MEDICINE

## 2024-11-05 RX ORDER — MAGNESIUM OXIDE 400 MG/1
400 TABLET ORAL ONCE
Status: COMPLETED | OUTPATIENT
Start: 2024-11-05 | End: 2024-11-05

## 2024-11-05 RX ORDER — VANCOMYCIN HYDROCHLORIDE 125 MG/1
125 CAPSULE ORAL 4 TIMES DAILY
Status: DISCONTINUED | OUTPATIENT
Start: 2024-11-05 | End: 2024-11-13

## 2024-11-05 NOTE — PLAN OF CARE
Problem: Patient Centered Care  Goal: Patient preferences are identified and integrated in the patient's plan of care  Description: Interventions:  - What would you like us to know as we care for you? Patient states one daughter is a registered nurse and lives with other daughter; patient has 8 children  - Provide timely, complete, and accurate information to patient/family  - Incorporate patient and family knowledge, values, beliefs, and cultural backgrounds into the planning and delivery of care  - Encourage patient/family to participate in care and decision-making at the level they choose  - Honor patient and family perspectives and choices  Outcome: Progressing     Problem: Patient/Family Goals  Goal: Patient/Family Long Term Goal  Description: Patient's Long Term Goal: To get stronger; minimize fatigue    Interventions:  -Monitor VSS  -Monitor Labs  -Cards consult  -PT/OT consult   - See additional Care Plan goals for specific interventions  Outcome: Progressing  Goal: Patient/Family Short Term Goal  Description: Patient's Short Term Goal: To manage and/or stop rectal bleeding     Interventions:   -Monitor VSS  -Monitor Labs  -GI consult  -Monitor stools   -See additional Care Plan goals for specific interventions  Outcome: Progressing     Problem: PAIN - ADULT  Goal: Verbalizes/displays adequate comfort level or patient's stated pain goal  Description: INTERVENTIONS:  - Encourage pt to monitor pain and request assistance  - Assess pain using appropriate pain scale  - Administer analgesics based on type and severity of pain and evaluate response  - Implement non-pharmacological measures as appropriate and evaluate response  - Consider cultural and social influences on pain and pain management  - Manage/alleviate anxiety  - Utilize distraction and/or relaxation techniques  - Monitor for opioid side effects  - Notify MD/LIP if interventions unsuccessful or patient reports new pain  - Anticipate increased pain  with activity and pre-medicate as appropriate  Outcome: Progressing     Problem: SAFETY ADULT - FALL  Goal: Free from fall injury  Description: INTERVENTIONS:  - Assess pt frequently for physical needs  - Identify cognitive and physical deficits and behaviors that affect risk of falls.  - [place] fall precautions as indicated by assessment.  - Educate pt/family on patient safety including physical limitations  - Instruct pt to call for assistance with activity based on assessment  - Modify environment to reduce risk of injury  - Provide assistive devices as appropriate  - Consider OT/PT consult to assist with strengthening/mobility  - Encourage toileting schedule  Outcome: Progressing     Problem: DISCHARGE PLANNING  Goal: Discharge to home or other facility with appropriate resources  Description: INTERVENTIONS:  - Identify barriers to discharge w/pt and caregiver  - Include patient/family/discharge partner in discharge planning  - Arrange for needed discharge resources and transportation as appropriate  - Identify discharge learning needs (meds, wound care, etc)  - Arrange for interpreters to assist at discharge as needed  - Consider post-discharge preferences of patient/family/discharge partner  - Complete POLST form as appropriate  - Assess patient's ability to be responsible for managing their own health  - Refer to Case Management Department for coordinating discharge planning if the patient needs post-hospital services based on physician/LIP order or complex needs related to functional status, cognitive ability or social support system  Outcome: Progressing     Problem: GASTROINTESTINAL - ADULT  Goal: Maintains or returns to baseline bowel function  Description: INTERVENTIONS:  - Assess bowel function  - Maintain adequate hydration with IV or PO as ordered and tolerated  - Evaluate effectiveness of GI medications  - Encourage mobilization and activity  - Obtain nutritional consult as needed  - Establish a  toileting routine/schedule  - Consider collaborating with pharmacy to review patient's medication profile  Outcome: Progressing     Problem: METABOLIC/FLUID AND ELECTROLYTES - ADULT  Goal: Electrolytes maintained within normal limits  Description: INTERVENTIONS:  - Monitor labs and rhythm and assess patient for signs and symptoms of electrolyte imbalances  - Administer electrolyte replacement as ordered  - Monitor response to electrolyte replacements, including rhythm and repeat lab results as appropriate  - Fluid restriction as ordered  - Instruct patient on fluid and nutrition restrictions as appropriate  Outcome: Progressing  Goal: Hemodynamic stability and optimal renal function maintained  Description: INTERVENTIONS:  - Monitor labs and assess for signs and symptoms of volume excess or deficit  - Monitor intake, output and patient weight  - Monitor urine specific gravity, serum osmolarity and serum sodium as indicated or ordered  - Monitor response to interventions for patient's volume status, including labs, urine output, blood pressure (other measures as available)  - Encourage oral intake as appropriate  - Instruct patient on fluid and nutrition restrictions as appropriate  Outcome: Progressing     Problem: SKIN/TISSUE INTEGRITY - ADULT  Goal: Skin integrity remains intact  Description: INTERVENTIONS  - Assess and document risk factors for pressure ulcer development  - Assess and document skin integrity  - Monitor for areas of redness and/or skin breakdown  - Initiate interventions, skin care algorithm/standards of care as needed  Outcome: Progressing  Goal: Oral mucous membranes remain intact  Description: INTERVENTIONS  - Assess oral mucosa and hygiene practices  - Implement preventative oral hygiene regimen  - Implement oral medicated treatments as ordered  Outcome: Progressing     Problem: HEMATOLOGIC - ADULT  Goal: Maintains hematologic stability  Description: INTERVENTIONS  - Assess for signs and  symptoms of bleeding or hemorrhage  - Monitor labs and vital signs for trends  - Administer supportive blood products/factors, fluids and medications as ordered and appropriate  - Administer supportive blood products/factors as ordered and appropriate  Outcome: Progressing  Goal: Free from bleeding injury  Description: (Example usage: patient with low platelets)  INTERVENTIONS:  - Avoid intramuscular injections, enemas and rectal medication administration  - Ensure safe mobilization of patient  - Hold pressure on venipuncture sites to achieve adequate hemostasis  - Assess for signs and symptoms of internal bleeding  - Monitor lab trends  - Patient is to report abnormal signs of bleeding to staff  - Avoid use of toothpicks and dental floss  - Use electric shaver for shaving  - Use soft bristle tooth brush  - Limit straining and forceful nose blowing  Outcome: Progressing     Problem: Impaired Functional Mobility  Goal: Achieve highest/safest level of mobility/gait  Description: Interventions:  - Assess patient's functional ability and stability  - Promote increasing activity/tolerance for mobility and gait  - Educate and engage patient/family in tolerated activity level and precautions  - Recommend use of  RW for transfers and ambulation  Outcome: Progressing     Problem: Impaired Activities of Daily Living  Goal: Achieve highest/safest level of independence in self care  Description: Interventions:  - Assess ability and encourage patient to participate in ADLs to maximize function  - Promote sitting position while performing ADLs such as feeding, grooming, and bathing  - Educate and encourage patient/family in tolerated functional activity level and precautions during self-care  - Encourage patient to incorporate impaired side during daily activities to promote function  Outcome: Progressing   Hgb up to 12.9 today. No BM today. No other signs of bleeding. INR 3.10. Coumadin remains on hold. MRI liver completed. Pt  denies any pain. Denies dizziness. Up with walker and assist x1. Pt and daughter updated about plan of care.

## 2024-11-05 NOTE — PROGRESS NOTES
Progress Note  Tara Lockwood Patient Status:  Inpatient    1940 MRN [id number]   Location [facility] 5SW/SE Attending Loy Vences MD   Hosp Day # 4 PCP Robbie Hubbard MD     Subjective:  No acute events overnight.  Denies any cardiac symptoms currently.  Sustaining SR per tele review.  Stool sample came back positive for C-diff infection by PCR and EIA.     Objective:  /78 (BP Location: Right arm)   Pulse 88   Temp 98 °F (36.7 °C) (Oral)   Resp 18   Ht 5' 7\" (1.702 m)   Wt 228 lb 8 oz (103.6 kg)   SpO2 93%   BMI 35.79 kg/m²     Telemetry: SR, HR 80s, PVCs      Intake/Output:    Intake/Output Summary (Last 24 hours) at 2024 0903  Last data filed at 2024 0517  Gross per 24 hour   Intake 470 ml   Output 650 ml   Net -180 ml       Last 3 Weights   24 0500 228 lb 8 oz (103.6 kg)   24 1959 230 lb 12.8 oz (104.7 kg)   24 1520 233 lb (105.7 kg)   10/25/24 1008 233 lb (105.7 kg)   24 1528 231 lb (104.8 kg)       Labs:  Recent Labs   Lab 24  0511 24  0528 24  0518   * 113* 98   BUN 15 16 17   CREATSERUM 0.89 1.02 0.91   EGFRCR 63 54* 62   CA 9.1 9.5 9.2   * 136 140   K 3.8 4.2 3.7    103 106   CO2 23.0 23.0 26.0     Recent Labs   Lab 24  1618 24  0512 24  0511 24  0528 24  0518   RBC 3.08* 2.92* 3.27* 3.64* 3.10*   HGB 11.4* 10.4* 11.6* 12.9 11.3*   HCT 34.3* 32.9* 35.4 39.6 33.9*   .4* 112.7* 108.3* 108.8* 109.4*   MCH 37.0* 35.6* 35.5* 35.4* 36.5*   MCHC 33.2 31.6 32.8 32.6 33.3   RDW 13.9 13.8 13.6 13.4 13.8   NEPRELIM 7.07 5.08  --   --   --    WBC 8.7 6.7 7.3 8.8 6.8   .0 283.0 310.0 401.0 280.0         No results for input(s): \"TROP\", \"TROPHS\", \"CK\" in the last 168 hours.    Review of Systems   Constitutional: Negative.   Cardiovascular:  Positive for leg swelling.   Respiratory: Negative.         Physical Exam:    Gen: alert, oriented x 3, NAD  Heent: pupils equal,  reactive. Mucous membranes moist.   Neck: no jvd  Cardiac: regular rate and rhythm, normal S1,S2  Lungs: CTA  Abd: soft, NT/ND +bs  Ext: +1 bilateral lower extremities edema  Skin: Warm, dry  Neuro: No focal deficits        Medications:     sodium ferric gluconate  125 mg Intravenous Daily    dilTIAZem ER  180 mg Oral Daily    atorvastatin  80 mg Oral Nightly    hydroxyurea  500 mg Oral Daily    metoprolol succinate ER  25 mg Oral QAM    pantoprazole  40 mg Intravenous Daily       Assessment:  Recurrent diarrhea with melena/GIB  Has hx of chronic diarrhea-current stool sample tested positive for C-diff infection    EGD 2017 per chart renew showed diverticulosis and polyps -holding off repeat EGD d/t supratherapeutic INRs  US of the abdomen showed 16.7 cm solid mass of the right lobe of the liver  MRI liver showed large, centrally necrotic mass in the right hepatic lobe highly concerning for malignant process  GI following    Anemia likely d/t GIB, hgb stable at 11.3  Goal hgb >8.0  Dyspnea  CT chest 10/8 showed mild pleural effusion, pulmonary nodule  S/P IV lasix  Hx of recurrent DVT/PE  Historically on warfarin, INRs supratherapeutic- currently held d/t above   Follows with hematology op-per note review may need bridging if ac interrupted for prolonged period of time.  Hx of polycythemia vera-on hydroxyurea  Hx of left lung ca s/p resection follows with Dr Reyes and Dr Ceron as op  Hx of lacunar CVA  On statin, warfarin held  SSS s/p Biotronic PPM  PAF-currently sustaining SR  On oral diltiazem, toprol   On warfarin-currently held d/t above   Echo 9/2024 LVEF 58%, G1D, mild-mod TR  Chronic LE edema, venous insufficiency  Follows op with Dr Yates, was holding diuretics d/t orthostatic hypotension    Plan:  Continue holding warfarin, INR pending this morning-currently allowing for passive correction however if necessary for liver biopsy or endoscopy- ok to administer FFP to reverse warfarin per Dr Catalan  recs.  Long term ac interruption may need bridging with heparin or Lovenox per hematology note review d/t recurrent PE/DVT.  Continue toprol, diltiazem, statin.  Monitor H/H closely, currently hgb stable.  C-diff infection-management per primary/GI.  Further management per GI/primary.    Plan of care discussed with patient, RN.    Rosey Orellana, ARMOND  11/5/2024  9:03 AM  [phone]

## 2024-11-05 NOTE — DIETARY NOTE
BRIEF DIETITIAN NOTE      Patient Status 11/05/24: Pt screened for RDN f/u per nutrition screening. Initially screened per MST of 2, but wt reviewd and no wt loss found. Found to be at no/low nutrition risk at this time. Pt admitted for rectal bleeding. PMHx: chronic diarrhea, chronic dyspnea, paroxysmal atrial fibrillation on warfarin, chronic hypotension. Visited pt today. Diet Hx: Pt reported poor appetite. Pt with improved  PO intakes, consuming 40-70% of CLD in the past few days, now 50 and 100% of two meals of Cardiac diet, per documentation. Pt reported fair po PTA. Weight Hx: Weight relatively stable, pt UBW: 220-23# per Wt Hx on EHR.  CBW: 228#  Plan: Noted MRI showing malignancy of hepatic lobe, per MD note. Added ONS to help pt meet nutrient needs while appetite continues to improve. Pt agreeable to Ensure Enlive BID strawberry. Will follow per protocol. Please consult RD if earlier nutrition intervention is needed.     Percent Meals Eaten (last 6 days)       Date/Time Percent Meals Eaten (%)    11/02/24 0900 40 %    11/02/24 1500 70 %    11/02/24 1925 50 %     Percent Meals Eaten (%): dinner tray at 11/02/24 1925    11/03/24 0000 0 %    11/03/24 1500 100 %    11/04/24 1500 70 %    11/04/24 2100 40 %    11/05/24 0922 50 %    11/05/24 1428 100 %             Patient Weight(s) for the past 336 hrs:   Weight   11/04/24 0500 103.6 kg (228 lb 8 oz)   11/01/24 1959 104.7 kg (230 lb 12.8 oz)   11/01/24 1520 105.7 kg (233 lb)        Wt Readings from Last 6 Encounters:   11/04/24 103.6 kg (228 lb 8 oz)   10/25/24 105.7 kg (233 lb)   09/16/24 104.8 kg (231 lb)   09/04/24 104.8 kg (231 lb)   06/25/24 107.8 kg (237 lb 9.6 oz)   05/20/24 109.8 kg (242 lb)        Medical Food Supplements-RD added Ensure Enlive (350 calories/ 20 g protein each) BID Strawberry. Rational/use of oral supplements discussed. Will monitor  for tolerance and adequacy.     F/u per protocol or as appropriate.       Juli Ivey RD, LDN  Clinical  Dietitian  Available via Epic securechat  Ext. [phone]

## 2024-11-05 NOTE — PLAN OF CARE
Fall precautions in place. Call light in reach.    Problem: Patient Centered Care  Goal: Patient preferences are identified and integrated in the patient's plan of care  Description: Interventions:  - What would you like us to know as we care for you? Patient states one daughter is a registered nurse and lives with other daughter; patient has 8 children  - Provide timely, complete, and accurate information to patient/family  - Incorporate patient and family knowledge, values, beliefs, and cultural backgrounds into the planning and delivery of care  - Encourage patient/family to participate in care and decision-making at the level they choose  - Honor patient and family perspectives and choices  Outcome: Progressing     Problem: Patient/Family Goals  Goal: Patient/Family Long Term Goal  Description: Patient's Long Term Goal: To get stronger; minimize fatigue    Interventions:  -Monitor VSS  -Monitor Labs  -Cards consult  -PT/OT consult   - See additional Care Plan goals for specific interventions  Outcome: Progressing  Goal: Patient/Family Short Term Goal  Description: Patient's Short Term Goal: To manage and/or stop rectal bleeding     Interventions:   -Monitor VSS  -Monitor Labs  -GI consult  -Monitor stools   -See additional Care Plan goals for specific interventions  Outcome: Progressing     Problem: PAIN - ADULT  Goal: Verbalizes/displays adequate comfort level or patient's stated pain goal  Description: INTERVENTIONS:  - Encourage pt to monitor pain and request assistance  - Assess pain using appropriate pain scale  - Administer analgesics based on type and severity of pain and evaluate response  - Implement non-pharmacological measures as appropriate and evaluate response  - Consider cultural and social influences on pain and pain management  - Manage/alleviate anxiety  - Utilize distraction and/or relaxation techniques  - Monitor for opioid side effects  - Notify MD/LIP if interventions unsuccessful or  patient reports new pain  - Anticipate increased pain with activity and pre-medicate as appropriate  Outcome: Progressing     Problem: SAFETY ADULT - FALL  Goal: Free from fall injury  Description: INTERVENTIONS:  - Assess pt frequently for physical needs  - Identify cognitive and physical deficits and behaviors that affect risk of falls.  - [place] fall precautions as indicated by assessment.  - Educate pt/family on patient safety including physical limitations  - Instruct pt to call for assistance with activity based on assessment  - Modify environment to reduce risk of injury  - Provide assistive devices as appropriate  - Consider OT/PT consult to assist with strengthening/mobility  - Encourage toileting schedule  Outcome: Progressing     Problem: DISCHARGE PLANNING  Goal: Discharge to home or other facility with appropriate resources  Description: INTERVENTIONS:  - Identify barriers to discharge w/pt and caregiver  - Include patient/family/discharge partner in discharge planning  - Arrange for needed discharge resources and transportation as appropriate  - Identify discharge learning needs (meds, wound care, etc)  - Arrange for interpreters to assist at discharge as needed  - Consider post-discharge preferences of patient/family/discharge partner  - Complete POLST form as appropriate  - Assess patient's ability to be responsible for managing their own health  - Refer to Case Management Department for coordinating discharge planning if the patient needs post-hospital services based on physician/LIP order or complex needs related to functional status, cognitive ability or social support system  Outcome: Progressing     Problem: GASTROINTESTINAL - ADULT  Goal: Maintains or returns to baseline bowel function  Description: INTERVENTIONS:  - Assess bowel function  - Maintain adequate hydration with IV or PO as ordered and tolerated  - Evaluate effectiveness of GI medications  - Encourage mobilization and activity  -  Obtain nutritional consult as needed  - Establish a toileting routine/schedule  - Consider collaborating with pharmacy to review patient's medication profile  Outcome: Progressing     Problem: METABOLIC/FLUID AND ELECTROLYTES - ADULT  Goal: Electrolytes maintained within normal limits  Description: INTERVENTIONS:  - Monitor labs and rhythm and assess patient for signs and symptoms of electrolyte imbalances  - Administer electrolyte replacement as ordered  - Monitor response to electrolyte replacements, including rhythm and repeat lab results as appropriate  - Fluid restriction as ordered  - Instruct patient on fluid and nutrition restrictions as appropriate  Outcome: Progressing  Goal: Hemodynamic stability and optimal renal function maintained  Description: INTERVENTIONS:  - Monitor labs and assess for signs and symptoms of volume excess or deficit  - Monitor intake, output and patient weight  - Monitor urine specific gravity, serum osmolarity and serum sodium as indicated or ordered  - Monitor response to interventions for patient's volume status, including labs, urine output, blood pressure (other measures as available)  - Encourage oral intake as appropriate  - Instruct patient on fluid and nutrition restrictions as appropriate  Outcome: Progressing     Problem: SKIN/TISSUE INTEGRITY - ADULT  Goal: Skin integrity remains intact  Description: INTERVENTIONS  - Assess and document risk factors for pressure ulcer development  - Assess and document skin integrity  - Monitor for areas of redness and/or skin breakdown  - Initiate interventions, skin care algorithm/standards of care as needed  Outcome: Progressing  Goal: Oral mucous membranes remain intact  Description: INTERVENTIONS  - Assess oral mucosa and hygiene practices  - Implement preventative oral hygiene regimen  - Implement oral medicated treatments as ordered  Outcome: Progressing     Problem: HEMATOLOGIC - ADULT  Goal: Maintains hematologic  stability  Description: INTERVENTIONS  - Assess for signs and symptoms of bleeding or hemorrhage  - Monitor labs and vital signs for trends  - Administer supportive blood products/factors, fluids and medications as ordered and appropriate  - Administer supportive blood products/factors as ordered and appropriate  Outcome: Progressing  Goal: Free from bleeding injury  Description: (Example usage: patient with low platelets)  INTERVENTIONS:  - Avoid intramuscular injections, enemas and rectal medication administration  - Ensure safe mobilization of patient  - Hold pressure on venipuncture sites to achieve adequate hemostasis  - Assess for signs and symptoms of internal bleeding  - Monitor lab trends  - Patient is to report abnormal signs of bleeding to staff  - Avoid use of toothpicks and dental floss  - Use electric shaver for shaving  - Use soft bristle tooth brush  - Limit straining and forceful nose blowing  Outcome: Progressing     Problem: Impaired Functional Mobility  Goal: Achieve highest/safest level of mobility/gait  Description: Interventions:  - Assess patient's functional ability and stability  - Promote increasing activity/tolerance for mobility and gait  - Educate and engage patient/family in tolerated activity level and precautions  - Recommend use of  RW for transfers and ambulation  Outcome: Progressing     Problem: Impaired Activities of Daily Living  Goal: Achieve highest/safest level of independence in self care  Description: Interventions:  - Assess ability and encourage patient to participate in ADLs to maximize function  - Promote sitting position while performing ADLs such as feeding, grooming, and bathing  - Educate and encourage patient/family in tolerated functional activity level and precautions during self-care  - Encourage patient to incorporate impaired side during daily activities to promote function  Outcome: Progressing

## 2024-11-05 NOTE — PROGRESS NOTES
[facility]     Gastroenterology Progress Note    aTra Lockwood Patient Status:  Inpatient    1940 MRN [id number]   Location [facility] 5SW/SE Attending Loy Vences MD   Hosp Day # 4 PCP Robbie Hubbard MD       Subjective:   Feels fine today. Not much of appetite. No nausea or emesis. Diarrhea slowing down. No abdominal pain. No fever.   Objective:   Blood pressure 134/54, pulse 81, temperature 99.2 °F (37.3 °C), temperature source Oral, resp. rate 18, height 5' 7\" (1.702 m), weight 228 lb 8 oz (103.6 kg), SpO2 95%. Body mass index is 35.79 kg/m².    Gen: awake, alert patient, NAD  HEENT: EOMI, the sclera appears anicteric, oropharynx clear, mucus membranes appear moist  CV: RRR  Lung: no conversational dyspnea   Abdomen: soft NTND abdomen with NABS appreciated   Skin: dry, warm, no jaundice  Ext: no LE edema is evident  Neuro: Alert, oriented x3 and interactive  Psych: calm, cooperative    Assessment and Plan:   Tara Lockwood is a 84 year old female w/ PMHx of left upper lobe adenocarcinoma, DVT/PE on warfarin, polycythemia vera, COPD who presented to ER for dark stools.     #C diff infection  - start oral vancomycin course     #Large liver mass with multiple additional lesions in liver   - based on MRI concern for multifocal HCC vs metastatic cholangio vs metastatic disease   - Alpha-fetoprotein is elevated at 109.9.    - oncology consult  - hepatitis B nonreactive    Addie Chacon MD      Results:     Lab Results   Component Value Date    WBC 6.8 2024    HGB 11.3 (L) 2024    HCT 33.9 (L) 2024    .0 2024    CREATSERUM 0.91 2024    BUN 17 2024     2024    K 3.7 2024     2024    CO2 26.0 2024    GLU 98 2024    CA 9.2 2024    ALB 3.0 (L) 2024    ALKPHO 99 2024    BILT 1.0 2024    TP 5.3 (L) 2024     (H) 2024    ALT 46 2024    PTT 33.4 2024     INR 2.27 (H) 11/05/2024    PT 13.1 05/11/2016    T4F 1.0 05/03/2022    TSH 3.521 09/05/2024    CRP < 0.5 05/29/2014    MG 1.6 11/05/2024    B12 734 09/05/2024       MRI LIVER (W+WO) (CPT=74183)    Result Date: 11/4/2024  CONCLUSION:  1. Large centrally necrotic mass again noted arising from the right hepatic lobe that measures 15.7 cm with multiple similar appearing, more homogeneous enhancing smaller satellite lesions.  The constellation of these findings is highly concerning for a malignant process.  Differential considerations favor multifocal hepatocellular carcinoma over metastatic cholangiocarcinoma or other metastatic malignancy given the history of elevated alpha fetoprotein levels.  However, consider correlation with percutaneous biopsy if warranted. 2. Mild hepatic steatosis.  Note the liver is not grossly cirrhotic morphologically and there is no evidence of portal venous hypertension. 3. Small pleural effusions, increased in size on the left and new on the right, which probably relate to mild CHF or fluid overload in this patient with a cardiac pacemaker. 4. Stable small bilateral adrenal adenomas. 5. Status post cholecystectomy.  No biliary ductal dilatation.    [facility]  Dictated by (CST): Jordon Haney MD on 11/04/2024 at 2:18 PM     Finalized by (CST): Jordon Haney MD on 11/04/2024 at 2:34 PM

## 2024-11-05 NOTE — PLAN OF CARE
Problem: Patient Centered Care  Goal: Patient preferences are identified and integrated in the patient's plan of care  Description: Interventions:  - What would you like us to know as we care for you? Patient states one daughter is a registered nurse and lives with other daughter; patient has 8 children  - Provide timely, complete, and accurate information to patient/family  - Incorporate patient and family knowledge, values, beliefs, and cultural backgrounds into the planning and delivery of care  - Encourage patient/family to participate in care and decision-making at the level they choose  - Honor patient and family perspectives and choices  Outcome: Not Progressing     Problem: Patient/Family Goals  Goal: Patient/Family Long Term Goal  Description: Patient's Long Term Goal: To get stronger; minimize fatigue    Interventions:  -Monitor VSS  -Monitor Labs  -Cards consult  -PT/OT consult   - See additional Care Plan goals for specific interventions  Outcome: Not Progressing  Goal: Patient/Family Short Term Goal  Description: Patient's Short Term Goal: To manage and/or stop rectal bleeding     Interventions:   -Monitor VSS  -Monitor Labs  -GI consult  -Monitor stools   -See additional Care Plan goals for specific interventions  Outcome: Not Progressing     Problem: PAIN - ADULT  Goal: Verbalizes/displays adequate comfort level or patient's stated pain goal  Description: INTERVENTIONS:  - Encourage pt to monitor pain and request assistance  - Assess pain using appropriate pain scale  - Administer analgesics based on type and severity of pain and evaluate response  - Implement non-pharmacological measures as appropriate and evaluate response  - Consider cultural and social influences on pain and pain management  - Manage/alleviate anxiety  - Utilize distraction and/or relaxation techniques  - Monitor for opioid side effects  - Notify MD/LIP if interventions unsuccessful or patient reports new pain  - Anticipate  increased pain with activity and pre-medicate as appropriate  Outcome: Not Progressing     Problem: SAFETY ADULT - FALL  Goal: Free from fall injury  Description: INTERVENTIONS:  - Assess pt frequently for physical needs  - Identify cognitive and physical deficits and behaviors that affect risk of falls.  - [place] fall precautions as indicated by assessment.  - Educate pt/family on patient safety including physical limitations  - Instruct pt to call for assistance with activity based on assessment  - Modify environment to reduce risk of injury  - Provide assistive devices as appropriate  - Consider OT/PT consult to assist with strengthening/mobility  - Encourage toileting schedule  Outcome: Not Progressing     Problem: DISCHARGE PLANNING  Goal: Discharge to home or other facility with appropriate resources  Description: INTERVENTIONS:  - Identify barriers to discharge w/pt and caregiver  - Include patient/family/discharge partner in discharge planning  - Arrange for needed discharge resources and transportation as appropriate  - Identify discharge learning needs (meds, wound care, etc)  - Arrange for interpreters to assist at discharge as needed  - Consider post-discharge preferences of patient/family/discharge partner  - Complete POLST form as appropriate  - Assess patient's ability to be responsible for managing their own health  - Refer to Case Management Department for coordinating discharge planning if the patient needs post-hospital services based on physician/LIP order or complex needs related to functional status, cognitive ability or social support system  Outcome: Not Progressing     Problem: GASTROINTESTINAL - ADULT  Goal: Maintains or returns to baseline bowel function  Description: INTERVENTIONS:  - Assess bowel function  - Maintain adequate hydration with IV or PO as ordered and tolerated  - Evaluate effectiveness of GI medications  - Encourage mobilization and activity  - Obtain nutritional consult  as needed  - Establish a toileting routine/schedule  - Consider collaborating with pharmacy to review patient's medication profile  Outcome: Not Progressing     Problem: METABOLIC/FLUID AND ELECTROLYTES - ADULT  Goal: Electrolytes maintained within normal limits  Description: INTERVENTIONS:  - Monitor labs and rhythm and assess patient for signs and symptoms of electrolyte imbalances  - Administer electrolyte replacement as ordered  - Monitor response to electrolyte replacements, including rhythm and repeat lab results as appropriate  - Fluid restriction as ordered  - Instruct patient on fluid and nutrition restrictions as appropriate  Outcome: Not Progressing  Goal: Hemodynamic stability and optimal renal function maintained  Description: INTERVENTIONS:  - Monitor labs and assess for signs and symptoms of volume excess or deficit  - Monitor intake, output and patient weight  - Monitor urine specific gravity, serum osmolarity and serum sodium as indicated or ordered  - Monitor response to interventions for patient's volume status, including labs, urine output, blood pressure (other measures as available)  - Encourage oral intake as appropriate  - Instruct patient on fluid and nutrition restrictions as appropriate  Outcome: Not Progressing     Problem: SKIN/TISSUE INTEGRITY - ADULT  Goal: Skin integrity remains intact  Description: INTERVENTIONS  - Assess and document risk factors for pressure ulcer development  - Assess and document skin integrity  - Monitor for areas of redness and/or skin breakdown  - Initiate interventions, skin care algorithm/standards of care as needed  Outcome: Not Progressing  Goal: Oral mucous membranes remain intact  Description: INTERVENTIONS  - Assess oral mucosa and hygiene practices  - Implement preventative oral hygiene regimen  - Implement oral medicated treatments as ordered  Outcome: Not Progressing     Problem: HEMATOLOGIC - ADULT  Goal: Maintains hematologic  stability  Description: INTERVENTIONS  - Assess for signs and symptoms of bleeding or hemorrhage  - Monitor labs and vital signs for trends  - Administer supportive blood products/factors, fluids and medications as ordered and appropriate  - Administer supportive blood products/factors as ordered and appropriate  Outcome: Not Progressing  Goal: Free from bleeding injury  Description: (Example usage: patient with low platelets)  INTERVENTIONS:  - Avoid intramuscular injections, enemas and rectal medication administration  - Ensure safe mobilization of patient  - Hold pressure on venipuncture sites to achieve adequate hemostasis  - Assess for signs and symptoms of internal bleeding  - Monitor lab trends  - Patient is to report abnormal signs of bleeding to staff  - Avoid use of toothpicks and dental floss  - Use electric shaver for shaving  - Use soft bristle tooth brush  - Limit straining and forceful nose blowing  Outcome: Not Progressing     Problem: Impaired Functional Mobility  Goal: Achieve highest/safest level of mobility/gait  Description: Interventions:  - Assess patient's functional ability and stability  - Promote increasing activity/tolerance for mobility and gait  - Educate and engage patient/family in tolerated activity level and precautions  - Recommend use of  RW for transfers and ambulation  Outcome: Not Progressing     Problem: Impaired Activities of Daily Living  Goal: Achieve highest/safest level of independence in self care  Description: Interventions:  - Assess ability and encourage patient to participate in ADLs to maximize function  - Promote sitting position while performing ADLs such as feeding, grooming, and bathing  - Educate and encourage patient/family in tolerated functional activity level and precautions during self-care  - Encourage patient to incorporate impaired side during daily activities to promote function  Outcome: Not Progressing

## 2024-11-05 NOTE — PROGRESS NOTES
[facility]  part of [facility]    Progress Note    Tara Lockwood Patient Status:  Inpatient    1940 MRN [id number]   Location [facility] 5SW/SE Attending Loy Vences MD   Hosp Day # 4 PCP Robbie Hubbard MD       Subjective:   Tara Lockwood remains stable, MRI suggested malignancy.     I called patient's daughter Mily [phone] and gave her an update.     Review of Systems:   10 point ROS completed and was negative, except for pertinent positive and negatives stated in subjective.    Objective:     Vitals:    24 0900 24 1500 24 2341 24 0519   BP: 148/54 157/62 143/54 158/67   BP Location: Left arm Left arm Right arm Left arm   Pulse: 83 83 88 83   Resp:    Temp: 98.6 °F (37 °C) 99.3 °F (37.4 °C) 98.8 °F (37.1 °C) 98.6 °F (37 °C)   TempSrc: Oral Oral Oral Oral   SpO2: 95% 94% 93% 94%   Weight:       Height:         Patient Weight for the past 72 hrs:   Weight   24 1520 233 lb (105.7 kg)   24 1959 230 lb 12.8 oz (104.7 kg)       Intake/Output Summary (Last 24 hours) at 2024 0845  Last data filed at 2024 0517  Gross per 24 hour   Intake 470 ml   Output 650 ml   Net -180 ml       GENERAL:  The patient appeared to be in no distress   SKIN:  Warm and hydrated  HEENT:  Head was atraumatic and normocephalic.    CHEST:  Symmetrical movement on inspiration  LUNGS:  No audible wheezing  ABDOMEN: Non-distended  MUSCULOSKELETAL:  There was no deformity.   EXTREMITIES: There was no edema  NEUROLOGICAL:  There was no focal deficit.    PSYCHIATRIC: Calm and cooperative     Current Scheduled Inpatient Meds:      magnesium oxide  400 mg Oral Once    sodium ferric gluconate  125 mg Intravenous Daily    dilTIAZem ER  180 mg Oral Daily    atorvastatin  80 mg Oral Nightly    hydroxyurea  500 mg Oral Daily    metoprolol succinate ER  25 mg Oral QAM    pantoprazole  40 mg Intravenous Daily       Current PRN Inpatient Meds:        gadoterate  meglumine    acetaminophen    ondansetron    metoclopramide    influenza virus vaccine PF    Results:     Lab Results   Component Value Date    WBC 6.8 11/05/2024    HGB 11.3 (L) 11/05/2024    HCT 33.9 (L) 11/05/2024    .0 11/05/2024    CREATSERUM 0.91 11/05/2024    BUN 17 11/05/2024     11/05/2024    K 3.7 11/05/2024     11/05/2024    CO2 26.0 11/05/2024    GLU 98 11/05/2024    CA 9.2 11/05/2024    ALB 3.0 (L) 11/05/2024    ALKPHO 99 11/05/2024    BILT 1.0 11/05/2024    TP 5.3 (L) 11/05/2024     (H) 11/05/2024    ALT 46 11/05/2024    PTT 33.4 05/13/2024    INR 3.10 (H) 11/04/2024    PT 13.1 05/11/2016    T4F 1.0 05/03/2022    TSH 3.521 09/05/2024    CRP < 0.5 05/29/2014    MG 1.6 11/05/2024    B12 734 09/05/2024       Recent Labs   Lab 11/01/24  1618 11/02/24  0512 11/03/24  0511 11/04/24  0528 11/05/24  0518   RBC 3.08* 2.92* 3.27* 3.64* 3.10*   HGB 11.4* 10.4* 11.6* 12.9 11.3*   HCT 34.3* 32.9* 35.4 39.6 33.9*   .4* 112.7* 108.3* 108.8* 109.4*   MCH 37.0* 35.6* 35.5* 35.4* 36.5*   MCHC 33.2 31.6 32.8 32.6 33.3   RDW 13.9 13.8 13.6 13.4 13.8   NEPRELIM 7.07 5.08  --   --   --    WBC 8.7 6.7 7.3 8.8 6.8   .0 283.0 310.0 401.0 280.0     Recent Labs   Lab 11/01/24  1618 11/02/24  0513 11/03/24  0511 11/04/24  0528 11/05/24  0518   GLU 95   < > 106* 113* 98   BUN 21   < > 15 16 17   CREATSERUM 1.19*   < > 0.89 1.02 0.91   CA 9.4   < > 9.1 9.5 9.2   ALB 3.4  --   --  3.4 3.0*      < > 135* 136 140   K 4.3   < > 3.8 4.2 3.7      < > 104 103 106   CO2 23.0   < > 23.0 23.0 26.0   ALKPHO 102  --   --  115 99   *  --   --  160* 130*   ALT 48  --   --  56* 46   BILT 0.8  --   --  1.2* 1.0   TP 6.0  --   --  6.3 5.3*    < > = values in this interval not displayed.     Lab Results   Component Value Date    PT 13.1 05/11/2016    PT 15.7 (H) 07/13/2015    PT 22.6 (H) 04/13/2015    INR 3.10 (H) 11/04/2024    INR 4.07 (H) 11/03/2024    INR 4.05 (H) 11/02/2024        Culture:  No results found for this visit on 11/01/24.    Imaging/EKG:   MRI LIVER (W+WO) (CPT=74183)    Result Date: 11/4/2024  CONCLUSION:  1. Large centrally necrotic mass again noted arising from the right hepatic lobe that measures 15.7 cm with multiple similar appearing, more homogeneous enhancing smaller satellite lesions.  The constellation of these findings is highly concerning for a malignant process.  Differential considerations favor multifocal hepatocellular carcinoma over metastatic cholangiocarcinoma or other metastatic malignancy given the history of elevated alpha fetoprotein levels.  However, consider correlation with percutaneous biopsy if warranted. 2. Mild hepatic steatosis.  Note the liver is not grossly cirrhotic morphologically and there is no evidence of portal venous hypertension. 3. Small pleural effusions, increased in size on the left and new on the right, which probably relate to mild CHF or fluid overload in this patient with a cardiac pacemaker. 4. Stable small bilateral adrenal adenomas. 5. Status post cholecystectomy.  No biliary ductal dilatation.    [facility]-remote  Dictated by (CST): Jordon Haney MD on 11/04/2024 at 2:18 PM     Finalized by (CST): Jordon Haney MD on 11/04/2024 at 2:34 PM          XR CHEST AP PORTABLE  (CPT=71045)    Result Date: 11/3/2024  CONCLUSION:   Small to moderate left pleural effusion, slightly increased from prior.  Adjacent left basilar opacity, also increased from prior, which may represent subsegmental atelectasis, edema, and/or pneumonia in the appropriate clinical setting.  Trace right pleural effusion with right basilar opacity, slightly increased from prior, may represent the same process.  Recommend radiographic follow-up to resolution.      Dictated by (CST): Estefani Martinez MD on 11/03/2024 at 10:38 AM     Finalized by (CST): Estefani Martinez MD on 11/03/2024 at 10:40 AM             Assessment and Plan:   Tara Lockwood is an  84-year-old female with hx of chronic diarrhea, chronic dyspnea, paroxysmal atrial fibrillation on warfarin, chronic hypotension on midodrine, SSS w/ PPM, who presents with black diarrhea.       # Anemia  # Melena  # Diarrhea  - baseline hgb 12-14, around 11 on admission  - no melena since admission  - C Diff PCR positive, EIA pending  - GI on consult  .  # Liver mass  - Liver ultrasound showed a large 16.7 cm solid mass of the inferior right hepatic lobe, most compatible with hepatocellular carcinoma.   - MRI showed a large centrally necrotic mass arising from the right hepatic lobe that measures 15.7 cm, highly concerning for malignancy.  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   - Oneal 2 V617 F+ polycythemia vera 11/2020, on hydroxyurea.  - patient follows Dr. Ceron  - continue home hydroxyurea     # History of ischemic stroke  - MRI of the brain 05/2024 showed lacunar stroke in the right cerebellum     Diet: regular  PT/OT: Patient has been evaluated and presents with no skilled Physical Therapy needs at this time.   DVT ppx: warfarin  Line: none  Code status: Full   Dispo: per clinical course     MDM: high Loy Vences MD, PhD  Message via Secure Chat  [facility] Hospitalist

## 2024-11-06 PROBLEM — Z86.718 HISTORY OF VENOUS THROMBOEMBOLISM: Status: ACTIVE | Noted: 2024-01-01

## 2024-11-06 PROBLEM — R16.0 LIVER MASS: Status: ACTIVE | Noted: 2024-01-01

## 2024-11-06 PROBLEM — D64.9 ANEMIA: Status: ACTIVE | Noted: 2024-01-01

## 2024-11-06 PROBLEM — R16.0 LIVER MASS: Status: ACTIVE | Noted: 2024-11-06

## 2024-11-06 PROBLEM — Z86.718 HISTORY OF VENOUS THROMBOEMBOLISM: Status: ACTIVE | Noted: 2024-11-06

## 2024-11-06 PROBLEM — D64.9 ANEMIA: Status: ACTIVE | Noted: 2024-11-06

## 2024-11-06 LAB
ANION GAP SERPL CALC-SCNC: 11 MMOL/L (ref 0–18)
ANION GAP SERPL CALC-SCNC: 6 MMOL/L (ref 0–18)
APTT PPP: 31.6 SECONDS (ref 23–36)
APTT PPP: 51.2 SECONDS (ref 23–36)
BUN BLD-MCNC: 21 MG/DL (ref 9–23)
BUN BLD-MCNC: 23 MG/DL (ref 9–23)
BUN/CREAT SERPL: 20.9 (ref 10–20)
BUN/CREAT SERPL: 23.3 (ref 10–20)
CALCIUM BLD-MCNC: 9.1 MG/DL (ref 8.7–10.4)
CALCIUM BLD-MCNC: 9.7 MG/DL (ref 8.7–10.4)
CHLORIDE SERPL-SCNC: 103 MMOL/L (ref 98–112)
CHLORIDE SERPL-SCNC: 105 MMOL/L (ref 98–112)
CO2 SERPL-SCNC: 23 MMOL/L (ref 21–32)
CO2 SERPL-SCNC: 27 MMOL/L (ref 21–32)
CREAT BLD-MCNC: 0.9 MG/DL
CREAT BLD-MCNC: 1.1 MG/DL
DEPRECATED RDW RBC AUTO: 56.2 FL (ref 35.1–46.3)
DEPRECATED RDW RBC AUTO: 56.3 FL (ref 35.1–46.3)
EGFRCR SERPLBLD CKD-EPI 2021: 49 ML/MIN/1.73M2 (ref 60–?)
EGFRCR SERPLBLD CKD-EPI 2021: 63 ML/MIN/1.73M2 (ref 60–?)
ERYTHROCYTE [DISTWIDTH] IN BLOOD BY AUTOMATED COUNT: 13.8 % (ref 11–15)
ERYTHROCYTE [DISTWIDTH] IN BLOOD BY AUTOMATED COUNT: 13.9 % (ref 11–15)
GLUCOSE BLD-MCNC: 104 MG/DL (ref 70–99)
GLUCOSE BLD-MCNC: 160 MG/DL (ref 70–99)
HCT VFR BLD AUTO: 34.4 %
HCT VFR BLD AUTO: 40.9 %
HGB BLD-MCNC: 11.4 G/DL
HGB BLD-MCNC: 13.1 G/DL
INR BLD: 1.57 (ref 0.8–1.2)
MAGNESIUM SERPL-MCNC: 1.6 MG/DL (ref 1.6–2.6)
MCH RBC QN AUTO: 35.4 PG (ref 26–34)
MCH RBC QN AUTO: 36.3 PG (ref 26–34)
MCHC RBC AUTO-ENTMCNC: 32 G/DL (ref 31–37)
MCHC RBC AUTO-ENTMCNC: 33.1 G/DL (ref 31–37)
MCV RBC AUTO: 109.6 FL
MCV RBC AUTO: 110.5 FL
OSMOLALITY SERPL CALC.SUM OF ELEC: 289 MOSM/KG (ref 275–295)
OSMOLALITY SERPL CALC.SUM OF ELEC: 291 MOSM/KG (ref 275–295)
PLATELET # BLD AUTO: 282 10(3)UL (ref 150–450)
PLATELET # BLD AUTO: 398 10(3)UL (ref 150–450)
POTASSIUM SERPL-SCNC: 3.9 MMOL/L (ref 3.5–5.1)
POTASSIUM SERPL-SCNC: 4.5 MMOL/L (ref 3.5–5.1)
PROTHROMBIN TIME: 19.6 SECONDS (ref 11.6–14.8)
RBC # BLD AUTO: 3.14 X10(6)UL
RBC # BLD AUTO: 3.7 X10(6)UL
SODIUM SERPL-SCNC: 137 MMOL/L (ref 136–145)
SODIUM SERPL-SCNC: 138 MMOL/L (ref 136–145)
WBC # BLD AUTO: 6.7 X10(3) UL (ref 4–11)
WBC # BLD AUTO: 8.9 X10(3) UL (ref 4–11)

## 2024-11-06 PROCEDURE — 99233 SBSQ HOSP IP/OBS HIGH 50: CPT | Performed by: HOSPITALIST

## 2024-11-06 PROCEDURE — 99232 SBSQ HOSP IP/OBS MODERATE 35: CPT | Performed by: REGISTERED NURSE

## 2024-11-06 PROCEDURE — 99223 1ST HOSP IP/OBS HIGH 75: CPT | Performed by: INTERNAL MEDICINE

## 2024-11-06 RX ORDER — HEPARIN SODIUM AND DEXTROSE 10000; 5 [USP'U]/100ML; G/100ML
INJECTION INTRAVENOUS CONTINUOUS
Status: DISPENSED | OUTPATIENT
Start: 2024-11-06 | End: 2024-11-08

## 2024-11-06 RX ORDER — HEPARIN SODIUM AND DEXTROSE 10000; 5 [USP'U]/100ML; G/100ML
1000 INJECTION INTRAVENOUS ONCE
Status: COMPLETED | OUTPATIENT
Start: 2024-11-06 | End: 2024-11-06

## 2024-11-06 RX ORDER — HEPARIN SODIUM AND DEXTROSE 10000; 5 [USP'U]/100ML; G/100ML
INJECTION INTRAVENOUS CONTINUOUS
Status: DISCONTINUED | OUTPATIENT
Start: 2024-11-06 | End: 2024-11-06

## 2024-11-06 RX ORDER — MAGNESIUM OXIDE 400 MG/1
400 TABLET ORAL ONCE
Status: COMPLETED | OUTPATIENT
Start: 2024-11-06 | End: 2024-11-06

## 2024-11-06 NOTE — PLAN OF CARE
Problem: Patient Centered Care  Goal: Patient preferences are identified and integrated in the patient's plan of care  Description: Interventions:  - What would you like us to know as we care for you? Patient states one daughter is a registered nurse and lives with other daughter; patient has 8 children  - Provide timely, complete, and accurate information to patient/family  - Incorporate patient and family knowledge, values, beliefs, and cultural backgrounds into the planning and delivery of care  - Encourage patient/family to participate in care and decision-making at the level they choose  - Honor patient and family perspectives and choices  Outcome: Progressing     Problem: PAIN - ADULT  Goal: Verbalizes/displays adequate comfort level or patient's stated pain goal  Description: INTERVENTIONS:  - Encourage pt to monitor pain and request assistance  - Assess pain using appropriate pain scale  - Administer analgesics based on type and severity of pain and evaluate response  - Implement non-pharmacological measures as appropriate and evaluate response  - Consider cultural and social influences on pain and pain management  - Manage/alleviate anxiety  - Utilize distraction and/or relaxation techniques  - Monitor for opioid side effects  - Notify MD/LIP if interventions unsuccessful or patient reports new pain  - Anticipate increased pain with activity and pre-medicate as appropriate  Outcome: Progressing     Problem: SAFETY ADULT - FALL  Goal: Free from fall injury  Description: INTERVENTIONS:  - Assess pt frequently for physical needs  - Identify cognitive and physical deficits and behaviors that affect risk of falls.  - [place] fall precautions as indicated by assessment.  - Educate pt/family on patient safety including physical limitations  - Instruct pt to call for assistance with activity based on assessment  - Modify environment to reduce risk of injury  - Provide assistive devices as appropriate  -  Consider OT/PT consult to assist with strengthening/mobility  - Encourage toileting schedule  Outcome: Progressing     Problem: DISCHARGE PLANNING  Goal: Discharge to home or other facility with appropriate resources  Description: INTERVENTIONS:  - Identify barriers to discharge w/pt and caregiver  - Include patient/family/discharge partner in discharge planning  - Arrange for needed discharge resources and transportation as appropriate  - Identify discharge learning needs (meds, wound care, etc)  - Arrange for interpreters to assist at discharge as needed  - Consider post-discharge preferences of patient/family/discharge partner  - Complete POLST form as appropriate  - Assess patient's ability to be responsible for managing their own health  - Refer to Case Management Department for coordinating discharge planning if the patient needs post-hospital services based on physician/LIP order or complex needs related to functional status, cognitive ability or social support system  Outcome: Progressing     Problem: GASTROINTESTINAL - ADULT  Goal: Maintains or returns to baseline bowel function  Description: INTERVENTIONS:  - Assess bowel function  - Maintain adequate hydration with IV or PO as ordered and tolerated  - Evaluate effectiveness of GI medications  - Encourage mobilization and activity  - Obtain nutritional consult as needed  - Establish a toileting routine/schedule  - Consider collaborating with pharmacy to review patient's medication profile  Outcome: Progressing     Problem: METABOLIC/FLUID AND ELECTROLYTES - ADULT  Goal: Electrolytes maintained within normal limits  Description: INTERVENTIONS:  - Monitor labs and rhythm and assess patient for signs and symptoms of electrolyte imbalances  - Administer electrolyte replacement as ordered  - Monitor response to electrolyte replacements, including rhythm and repeat lab results as appropriate  - Fluid restriction as ordered  - Instruct patient on fluid and  nutrition restrictions as appropriate  Outcome: Progressing  Goal: Hemodynamic stability and optimal renal function maintained  Description: INTERVENTIONS:  - Monitor labs and assess for signs and symptoms of volume excess or deficit  - Monitor intake, output and patient weight  - Monitor urine specific gravity, serum osmolarity and serum sodium as indicated or ordered  - Monitor response to interventions for patient's volume status, including labs, urine output, blood pressure (other measures as available)  - Encourage oral intake as appropriate  - Instruct patient on fluid and nutrition restrictions as appropriate  Outcome: Progressing     Problem: SKIN/TISSUE INTEGRITY - ADULT  Goal: Skin integrity remains intact  Description: INTERVENTIONS  - Assess and document risk factors for pressure ulcer development  - Assess and document skin integrity  - Monitor for areas of redness and/or skin breakdown  - Initiate interventions, skin care algorithm/standards of care as needed  Outcome: Progressing  Goal: Oral mucous membranes remain intact  Description: INTERVENTIONS  - Assess oral mucosa and hygiene practices  - Implement preventative oral hygiene regimen  - Implement oral medicated treatments as ordered  Outcome: Progressing     Problem: HEMATOLOGIC - ADULT  Goal: Maintains hematologic stability  Description: INTERVENTIONS  - Assess for signs and symptoms of bleeding or hemorrhage  - Monitor labs and vital signs for trends  - Administer supportive blood products/factors, fluids and medications as ordered and appropriate  - Administer supportive blood products/factors as ordered and appropriate  Outcome: Progressing  Goal: Free from bleeding injury  Description: (Example usage: patient with low platelets)  INTERVENTIONS:  - Avoid intramuscular injections, enemas and rectal medication administration  - Ensure safe mobilization of patient  - Hold pressure on venipuncture sites to achieve adequate hemostasis  - Assess  for signs and symptoms of internal bleeding  - Monitor lab trends  - Patient is to report abnormal signs of bleeding to staff  - Avoid use of toothpicks and dental floss  - Use electric shaver for shaving  - Use soft bristle tooth brush  - Limit straining and forceful nose blowing  Outcome: Progressing     Problem: Impaired Functional Mobility  Goal: Achieve highest/safest level of mobility/gait  Description: Interventions:  - Assess patient's functional ability and stability  - Promote increasing activity/tolerance for mobility and gait  - Educate and engage patient/family in tolerated activity level and precautions  - Recommend use of  RW for transfers and ambulation  Outcome: Progressing     Problem: Impaired Activities of Daily Living  Goal: Achieve highest/safest level of independence in self care  Description: Interventions:  - Assess ability and encourage patient to participate in ADLs to maximize function  - Promote sitting position while performing ADLs such as feeding, grooming, and bathing  - Educate and encourage patient/family in tolerated functional activity level and precautions during self-care  - Encourage patient to incorporate impaired side during daily activities to promote function  Outcome: Progressing  Patient is A/O x 4, up with assist, good appetite. No complains of pain. Plan is for ultrasound guided liver biopsy on Friday, NPO after MN for procedure. Patient and family updated on care plan.

## 2024-11-06 NOTE — PROGRESS NOTES
Progress Note  Tara Lockwood Patient Status:  Inpatient    1940 MRN [id number]   Location [facility] [place] 5SW/SE Attending Shayla Quinteros MD   Hosp Day # 5 PCP Robbie Hubbard MD     Subjective:  In bed on exam, no complaints for me. Denies cp, sob.     Objective:  /69 (BP Location: Right arm)   Pulse 85   Temp 98.5 °F (36.9 °C) (Oral)   Resp 18   Ht 5' 7\" (1.702 m)   Wt 228 lb 8 oz (103.6 kg)   SpO2 94%   BMI 35.79 kg/m²     Telemetry: nsr, brief pat vs paf last night.      Intake/Output:    Intake/Output Summary (Last 24 hours) at 2024 1048  Last data filed at 2024 0504  Gross per 24 hour   Intake 840 ml   Output 400 ml   Net 440 ml       Last 3 Weights   24 0500 228 lb 8 oz (103.6 kg)   24 1959 230 lb 12.8 oz (104.7 kg)   24 1520 233 lb (105.7 kg)   10/25/24 1008 233 lb (105.7 kg)   24 1528 231 lb (104.8 kg)       Labs:  Recent Labs   Lab 24  0528 24  0518 24  0540   * 98 104*   BUN 16 17 21   CREATSERUM 1.02 0.91 0.90   EGFRCR 54* 62 63   CA 9.5 9.2 9.1    140 138   K 4.2 3.7 3.9    106 105   CO2 23.0 26.0 27.0     Recent Labs   Lab 24  1618 24  0512 24  0511 24  0528 24  0518 24  0540   RBC 3.08* 2.92*   < > 3.64* 3.10* 3.14*   HGB 11.4* 10.4*   < > 12.9 11.3* 11.4*   HCT 34.3* 32.9*   < > 39.6 33.9* 34.4*   .4* 112.7*   < > 108.8* 109.4* 109.6*   MCH 37.0* 35.6*   < > 35.4* 36.5* 36.3*   MCHC 33.2 31.6   < > 32.6 33.3 33.1   RDW 13.9 13.8   < > 13.4 13.8 13.9   NEPRELIM 7.07 5.08  --   --   --   --    WBC 8.7 6.7   < > 8.8 6.8 6.7   .0 283.0   < > 401.0 280.0 282.0    < > = values in this interval not displayed.         No results for input(s): \"TROP\", \"TROPHS\", \"CK\" in the last 168 hours.    Review of Systems   Cardiovascular: Negative.    Respiratory: Negative.         Physical Exam:    Gen: alert, oriented x 3, NAD  Heent: pupils equal, reactive. Mucous  membranes moist.   Neck: no jvd  Cardiac: regular rate and rhythm, normal S1,S2  Lungs: CTA  Abd: soft, NT/ND +bs  Ext: 1+ ble edema  Skin: Warm, dry  Neuro: No focal deficits        Medications:     vancomycin  125 mg Oral QID    melatonin  3 mg Oral Nightly    dilTIAZem ER  180 mg Oral Daily    atorvastatin  80 mg Oral Nightly    hydroxyurea  500 mg Oral Daily    metoprolol succinate ER  25 mg Oral QAM    pantoprazole  40 mg Intravenous Daily             Assessment:  Recurrent diarrhea with melena/GIB  Has hx of chronic diarrhea-current stool sample tested positive for C-diff infection -po vanco  EGD 2017 per chart renew showed diverticulosis and polyps -holding off repeat EGD d/t supratherapeutic INRs  US of the abdomen showed 16.7 cm solid mass of the right lobe of the liver  MRI liver showed large, centrally necrotic mass in the right hepatic lobe highly concerning for malignant process--heme/onc to see  GI following    Anemia likely d/t GIB, hgb stable   Goal hgb >8.0  Dyspnea  CT chest 10/8 showed mild pleural effusion, pulmonary nodule  S/P IV lasix  Hx of recurrent DVT/PE  Historically on warfarin, INRs supratherapeutic- currently held d/t above   Follows with hematology op-per note review may need bridging if ac interrupted for prolonged period of time.  Hx of polycythemia vera-on hydroxyurea  Hx of left lung ca s/p resection follows with Dr Reyes and Dr Ceron as op  Hx of lacunar CVA  On statin, warfarin held  SSS s/p [employer] PPM  PAF-currently sustaining SR  On oral diltiazem, toprol   On warfarin-currently held d/t above   Echo 9/2024 LVEF 58%, G1D, mild-mod TR  Chronic LE edema, venous insufficiency  Follows op with Dr Yates, was holding diuretics d/t orthostatic hypotension      Plan:  Heme/onc to see regarding MRI liver findings.   INR 1.5 today. Cont to hold coumadin until no procedures needed. Plan for liver biopsy noted.  As outlined, if long term AC interruption needed, may need bridging  with heparin or lovenox. Will await heme/onc eval/recs.  Hgb remains stable.   Cont bb, ccb.    Plan of care discussed with patient, RN.    ARMOND So  11/6/2024  10:48 AM      Addendum:  Reviewed with IR Camryn LEAHY and Dr. Ceron. Will start heparin gtt per protocol, no bolus. Will need to be turned off on 11/8 at 0200, 6 hours prior to biopsy.

## 2024-11-06 NOTE — CONSULTS
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 Labs with mild anemia and elevated AST, liver US revealed large 16.7 cm mass in the inferior right hepatic lobe concerning for HCC. MRI liver showed the mass measuring 15.7 cm with multiple similar appearing satellite lesions highly concerning for malignancy. Mild hepatic steatosis. No evidence of cirrhosis or portal hypertension. No lymphadenopathy. , CEA 1.6,  18.2. oncology consulted for further management.     History:  Past Medical History:    Acid reflux    Acute medial meniscus tear of right knee    Acute medial meniscus tear of right knee    Acute midline low back pain with left-sided sciatica    Age-related nuclear cataract of both eyes    Arthritis    Degenerative disc disease, lumbar    Displaced fracture (avulsion) of medial epicondyle of right humerus, subsequent encounter for fracture with routine healing    Diverticulosis large intestine w/o perforation or abscess w/o bleeding    Diverticulosis large intestine w/o perforation or abscess w/o bleeding    Family history of glaucoma in mother    Family history of glaucoma in mother    Family history of macular degeneration    Gastric polyps    Gastric polyps    Hiatal hernia    History of hip replacement, total    right hip    Iatrogenic pulmonary embolism and infarction, initial encounter (HCC)    Internal hemorrhoids    Kidney problem    Long term (current) use of anticoagulants    Lumbar  herniated disc    Lung cancer (HCC)    Obesity, unspecified    Osteoarthrosis    Osteoarthrosis, unspecified whether generalized or localized, unspecified site    Post-menopausal bleeding    Primary osteoarthritis of left knee    Primary osteoarthritis of right knee    Pulmonary embolism (HCC)    S/P colonoscopy with polypectomy    Spinal stenosis of lumbar region without neurogenic claudication    Spondylolisthesis of lumbar region    Thyroid condition     Past Surgical History:   Procedure Laterality Date    Cholecystectomy      Colonoscopy      Colonoscopy N/A 2017    Procedure: COLONOSCOPY;  Surgeon: Clare Victor MD;  Location: [facility] ENDOSCOPY    Egd      Foot surgery Bilateral     Bunion Surgery X6    Foot/toes surgery proc unlisted Left     Pt had surgery to straighten toes on left foot.    Hip replacement surgery Right     Hip surgery Right 2015    right total hip arthroplasty    Hysterectomy      Laparoscopic cholecystectomy            x7 (Twins x1) Max weight 9 1/2 lbs    Other surgical history      parotid gland removed left benigh    Other surgical history      Removal Skin Mole    Other surgical history      SAB/ D&C    Other surgical history      Ear Surgery    Other surgical history      Neg EM Bx    Removal of lung,lobectomy Left     Pt had upper left lobe removed.     Family History   Problem Relation Age of Onset    Cancer Father 87        Lung  -  smoker    Macular degeneration Mother     Glaucoma Mother     Other (Alzheimer's Disease) Mother 93    Other (Bladder Cancer) Mother     Macular degeneration Brother     Other (Pancreatic Cancer) Sister 73    Macular degeneration Brother     Other (Myocardial Infarction) Brother 69    Macular degeneration Maternal Aunt     Glaucoma Maternal Aunt     Breast Cancer Daughter 48    Diabetes Neg       reports that she has never smoked. She has never been exposed to tobacco smoke. She has never used smokeless tobacco. She reports  that she does not currently use alcohol after a past usage of about 0.8 standard drinks of alcohol per week. She reports that she does not use drugs.    Allergies:  Allergies[1]    Medications:    Current Facility-Administered Medications:     vancomycin (Vancocin) cap 125 mg, 125 mg, Oral, QID    melatonin tab 3 mg, 3 mg, Oral, Nightly    gadoterate meglumine (Dotarem) 10 MMOL/20ML injection 20 mL, 20 mL, Intravenous, ONCE PRN    dilTIAZem ER (CardIZEM CD) 24 hr cap 180 mg, 180 mg, Oral, Daily    atorvastatin (Lipitor) tab 80 mg, 80 mg, Oral, Nightly    hydroxyurea (Hydrea) cap 500 mg, 500 mg, Oral, Daily    metoprolol succinate ER (Toprol XL) 24 hr tab 25 mg, 25 mg, Oral, QAM    acetaminophen (Tylenol Extra Strength) tab 500 mg, 500 mg, Oral, Q4H PRN    ondansetron (Zofran) 4 MG/2ML injection 4 mg, 4 mg, Intravenous, Q6H PRN    metoclopramide (Reglan) 5 mg/mL injection 5 mg, 5 mg, Intravenous, Q8H PRN    pantoprazole (Protonix) 40 mg in sodium chloride 0.9% PF 10 mL IV push, 40 mg, Intravenous, Daily    influenza virus trivalent high dose PF (Fluzone HD) 0.5 mL injection (ages >/= 65 years) 0.5 mL, 0.5 mL, Intramuscular, Prior to discharge    Review of Systems:  A 12-point review of systems was done with pertinent positives and negatives per the HPI.    Weight:  Wt Readings from Last 6 Encounters:   11/04/24 103.6 kg (228 lb 8 oz)   10/25/24 105.7 kg (233 lb)   09/16/24 104.8 kg (231 lb)   09/04/24 104.8 kg (231 lb)   06/25/24 107.8 kg (237 lb 9.6 oz)   05/20/24 109.8 kg (242 lb)       Vital Sings:  /69 (BP Location: Right arm)   Pulse 85   Temp 98.5 °F (36.9 °C) (Oral)   Resp 18   Ht 1.702 m (5' 7\")   Wt 103.6 kg (228 lb 8 oz)   SpO2 94%   BMI 35.79 kg/m²     Physical Exam:   General: Patient is alert and oriented x 3, not in acute distress.   HEENT: EOMs intact. Anicteric sclera. Pink conjunctiva. Oropharynx is clear.   Neck: No palpable lymphadenopathy. Neck is supple.  Chest: Clear to  auscultation.  No rales or wheezes.  Heart: Regular rate and rhythm.   Abdomen: Soft, non tender with good bowel sounds.   Extremities: Mild LE edema.   Neurological: Grossly intact.   Psych: Appropriate mood and affect.     Laboratory Data:    Recent Labs   Lab 11/01/24  1618 11/02/24  0512 11/03/24  0511 11/04/24  0528 11/05/24  0518 11/06/24  0540   RBC 3.08* 2.92*   < > 3.64* 3.10* 3.14*   HGB 11.4* 10.4*   < > 12.9 11.3* 11.4*   HCT 34.3* 32.9*   < > 39.6 33.9* 34.4*   .4* 112.7*   < > 108.8* 109.4* 109.6*   MCH 37.0* 35.6*   < > 35.4* 36.5* 36.3*   MCHC 33.2 31.6   < > 32.6 33.3 33.1   RDW 13.9 13.8   < > 13.4 13.8 13.9   NEPRELIM 7.07 5.08  --   --   --   --    WBC 8.7 6.7   < > 8.8 6.8 6.7   .0 283.0   < > 401.0 280.0 282.0    < > = values in this interval not displayed.       Recent Labs   Lab 11/01/24 1618 11/02/24  0513 11/04/24  0528 11/05/24  0518 11/06/24  0540   GLU 95   < > 113* 98 104*   BUN 21   < > 16 17 21   CREATSERUM 1.19*   < > 1.02 0.91 0.90   EGFRCR 45*   < > 54* 62 63   CA 9.4   < > 9.5 9.2 9.1   ALB 3.4  --  3.4 3.0*  --       < > 136 140 138   K 4.3   < > 4.2 3.7 3.9      < > 103 106 105   CO2 23.0   < > 23.0 26.0 27.0   ALKPHO 102  --  115 99  --    *  --  160* 130*  --    ALT 48  --  56* 46  --    BILT 0.8  --  1.2* 1.0  --    TP 6.0  --  6.3 5.3*  --     < > = values in this interval not displayed.        Recent Labs   Lab 11/04/24  0528 11/05/24  0518 11/06/24  0540   INR 3.10* 2.27* 1.57*        Imaging:  MRI LIVER (W+WO) (CPT=74183)    Result Date: 11/4/2024  CONCLUSION:  1. Large centrally necrotic mass again noted arising from the right hepatic lobe that measures 15.7 cm with multiple similar appearing, more homogeneous enhancing smaller satellite lesions.  The constellation of these findings is highly concerning for a malignant process.  Differential considerations favor multifocal hepatocellular carcinoma over metastatic cholangiocarcinoma or  other metastatic malignancy given the history of elevated alpha fetoprotein levels.  However, consider correlation with percutaneous biopsy if warranted. 2. Mild hepatic steatosis.  Note the liver is not grossly cirrhotic morphologically and there is no evidence of portal venous hypertension. 3. Small pleural effusions, increased in size on the left and new on the right, which probably relate to mild CHF or fluid overload in this patient with a cardiac pacemaker. 4. Stable small bilateral adrenal adenomas. 5. Status post cholecystectomy.  No biliary ductal dilatation.    Elm-remote  Dictated by (CST): Jordon Haney MD on 11/04/2024 at 2:18 PM     Finalized by (CST): Jordon Haney MD on 11/04/2024 at 2:34 PM              Impression and Plan:      Liver mass  - large mass ~16 cm in the right hepatic lobe with multiple smaller satellite lesions highly concerning for malignancy. No liver cirrhosis. No lymphadenopathy. . CEA and  normal. HBV negative.   - recommend IR liver biopsy; plan for US guided biopsy on Friday  - differential includes HCC, cholangiocarcinoma, or metastases    History of stage IB adenocarcinoma of the left lung  - s/p left upper lobectomy 6/2016. Recent CT chest with no evidence of disease.     History of recurrent DVT/PE  - on indefinite anticoagulation with warfarin- currently on hold for bx  - recommend anticoagulation bridging with IV heparin while awaiting liver biopsy due to her high risk   - start heparin gtt with no bolus and monitor PTT per protocol    Polycythemia vera  - high risk due to her age and history of thrombosis  - on hydrea    Mild anemia   - secondary to hydrea and recent GI bleed    C diff colitis   - po vancomycin     Discussed at length with the patient and her family         Thank you for the opportunity to participate in the care of Tara Lockwood. Will continue to follow along with you.   Please contact me for any questions or concerns.       Yamilex  MD Jie  Hematology Oncology             [1]   Allergies  Allergen Reactions    Fentanyl ANAPHYLAXIS    Hydrocodone RASH    Morphine OTHER (SEE COMMENTS)     Chest tightness     Phenol ANAPHYLAXIS

## 2024-11-06 NOTE — PLAN OF CARE
Problem: Patient Centered Care  Goal: Patient preferences are identified and integrated in the patient's plan of care  Description: Interventions:  - What would you like us to know as we care for you? Patient states one daughter is a registered nurse and lives with other daughter; patient has 8 children  - Provide timely, complete, and accurate information to patient/family  - Incorporate patient and family knowledge, values, beliefs, and cultural backgrounds into the planning and delivery of care  - Encourage patient/family to participate in care and decision-making at the level they choose  - Honor patient and family perspectives and choices  Outcome: Progressing     Problem: Patient/Family Goals  Goal: Patient/Family Long Term Goal  Description: Patient's Long Term Goal: To get stronger; minimize fatigue    Interventions:  -Monitor VSS  -Monitor Labs  -Cards consult  -PT/OT consult   - See additional Care Plan goals for specific interventions  Outcome: Progressing  Goal: Patient/Family Short Term Goal  Description: Patient's Short Term Goal: To manage and/or stop rectal bleeding     Interventions:   -Monitor VSS  -Monitor Labs  -GI consult  -Monitor stools   -See additional Care Plan goals for specific interventions  Outcome: Progressing     Problem: PAIN - ADULT  Goal: Verbalizes/displays adequate comfort level or patient's stated pain goal  Description: INTERVENTIONS:  - Encourage pt to monitor pain and request assistance  - Assess pain using appropriate pain scale  - Administer analgesics based on type and severity of pain and evaluate response  - Implement non-pharmacological measures as appropriate and evaluate response  - Consider cultural and social influences on pain and pain management  - Manage/alleviate anxiety  - Utilize distraction and/or relaxation techniques  - Monitor for opioid side effects  - Notify MD/LIP if interventions unsuccessful or patient reports new pain  - Anticipate increased pain  with activity and pre-medicate as appropriate  Outcome: Progressing     Problem: SAFETY ADULT - FALL  Goal: Free from fall injury  Description: INTERVENTIONS:  - Assess pt frequently for physical needs  - Identify cognitive and physical deficits and behaviors that affect risk of falls.  - [place] fall precautions as indicated by assessment.  - Educate pt/family on patient safety including physical limitations  - Instruct pt to call for assistance with activity based on assessment  - Modify environment to reduce risk of injury  - Provide assistive devices as appropriate  - Consider OT/PT consult to assist with strengthening/mobility  - Encourage toileting schedule  Outcome: Progressing     Problem: DISCHARGE PLANNING  Goal: Discharge to home or other facility with appropriate resources  Description: INTERVENTIONS:  - Identify barriers to discharge w/pt and caregiver  - Include patient/family/discharge partner in discharge planning  - Arrange for needed discharge resources and transportation as appropriate  - Identify discharge learning needs (meds, wound care, etc)  - Arrange for interpreters to assist at discharge as needed  - Consider post-discharge preferences of patient/family/discharge partner  - Complete POLST form as appropriate  - Assess patient's ability to be responsible for managing their own health  - Refer to Case Management Department for coordinating discharge planning if the patient needs post-hospital services based on physician/LIP order or complex needs related to functional status, cognitive ability or social support system  Outcome: Progressing     Problem: GASTROINTESTINAL - ADULT  Goal: Maintains or returns to baseline bowel function  Description: INTERVENTIONS:  - Assess bowel function  - Maintain adequate hydration with IV or PO as ordered and tolerated  - Evaluate effectiveness of GI medications  - Encourage mobilization and activity  - Obtain nutritional consult as needed  - Establish a  toileting routine/schedule  - Consider collaborating with pharmacy to review patient's medication profile  Outcome: Progressing     Problem: METABOLIC/FLUID AND ELECTROLYTES - ADULT  Goal: Electrolytes maintained within normal limits  Description: INTERVENTIONS:  - Monitor labs and rhythm and assess patient for signs and symptoms of electrolyte imbalances  - Administer electrolyte replacement as ordered  - Monitor response to electrolyte replacements, including rhythm and repeat lab results as appropriate  - Fluid restriction as ordered  - Instruct patient on fluid and nutrition restrictions as appropriate  Outcome: Progressing  Goal: Hemodynamic stability and optimal renal function maintained  Description: INTERVENTIONS:  - Monitor labs and assess for signs and symptoms of volume excess or deficit  - Monitor intake, output and patient weight  - Monitor urine specific gravity, serum osmolarity and serum sodium as indicated or ordered  - Monitor response to interventions for patient's volume status, including labs, urine output, blood pressure (other measures as available)  - Encourage oral intake as appropriate  - Instruct patient on fluid and nutrition restrictions as appropriate  Outcome: Progressing     Problem: SKIN/TISSUE INTEGRITY - ADULT  Goal: Skin integrity remains intact  Description: INTERVENTIONS  - Assess and document risk factors for pressure ulcer development  - Assess and document skin integrity  - Monitor for areas of redness and/or skin breakdown  - Initiate interventions, skin care algorithm/standards of care as needed  Outcome: Progressing  Goal: Oral mucous membranes remain intact  Description: INTERVENTIONS  - Assess oral mucosa and hygiene practices  - Implement preventative oral hygiene regimen  - Implement oral medicated treatments as ordered  Outcome: Progressing     Problem: HEMATOLOGIC - ADULT  Goal: Maintains hematologic stability  Description: INTERVENTIONS  - Assess for signs and  symptoms of bleeding or hemorrhage  - Monitor labs and vital signs for trends  - Administer supportive blood products/factors, fluids and medications as ordered and appropriate  - Administer supportive blood products/factors as ordered and appropriate  Outcome: Progressing  Goal: Free from bleeding injury  Description: (Example usage: patient with low platelets)  INTERVENTIONS:  - Avoid intramuscular injections, enemas and rectal medication administration  - Ensure safe mobilization of patient  - Hold pressure on venipuncture sites to achieve adequate hemostasis  - Assess for signs and symptoms of internal bleeding  - Monitor lab trends  - Patient is to report abnormal signs of bleeding to staff  - Avoid use of toothpicks and dental floss  - Use electric shaver for shaving  - Use soft bristle tooth brush  - Limit straining and forceful nose blowing  Outcome: Progressing     Problem: Impaired Functional Mobility  Goal: Achieve highest/safest level of mobility/gait  Description: Interventions:  - Assess patient's functional ability and stability  - Promote increasing activity/tolerance for mobility and gait  - Educate and engage patient/family in tolerated activity level and precautions  - Recommend use of  RW for transfers and ambulation  Outcome: Progressing     Problem: Impaired Activities of Daily Living  Goal: Achieve highest/safest level of independence in self care  Description: Interventions:  - Assess ability and encourage patient to participate in ADLs to maximize function  - Promote sitting position while performing ADLs such as feeding, grooming, and bathing  - Educate and encourage patient/family in tolerated functional activity level and precautions during self-care  - Encourage patient to incorporate impaired side during daily activities to promote function  Outcome: Progressing

## 2024-11-06 NOTE — IMAGING NOTE
Reviewed MRI - there is a large right hepatic lobe mass that is amenable for ultrasound guided biopsy.  This will be scheduled on 11/8.  Coumadin on hold.

## 2024-11-06 NOTE — PROGRESS NOTES
[facility]     Gastroenterology Progress Note    Tara Lockwood Patient Status:  Inpatient    1940 MRN [id number]   Location [facility] 5SW/SE Attending Shayla Quinteros MD   Hosp Day # 5 PCP Robbie Hubbard MD       Subjective:   Pt is forgetful, inquiring about why NPO.  Plan for biopsy has been discussed with her along with MRI results.  Otherwise feeling well.  Asking to eat, stating she's hungry  No Bm today, which is much improved  No fever, chills   No chest pain, SOB  Objective:   Blood pressure 148/69, pulse 85, temperature 98.5 °F (36.9 °C), temperature source Oral, resp. rate 18, height 5' 7\" (1.702 m), weight 228 lb 8 oz (103.6 kg), SpO2 94%. Body mass index is 35.79 kg/m².    Gen: awake, alert patient, NAD  HEENT: EOMI, the sclera appears anicteric, oropharynx clear, mucus membranes appear moist  CV: RRR  Lung: no conversational dyspnea   Abdomen: soft NTND abdomen with NABS appreciated   Skin: dry, warm, no jaundice  Ext: no LE edema is evident  Neuro: Alert, oriented x2-3 and interactive  Psych: calm, cooperative    Assessment and Plan:   Tara Lockwood is a 84 year old female w/ PMHx of left upper lobe adenocarcinoma, DVT/PE on warfarin, polycythemia vera, COPD who presented to ER for dark stools.      #C diff infection  - continue oral vancomycin course  - diarrhea improved in frequency, no bowel movement overnight/today     #Large liver mass with multiple additional lesions in liver   - based on MRI concern for multifocal HCC vs metastatic cholangio vs metastatic disease   - Alpha-fetoprotein is elevated at 109.9.    - oncology consult  - hepatitis B nonreactive  - plan for biopsy today, NPO for procedure    GI available as needed.  Please call with further questions or concerns.    Case discussed with Addie Chacon MD and Mariela JORDAN.    Eleanor Paiz DNP, FNP-[facility] Gastroenterology  2024      Results:     Lab Results   Component Value Date     WBC 6.7 11/06/2024    HGB 11.4 (L) 11/06/2024    HCT 34.4 (L) 11/06/2024    .0 11/06/2024    CREATSERUM 0.90 11/06/2024    BUN 21 11/06/2024     11/06/2024    K 3.9 11/06/2024     11/06/2024    CO2 27.0 11/06/2024     (H) 11/06/2024    CA 9.1 11/06/2024    ALB 3.0 (L) 11/05/2024    ALKPHO 99 11/05/2024    BILT 1.0 11/05/2024    TP 5.3 (L) 11/05/2024     (H) 11/05/2024    ALT 46 11/05/2024    PTT 33.4 05/13/2024    INR 1.57 (H) 11/06/2024    PT 13.1 05/11/2016    T4F 1.0 05/03/2022    TSH 3.521 09/05/2024    CRP < 0.5 05/29/2014    MG 1.6 11/06/2024    B12 734 09/05/2024       MRI LIVER (W+WO) (CPT=74183)    Result Date: 11/4/2024  CONCLUSION:  1. Large centrally necrotic mass again noted arising from the right hepatic lobe that measures 15.7 cm with multiple similar appearing, more homogeneous enhancing smaller satellite lesions.  The constellation of these findings is highly concerning for a malignant process.  Differential considerations favor multifocal hepatocellular carcinoma over metastatic cholangiocarcinoma or other metastatic malignancy given the history of elevated alpha fetoprotein levels.  However, consider correlation with percutaneous biopsy if warranted. 2. Mild hepatic steatosis.  Note the liver is not grossly cirrhotic morphologically and there is no evidence of portal venous hypertension. 3. Small pleural effusions, increased in size on the left and new on the right, which probably relate to mild CHF or fluid overload in this patient with a cardiac pacemaker. 4. Stable small bilateral adrenal adenomas. 5. Status post cholecystectomy.  No biliary ductal dilatation.    Elm-remote  Dictated by (CST): Jordon Haney MD on 11/04/2024 at 2:18 PM     Finalized by (CST): Jordon Haney MD on 11/04/2024 at 2:34 PM

## 2024-11-07 LAB
APTT PPP: 59.5 SECONDS (ref 23–36)
PLATELET # BLD AUTO: 267 10(3)UL (ref 150–450)

## 2024-11-07 PROCEDURE — 99233 SBSQ HOSP IP/OBS HIGH 50: CPT | Performed by: HOSPITALIST

## 2024-11-07 NOTE — PROGRESS NOTES
[facility]  part of [facility]  Progress Note    Tara Lockwood Patient Status:  Inpatient    1940 MRN [id number]   Location [facility] 5SW/SE Attending Shayla Quinteros MD   Hosp Day # 6 PCP Robbie Hubbard MD       Subjective:   No complaints    Objective:   Comfortable, A&O.     Blood pressure 140/63, pulse 81, temperature 99 °F (37.2 °C), temperature source Oral, resp. rate 18, height 67\", weight 228 lb 8 oz (103.6 kg), SpO2 94%.          Results:   Labs:  Lab Results   Component Value Date    WBC 8.9 2024    RBC 3.70 2024    HGB 13.1 2024    HCT 40.9 2024    .5 2024    MCH 35.4 2024    MCHC 32.0 2024    RDW 13.8 2024    .0 2024       Assessment and Plan:   MRI shows liver mass, biopsy scheduled for .  Discussed procedure with patient who states understanding.       JUAN M ECHOLS, APRN  2024

## 2024-11-07 NOTE — PLAN OF CARE
No acute changes during shift.     Problem: Patient Centered Care  Goal: Patient preferences are identified and integrated in the patient's plan of care  Description: Interventions:  - What would you like us to know as we care for you? Patient states one daughter is a registered nurse and lives with other daughter; patient has 8 children  - Provide timely, complete, and accurate information to patient/family  - Incorporate patient and family knowledge, values, beliefs, and cultural backgrounds into the planning and delivery of care  - Encourage patient/family to participate in care and decision-making at the level they choose  - Honor patient and family perspectives and choices  Outcome: Progressing     Problem: Patient/Family Goals  Goal: Patient/Family Long Term Goal  Description: Patient's Long Term Goal: To get stronger; minimize fatigue    Interventions:  -Monitor VSS  -Monitor Labs  -Cards consult  -PT/OT consult   - See additional Care Plan goals for specific interventions  Outcome: Progressing  Goal: Patient/Family Short Term Goal  Description: Patient's Short Term Goal: To manage and/or stop rectal bleeding     Interventions:   -Monitor VSS  -Monitor Labs  -GI consult  -Monitor stools   -See additional Care Plan goals for specific interventions  Outcome: Progressing     Problem: PAIN - ADULT  Goal: Verbalizes/displays adequate comfort level or patient's stated pain goal  Description: INTERVENTIONS:  - Encourage pt to monitor pain and request assistance  - Assess pain using appropriate pain scale  - Administer analgesics based on type and severity of pain and evaluate response  - Implement non-pharmacological measures as appropriate and evaluate response  - Consider cultural and social influences on pain and pain management  - Manage/alleviate anxiety  - Utilize distraction and/or relaxation techniques  - Monitor for opioid side effects  - Notify MD/LIP if interventions unsuccessful or patient reports new  pain  - Anticipate increased pain with activity and pre-medicate as appropriate  Outcome: Progressing     Problem: SAFETY ADULT - FALL  Goal: Free from fall injury  Description: INTERVENTIONS:  - Assess pt frequently for physical needs  - Identify cognitive and physical deficits and behaviors that affect risk of falls.  - [place] fall precautions as indicated by assessment.  - Educate pt/family on patient safety including physical limitations  - Instruct pt to call for assistance with activity based on assessment  - Modify environment to reduce risk of injury  - Provide assistive devices as appropriate  - Consider OT/PT consult to assist with strengthening/mobility  - Encourage toileting schedule  Outcome: Progressing     Problem: DISCHARGE PLANNING  Goal: Discharge to home or other facility with appropriate resources  Description: INTERVENTIONS:  - Identify barriers to discharge w/pt and caregiver  - Include patient/family/discharge partner in discharge planning  - Arrange for needed discharge resources and transportation as appropriate  - Identify discharge learning needs (meds, wound care, etc)  - Arrange for interpreters to assist at discharge as needed  - Consider post-discharge preferences of patient/family/discharge partner  - Complete POLST form as appropriate  - Assess patient's ability to be responsible for managing their own health  - Refer to Case Management Department for coordinating discharge planning if the patient needs post-hospital services based on physician/LIP order or complex needs related to functional status, cognitive ability or social support system  Outcome: Progressing     Problem: GASTROINTESTINAL - ADULT  Goal: Maintains or returns to baseline bowel function  Description: INTERVENTIONS:  - Assess bowel function  - Maintain adequate hydration with IV or PO as ordered and tolerated  - Evaluate effectiveness of GI medications  - Encourage mobilization and activity  - Obtain nutritional  consult as needed  - Establish a toileting routine/schedule  - Consider collaborating with pharmacy to review patient's medication profile  Outcome: Progressing     Problem: METABOLIC/FLUID AND ELECTROLYTES - ADULT  Goal: Electrolytes maintained within normal limits  Description: INTERVENTIONS:  - Monitor labs and rhythm and assess patient for signs and symptoms of electrolyte imbalances  - Administer electrolyte replacement as ordered  - Monitor response to electrolyte replacements, including rhythm and repeat lab results as appropriate  - Fluid restriction as ordered  - Instruct patient on fluid and nutrition restrictions as appropriate  Outcome: Progressing  Goal: Hemodynamic stability and optimal renal function maintained  Description: INTERVENTIONS:  - Monitor labs and assess for signs and symptoms of volume excess or deficit  - Monitor intake, output and patient weight  - Monitor urine specific gravity, serum osmolarity and serum sodium as indicated or ordered  - Monitor response to interventions for patient's volume status, including labs, urine output, blood pressure (other measures as available)  - Encourage oral intake as appropriate  - Instruct patient on fluid and nutrition restrictions as appropriate  Outcome: Progressing     Problem: SKIN/TISSUE INTEGRITY - ADULT  Goal: Skin integrity remains intact  Description: INTERVENTIONS  - Assess and document risk factors for pressure ulcer development  - Assess and document skin integrity  - Monitor for areas of redness and/or skin breakdown  - Initiate interventions, skin care algorithm/standards of care as needed  Outcome: Progressing  Goal: Oral mucous membranes remain intact  Description: INTERVENTIONS  - Assess oral mucosa and hygiene practices  - Implement preventative oral hygiene regimen  - Implement oral medicated treatments as ordered  Outcome: Progressing     Problem: HEMATOLOGIC - ADULT  Goal: Maintains hematologic stability  Description:  INTERVENTIONS  - Assess for signs and symptoms of bleeding or hemorrhage  - Monitor labs and vital signs for trends  - Administer supportive blood products/factors, fluids and medications as ordered and appropriate  - Administer supportive blood products/factors as ordered and appropriate  Outcome: Progressing  Goal: Free from bleeding injury  Description: (Example usage: patient with low platelets)  INTERVENTIONS:  - Avoid intramuscular injections, enemas and rectal medication administration  - Ensure safe mobilization of patient  - Hold pressure on venipuncture sites to achieve adequate hemostasis  - Assess for signs and symptoms of internal bleeding  - Monitor lab trends  - Patient is to report abnormal signs of bleeding to staff  - Avoid use of toothpicks and dental floss  - Use electric shaver for shaving  - Use soft bristle tooth brush  - Limit straining and forceful nose blowing  Outcome: Progressing     Problem: Impaired Functional Mobility  Goal: Achieve highest/safest level of mobility/gait  Description: Interventions:  - Assess patient's functional ability and stability  - Promote increasing activity/tolerance for mobility and gait  - Educate and engage patient/family in tolerated activity level and precautions  - Recommend use of  RW for transfers and ambulation  Outcome: Progressing     Problem: Impaired Activities of Daily Living  Goal: Achieve highest/safest level of independence in self care  Description: Interventions:  - Assess ability and encourage patient to participate in ADLs to maximize function  - Promote sitting position while performing ADLs such as feeding, grooming, and bathing  - Educate and encourage patient/family in tolerated functional activity level and precautions during self-care  - Encourage patient to incorporate impaired side during daily activities to promote function  Outcome: Progressing

## 2024-11-07 NOTE — PROGRESS NOTES
Progress Note  Tara Lockwood Patient Status:  Inpatient    1940 MRN [id number]   Location [facility] 5SW/SE Attending Shayla Quinteros MD   Hosp Day # 6 PCP Robbie Hubbard MD     Subjective:  Pt denies chest pain, SOB or orthopnea. States diarrhea is improved and now starting to feel constipated. We discussed her chronic LE edema - she feels it is better than her baseline.     Objective:  /59 (BP Location: Left arm)   Pulse 81   Temp 98.3 °F (36.8 °C) (Oral)   Resp 18   Ht 5' 7\" (1.702 m)   Wt 228 lb 8 oz (103.6 kg)   SpO2 95%   BMI 35.79 kg/m²     Telemetry: NSR, occasional A paced     Intake/Output:    Intake/Output Summary (Last 24 hours) at 2024 1124  Last data filed at 2024 0455  Gross per 24 hour   Intake 250 ml   Output 450 ml   Net -200 ml       Last 3 Weights   24 0500 228 lb 8 oz (103.6 kg)   24 1959 230 lb 12.8 oz (104.7 kg)   24 1520 233 lb (105.7 kg)   10/25/24 1008 233 lb (105.7 kg)   24 1528 231 lb (104.8 kg)       Labs:  Recent Labs   Lab 24  0518 24  0540 24  1618   GLU 98 104* 160*   BUN 17 21 23   CREATSERUM 0.91 0.90 1.10*   EGFRCR 62 63 49*   CA 9.2 9.1 9.7    138 137   K 3.7 3.9 4.5    105 103   CO2 26.0 27.0 23.0     Recent Labs   Lab 24  1618 24  0512 24  0511 24  0518 24  0540 24  1618 24  0456   RBC 3.08* 2.92*   < > 3.10* 3.14* 3.70*  --    HGB 11.4* 10.4*   < > 11.3* 11.4* 13.1  --    HCT 34.3* 32.9*   < > 33.9* 34.4* 40.9  --    .4* 112.7*   < > 109.4* 109.6* 110.5*  --    MCH 37.0* 35.6*   < > 36.5* 36.3* 35.4*  --    MCHC 33.2 31.6   < > 33.3 33.1 32.0  --    RDW 13.9 13.8   < > 13.8 13.9 13.8  --    NEPRELIM 7.07 5.08  --   --   --   --   --    WBC 8.7 6.7   < > 6.8 6.7 8.9  --    .0 283.0   < > 280.0 282.0 398.0 267.0    < > = values in this interval not displayed.         No results for input(s): \"TROP\", \"TROPHS\", \"CK\" in the  last 168 hours.    Diagnostics:  No results found.   Review of Systems   Cardiovascular:  Positive for leg swelling. Negative for chest pain, dyspnea on exertion, orthopnea and palpitations.   Respiratory:  Negative for cough and shortness of breath.    Gastrointestinal:  Positive for constipation. Negative for abdominal pain and diarrhea.       Physical Exam:    Gen: alert, oriented x 3, NAD  Heent: pupils equal, reactive. Mucous membranes moist.   Neck: no jvd  Cardiac: regular rate and rhythm, normal S1,S2, no murmur, clicks, rub or gallop  Lungs: CTA  Abd: soft, NT/ND +bs  Ext: BLE 1-2+ edema  Skin: Warm, dry  Neuro: No focal deficits      Medications:     vancomycin  125 mg Oral QID    melatonin  3 mg Oral Nightly    dilTIAZem ER  180 mg Oral Daily    atorvastatin  80 mg Oral Nightly    hydroxyurea  500 mg Oral Daily    metoprolol succinate ER  25 mg Oral QAM    pantoprazole  40 mg Intravenous Daily      continuous dose heparin 1,000 Units/hr (11/07/24 0552)         Assessment:  Paroxysmal Atrial Fibrillation, Hx SSS s/p Biotronik DC PPM 12/2023  Maintaining NSR  On toprol, diltiiazem  Echo LVEF 58%, G1DD, no RWMA, mild-mod TR  Nuc stress  [id number] - 6; currently on IV heparin, warfarin on hold    C Diff Infection, Melena  Diarrhea improved - on po vanco  GI following  Liver Mass  MRI w/concern for malignancy  Pending biopsy on 11/8  GI, Heme/onc following  Anemia - likely r/t GIB; improved  Hx Recurrent DVT/PE  Hx Polycythemia Vera  Hx on warfarin - currently on IV heparin gtt  Mgmt per heme  Hx Lung Cancer s/p WILL lobectomy  Hx Lacunar CVA  Chronic Venous Insufficiency, Chronic LE edema  Was holding diuretics as OP d/t orthostatic hypotension    Plan:  Warfarin on hold  - currently on IV heparin. Hematology following - Appreciate their recommendations. Heparin gtt to be held 11/8 at 0200, 6 hrs prior to biopsy.   Pt is maintaining NSR - continue toprol, diltiazem   Recommend compression stockings for BLE  edema    Plan of care discussed with patient, RN.    Isa Vazquez, APRN  11/7/2024  11:24 AM  [phone] [facility]  [phone] [facility]

## 2024-11-07 NOTE — PLAN OF CARE
Problem: Patient Centered Care  Goal: Patient preferences are identified and integrated in the patient's plan of care  Description: Interventions:  - What would you like us to know as we care for you? Patient states one daughter is a registered nurse and lives with other daughter; patient has 8 children  - Provide timely, complete, and accurate information to patient/family  - Incorporate patient and family knowledge, values, beliefs, and cultural backgrounds into the planning and delivery of care  - Encourage patient/family to participate in care and decision-making at the level they choose  - Honor patient and family perspectives and choices  11/7/2024 1721 by Mariela Cosme RN  Outcome: Progressing  11/7/2024 1716 by Mariela Cosme RN  Outcome: Progressing     Problem: PAIN - ADULT  Goal: Verbalizes/displays adequate comfort level or patient's stated pain goal  Description: INTERVENTIONS:  - Encourage pt to monitor pain and request assistance  - Assess pain using appropriate pain scale  - Administer analgesics based on type and severity of pain and evaluate response  - Implement non-pharmacological measures as appropriate and evaluate response  - Consider cultural and social influences on pain and pain management  - Manage/alleviate anxiety  - Utilize distraction and/or relaxation techniques  - Monitor for opioid side effects  - Notify MD/LIP if interventions unsuccessful or patient reports new pain  - Anticipate increased pain with activity and pre-medicate as appropriate  11/7/2024 1721 by Mariela Cosme RN  Outcome: Progressing  11/7/2024 1716 by Mariela Cosme RN  Outcome: Progressing     Problem: SAFETY ADULT - FALL  Goal: Free from fall injury  Description: INTERVENTIONS:  - Assess pt frequently for physical needs  - Identify cognitive and physical deficits and behaviors that affect risk of falls.  - [place] fall precautions as indicated by assessment.  - Educate pt/family on patient safety including physical  limitations  - Instruct pt to call for assistance with activity based on assessment  - Modify environment to reduce risk of injury  - Provide assistive devices as appropriate  - Consider OT/PT consult to assist with strengthening/mobility  - Encourage toileting schedule  11/7/2024 1721 by Mareila Cosme RN  Outcome: Progressing  11/7/2024 1716 by Mariela Cosme RN  Outcome: Progressing     Problem: DISCHARGE PLANNING  Goal: Discharge to home or other facility with appropriate resources  Description: INTERVENTIONS:  - Identify barriers to discharge w/pt and caregiver  - Include patient/family/discharge partner in discharge planning  - Arrange for needed discharge resources and transportation as appropriate  - Identify discharge learning needs (meds, wound care, etc)  - Arrange for interpreters to assist at discharge as needed  - Consider post-discharge preferences of patient/family/discharge partner  - Complete POLST form as appropriate  - Assess patient's ability to be responsible for managing their own health  - Refer to Case Management Department for coordinating discharge planning if the patient needs post-hospital services based on physician/LIP order or complex needs related to functional status, cognitive ability or social support system  11/7/2024 1721 by Mariela Cosme RN  Outcome: Progressing  11/7/2024 1716 by Mariela Cosme RN  Outcome: Progressing     Problem: GASTROINTESTINAL - ADULT  Goal: Maintains or returns to baseline bowel function  Description: INTERVENTIONS:  - Assess bowel function  - Maintain adequate hydration with IV or PO as ordered and tolerated  - Evaluate effectiveness of GI medications  - Encourage mobilization and activity  - Obtain nutritional consult as needed  - Establish a toileting routine/schedule  - Consider collaborating with pharmacy to review patient's medication profile  11/7/2024 1721 by Mariela Cosme RN  Outcome: Progressing  11/7/2024 1716 by Mariela Cosme RN  Outcome:  Progressing     Problem: METABOLIC/FLUID AND ELECTROLYTES - ADULT  Goal: Electrolytes maintained within normal limits  Description: INTERVENTIONS:  - Monitor labs and rhythm and assess patient for signs and symptoms of electrolyte imbalances  - Administer electrolyte replacement as ordered  - Monitor response to electrolyte replacements, including rhythm and repeat lab results as appropriate  - Fluid restriction as ordered  - Instruct patient on fluid and nutrition restrictions as appropriate  11/7/2024 1721 by Mariela Cosme RN  Outcome: Progressing  11/7/2024 1716 by Mariela Cosme RN  Outcome: Progressing  Goal: Hemodynamic stability and optimal renal function maintained  Description: INTERVENTIONS:  - Monitor labs and assess for signs and symptoms of volume excess or deficit  - Monitor intake, output and patient weight  - Monitor urine specific gravity, serum osmolarity and serum sodium as indicated or ordered  - Monitor response to interventions for patient's volume status, including labs, urine output, blood pressure (other measures as available)  - Encourage oral intake as appropriate  - Instruct patient on fluid and nutrition restrictions as appropriate  11/7/2024 1721 by Mariela Cosme RN  Outcome: Progressing  11/7/2024 1716 by Mariela Cosme RN  Outcome: Progressing     Problem: SKIN/TISSUE INTEGRITY - ADULT  Goal: Skin integrity remains intact  Description: INTERVENTIONS  - Assess and document risk factors for pressure ulcer development  - Assess and document skin integrity  - Monitor for areas of redness and/or skin breakdown  - Initiate interventions, skin care algorithm/standards of care as needed  11/7/2024 1721 by Mariela Cosme RN  Outcome: Progressing  11/7/2024 1716 by Mariela Cosme RN  Outcome: Progressing  Goal: Oral mucous membranes remain intact  Description: INTERVENTIONS  - Assess oral mucosa and hygiene practices  - Implement preventative oral hygiene regimen  - Implement oral medicated treatments as  ordered  11/7/2024 1721 by Mariela Cosme RN  Outcome: Progressing  11/7/2024 1716 by Mariela Cosme RN  Outcome: Progressing     Problem: HEMATOLOGIC - ADULT  Goal: Maintains hematologic stability  Description: INTERVENTIONS  - Assess for signs and symptoms of bleeding or hemorrhage  - Monitor labs and vital signs for trends  - Administer supportive blood products/factors, fluids and medications as ordered and appropriate  - Administer supportive blood products/factors as ordered and appropriate  11/7/2024 1721 by Mariela Cosme RN  Outcome: Progressing  11/7/2024 1716 by Mariela Cosme RN  Outcome: Progressing  Goal: Free from bleeding injury  Description: (Example usage: patient with low platelets)  INTERVENTIONS:  - Avoid intramuscular injections, enemas and rectal medication administration  - Ensure safe mobilization of patient  - Hold pressure on venipuncture sites to achieve adequate hemostasis  - Assess for signs and symptoms of internal bleeding  - Monitor lab trends  - Patient is to report abnormal signs of bleeding to staff  - Avoid use of toothpicks and dental floss  - Use electric shaver for shaving  - Use soft bristle tooth brush  - Limit straining and forceful nose blowing  11/7/2024 1721 by Mariela Cosme RN  Outcome: Progressing  11/7/2024 1716 by Mariela Cosme RN  Outcome: Progressing     Problem: Impaired Functional Mobility  Goal: Achieve highest/safest level of mobility/gait  Description: Interventions:  - Assess patient's functional ability and stability  - Promote increasing activity/tolerance for mobility and gait  - Educate and engage patient/family in tolerated activity level and precautions  - Recommend use of  RW for transfers and ambulation  11/7/2024 1721 by Mariela Cosme RN  Outcome: Progressing  11/7/2024 1716 by Mariela Cosme RN  Outcome: Progressing     Problem: Impaired Activities of Daily Living  Goal: Achieve highest/safest level of independence in self care  Description: Interventions:  -  Assess ability and encourage patient to participate in ADLs to maximize function  - Promote sitting position while performing ADLs such as feeding, grooming, and bathing  - Educate and encourage patient/family in tolerated functional activity level and precautions during self-care  - Encourage patient to incorporate impaired side during daily activities to promote function  11/7/2024 1721 by Mariela Cosme, RN  Outcome: Progressing  11/7/2024 1716 by Mariela Cosme, RN  Outcome: Progressing   A/O x4, up in the chair with assist. Good appetite. NPO after MN for ultrasound guided liver mass biopsy, consent signed. Heparin drip at 10ml/hr, it will be stopped at 2 AM for procedure tomorrow.

## 2024-11-07 NOTE — PROGRESS NOTES
[facility]  part of [facility]    Progress Note    Tara Lockwood Patient Status:  Inpatient    1940 MRN [id number]   Location [facility] 5SW/SE Attending Loy Vences MD   Hosp Day # 5 PCP Robbie Hubbard MD       Subjective:   Tara Lockwood remains stable, resting in bed.   No new symptoms  Had 1 normal BM today    Review of Systems:   10 point ROS completed and was negative, except for pertinent positive and negatives stated in subjective.    Objective:     Vitals:    24 1445 24 2131 24 0506 24 0900   BP: 134/54 115/71 145/57 148/69   BP Location: Right arm Left arm Right arm Right arm   Pulse: 81 78 85    Resp: 18 18 18 18   Temp: 99.2 °F (37.3 °C) 98.8 °F (37.1 °C) 98 °F (36.7 °C) 98.5 °F (36.9 °C)   TempSrc: Oral Oral Oral Oral   SpO2: 95% 96% 94% 94%   Weight:       Height:         Patient Weight for the past 72 hrs:   Weight   24 1520 233 lb (105.7 kg)   24 1959 230 lb 12.8 oz (104.7 kg)       Intake/Output Summary (Last 24 hours) at 2024 1840  Last data filed at 2024 1555  Gross per 24 hour   Intake 720 ml   Output 400 ml   Net 320 ml       GENERAL:  The patient appeared to be in no distress, conversant  SKIN:  Warm and hydrated  HEENT:  Head was atraumatic and normocephalic.    CHEST:  Symmetrical movement on inspiration  LUNGS:  No audible wheezing  ABDOMEN: Non-distended  MUSCULOSKELETAL:  There was no deformity.   EXTREMITIES: There was no edema  NEUROLOGICAL:  There was no focal deficit.    PSYCHIATRIC: Calm and cooperative     Current Scheduled Inpatient Meds:      vancomycin  125 mg Oral QID    melatonin  3 mg Oral Nightly    dilTIAZem ER  180 mg Oral Daily    atorvastatin  80 mg Oral Nightly    hydroxyurea  500 mg Oral Daily    metoprolol succinate ER  25 mg Oral QAM    pantoprazole  40 mg Intravenous Daily       Current PRN Inpatient Meds:        gadoterate meglumine    acetaminophen    ondansetron     metoclopramide    influenza virus vaccine PF    Results:     Lab Results   Component Value Date    WBC 8.9 11/06/2024    HGB 13.1 11/06/2024    HCT 40.9 11/06/2024    .0 11/06/2024    CREATSERUM 1.10 (H) 11/06/2024    BUN 23 11/06/2024     11/06/2024    K 4.5 11/06/2024     11/06/2024    CO2 23.0 11/06/2024     (H) 11/06/2024    CA 9.7 11/06/2024    ALB 3.0 (L) 11/05/2024    ALKPHO 99 11/05/2024    BILT 1.0 11/05/2024    TP 5.3 (L) 11/05/2024     (H) 11/05/2024    ALT 46 11/05/2024    PTT 31.6 11/06/2024    INR 1.57 (H) 11/06/2024    PT 13.1 05/11/2016    T4F 1.0 05/03/2022    TSH 3.521 09/05/2024    CRP < 0.5 05/29/2014    MG 1.6 11/06/2024    B12 734 09/05/2024       Recent Labs   Lab 11/01/24  1618 11/02/24  0512 11/03/24  0511 11/05/24  0518 11/06/24  0540 11/06/24  1618   RBC 3.08* 2.92*   < > 3.10* 3.14* 3.70*   HGB 11.4* 10.4*   < > 11.3* 11.4* 13.1   HCT 34.3* 32.9*   < > 33.9* 34.4* 40.9   .4* 112.7*   < > 109.4* 109.6* 110.5*   MCH 37.0* 35.6*   < > 36.5* 36.3* 35.4*   MCHC 33.2 31.6   < > 33.3 33.1 32.0   RDW 13.9 13.8   < > 13.8 13.9 13.8   NEPRELIM 7.07 5.08  --   --   --   --    WBC 8.7 6.7   < > 6.8 6.7 8.9   .0 283.0   < > 280.0 282.0 398.0    < > = values in this interval not displayed.     Recent Labs   Lab 11/01/24  1618 11/02/24  0513 11/04/24  0528 11/05/24  0518 11/06/24  0540 11/06/24  1618   GLU 95   < > 113* 98 104* 160*   BUN 21   < > 16 17 21 23   CREATSERUM 1.19*   < > 1.02 0.91 0.90 1.10*   CA 9.4   < > 9.5 9.2 9.1 9.7   ALB 3.4  --  3.4 3.0*  --   --       < > 136 140 138 137   K 4.3   < > 4.2 3.7 3.9 4.5      < > 103 106 105 103   CO2 23.0   < > 23.0 26.0 27.0 23.0   ALKPHO 102  --  115 99  --   --    *  --  160* 130*  --   --    ALT 48  --  56* 46  --   --    BILT 0.8  --  1.2* 1.0  --   --    TP 6.0  --  6.3 5.3*  --   --     < > = values in this interval not displayed.     Lab Results   Component Value Date    PT 13.1  05/11/2016    PT 15.7 (H) 07/13/2015    PT 22.6 (H) 04/13/2015    INR 1.57 (H) 11/06/2024    INR 2.27 (H) 11/05/2024    INR 3.10 (H) 11/04/2024       Culture:  No results found for this visit on 11/01/24.    Imaging/EKG:   No results found.      Assessment and Plan:   Tara Lockwood is an 84-year-old female with hx of chronic diarrhea, chronic dyspnea, paroxysmal atrial fibrillation on warfarin, chronic hypotension on midodrine, SSS w/ PPM, who presents with black diarrhea.       # Anemia  # Melena  # Diarrhea due to C. difficile colitis  - baseline hgb 12-14, around 11 on admission  - no melena since admission  - C Diff PCR positive, EIA also positive  -On oral vancomycin 4 times daily  - GI on consult  .  # Liver mass  - Liver ultrasound showed a large 16.7 cm solid mass of the inferior right hepatic lobe, most compatible with hepatocellular carcinoma.   - MRI showed a large centrally necrotic mass arising from the right hepatic lobe that measures 15.7 cm, highly concerning for malignancy.  - Onc on consult, d/w Dr. 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   - Oneal 2 V617 F+ polycythemia vera 11/2020, on hydroxyurea.  - patient follows Dr. Ceron  - continue home hydroxyurea     # History of ischemic stroke  - MRI of the brain 05/2024 showed lacunar stroke in the right cerebellum     Diet: regular  PT/OT: Patient has been evaluated and presents with no skilled Physical Therapy needs at this time.   DVT ppx:  warfarin  Line: none  Code status: Full   Dispo: per clinical course, lives at home with dtr and grandson     MDM: high Shayla Quinteros MD   [facility] Hospitalist

## 2024-11-08 ENCOUNTER — APPOINTMENT (OUTPATIENT)
Dept: INTERVENTIONAL RADIOLOGY/VASCULAR | Facility: HOSPITAL | Age: 84
End: 2024-11-08
Attending: HOSPITALIST
Payer: MEDICARE

## 2024-11-08 LAB
APTT PPP: 24.9 SECONDS (ref 23–36)
PLATELET # BLD AUTO: 235 10(3)UL (ref 150–450)

## 2024-11-08 PROCEDURE — 99233 SBSQ HOSP IP/OBS HIGH 50: CPT | Performed by: HOSPITALIST

## 2024-11-08 PROCEDURE — 0FB13ZX EXCISION OF RIGHT LOBE LIVER, PERCUTANEOUS APPROACH, DIAGNOSTIC: ICD-10-PCS | Performed by: RADIOLOGY

## 2024-11-08 RX ORDER — HEPARIN SODIUM AND DEXTROSE 10000; 5 [USP'U]/100ML; G/100ML
1000 INJECTION INTRAVENOUS CONTINUOUS
Status: DISCONTINUED | OUTPATIENT
Start: 2024-11-08 | End: 2024-11-09

## 2024-11-08 RX ORDER — FUROSEMIDE 40 MG/1
40 TABLET ORAL ONCE
Status: DISCONTINUED | OUTPATIENT
Start: 2024-11-08 | End: 2024-11-08

## 2024-11-08 RX ORDER — MIDAZOLAM HYDROCHLORIDE 1 MG/ML
INJECTION INTRAMUSCULAR; INTRAVENOUS
Status: COMPLETED
Start: 2024-11-08 | End: 2024-11-08

## 2024-11-08 RX ORDER — FUROSEMIDE 10 MG/ML
40 INJECTION INTRAMUSCULAR; INTRAVENOUS ONCE
Status: COMPLETED | OUTPATIENT
Start: 2024-11-08 | End: 2024-11-08

## 2024-11-08 NOTE — PLAN OF CARE
Pt went down for liver biopsy today, results pending. Plan to resume heparin drip at 1800.    Problem: Patient Centered Care  Goal: Patient preferences are identified and integrated in the patient's plan of care  Description: Interventions:  - What would you like us to know as we care for you? Patient states one daughter is a registered nurse and lives with other daughter; patient has 8 children  - Provide timely, complete, and accurate information to patient/family  - Incorporate patient and family knowledge, values, beliefs, and cultural backgrounds into the planning and delivery of care  - Encourage patient/family to participate in care and decision-making at the level they choose  - Honor patient and family perspectives and choices  Outcome: Progressing     Problem: Patient/Family Goals  Goal: Patient/Family Long Term Goal  Description: Patient's Long Term Goal: To get stronger; minimize fatigue    Interventions:  -Monitor VSS  -Monitor Labs  -Cards consult  -PT/OT consult   - See additional Care Plan goals for specific interventions  Outcome: Progressing  Goal: Patient/Family Short Term Goal  Description: Patient's Short Term Goal: To manage and/or stop rectal bleeding     Interventions:   -Monitor VSS  -Monitor Labs  -GI consult  -Monitor stools   -See additional Care Plan goals for specific interventions  Outcome: Progressing     Problem: PAIN - ADULT  Goal: Verbalizes/displays adequate comfort level or patient's stated pain goal  Description: INTERVENTIONS:  - Encourage pt to monitor pain and request assistance  - Assess pain using appropriate pain scale  - Administer analgesics based on type and severity of pain and evaluate response  - Implement non-pharmacological measures as appropriate and evaluate response  - Consider cultural and social influences on pain and pain management  - Manage/alleviate anxiety  - Utilize distraction and/or relaxation techniques  - Monitor for opioid side effects  - Notify  MD/LIP if interventions unsuccessful or patient reports new pain  - Anticipate increased pain with activity and pre-medicate as appropriate  Outcome: Progressing     Problem: SAFETY ADULT - FALL  Goal: Free from fall injury  Description: INTERVENTIONS:  - Assess pt frequently for physical needs  - Identify cognitive and physical deficits and behaviors that affect risk of falls.  - [place] fall precautions as indicated by assessment.  - Educate pt/family on patient safety including physical limitations  - Instruct pt to call for assistance with activity based on assessment  - Modify environment to reduce risk of injury  - Provide assistive devices as appropriate  - Consider OT/PT consult to assist with strengthening/mobility  - Encourage toileting schedule  Outcome: Progressing     Problem: DISCHARGE PLANNING  Goal: Discharge to home or other facility with appropriate resources  Description: INTERVENTIONS:  - Identify barriers to discharge w/pt and caregiver  - Include patient/family/discharge partner in discharge planning  - Arrange for needed discharge resources and transportation as appropriate  - Identify discharge learning needs (meds, wound care, etc)  - Arrange for interpreters to assist at discharge as needed  - Consider post-discharge preferences of patient/family/discharge partner  - Complete POLST form as appropriate  - Assess patient's ability to be responsible for managing their own health  - Refer to Case Management Department for coordinating discharge planning if the patient needs post-hospital services based on physician/LIP order or complex needs related to functional status, cognitive ability or social support system  Outcome: Progressing     Problem: GASTROINTESTINAL - ADULT  Goal: Maintains or returns to baseline bowel function  Description: INTERVENTIONS:  - Assess bowel function  - Maintain adequate hydration with IV or PO as ordered and tolerated  - Evaluate effectiveness of GI  medications  - Encourage mobilization and activity  - Obtain nutritional consult as needed  - Establish a toileting routine/schedule  - Consider collaborating with pharmacy to review patient's medication profile  Outcome: Progressing     Problem: METABOLIC/FLUID AND ELECTROLYTES - ADULT  Goal: Electrolytes maintained within normal limits  Description: INTERVENTIONS:  - Monitor labs and rhythm and assess patient for signs and symptoms of electrolyte imbalances  - Administer electrolyte replacement as ordered  - Monitor response to electrolyte replacements, including rhythm and repeat lab results as appropriate  - Fluid restriction as ordered  - Instruct patient on fluid and nutrition restrictions as appropriate  Outcome: Progressing  Goal: Hemodynamic stability and optimal renal function maintained  Description: INTERVENTIONS:  - Monitor labs and assess for signs and symptoms of volume excess or deficit  - Monitor intake, output and patient weight  - Monitor urine specific gravity, serum osmolarity and serum sodium as indicated or ordered  - Monitor response to interventions for patient's volume status, including labs, urine output, blood pressure (other measures as available)  - Encourage oral intake as appropriate  - Instruct patient on fluid and nutrition restrictions as appropriate  Outcome: Progressing     Problem: SKIN/TISSUE INTEGRITY - ADULT  Goal: Skin integrity remains intact  Description: INTERVENTIONS  - Assess and document risk factors for pressure ulcer development  - Assess and document skin integrity  - Monitor for areas of redness and/or skin breakdown  - Initiate interventions, skin care algorithm/standards of care as needed  Outcome: Progressing  Goal: Oral mucous membranes remain intact  Description: INTERVENTIONS  - Assess oral mucosa and hygiene practices  - Implement preventative oral hygiene regimen  - Implement oral medicated treatments as ordered  Outcome: Progressing     Problem:  HEMATOLOGIC - ADULT  Goal: Maintains hematologic stability  Description: INTERVENTIONS  - Assess for signs and symptoms of bleeding or hemorrhage  - Monitor labs and vital signs for trends  - Administer supportive blood products/factors, fluids and medications as ordered and appropriate  - Administer supportive blood products/factors as ordered and appropriate  Outcome: Progressing  Goal: Free from bleeding injury  Description: (Example usage: patient with low platelets)  INTERVENTIONS:  - Avoid intramuscular injections, enemas and rectal medication administration  - Ensure safe mobilization of patient  - Hold pressure on venipuncture sites to achieve adequate hemostasis  - Assess for signs and symptoms of internal bleeding  - Monitor lab trends  - Patient is to report abnormal signs of bleeding to staff  - Avoid use of toothpicks and dental floss  - Use electric shaver for shaving  - Use soft bristle tooth brush  - Limit straining and forceful nose blowing  Outcome: Progressing     Problem: Impaired Functional Mobility  Goal: Achieve highest/safest level of mobility/gait  Description: Interventions:  - Assess patient's functional ability and stability  - Promote increasing activity/tolerance for mobility and gait  - Educate and engage patient/family in tolerated activity level and precautions  - Recommend use of  RW for transfers and ambulation  Outcome: Progressing     Problem: Impaired Activities of Daily Living  Goal: Achieve highest/safest level of independence in self care  Description: Interventions:  - Assess ability and encourage patient to participate in ADLs to maximize function  - Promote sitting position while performing ADLs such as feeding, grooming, and bathing  - Educate and encourage patient/family in tolerated functional activity level and precautions during self-care  - Encourage patient to incorporate impaired side during daily activities to promote function  Outcome: Progressing

## 2024-11-08 NOTE — BRIEF PROCEDURE NOTE
Patient bedside procedure in holding room 12 for liver lesion biopsy procedure. Patient is alert and oriented x3. MD to bedside for procedure consent and explanation. Patient positioned supine, resting comfortably. Patient prepped and draped in sterile fashion. Time-out performed, all staff agree. Patient received 10 mL lidocaine subcutaneously to right lower quadrant. Patient also received 2 mg versed IVP for anxiolytic therapy. Biopsy performed with cytology team to bedside for specimen collection and evaluation. Patient tolerated procedure well, vital signs stable. MD performed administration of gel foam to tract for hemostasis. Site appears clean, dry, intact without bleeding, swelling, or hematoma. Sterile dressing applied to site. Report given to JULIAN Vicente.

## 2024-11-08 NOTE — PROGRESS NOTES
[facility]  part of [facility]    Progress Note    Tara Lockwood Patient Status:  Inpatient    1940 MRN [id number]   Location [facility] 5SW/SE Attending Loy Vences MD   Hosp Day # 7 PCP Robbie Hubbard MD       Subjective:   Tara Lockwood remains stable, resting in bed.   No new symptoms  normal BMs    Off the floor for biopsy  Doing ok post per RN    Review of Systems:   10 point ROS completed and was negative, except for pertinent positive and negatives stated in subjective.    Objective:     Vitals:    24 1432 24 1447 24 1517 24 1547   BP: 150/50 148/56 152/58 141/51   BP Location: Left arm Left arm Left arm Left arm   Pulse: 78 78 79 76   Resp:    Temp: 98.5 °F (36.9 °C) 98.6 °F (37 °C) 97.8 °F (36.6 °C) 98.1 °F (36.7 °C)   TempSrc: Oral Oral Oral Oral   SpO2: 96% 96% 95% 96%   Weight:       Height:         Patient Weight for the past 72 hrs:   Weight   24 1520 233 lb (105.7 kg)   24 1959 230 lb 12.8 oz (104.7 kg)       Intake/Output Summary (Last 24 hours) at 2024 1614  Last data filed at 2024 1530  Gross per 24 hour   Intake 760 ml   Output 800 ml   Net -40 ml       GENERAL:  The patient appeared to be in no distress, conversant  SKIN:  Warm and hydrated  HEENT:  Head was atraumatic and normocephalic.    CHEST:  Symmetrical movement on inspiration  LUNGS:  No audible wheezing  ABDOMEN: Non-distended  MUSCULOSKELETAL:  There was no deformity.   EXTREMITIES: There was no edema  NEUROLOGICAL:  There was no focal deficit.    PSYCHIATRIC: Calm and cooperative     Current Scheduled Inpatient Meds:      vancomycin  125 mg Oral QID    melatonin  3 mg Oral Nightly    dilTIAZem ER  180 mg Oral Daily    atorvastatin  80 mg Oral Nightly    hydroxyurea  500 mg Oral Daily    metoprolol succinate ER  25 mg Oral QAM    pantoprazole  40 mg Intravenous Daily       Current PRN Inpatient Meds:        gadoterate meglumine     acetaminophen    ondansetron    metoclopramide    influenza virus vaccine PF    Results:     Lab Results   Component Value Date    WBC 8.9 11/06/2024    HGB 13.1 11/06/2024    HCT 40.9 11/06/2024    .0 11/08/2024    CREATSERUM 1.10 (H) 11/06/2024    BUN 23 11/06/2024     11/06/2024    K 4.5 11/06/2024     11/06/2024    CO2 23.0 11/06/2024     (H) 11/06/2024    CA 9.7 11/06/2024    ALB 3.0 (L) 11/05/2024    ALKPHO 99 11/05/2024    BILT 1.0 11/05/2024    TP 5.3 (L) 11/05/2024     (H) 11/05/2024    ALT 46 11/05/2024    PTT 24.9 11/08/2024    INR 1.57 (H) 11/06/2024    PT 13.1 05/11/2016    T4F 1.0 05/03/2022    TSH 3.521 09/05/2024    CRP < 0.5 05/29/2014    MG 1.6 11/06/2024    B12 734 09/05/2024       Recent Labs   Lab 11/02/24  0512 11/03/24  0511 11/05/24  0518 11/06/24  0540 11/06/24  1618 11/07/24  0456 11/08/24  0655   RBC 2.92*   < > 3.10* 3.14* 3.70*  --   --    HGB 10.4*   < > 11.3* 11.4* 13.1  --   --    HCT 32.9*   < > 33.9* 34.4* 40.9  --   --    .7*   < > 109.4* 109.6* 110.5*  --   --    MCH 35.6*   < > 36.5* 36.3* 35.4*  --   --    MCHC 31.6   < > 33.3 33.1 32.0  --   --    RDW 13.8   < > 13.8 13.9 13.8  --   --    NEPRELIM 5.08  --   --   --   --   --   --    WBC 6.7   < > 6.8 6.7 8.9  --   --    .0   < > 280.0 282.0 398.0 267.0 235.0    < > = values in this interval not displayed.     Recent Labs   Lab 11/04/24 0528 11/05/24  0518 11/06/24  0540 11/06/24  1618   * 98 104* 160*   BUN 16 17 21 23   CREATSERUM 1.02 0.91 0.90 1.10*   CA 9.5 9.2 9.1 9.7   ALB 3.4 3.0*  --   --     140 138 137   K 4.2 3.7 3.9 4.5    106 105 103   CO2 23.0 26.0 27.0 23.0   ALKPHO 115 99  --   --    * 130*  --   --    ALT 56* 46  --   --    BILT 1.2* 1.0  --   --    TP 6.3 5.3*  --   --      Lab Results   Component Value Date    PT 13.1 05/11/2016    PT 15.7 (H) 07/13/2015    PT 22.6 (H) 04/13/2015    INR 1.57 (H) 11/06/2024    INR 2.27 (H) 11/05/2024     INR 3.10 (H) 11/04/2024       Culture:  No results found for this visit on 11/01/24.    Imaging/EKG:   IR BIOPSY    Result Date: 11/8/2024  CONCLUSION:  1. Successful sonographic guided core right lobe liver lesion biopsy    Dictated by (CST): Fco Bloom MD on 11/08/2024 at 4:05 PM     Finalized by (CST): Fco Bloom MD on 11/08/2024 at 4:07 PM             Assessment and Plan:   Tara Lockwood is an 84-year-old female with hx of chronic diarrhea, chronic dyspnea, paroxysmal atrial fibrillation on warfarin, chronic hypotension on midodrine, SSS w/ PPM, who presents with black diarrhea.       # Anemia  # Melena  # Diarrhea due to C. difficile colitis  - baseline hgb 12-14, around 11 on admission  - no melena since admission  - C Diff PCR positive, EIA also positive  -On oral vancomycin 4 times daily  - GI on consult  .  # Liver mass  - Liver ultrasound showed a large 16.7 cm solid mass of the inferior right hepatic lobe, most compatible with hepatocellular carcinoma.   - MRI showed a large centrally necrotic mass arising from the right hepatic lobe that measures 15.7 cm, highly concerning for malignancy.  - Onc on consult, d/w Dr. 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   - Oneal 2 V617 F+ polycythemia vera 11/2020, on hydroxyurea.  - patient follows Dr. Ceron  - continue home hydroxyurea     # History of ischemic stroke  - MRI of the brain  05/2024 showed lacunar stroke in the right cerebellum     Diet: regular  PT/OT: Patient has been evaluated and presents with no skilled Physical Therapy needs at this time.   DVT ppx: AC  Line: none  Code status: Full   Dispo: per clinical course, lives at home with dtr and grandson     MDM: high Shayla Quinteros MD   [facility] Hospitalist

## 2024-11-08 NOTE — PLAN OF CARE
Problem: Patient Centered Care  Goal: Patient preferences are identified and integrated in the patient's plan of care  Description: Interventions:  - What would you like us to know as we care for you? Patient states one daughter is a registered nurse and lives with other daughter; patient has 8 children  - Provide timely, complete, and accurate information to patient/family  - Incorporate patient and family knowledge, values, beliefs, and cultural backgrounds into the planning and delivery of care  - Encourage patient/family to participate in care and decision-making at the level they choose  - Honor patient and family perspectives and choices  Outcome: Progressing     Problem: Patient/Family Goals  Goal: Patient/Family Long Term Goal  Description: Patient's Long Term Goal: To get stronger; minimize fatigue    Interventions:  -Monitor VSS  -Monitor Labs  -Cards consult  -PT/OT consult   - See additional Care Plan goals for specific interventions  Outcome: Progressing  Goal: Patient/Family Short Term Goal  Description: Patient's Short Term Goal: To manage and/or stop rectal bleeding     Interventions:   -Monitor VSS  -Monitor Labs  -GI consult  -Monitor stools   -See additional Care Plan goals for specific interventions  Outcome: Progressing     Problem: PAIN - ADULT  Goal: Verbalizes/displays adequate comfort level or patient's stated pain goal  Description: INTERVENTIONS:  - Encourage pt to monitor pain and request assistance  - Assess pain using appropriate pain scale  - Administer analgesics based on type and severity of pain and evaluate response  - Implement non-pharmacological measures as appropriate and evaluate response  - Consider cultural and social influences on pain and pain management  - Manage/alleviate anxiety  - Utilize distraction and/or relaxation techniques  - Monitor for opioid side effects  - Notify MD/LIP if interventions unsuccessful or patient reports new pain  - Anticipate increased pain  with activity and pre-medicate as appropriate  Outcome: Progressing     Problem: SAFETY ADULT - FALL  Goal: Free from fall injury  Description: INTERVENTIONS:  - Assess pt frequently for physical needs  - Identify cognitive and physical deficits and behaviors that affect risk of falls.  - [place] fall precautions as indicated by assessment.  - Educate pt/family on patient safety including physical limitations  - Instruct pt to call for assistance with activity based on assessment  - Modify environment to reduce risk of injury  - Provide assistive devices as appropriate  - Consider OT/PT consult to assist with strengthening/mobility  - Encourage toileting schedule  Outcome: Progressing     Problem: DISCHARGE PLANNING  Goal: Discharge to home or other facility with appropriate resources  Description: INTERVENTIONS:  - Identify barriers to discharge w/pt and caregiver  - Include patient/family/discharge partner in discharge planning  - Arrange for needed discharge resources and transportation as appropriate  - Identify discharge learning needs (meds, wound care, etc)  - Arrange for interpreters to assist at discharge as needed  - Consider post-discharge preferences of patient/family/discharge partner  - Complete POLST form as appropriate  - Assess patient's ability to be responsible for managing their own health  - Refer to Case Management Department for coordinating discharge planning if the patient needs post-hospital services based on physician/LIP order or complex needs related to functional status, cognitive ability or social support system  Outcome: Progressing     Problem: GASTROINTESTINAL - ADULT  Goal: Maintains or returns to baseline bowel function  Description: INTERVENTIONS:  - Assess bowel function  - Maintain adequate hydration with IV or PO as ordered and tolerated  - Evaluate effectiveness of GI medications  - Encourage mobilization and activity  - Obtain nutritional consult as needed  - Establish a  toileting routine/schedule  - Consider collaborating with pharmacy to review patient's medication profile  Outcome: Progressing     Problem: METABOLIC/FLUID AND ELECTROLYTES - ADULT  Goal: Electrolytes maintained within normal limits  Description: INTERVENTIONS:  - Monitor labs and rhythm and assess patient for signs and symptoms of electrolyte imbalances  - Administer electrolyte replacement as ordered  - Monitor response to electrolyte replacements, including rhythm and repeat lab results as appropriate  - Fluid restriction as ordered  - Instruct patient on fluid and nutrition restrictions as appropriate  Outcome: Progressing  Goal: Hemodynamic stability and optimal renal function maintained  Description: INTERVENTIONS:  - Monitor labs and assess for signs and symptoms of volume excess or deficit  - Monitor intake, output and patient weight  - Monitor urine specific gravity, serum osmolarity and serum sodium as indicated or ordered  - Monitor response to interventions for patient's volume status, including labs, urine output, blood pressure (other measures as available)  - Encourage oral intake as appropriate  - Instruct patient on fluid and nutrition restrictions as appropriate  Outcome: Progressing     Problem: SKIN/TISSUE INTEGRITY - ADULT  Goal: Skin integrity remains intact  Description: INTERVENTIONS  - Assess and document risk factors for pressure ulcer development  - Assess and document skin integrity  - Monitor for areas of redness and/or skin breakdown  - Initiate interventions, skin care algorithm/standards of care as needed  Outcome: Progressing  Goal: Oral mucous membranes remain intact  Description: INTERVENTIONS  - Assess oral mucosa and hygiene practices  - Implement preventative oral hygiene regimen  - Implement oral medicated treatments as ordered  Outcome: Progressing     Problem: HEMATOLOGIC - ADULT  Goal: Maintains hematologic stability  Description: INTERVENTIONS  - Assess for signs and  symptoms of bleeding or hemorrhage  - Monitor labs and vital signs for trends  - Administer supportive blood products/factors, fluids and medications as ordered and appropriate  - Administer supportive blood products/factors as ordered and appropriate  Outcome: Progressing  Goal: Free from bleeding injury  Description: (Example usage: patient with low platelets)  INTERVENTIONS:  - Avoid intramuscular injections, enemas and rectal medication administration  - Ensure safe mobilization of patient  - Hold pressure on venipuncture sites to achieve adequate hemostasis  - Assess for signs and symptoms of internal bleeding  - Monitor lab trends  - Patient is to report abnormal signs of bleeding to staff  - Avoid use of toothpicks and dental floss  - Use electric shaver for shaving  - Use soft bristle tooth brush  - Limit straining and forceful nose blowing  Outcome: Progressing     Problem: Impaired Functional Mobility  Goal: Achieve highest/safest level of mobility/gait  Description: Interventions:  - Assess patient's functional ability and stability  - Promote increasing activity/tolerance for mobility and gait  - Educate and engage patient/family in tolerated activity level and precautions  - Recommend use of  RW for transfers and ambulation  Outcome: Progressing     Problem: Impaired Activities of Daily Living  Goal: Achieve highest/safest level of independence in self care  Description: Interventions:  - Assess ability and encourage patient to participate in ADLs to maximize function  - Promote sitting position while performing ADLs such as feeding, grooming, and bathing  - Educate and encourage patient/family in tolerated functional activity level and precautions during self-care  - Encourage patient to incorporate impaired side during daily activities to promote function  Outcome: Progressing

## 2024-11-08 NOTE — PROCEDURES
[facility]  part of Merg[facility] with [facility]  Procedure Note    Tara Lockwood Patient Status:  Inpatient    1940 MRN [id number]   Location [facility] INTERVENTIONAL SUITES Attending Shayla Quinteros MD   Hosp Day # 7 PCP Robbie Hubbard MD     Procedure: image guided liver lesion biopsy    Pre-Procedure Diagnosis:  large right indeterminate liver lesion    Post-Procedure Diagnosis: same    Anesthesia:  Sedation    Findings:  large heterogenous right lobe liver lesion    Specimens: 4 18 gauge core samples    Blood Loss:  less than 10 mls       Complications:  None            Fco Bloom MD  2024

## 2024-11-08 NOTE — PROGRESS NOTES
[facility]  part of [facility]    Progress Note    Tara Lockwood Patient Status:  Inpatient    1940 MRN [id number]   Location [facility] 5SW/SE Attending Loy Vences MD   Hosp Day # 6 PCP Robbie Hubbard MD       Subjective:   Tara Lockwood remains stable, resting in bed.   No new symptoms  normal BMs    Review of Systems:   10 point ROS completed and was negative, except for pertinent positive and negatives stated in subjective.    Objective:     Vitals:    24 2048 24 0456 24 0900 24 1419   BP: 142/56 134/56 145/59 140/63   BP Location: Left arm Left arm Left arm Left arm   Pulse: 83 81     Resp: 18 18 18 18   Temp: 98.7 °F (37.1 °C) 98.6 °F (37 °C) 98.3 °F (36.8 °C) 99 °F (37.2 °C)   TempSrc: Oral Oral Oral Oral   SpO2: 96% 94% 95% 94%   Weight:       Height:         Patient Weight for the past 72 hrs:   Weight   24 1520 233 lb (105.7 kg)   24 1959 230 lb 12.8 oz (104.7 kg)       Intake/Output Summary (Last 24 hours) at 2024  Last data filed at 2024 1821  Gross per 24 hour   Intake 250 ml   Output 150 ml   Net 100 ml       GENERAL:  The patient appeared to be in no distress, conversant  SKIN:  Warm and hydrated  HEENT:  Head was atraumatic and normocephalic.    CHEST:  Symmetrical movement on inspiration  LUNGS:  No audible wheezing  ABDOMEN: Non-distended  MUSCULOSKELETAL:  There was no deformity.   EXTREMITIES: There was no edema  NEUROLOGICAL:  There was no focal deficit.    PSYCHIATRIC: Calm and cooperative     Current Scheduled Inpatient Meds:      vancomycin  125 mg Oral QID    melatonin  3 mg Oral Nightly    dilTIAZem ER  180 mg Oral Daily    atorvastatin  80 mg Oral Nightly    hydroxyurea  500 mg Oral Daily    metoprolol succinate ER  25 mg Oral QAM    pantoprazole  40 mg Intravenous Daily       Current PRN Inpatient Meds:        gadoterate meglumine    acetaminophen    ondansetron    metoclopramide    influenza  virus vaccine PF    Results:     Lab Results   Component Value Date    WBC 8.9 11/06/2024    HGB 13.1 11/06/2024    HCT 40.9 11/06/2024    .0 11/07/2024    CREATSERUM 1.10 (H) 11/06/2024    BUN 23 11/06/2024     11/06/2024    K 4.5 11/06/2024     11/06/2024    CO2 23.0 11/06/2024     (H) 11/06/2024    CA 9.7 11/06/2024    ALB 3.0 (L) 11/05/2024    ALKPHO 99 11/05/2024    BILT 1.0 11/05/2024    TP 5.3 (L) 11/05/2024     (H) 11/05/2024    ALT 46 11/05/2024    PTT 59.5 (H) 11/07/2024    INR 1.57 (H) 11/06/2024    PT 13.1 05/11/2016    T4F 1.0 05/03/2022    TSH 3.521 09/05/2024    CRP < 0.5 05/29/2014    MG 1.6 11/06/2024    B12 734 09/05/2024       Recent Labs   Lab 11/01/24  1618 11/02/24  0512 11/03/24  0511 11/05/24  0518 11/06/24  0540 11/06/24  1618 11/07/24  0456   RBC 3.08* 2.92*   < > 3.10* 3.14* 3.70*  --    HGB 11.4* 10.4*   < > 11.3* 11.4* 13.1  --    HCT 34.3* 32.9*   < > 33.9* 34.4* 40.9  --    .4* 112.7*   < > 109.4* 109.6* 110.5*  --    MCH 37.0* 35.6*   < > 36.5* 36.3* 35.4*  --    MCHC 33.2 31.6   < > 33.3 33.1 32.0  --    RDW 13.9 13.8   < > 13.8 13.9 13.8  --    NEPRELIM 7.07 5.08  --   --   --   --   --    WBC 8.7 6.7   < > 6.8 6.7 8.9  --    .0 283.0   < > 280.0 282.0 398.0 267.0    < > = values in this interval not displayed.     Recent Labs   Lab 11/01/24  1618 11/02/24  0513 11/04/24  0528 11/05/24  0518 11/06/24  0540 11/06/24  1618   GLU 95   < > 113* 98 104* 160*   BUN 21   < > 16 17 21 23   CREATSERUM 1.19*   < > 1.02 0.91 0.90 1.10*   CA 9.4   < > 9.5 9.2 9.1 9.7   ALB 3.4  --  3.4 3.0*  --   --       < > 136 140 138 137   K 4.3   < > 4.2 3.7 3.9 4.5      < > 103 106 105 103   CO2 23.0   < > 23.0 26.0 27.0 23.0   ALKPHO 102  --  115 99  --   --    *  --  160* 130*  --   --    ALT 48  --  56* 46  --   --    BILT 0.8  --  1.2* 1.0  --   --    TP 6.0  --  6.3 5.3*  --   --     < > = values in this interval not displayed.     Lab  Results   Component Value Date    PT 13.1 05/11/2016    PT 15.7 (H) 07/13/2015    PT 22.6 (H) 04/13/2015    INR 1.57 (H) 11/06/2024    INR 2.27 (H) 11/05/2024    INR 3.10 (H) 11/04/2024       Culture:  No results found for this visit on 11/01/24.    Imaging/EKG:   No results found.      Assessment and Plan:   Tara Lockwood is an 84-year-old female with hx of chronic diarrhea, chronic dyspnea, paroxysmal atrial fibrillation on warfarin, chronic hypotension on midodrine, SSS w/ PPM, who presents with black diarrhea.       # Anemia  # Melena  # Diarrhea due to C. difficile colitis  - baseline hgb 12-14, around 11 on admission  - no melena since admission  - C Diff PCR positive, EIA also positive  -On oral vancomycin 4 times daily  - GI on consult  .  # Liver mass  - Liver ultrasound showed a large 16.7 cm solid mass of the inferior right hepatic lobe, most compatible with hepatocellular carcinoma.   - MRI showed a large centrally necrotic mass arising from the right hepatic lobe that measures 15.7 cm, highly concerning for malignancy.  - Onc on consult, d/w Dr. 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   - Oneal 2 V617 F+ polycythemia vera 11/2020, on hydroxyurea.  - patient follows Dr. Ceron  - continue home hydroxyurea     # History of ischemic stroke  - MRI of the brain 05/2024 showed lacunar stroke in the right cerebellum     Diet: regular  PT/OT: Patient has been evaluated and presents  with no skilled Physical Therapy needs at this time.   DVT ppx: AC  Line: none  Code status: Full   Dispo: per clinical course, lives at home with dtr and grandson     MDM: high Shayla Quinteros MD   [facility]

## 2024-11-08 NOTE — PROGRESS NOTES
Progress Note  Tara Lockwood Patient Status:  Inpatient    1940 MRN [id number]   Location [facility] 5SW/SE Attending Shayla Quinteros MD   Hosp Day # 7 PCP Robbie Hubbard MD     Subjective:  Breathing heavier today. No chest pain or pressure. Feels tired s/p liver biopsy.     Objective:  /50 (BP Location: Left arm)   Pulse 77   Temp 98.1 °F (36.7 °C) (Oral)   Resp 18   Ht 5' 7\" (1.702 m)   Wt 228 lb 8 oz (103.6 kg)   SpO2 94%   BMI 35.79 kg/m²     Telemetry: SR; occasionally paced; no malignant arrhythmias noted        Intake/Output:    Intake/Output Summary (Last 24 hours) at 2024 1422  Last data filed at 2024 0957  Gross per 24 hour   Intake 220 ml   Output 500 ml   Net -280 ml       Last 3 Weights   24 0500 228 lb 8 oz (103.6 kg)   24 1959 230 lb 12.8 oz (104.7 kg)   24 1520 233 lb (105.7 kg)   10/25/24 1008 233 lb (105.7 kg)   24 1528 231 lb (104.8 kg)       Labs:  Recent Labs   Lab 24  0518 24  0540 24  1618   GLU 98 104* 160*   BUN 17 21 23   CREATSERUM 0.91 0.90 1.10*   EGFRCR 62 63 49*   CA 9.2 9.1 9.7    138 137   K 3.7 3.9 4.5    105 103   CO2 26.0 27.0 23.0     Recent Labs   Lab 24  1618 24  0512 24  0511 24  0518 24  0540 24  1618 24  0456 24  0655   RBC 3.08* 2.92*   < > 3.10* 3.14* 3.70*  --   --    HGB 11.4* 10.4*   < > 11.3* 11.4* 13.1  --   --    HCT 34.3* 32.9*   < > 33.9* 34.4* 40.9  --   --    .4* 112.7*   < > 109.4* 109.6* 110.5*  --   --    MCH 37.0* 35.6*   < > 36.5* 36.3* 35.4*  --   --    MCHC 33.2 31.6   < > 33.3 33.1 32.0  --   --    RDW 13.9 13.8   < > 13.8 13.9 13.8  --   --    NEPRELIM 7.07 5.08  --   --   --   --   --   --    WBC 8.7 6.7   < > 6.8 6.7 8.9  --   --    .0 283.0   < > 280.0 282.0 398.0 267.0 235.0    < > = values in this interval not displayed.         No results for input(s): \"TROP\", \"TROPHS\", \"CK\" in the last  168 hours.    Review of Systems   Cardiovascular:  Positive for leg swelling. Negative for chest pain, dyspnea on exertion, orthopnea and palpitations.   Respiratory:  + sob   Gastrointestinal:  Positive for constipation. Negative for abdominal pain and diarrhea.     Physical Exam:    Gen: alert, oriented x 3, NAD  Heent: pupils equal, reactive. Mucous membranes moist.   Neck: no jvd  Cardiac: regular rate and rhythm, normal S1,S2  Lungs: CTA  Abd: soft, NT/ND +bs  Ext: no edema  Skin: Warm, dry  Neuro: No focal deficits      Medications:     vancomycin  125 mg Oral QID    melatonin  3 mg Oral Nightly    dilTIAZem ER  180 mg Oral Daily    atorvastatin  80 mg Oral Nightly    hydroxyurea  500 mg Oral Daily    metoprolol succinate ER  25 mg Oral QAM    pantoprazole  40 mg Intravenous Daily         Assessment:  # Increased sob with BLE edema secondary to fluid resuscitation > will give IV lasix 40 mg x1 dose   Reevaluate tomorrow for further diuresis     # Paroxysmal AF/ Hx SSS s/p Biotronik DC PPM 12/2023  Currently sustaining SR  On oral diltiazem, toprol   Echo 9/2024 LVEF 58%, G1D, mild-mod TR  On warfarin PTA > held for planned procedure/biopsy > bridging with heparin gtt   CHADVASCs score 6 > continue to bridge, resume warfarin when cleared by IR   #  C Diff Infection, Melena  Diarrhea improved - on po vanco  GI following  MRI liver showed large mass highly concerning for malignant process--heme/onc consulted   # Liver Mass   S/p US guided liver biopsy   Heme/oncology following   # Anemia likely d/t GIB, hgb stable   Goal hgb >8.0  # Hx of recurrent DVT/PE  Historically on warfarin, INRs supratherapeutic on admission - currently held d/t above   Bridging with IV heparin per heme recs > resume oral AC when ok per IR s/p biopsy   # Hx of polycythemia vera-on hydroxyurea - mgmt per heme   # Hx of left lung ca s/p resection follows with Dr Reyes and Dr Ceron as op  # Hx of lacunar CVA  On statin, warfarin held  #  Chronic LE edema, venous insufficiency  Follows op with Dr Yates, was holding diuretics d/t orthostatic hypotension      Plan:  S/p liver biopsy 11/8   Resume heparin gtt/ warfarin when cleared by IR/heme   Continue to bridge until INR therapeutic; Will await heme/onc recs   Increased sob with BLE edema secondary to fluid resuscitation > will give IV lasix 40 mg x1 dose   Reevaluate tomorrow for further diuresis   Hgb remains stable.   Cont bb, ccb.      Plan of care discussed with patient, RN.      Geri Mena, APRN  11/8/2024  2:22 PM  [phone]

## 2024-11-08 NOTE — PRE-SEDATION ASSESSMENT
[facility]  [facility] Pre-Procedure Sedation Assessment    History of snoring or sleep or apnea?   No    History of previous problems with anesthesia or sedation  No    Physical Findings:  Neck: nl ROM  CV: RRR  PULM: normal respiratory rate/effort    Mallampati Score:  II (hard and soft palate, upper portion of tonsils anduvula visible)    ASA Classification:   3. Patient with severe systemic disease    Plan:   -IV moderate sedation    Fco Bloom MD

## 2024-11-09 LAB
ALBUMIN SERPL-MCNC: 3 G/DL (ref 3.2–4.8)
ALBUMIN/GLOB SERPL: 1.3 {RATIO} (ref 1–2)
ALP LIVER SERPL-CCNC: 112 U/L
ALT SERPL-CCNC: 51 U/L
ANION GAP SERPL CALC-SCNC: 8 MMOL/L (ref 0–18)
APTT PPP: 37.6 SECONDS (ref 23–36)
APTT PPP: 41.8 SECONDS (ref 23–36)
APTT PPP: 46.1 SECONDS (ref 23–36)
APTT PPP: 83.1 SECONDS (ref 23–36)
AST SERPL-CCNC: 153 U/L (ref ?–34)
BASOPHILS # BLD AUTO: 0.05 X10(3) UL (ref 0–0.2)
BASOPHILS NFR BLD AUTO: 0.8 %
BILIRUB SERPL-MCNC: 1.1 MG/DL (ref 0.2–1.1)
BUN BLD-MCNC: 24 MG/DL (ref 9–23)
BUN/CREAT SERPL: 23.3 (ref 10–20)
CALCIUM BLD-MCNC: 9.1 MG/DL (ref 8.7–10.4)
CHLORIDE SERPL-SCNC: 104 MMOL/L (ref 98–112)
CO2 SERPL-SCNC: 27 MMOL/L (ref 21–32)
CREAT BLD-MCNC: 1.03 MG/DL
DEPRECATED RDW RBC AUTO: 55.9 FL (ref 35.1–46.3)
EGFRCR SERPLBLD CKD-EPI 2021: 53 ML/MIN/1.73M2 (ref 60–?)
EOSINOPHIL # BLD AUTO: 0.07 X10(3) UL (ref 0–0.7)
EOSINOPHIL NFR BLD AUTO: 1.1 %
ERYTHROCYTE [DISTWIDTH] IN BLOOD BY AUTOMATED COUNT: 14.1 % (ref 11–15)
GLOBULIN PLAS-MCNC: 2.3 G/DL (ref 2–3.5)
GLUCOSE BLD-MCNC: 99 MG/DL (ref 70–99)
HCT VFR BLD AUTO: 34.4 %
HGB BLD-MCNC: 11.3 G/DL
IMM GRANULOCYTES # BLD AUTO: 0.02 X10(3) UL (ref 0–1)
IMM GRANULOCYTES NFR BLD: 0.3 %
INR BLD: 1.23 (ref 0.8–1.2)
LYMPHOCYTES # BLD AUTO: 0.82 X10(3) UL (ref 1–4)
LYMPHOCYTES NFR BLD AUTO: 12.6 %
MCH RBC QN AUTO: 35.6 PG (ref 26–34)
MCHC RBC AUTO-ENTMCNC: 32.8 G/DL (ref 31–37)
MCV RBC AUTO: 108.5 FL
MONOCYTES # BLD AUTO: 0.59 X10(3) UL (ref 0.1–1)
MONOCYTES NFR BLD AUTO: 9.1 %
NEUTROPHILS # BLD AUTO: 4.96 X10 (3) UL (ref 1.5–7.7)
NEUTROPHILS # BLD AUTO: 4.96 X10(3) UL (ref 1.5–7.7)
NEUTROPHILS NFR BLD AUTO: 76.1 %
NT-PROBNP SERPL-MCNC: 3940 PG/ML (ref ?–450)
OSMOLALITY SERPL CALC.SUM OF ELEC: 292 MOSM/KG (ref 275–295)
PLATELET # BLD AUTO: 289 10(3)UL (ref 150–450)
PLATELET # BLD AUTO: 289 10(3)UL (ref 150–450)
POTASSIUM SERPL-SCNC: 4 MMOL/L (ref 3.5–5.1)
PROT SERPL-MCNC: 5.3 G/DL (ref 5.7–8.2)
PROTHROMBIN TIME: 16.2 SECONDS (ref 11.6–14.8)
RBC # BLD AUTO: 3.17 X10(6)UL
SODIUM SERPL-SCNC: 139 MMOL/L (ref 136–145)
WBC # BLD AUTO: 6.5 X10(3) UL (ref 4–11)

## 2024-11-09 PROCEDURE — 99233 SBSQ HOSP IP/OBS HIGH 50: CPT | Performed by: HOSPITALIST

## 2024-11-09 RX ORDER — HEPARIN SODIUM AND DEXTROSE 10000; 5 [USP'U]/100ML; G/100ML
INJECTION INTRAVENOUS CONTINUOUS
Status: DISCONTINUED | OUTPATIENT
Start: 2024-11-09 | End: 2024-11-13

## 2024-11-09 RX ORDER — FUROSEMIDE 10 MG/ML
40 INJECTION INTRAMUSCULAR; INTRAVENOUS
Status: DISCONTINUED | OUTPATIENT
Start: 2024-11-09 | End: 2024-11-10

## 2024-11-09 RX ORDER — SPIRONOLACTONE 25 MG/1
25 TABLET ORAL DAILY
Status: DISCONTINUED | OUTPATIENT
Start: 2024-11-09 | End: 2024-11-13

## 2024-11-09 RX ORDER — HEPARIN SODIUM AND DEXTROSE 10000; 5 [USP'U]/100ML; G/100ML
1000 INJECTION INTRAVENOUS ONCE
Status: COMPLETED | OUTPATIENT
Start: 2024-11-09 | End: 2024-11-09

## 2024-11-09 RX ORDER — HEPARIN SODIUM 1000 [USP'U]/ML
3000 INJECTION, SOLUTION INTRAVENOUS; SUBCUTANEOUS ONCE
Status: COMPLETED | OUTPATIENT
Start: 2024-11-09 | End: 2024-11-09

## 2024-11-09 NOTE — DIETARY NOTE
NUTRITION EDUCATION NOTE     Received consult for heart failure nutrition education. Educated on  basic cardiac diet restrictions but most importantly focusing on eating well and taking care of good appetite. Pt reports not using salt in cooking/meals at home for awhile now. Encouraged to continue similar diet practice.   Provided with Heart Failure Nutrition Therapy  handout.   Receptive to instruction.  RD contact information provided to patient if with questions.        Allyson Smith RD, LDN, [facility]  Clinical Dietitian  [phone]

## 2024-11-09 NOTE — PLAN OF CARE
Continued Heparin drip per protocol, no acute changes noted. Educated patient and family for low sodium and cardiac diet.    Problem: Patient Centered Care  Goal: Patient preferences are identified and integrated in the patient's plan of care  Description: Interventions:  - What would you like us to know as we care for you? Patient states one daughter is a registered nurse and lives with other daughter; patient has 8 children  - Provide timely, complete, and accurate information to patient/family  - Incorporate patient and family knowledge, values, beliefs, and cultural backgrounds into the planning and delivery of care  - Encourage patient/family to participate in care and decision-making at the level they choose  - Honor patient and family perspectives and choices  Outcome: Progressing     Problem: Patient/Family Goals  Goal: Patient/Family Long Term Goal  Description: Patient's Long Term Goal: To get stronger; minimize fatigue    Interventions:  -Monitor VSS  -Monitor Labs  -Cards consult  -PT/OT consult   - See additional Care Plan goals for specific interventions  Outcome: Progressing  Goal: Patient/Family Short Term Goal  Description: Patient's Short Term Goal: To manage and/or stop rectal bleeding     Interventions:   -Monitor VSS  -Monitor Labs  -GI consult  -Monitor stools   -See additional Care Plan goals for specific interventions  Outcome: Progressing     Problem: PAIN - ADULT  Goal: Verbalizes/displays adequate comfort level or patient's stated pain goal  Description: INTERVENTIONS:  - Encourage pt to monitor pain and request assistance  - Assess pain using appropriate pain scale  - Administer analgesics based on type and severity of pain and evaluate response  - Implement non-pharmacological measures as appropriate and evaluate response  - Consider cultural and social influences on pain and pain management  - Manage/alleviate anxiety  - Utilize distraction and/or relaxation techniques  - Monitor for  opioid side effects  - Notify MD/LIP if interventions unsuccessful or patient reports new pain  - Anticipate increased pain with activity and pre-medicate as appropriate  Outcome: Progressing     Problem: SAFETY ADULT - FALL  Goal: Free from fall injury  Description: INTERVENTIONS:  - Assess pt frequently for physical needs  - Identify cognitive and physical deficits and behaviors that affect risk of falls.  - [place] fall precautions as indicated by assessment.  - Educate pt/family on patient safety including physical limitations  - Instruct pt to call for assistance with activity based on assessment  - Modify environment to reduce risk of injury  - Provide assistive devices as appropriate  - Consider OT/PT consult to assist with strengthening/mobility  - Encourage toileting schedule  Outcome: Progressing     Problem: DISCHARGE PLANNING  Goal: Discharge to home or other facility with appropriate resources  Description: INTERVENTIONS:  - Identify barriers to discharge w/pt and caregiver  - Include patient/family/discharge partner in discharge planning  - Arrange for needed discharge resources and transportation as appropriate  - Identify discharge learning needs (meds, wound care, etc)  - Arrange for interpreters to assist at discharge as needed  - Consider post-discharge preferences of patient/family/discharge partner  - Complete POLST form as appropriate  - Assess patient's ability to be responsible for managing their own health  - Refer to Case Management Department for coordinating discharge planning if the patient needs post-hospital services based on physician/LIP order or complex needs related to functional status, cognitive ability or social support system  Outcome: Progressing     Problem: GASTROINTESTINAL - ADULT  Goal: Maintains or returns to baseline bowel function  Description: INTERVENTIONS:  - Assess bowel function  - Maintain adequate hydration with IV or PO as ordered and tolerated  - Evaluate  effectiveness of GI medications  - Encourage mobilization and activity  - Obtain nutritional consult as needed  - Establish a toileting routine/schedule  - Consider collaborating with pharmacy to review patient's medication profile  Outcome: Progressing     Problem: METABOLIC/FLUID AND ELECTROLYTES - ADULT  Goal: Electrolytes maintained within normal limits  Description: INTERVENTIONS:  - Monitor labs and rhythm and assess patient for signs and symptoms of electrolyte imbalances  - Administer electrolyte replacement as ordered  - Monitor response to electrolyte replacements, including rhythm and repeat lab results as appropriate  - Fluid restriction as ordered  - Instruct patient on fluid and nutrition restrictions as appropriate  Outcome: Progressing  Goal: Hemodynamic stability and optimal renal function maintained  Description: INTERVENTIONS:  - Monitor labs and assess for signs and symptoms of volume excess or deficit  - Monitor intake, output and patient weight  - Monitor urine specific gravity, serum osmolarity and serum sodium as indicated or ordered  - Monitor response to interventions for patient's volume status, including labs, urine output, blood pressure (other measures as available)  - Encourage oral intake as appropriate  - Instruct patient on fluid and nutrition restrictions as appropriate  Outcome: Progressing     Problem: SKIN/TISSUE INTEGRITY - ADULT  Goal: Skin integrity remains intact  Description: INTERVENTIONS  - Assess and document risk factors for pressure ulcer development  - Assess and document skin integrity  - Monitor for areas of redness and/or skin breakdown  - Initiate interventions, skin care algorithm/standards of care as needed  Outcome: Progressing  Goal: Oral mucous membranes remain intact  Description: INTERVENTIONS  - Assess oral mucosa and hygiene practices  - Implement preventative oral hygiene regimen  - Implement oral medicated treatments as ordered  Outcome: Progressing      Problem: HEMATOLOGIC - ADULT  Goal: Maintains hematologic stability  Description: INTERVENTIONS  - Assess for signs and symptoms of bleeding or hemorrhage  - Monitor labs and vital signs for trends  - Administer supportive blood products/factors, fluids and medications as ordered and appropriate  - Administer supportive blood products/factors as ordered and appropriate  Outcome: Progressing  Goal: Free from bleeding injury  Description: (Example usage: patient with low platelets)  INTERVENTIONS:  - Avoid intramuscular injections, enemas and rectal medication administration  - Ensure safe mobilization of patient  - Hold pressure on venipuncture sites to achieve adequate hemostasis  - Assess for signs and symptoms of internal bleeding  - Monitor lab trends  - Patient is to report abnormal signs of bleeding to staff  - Avoid use of toothpicks and dental floss  - Use electric shaver for shaving  - Use soft bristle tooth brush  - Limit straining and forceful nose blowing  Outcome: Progressing     Problem: Impaired Functional Mobility  Goal: Achieve highest/safest level of mobility/gait  Description: Interventions:  - Assess patient's functional ability and stability  - Promote increasing activity/tolerance for mobility and gait  - Educate and engage patient/family in tolerated activity level and precautions  - Recommend use of  RW for transfers and ambulation  Outcome: Progressing     Problem: Impaired Activities of Daily Living  Goal: Achieve highest/safest level of independence in self care  Description: Interventions:  - Assess ability and encourage patient to participate in ADLs to maximize function  - Promote sitting position while performing ADLs such as feeding, grooming, and bathing  - Educate and encourage patient/family in tolerated functional activity level and precautions during self-care  - Encourage patient to incorporate impaired side during daily activities to promote function  Outcome: Progressing

## 2024-11-09 NOTE — PROGRESS NOTES
Progress Note  Tara Lockwood Patient Status:  Inpatient    1940 MRN [id number]   Location [facility] 5SW/SE Attending Shayla Quinteros MD   Hosp Day # 8 PCP Robbie Hubbard MD     Subjective:  Pt c/o some SOB, orthopnea though better than yesterday. Has chronic LE edema but appears more significant today.     Objective:  /57 (BP Location: Right arm)   Pulse 75   Temp 98.5 °F (36.9 °C) (Oral)   Resp 18   Ht 5' 7\" (1.702 m)   Wt 228 lb 8 oz (103.6 kg)   SpO2 94%   BMI 35.79 kg/m²     Telemetry: NSR    Intake/Output:    Intake/Output Summary (Last 24 hours) at 2024 0646  Last data filed at 2024 0600  Gross per 24 hour   Intake 876.5 ml   Output 1800 ml   Net -923.5 ml       Last 3 Weights   24 0500 228 lb 8 oz (103.6 kg)   24 1959 230 lb 12.8 oz (104.7 kg)   24 1520 233 lb (105.7 kg)   10/25/24 1008 233 lb (105.7 kg)   24 1528 231 lb (104.8 kg)       Labs:  Recent Labs   Lab 24  0540 24  1618 24  0352   * 160* 99   BUN  23 24*   CREATSERUM 0.90 1.10* 1.03*   EGFRCR 63 49* 53*   CA 9.1 9.7 9.1    137 139   K 3.9 4.5 4.0    103 104   CO2 27.0 23.0 27.0     Recent Labs   Lab 24  0540 24  1618 24  0456 24  0655 24  0352   RBC 3.14* 3.70*  --   --  3.17*   HGB 11.4* 13.1  --   --  11.3*   HCT 34.4* 40.9  --   --  34.4*   .6* 110.5*  --   --  108.5*   MCH 36.3* 35.4*  --   --  35.6*   MCHC 33.1 32.0  --   --  32.8   RDW 13.9 13.8  --   --  14.1   NEPRELIM  --   --   --   --  4.96   WBC 6.7 8.9  --   --  6.5   .0 398.0 267.0 235.0 289.0  289.0         No results for input(s): \"TROP\", \"TROPHS\", \"CK\" in the last 168 hours.    Diagnostics:  IR BIOPSY    Result Date: 2024  CONCLUSION:  1. Successful sonographic guided core right lobe liver lesion biopsy    Dictated by (CST): Fco Bloom MD on 2024 at 4:05 PM     Finalized by (CST): Fco Bloom MD on 2024 at  4:07 PM          Review of Systems   Cardiovascular:  Positive for leg swelling and orthopnea. Negative for chest pain.   Respiratory:  Positive for shortness of breath. Negative for cough.        Physical Exam:    Gen: alert, oriented x 3, NAD  Heent: pupils equal, reactive. Mucous membranes moist.   Neck: no jvd  Cardiac: regular rate and rhythm, normal S1,S2, no murmur, clicks, rub or gallop  Lungs: diminished  Abd: soft, NT/ND +bs  Ext: BLE 2-3+ edema  Skin: Warm, dry  Neuro: No focal deficits      Medications:     vancomycin  125 mg Oral QID    melatonin  3 mg Oral Nightly    dilTIAZem ER  180 mg Oral Daily    atorvastatin  80 mg Oral Nightly    hydroxyurea  500 mg Oral Daily    metoprolol succinate ER  25 mg Oral QAM    pantoprazole  40 mg Intravenous Daily      continuous dose heparin 1,200 Units/hr (11/09/24 0505)         Assessment:  Paroxysmal Atrial Fibrillation, Hx SSS s/p Biotronik DC PPM 12/2023  Maintaining NSR  On toprol, diltiiazem  10/2023 Nuc stress normal perfusion  [id number] -warfarin on hold; on IV heparin bridge  Acute HFpEF  Echo LVEF 58%, G1DD, no RWMA, mild-mod TR  S/P IV Lasix 40mg x1 yesterday  Still appears vol OL, though LE edema multifactorial w/lymphedema   C Diff Infection, Melena  Diarrhea improved - on po vanco  GI following  Liver Mass  MRI w/concern for malignancy  S/p biopsy 11/8  GI, Heme/onc following  Anemia - likely r/t GIB; Hgb stable  Hx Recurrent DVT/PE  Hx Polycythemia Vera  Hx on warfarin - on hold; IV heparin bridge - INR 1.23  Mgmt per heme  Hx Lung Cancer s/p WILL lobectomy  Hx Lacunar CVA - 5/2024  Chronic Venous Insufficiency, Chronic LE edema  Was recently holding diuretics as OP d/t orthostatic hypotension  Hypoalbuminemia   Hypertension - mildly elevated    Plan:  On IV heparin gtt for bridging. Resume warfarin when ok w/primary/heme.   Add proBNP today  Diurese w/Lasix 40mg IV BID today  BP remains above goal - add spironolactone 25mg po daily  Recommend  compression stockings to BLE     Plan of care discussed with patient, RN.    Isa Vazquez, APRN  11/9/2024  6:46 AM  [phone] [facility]  [phone] [facility]        Cardiology attending    The above note was reviewed and the patient examined independently.    She still looks and feels volume expanded.  Still short of breath.  Having a good diuresis.    Stable blood pressure and renal function.    Liver mass, status post biopsy yesterday.  Results pending.    Plan: As above.  Add Jardiance.

## 2024-11-09 NOTE — PLAN OF CARE
Problem: Patient Centered Care  Goal: Patient preferences are identified and integrated in the patient's plan of care  Description: Interventions:  - What would you like us to know as we care for you? Patient states one daughter is a registered nurse and lives with other daughter; patient has 8 children  - Provide timely, complete, and accurate information to patient/family  - Incorporate patient and family knowledge, values, beliefs, and cultural backgrounds into the planning and delivery of care  - Encourage patient/family to participate in care and decision-making at the level they choose  - Honor patient and family perspectives and choices  Outcome: Progressing     Problem: Patient/Family Goals  Goal: Patient/Family Long Term Goal  Description: Patient's Long Term Goal: To get stronger; minimize fatigue    Interventions:  -Monitor VSS  -Monitor Labs  -Cards consult  -PT/OT consult   - See additional Care Plan goals for specific interventions  Outcome: Progressing  Goal: Patient/Family Short Term Goal  Description: Patient's Short Term Goal: To manage and/or stop rectal bleeding     Interventions:   -Monitor VSS  -Monitor Labs  -GI consult  -Monitor stools   -See additional Care Plan goals for specific interventions  Outcome: Progressing     Problem: PAIN - ADULT  Goal: Verbalizes/displays adequate comfort level or patient's stated pain goal  Description: INTERVENTIONS:  - Encourage pt to monitor pain and request assistance  - Assess pain using appropriate pain scale  - Administer analgesics based on type and severity of pain and evaluate response  - Implement non-pharmacological measures as appropriate and evaluate response  - Consider cultural and social influences on pain and pain management  - Manage/alleviate anxiety  - Utilize distraction and/or relaxation techniques  - Monitor for opioid side effects  - Notify MD/LIP if interventions unsuccessful or patient reports new pain  - Anticipate increased pain  with activity and pre-medicate as appropriate  Outcome: Progressing     Problem: SAFETY ADULT - FALL  Goal: Free from fall injury  Description: INTERVENTIONS:  - Assess pt frequently for physical needs  - Identify cognitive and physical deficits and behaviors that affect risk of falls.  - [place] fall precautions as indicated by assessment.  - Educate pt/family on patient safety including physical limitations  - Instruct pt to call for assistance with activity based on assessment  - Modify environment to reduce risk of injury  - Provide assistive devices as appropriate  - Consider OT/PT consult to assist with strengthening/mobility  - Encourage toileting schedule  Outcome: Progressing     Problem: DISCHARGE PLANNING  Goal: Discharge to home or other facility with appropriate resources  Description: INTERVENTIONS:  - Identify barriers to discharge w/pt and caregiver  - Include patient/family/discharge partner in discharge planning  - Arrange for needed discharge resources and transportation as appropriate  - Identify discharge learning needs (meds, wound care, etc)  - Arrange for interpreters to assist at discharge as needed  - Consider post-discharge preferences of patient/family/discharge partner  - Complete POLST form as appropriate  - Assess patient's ability to be responsible for managing their own health  - Refer to Case Management Department for coordinating discharge planning if the patient needs post-hospital services based on physician/LIP order or complex needs related to functional status, cognitive ability or social support system  Outcome: Progressing     Problem: GASTROINTESTINAL - ADULT  Goal: Maintains or returns to baseline bowel function  Description: INTERVENTIONS:  - Assess bowel function  - Maintain adequate hydration with IV or PO as ordered and tolerated  - Evaluate effectiveness of GI medications  - Encourage mobilization and activity  - Obtain nutritional consult as needed  - Establish a  toileting routine/schedule  - Consider collaborating with pharmacy to review patient's medication profile  Outcome: Progressing     Problem: METABOLIC/FLUID AND ELECTROLYTES - ADULT  Goal: Electrolytes maintained within normal limits  Description: INTERVENTIONS:  - Monitor labs and rhythm and assess patient for signs and symptoms of electrolyte imbalances  - Administer electrolyte replacement as ordered  - Monitor response to electrolyte replacements, including rhythm and repeat lab results as appropriate  - Fluid restriction as ordered  - Instruct patient on fluid and nutrition restrictions as appropriate  Outcome: Progressing  Goal: Hemodynamic stability and optimal renal function maintained  Description: INTERVENTIONS:  - Monitor labs and assess for signs and symptoms of volume excess or deficit  - Monitor intake, output and patient weight  - Monitor urine specific gravity, serum osmolarity and serum sodium as indicated or ordered  - Monitor response to interventions for patient's volume status, including labs, urine output, blood pressure (other measures as available)  - Encourage oral intake as appropriate  - Instruct patient on fluid and nutrition restrictions as appropriate  Outcome: Progressing     Problem: SKIN/TISSUE INTEGRITY - ADULT  Goal: Skin integrity remains intact  Description: INTERVENTIONS  - Assess and document risk factors for pressure ulcer development  - Assess and document skin integrity  - Monitor for areas of redness and/or skin breakdown  - Initiate interventions, skin care algorithm/standards of care as needed  Outcome: Progressing  Goal: Oral mucous membranes remain intact  Description: INTERVENTIONS  - Assess oral mucosa and hygiene practices  - Implement preventative oral hygiene regimen  - Implement oral medicated treatments as ordered  Outcome: Progressing     Problem: HEMATOLOGIC - ADULT  Goal: Maintains hematologic stability  Description: INTERVENTIONS  - Assess for signs and  symptoms of bleeding or hemorrhage  - Monitor labs and vital signs for trends  - Administer supportive blood products/factors, fluids and medications as ordered and appropriate  - Administer supportive blood products/factors as ordered and appropriate  Outcome: Progressing  Goal: Free from bleeding injury  Description: (Example usage: patient with low platelets)  INTERVENTIONS:  - Avoid intramuscular injections, enemas and rectal medication administration  - Ensure safe mobilization of patient  - Hold pressure on venipuncture sites to achieve adequate hemostasis  - Assess for signs and symptoms of internal bleeding  - Monitor lab trends  - Patient is to report abnormal signs of bleeding to staff  - Avoid use of toothpicks and dental floss  - Use electric shaver for shaving  - Use soft bristle tooth brush  - Limit straining and forceful nose blowing  Outcome: Progressing     Problem: Impaired Functional Mobility  Goal: Achieve highest/safest level of mobility/gait  Description: Interventions:  - Assess patient's functional ability and stability  - Promote increasing activity/tolerance for mobility and gait  - Educate and engage patient/family in tolerated activity level and precautions  - Recommend use of  RW for transfers and ambulation  Outcome: Progressing     Problem: Impaired Activities of Daily Living  Goal: Achieve highest/safest level of independence in self care  Description: Interventions:  - Assess ability and encourage patient to participate in ADLs to maximize function  - Promote sitting position while performing ADLs such as feeding, grooming, and bathing  - Educate and encourage patient/family in tolerated functional activity level and precautions during self-care  - Encourage patient to incorporate impaired side during daily activities to promote function  Outcome: Progressing

## 2024-11-10 LAB
ANION GAP SERPL CALC-SCNC: 8 MMOL/L (ref 0–18)
APTT PPP: 46.5 SECONDS (ref 23–36)
APTT PPP: 54.2 SECONDS (ref 23–36)
BASOPHILS # BLD AUTO: 0.07 X10(3) UL (ref 0–0.2)
BASOPHILS NFR BLD AUTO: 0.9 %
BUN BLD-MCNC: 24 MG/DL (ref 9–23)
BUN/CREAT SERPL: 20 (ref 10–20)
CALCIUM BLD-MCNC: 9.2 MG/DL (ref 8.7–10.4)
CHLORIDE SERPL-SCNC: 101 MMOL/L (ref 98–112)
CO2 SERPL-SCNC: 27 MMOL/L (ref 21–32)
CREAT BLD-MCNC: 1.2 MG/DL
DEPRECATED RDW RBC AUTO: 57.7 FL (ref 35.1–46.3)
EGFRCR SERPLBLD CKD-EPI 2021: 44 ML/MIN/1.73M2 (ref 60–?)
EOSINOPHIL # BLD AUTO: 0.13 X10(3) UL (ref 0–0.7)
EOSINOPHIL NFR BLD AUTO: 1.7 %
ERYTHROCYTE [DISTWIDTH] IN BLOOD BY AUTOMATED COUNT: 14.2 % (ref 11–15)
GLUCOSE BLD-MCNC: 98 MG/DL (ref 70–99)
HCT VFR BLD AUTO: 35.1 %
HGB BLD-MCNC: 11.7 G/DL
IMM GRANULOCYTES # BLD AUTO: 0.06 X10(3) UL (ref 0–1)
IMM GRANULOCYTES NFR BLD: 0.8 %
INR BLD: 1.14 (ref 0.8–1.2)
LYMPHOCYTES # BLD AUTO: 1.04 X10(3) UL (ref 1–4)
LYMPHOCYTES NFR BLD AUTO: 13.8 %
MAGNESIUM SERPL-MCNC: 1.7 MG/DL (ref 1.6–2.6)
MCH RBC QN AUTO: 36.9 PG (ref 26–34)
MCHC RBC AUTO-ENTMCNC: 33.3 G/DL (ref 31–37)
MCV RBC AUTO: 110.7 FL
MONOCYTES # BLD AUTO: 0.79 X10(3) UL (ref 0.1–1)
MONOCYTES NFR BLD AUTO: 10.4 %
NEUTROPHILS # BLD AUTO: 5.47 X10 (3) UL (ref 1.5–7.7)
NEUTROPHILS # BLD AUTO: 5.47 X10(3) UL (ref 1.5–7.7)
NEUTROPHILS NFR BLD AUTO: 72.4 %
OSMOLALITY SERPL CALC.SUM OF ELEC: 286 MOSM/KG (ref 275–295)
PLATELET # BLD AUTO: 252 10(3)UL (ref 150–450)
POTASSIUM SERPL-SCNC: 4.9 MMOL/L (ref 3.5–5.1)
PROTHROMBIN TIME: 15.3 SECONDS (ref 11.6–14.8)
RBC # BLD AUTO: 3.17 X10(6)UL
SODIUM SERPL-SCNC: 136 MMOL/L (ref 136–145)
WBC # BLD AUTO: 7.6 X10(3) UL (ref 4–11)

## 2024-11-10 PROCEDURE — 99233 SBSQ HOSP IP/OBS HIGH 50: CPT | Performed by: HOSPITALIST

## 2024-11-10 RX ORDER — FUROSEMIDE 10 MG/ML
40 INJECTION INTRAMUSCULAR; INTRAVENOUS DAILY
Status: DISCONTINUED | OUTPATIENT
Start: 2024-11-10 | End: 2024-11-11

## 2024-11-10 RX ORDER — MAGNESIUM OXIDE 400 MG/1
400 TABLET ORAL ONCE
Status: COMPLETED | OUTPATIENT
Start: 2024-11-10 | End: 2024-11-10

## 2024-11-10 NOTE — PLAN OF CARE
Problem: Patient Centered Care  Goal: Patient preferences are identified and integrated in the patient's plan of care  Description: Interventions:  - What would you like us to know as we care for you? Patient states one daughter is a registered nurse and lives with other daughter; patient has 8 children  - Provide timely, complete, and accurate information to patient/family  - Incorporate patient and family knowledge, values, beliefs, and cultural backgrounds into the planning and delivery of care  - Encourage patient/family to participate in care and decision-making at the level they choose  - Honor patient and family perspectives and choices  Outcome: Progressing     Problem: Patient/Family Goals  Goal: Patient/Family Long Term Goal  Description: Patient's Long Term Goal: To get stronger; minimize fatigue    Interventions:  -Monitor VSS  -Monitor Labs  -Cards consult  -PT/OT consult   - See additional Care Plan goals for specific interventions  Outcome: Progressing  Goal: Patient/Family Short Term Goal  Description: Patient's Short Term Goal: To manage and/or stop rectal bleeding     Interventions:   -Monitor VSS  -Monitor Labs  -GI consult  -Monitor stools   -See additional Care Plan goals for specific interventions  Outcome: Progressing     Problem: PAIN - ADULT  Goal: Verbalizes/displays adequate comfort level or patient's stated pain goal  Description: INTERVENTIONS:  - Encourage pt to monitor pain and request assistance  - Assess pain using appropriate pain scale  - Administer analgesics based on type and severity of pain and evaluate response  - Implement non-pharmacological measures as appropriate and evaluate response  - Consider cultural and social influences on pain and pain management  - Manage/alleviate anxiety  - Utilize distraction and/or relaxation techniques  - Monitor for opioid side effects  - Notify MD/LIP if interventions unsuccessful or patient reports new pain  - Anticipate increased pain  with activity and pre-medicate as appropriate  Outcome: Progressing     Problem: SAFETY ADULT - FALL  Goal: Free from fall injury  Description: INTERVENTIONS:  - Assess pt frequently for physical needs  - Identify cognitive and physical deficits and behaviors that affect risk of falls.  - [place] fall precautions as indicated by assessment.  - Educate pt/family on patient safety including physical limitations  - Instruct pt to call for assistance with activity based on assessment  - Modify environment to reduce risk of injury  - Provide assistive devices as appropriate  - Consider OT/PT consult to assist with strengthening/mobility  - Encourage toileting schedule  Outcome: Progressing     Problem: DISCHARGE PLANNING  Goal: Discharge to home or other facility with appropriate resources  Description: INTERVENTIONS:  - Identify barriers to discharge w/pt and caregiver  - Include patient/family/discharge partner in discharge planning  - Arrange for needed discharge resources and transportation as appropriate  - Identify discharge learning needs (meds, wound care, etc)  - Arrange for interpreters to assist at discharge as needed  - Consider post-discharge preferences of patient/family/discharge partner  - Complete POLST form as appropriate  - Assess patient's ability to be responsible for managing their own health  - Refer to Case Management Department for coordinating discharge planning if the patient needs post-hospital services based on physician/LIP order or complex needs related to functional status, cognitive ability or social support system  Outcome: Progressing     Problem: GASTROINTESTINAL - ADULT  Goal: Maintains or returns to baseline bowel function  Description: INTERVENTIONS:  - Assess bowel function  - Maintain adequate hydration with IV or PO as ordered and tolerated  - Evaluate effectiveness of GI medications  - Encourage mobilization and activity  - Obtain nutritional consult as needed  - Establish a  toileting routine/schedule  - Consider collaborating with pharmacy to review patient's medication profile  Outcome: Progressing     Problem: METABOLIC/FLUID AND ELECTROLYTES - ADULT  Goal: Electrolytes maintained within normal limits  Description: INTERVENTIONS:  - Monitor labs and rhythm and assess patient for signs and symptoms of electrolyte imbalances  - Administer electrolyte replacement as ordered  - Monitor response to electrolyte replacements, including rhythm and repeat lab results as appropriate  - Fluid restriction as ordered  - Instruct patient on fluid and nutrition restrictions as appropriate  Outcome: Progressing  Goal: Hemodynamic stability and optimal renal function maintained  Description: INTERVENTIONS:  - Monitor labs and assess for signs and symptoms of volume excess or deficit  - Monitor intake, output and patient weight  - Monitor urine specific gravity, serum osmolarity and serum sodium as indicated or ordered  - Monitor response to interventions for patient's volume status, including labs, urine output, blood pressure (other measures as available)  - Encourage oral intake as appropriate  - Instruct patient on fluid and nutrition restrictions as appropriate  Outcome: Progressing     Problem: SKIN/TISSUE INTEGRITY - ADULT  Goal: Skin integrity remains intact  Description: INTERVENTIONS  - Assess and document risk factors for pressure ulcer development  - Assess and document skin integrity  - Monitor for areas of redness and/or skin breakdown  - Initiate interventions, skin care algorithm/standards of care as needed  Outcome: Progressing  Goal: Oral mucous membranes remain intact  Description: INTERVENTIONS  - Assess oral mucosa and hygiene practices  - Implement preventative oral hygiene regimen  - Implement oral medicated treatments as ordered  Outcome: Progressing     Problem: HEMATOLOGIC - ADULT  Goal: Maintains hematologic stability  Description: INTERVENTIONS  - Assess for signs and  symptoms of bleeding or hemorrhage  - Monitor labs and vital signs for trends  - Administer supportive blood products/factors, fluids and medications as ordered and appropriate  - Administer supportive blood products/factors as ordered and appropriate  Outcome: Progressing  Goal: Free from bleeding injury  Description: (Example usage: patient with low platelets)  INTERVENTIONS:  - Avoid intramuscular injections, enemas and rectal medication administration  - Ensure safe mobilization of patient  - Hold pressure on venipuncture sites to achieve adequate hemostasis  - Assess for signs and symptoms of internal bleeding  - Monitor lab trends  - Patient is to report abnormal signs of bleeding to staff  - Avoid use of toothpicks and dental floss  - Use electric shaver for shaving  - Use soft bristle tooth brush  - Limit straining and forceful nose blowing  Outcome: Progressing     Problem: Impaired Functional Mobility  Goal: Achieve highest/safest level of mobility/gait  Description: Interventions:  - Assess patient's functional ability and stability  - Promote increasing activity/tolerance for mobility and gait  - Educate and engage patient/family in tolerated activity level and precautions  - Recommend use of  RW for transfers and ambulation  Outcome: Progressing     Problem: Impaired Activities of Daily Living  Goal: Achieve highest/safest level of independence in self care  Description: Interventions:  - Assess ability and encourage patient to participate in ADLs to maximize function  - Promote sitting position while performing ADLs such as feeding, grooming, and bathing  - Educate and encourage patient/family in tolerated functional activity level and precautions during self-care  - Encourage patient to incorporate impaired side during daily activities to promote function  Outcome: Progressing

## 2024-11-10 NOTE — PROGRESS NOTES
Progress Note  Tara Lockwood Patient Status:  Inpatient    1940 MRN [id number]   Location [facility] 5SW/SE Attending Shayla Quinteros MD   Hosp Day # 9 PCP Robbie Hubbard MD     Subjective:  Pt states SOB is improving today. Still with LE edema but also improving. \"Frustrated with being here\"    Objective:  /62 (BP Location: Right arm)   Pulse 80   Temp 98.1 °F (36.7 °C) (Oral)   Resp 17   Ht 5' 7\" (1.702 m)   Wt 224 lb 12.8 oz (102 kg)   SpO2 96%   BMI 35.21 kg/m²       Intake/Output:    Intake/Output Summary (Last 24 hours) at 11/10/2024 0749  Last data filed at 2024 2214  Gross per 24 hour   Intake 390 ml   Output 600 ml   Net -210 ml       Last 3 Weights   11/10/24 0548 224 lb 12.8 oz (102 kg)   24 0500 228 lb 8 oz (103.6 kg)   24 1959 230 lb 12.8 oz (104.7 kg)   24 1520 233 lb (105.7 kg)   10/25/24 1008 233 lb (105.7 kg)   24 1528 231 lb (104.8 kg)       Labs:  Recent Labs   Lab 24  1618 24  0352 11/10/24  0534   * 99 98   BUN 23 24* 24*   CREATSERUM 1.10* 1.03* 1.20*   EGFRCR 49* 53* 44*   CA 9.7 9.1 9.2    139 136   K 4.5 4.0 4.9    104 101   CO2 23.0 27.0 27.0     Recent Labs   Lab 24  1618 24  0456 24  0655 24  0352 11/10/24  0402   RBC 3.70*  --   --  3.17* 3.17*   HGB 13.1  --   --  11.3* 11.7*   HCT 40.9  --   --  34.4* 35.1   .5*  --   --  108.5* 110.7*   MCH 35.4*  --   --  35.6* 36.9*   MCHC 32.0  --   --  32.8 33.3   RDW 13.8  --   --  14.1 14.2   NEPRELIM  --   --   --  4.96 5.47   WBC 8.9  --   --  6.5 7.6   .0   < > 235.0 289.0  289.0 252.0    < > = values in this interval not displayed.         No results for input(s): \"TROP\", \"TROPHS\", \"CK\" in the last 168 hours.    Diagnostics:  No results found.   ROS    Physical Exam:    Gen: alert, oriented x 3, NAD  Heent: pupils equal, reactive. Mucous membranes moist.   Neck: no jvd  Cardiac: regular rate and rhythm,  normal S1,S2, no murmur, clicks, rub or gallop  Lungs: diminished  Abd: soft, NT/ND +bs  Ext: BLE 1-2+ edema to lower calves, improved  Skin: Warm, dry  Neuro: No focal deficits      Medications:     magnesium oxide  400 mg Oral Once    furosemide  40 mg Intravenous BID (Diuretic)    spironolactone  25 mg Oral Daily    empagliflozin  10 mg Oral Daily    vancomycin  125 mg Oral QID    melatonin  3 mg Oral Nightly    dilTIAZem ER  180 mg Oral Daily    atorvastatin  80 mg Oral Nightly    hydroxyurea  500 mg Oral Daily    metoprolol succinate ER  25 mg Oral QAM    pantoprazole  40 mg Intravenous Daily      continuous dose heparin 1,100 Units/hr (11/10/24 0710)       Assessment:  Paroxysmal Atrial Fibrillation, Hx SSS s/p Biotronik DC PPM 12/2023  Maintaining NSR  On toprol, diltiiazem  10/2023 Nuc stress normal perfusion  [id number] -warfarin on hold; on IV heparin  Acute HFpEF  Echo LVEF 58%, G1DD, no RWMA, mild-mod TR  S/P IV Lasix 40mg BID yesterday  I&O incomplete  Still appears vol OL, though LE edema improving & multifactorial w/lymphedema  Cr slightly increased today  GDMT - started spironolactone   C Diff Infection, Melena  Diarrhea improved - on po vanco  GI following  Liver Mass  MRI w/concern for malignancy  S/p biopsy 11/8  GI, Heme/onc following  Anemia - likely r/t GIB; Hgb stable  Hx Recurrent DVT/PE  Hx Polycythemia Vera  Hx on warfarin - on hold; IV heparin bridge - INR 1.23  Mgmt per heme  Hx Lung Cancer s/p WILL lobectomy  Hx Lacunar CVA - 5/2024  Chronic Venous Insufficiency, Chronic LE edema  Was recently holding diuretics as OP d/t orthostatic hypotension  Hypoalbuminemia   Hypertension - mildly elevated    Plan:  On IV heparin gtt for bridging. Resume warfarin when ok w/primary/heme and no plans for any additional procedures. Liver biopsy pathology is pending.   Creatinine slightly increased today and LE edema is multifactorial with lymphedema. SOB improved. Will decrease to Lasix IV 40mg daily  and transition to oral tomorrow.   Continue toprol, diltiazem, spironolactone 25mg po daily  Recommend compression stockings to BLE   May benefit from SGLT2i - will discuss at future outpatient visit    Plan of care discussed with patient, RN.    Isa Vazquez, APRN  11/10/2024  7:49 AM  [phone] [facility]  [phone] [facility]        Patient seen and examined independently.  Note reviewed and labs reviewed.  Agree with above assessment and plan.  Rhythm control strategy for afib  On IV heparin while awaiting to resume Coumadin

## 2024-11-10 NOTE — PLAN OF CARE
Problem: Patient Centered Care  Goal: Patient preferences are identified and integrated in the patient's plan of care  Description: Interventions:  - What would you like us to know as we care for you? Patient states one daughter is a registered nurse and lives with other daughter; patient has 8 children  - Provide timely, complete, and accurate information to patient/family  - Incorporate patient and family knowledge, values, beliefs, and cultural backgrounds into the planning and delivery of care  - Encourage patient/family to participate in care and decision-making at the level they choose  - Honor patient and family perspectives and choices  Outcome: Progressing     Problem: Patient/Family Goals  Goal: Patient/Family Long Term Goal  Description: Patient's Long Term Goal: To get stronger; minimize fatigue    Interventions:  -Monitor VSS  -Monitor Labs  -Cards consult  -PT/OT consult   - See additional Care Plan goals for specific interventions  Outcome: Progressing  Goal: Patient/Family Short Term Goal  Description: Patient's Short Term Goal: To manage and/or stop rectal bleeding     Interventions:   -Monitor VSS  -Monitor Labs  -GI consult  -Monitor stools   -See additional Care Plan goals for specific interventions  Outcome: Progressing     Problem: PAIN - ADULT  Goal: Verbalizes/displays adequate comfort level or patient's stated pain goal  Description: INTERVENTIONS:  - Encourage pt to monitor pain and request assistance  - Assess pain using appropriate pain scale  - Administer analgesics based on type and severity of pain and evaluate response  - Implement non-pharmacological measures as appropriate and evaluate response  - Consider cultural and social influences on pain and pain management  - Manage/alleviate anxiety  - Utilize distraction and/or relaxation techniques  - Monitor for opioid side effects  - Notify MD/LIP if interventions unsuccessful or patient reports new pain  - Anticipate increased pain  with activity and pre-medicate as appropriate  Outcome: Progressing     Problem: SAFETY ADULT - FALL  Goal: Free from fall injury  Description: INTERVENTIONS:  - Assess pt frequently for physical needs  - Identify cognitive and physical deficits and behaviors that affect risk of falls.  - [place] fall precautions as indicated by assessment.  - Educate pt/family on patient safety including physical limitations  - Instruct pt to call for assistance with activity based on assessment  - Modify environment to reduce risk of injury  - Provide assistive devices as appropriate  - Consider OT/PT consult to assist with strengthening/mobility  - Encourage toileting schedule  Outcome: Progressing     Problem: DISCHARGE PLANNING  Goal: Discharge to home or other facility with appropriate resources  Description: INTERVENTIONS:  - Identify barriers to discharge w/pt and caregiver  - Include patient/family/discharge partner in discharge planning  - Arrange for needed discharge resources and transportation as appropriate  - Identify discharge learning needs (meds, wound care, etc)  - Arrange for interpreters to assist at discharge as needed  - Consider post-discharge preferences of patient/family/discharge partner  - Complete POLST form as appropriate  - Assess patient's ability to be responsible for managing their own health  - Refer to Case Management Department for coordinating discharge planning if the patient needs post-hospital services based on physician/LIP order or complex needs related to functional status, cognitive ability or social support system  Outcome: Progressing     Problem: GASTROINTESTINAL - ADULT  Goal: Maintains or returns to baseline bowel function  Description: INTERVENTIONS:  - Assess bowel function  - Maintain adequate hydration with IV or PO as ordered and tolerated  - Evaluate effectiveness of GI medications  - Encourage mobilization and activity  - Obtain nutritional consult as needed  - Establish a  toileting routine/schedule  - Consider collaborating with pharmacy to review patient's medication profile  Outcome: Progressing     Problem: METABOLIC/FLUID AND ELECTROLYTES - ADULT  Goal: Electrolytes maintained within normal limits  Description: INTERVENTIONS:  - Monitor labs and rhythm and assess patient for signs and symptoms of electrolyte imbalances  - Administer electrolyte replacement as ordered  - Monitor response to electrolyte replacements, including rhythm and repeat lab results as appropriate  - Fluid restriction as ordered  - Instruct patient on fluid and nutrition restrictions as appropriate  Outcome: Progressing  Goal: Hemodynamic stability and optimal renal function maintained  Description: INTERVENTIONS:  - Monitor labs and assess for signs and symptoms of volume excess or deficit  - Monitor intake, output and patient weight  - Monitor urine specific gravity, serum osmolarity and serum sodium as indicated or ordered  - Monitor response to interventions for patient's volume status, including labs, urine output, blood pressure (other measures as available)  - Encourage oral intake as appropriate  - Instruct patient on fluid and nutrition restrictions as appropriate  Outcome: Progressing     Problem: SKIN/TISSUE INTEGRITY - ADULT  Goal: Skin integrity remains intact  Description: INTERVENTIONS  - Assess and document risk factors for pressure ulcer development  - Assess and document skin integrity  - Monitor for areas of redness and/or skin breakdown  - Initiate interventions, skin care algorithm/standards of care as needed  Outcome: Progressing  Goal: Oral mucous membranes remain intact  Description: INTERVENTIONS  - Assess oral mucosa and hygiene practices  - Implement preventative oral hygiene regimen  - Implement oral medicated treatments as ordered  Outcome: Progressing     Problem: HEMATOLOGIC - ADULT  Goal: Maintains hematologic stability  Description: INTERVENTIONS  - Assess for signs and  symptoms of bleeding or hemorrhage  - Monitor labs and vital signs for trends  - Administer supportive blood products/factors, fluids and medications as ordered and appropriate  - Administer supportive blood products/factors as ordered and appropriate  Outcome: Progressing  Goal: Free from bleeding injury  Description: (Example usage: patient with low platelets)  INTERVENTIONS:  - Avoid intramuscular injections, enemas and rectal medication administration  - Ensure safe mobilization of patient  - Hold pressure on venipuncture sites to achieve adequate hemostasis  - Assess for signs and symptoms of internal bleeding  - Monitor lab trends  - Patient is to report abnormal signs of bleeding to staff  - Avoid use of toothpicks and dental floss  - Use electric shaver for shaving  - Use soft bristle tooth brush  - Limit straining and forceful nose blowing  Outcome: Progressing     Problem: Impaired Functional Mobility  Goal: Achieve highest/safest level of mobility/gait  Description: Interventions:  - Assess patient's functional ability and stability  - Promote increasing activity/tolerance for mobility and gait  - Educate and engage patient/family in tolerated activity level and precautions  - Recommend use of  RW for transfers and ambulation  Outcome: Progressing     Problem: Impaired Activities of Daily Living  Goal: Achieve highest/safest level of independence in self care  Description: Interventions:  - Assess ability and encourage patient to participate in ADLs to maximize function  - Promote sitting position while performing ADLs such as feeding, grooming, and bathing  - Educate and encourage patient/family in tolerated functional activity level and precautions during self-care  - Encourage patient to incorporate impaired side during daily activities to promote function  Outcome: Progressing

## 2024-11-10 NOTE — PROGRESS NOTES
[facility]  part of [facility]    Progress Note    Tara Lockwood Patient Status:  Inpatient    1940 MRN [id number]   Location [facility] 5SW/SE Attending Loy Vences MD   Hosp Day # 8 PCP Robbie Hubbard MD       Subjective:   Tara Lockwood remains stable, resting in bed.   No new symptoms  normal BMs    Doing ok, but some shortness of breath and orthopnea, incr leg swelling  No new symptoms    Review of Systems:   10 point ROS completed and was negative, except for pertinent positive and negatives stated in subjective.    Objective:     Vitals:    24 2129 24 0353 24 0800 24 1456   BP: 134/56 143/57 146/56 118/43   BP Location: Right arm Right arm Right arm Right arm   Pulse: 71 75  74   Resp: 16 18 18 18   Temp: 98.1 °F (36.7 °C) 98.5 °F (36.9 °C) 98.8 °F (37.1 °C) 98.5 °F (36.9 °C)   TempSrc: Oral Oral Oral Oral   SpO2: 94% 94% 96% 96%   Weight:       Height:         Patient Weight for the past 72 hrs:   Weight   24 1520 233 lb (105.7 kg)   24 1959 230 lb 12.8 oz (104.7 kg)       Intake/Output Summary (Last 24 hours) at 2024 1900  Last data filed at 2024 1352  Gross per 24 hour   Intake 696.5 ml   Output 1500 ml   Net -803.5 ml       GENERAL:  The patient appeared to be in no distress, conversant, smiling  SKIN:  Warm and hydrated  HEENT:  Head was atraumatic and normocephalic.    CHEST:  Symmetrical movement on inspiration  LUNGS:  No audible wheezing, breath sounds coarse  ABDOMEN: Non-distended  MUSCULOSKELETAL:  There was no deformity.   EXTREMITIES: +edema  NEUROLOGICAL:  There was no focal deficit.    PSYCHIATRIC: Calm and cooperative     Current Scheduled Inpatient Meds:      furosemide  40 mg Intravenous BID (Diuretic)    spironolactone  25 mg Oral Daily    empagliflozin  10 mg Oral Daily    vancomycin  125 mg Oral QID    melatonin  3 mg Oral Nightly    dilTIAZem ER  180 mg Oral Daily    atorvastatin  80 mg Oral Nightly     hydroxyurea  500 mg Oral Daily    metoprolol succinate ER  25 mg Oral QAM    pantoprazole  40 mg Intravenous Daily       Current PRN Inpatient Meds:        gadoterate meglumine    acetaminophen    ondansetron    metoclopramide    influenza virus vaccine PF    Results:     Lab Results   Component Value Date    WBC 6.5 11/09/2024    HGB 11.3 (L) 11/09/2024    HCT 34.4 (L) 11/09/2024    .0 11/09/2024    .0 11/09/2024    CREATSERUM 1.03 (H) 11/09/2024    BUN 24 (H) 11/09/2024     11/09/2024    K 4.0 11/09/2024     11/09/2024    CO2 27.0 11/09/2024    GLU 99 11/09/2024    CA 9.1 11/09/2024    ALB 3.0 (L) 11/09/2024    ALKPHO 112 11/09/2024    BILT 1.1 11/09/2024    TP 5.3 (L) 11/09/2024     (H) 11/09/2024    ALT 51 (H) 11/09/2024    PTT 37.6 (H) 11/09/2024    INR 1.23 (H) 11/09/2024    PT 13.1 05/11/2016    T4F 1.0 05/03/2022    TSH 3.521 09/05/2024    CRP < 0.5 05/29/2014    MG 1.6 11/06/2024    B12 734 09/05/2024       Recent Labs   Lab 11/06/24  0540 11/06/24  1618 11/07/24  0456 11/08/24  0655 11/09/24  0352   RBC 3.14* 3.70*  --   --  3.17*   HGB 11.4* 13.1  --   --  11.3*   HCT 34.4* 40.9  --   --  34.4*   .6* 110.5*  --   --  108.5*   MCH 36.3* 35.4*  --   --  35.6*   MCHC 33.1 32.0  --   --  32.8   RDW 13.9 13.8  --   --  14.1   NEPRELIM  --   --   --   --  4.96   WBC 6.7 8.9  --   --  6.5   .0 398.0 267.0 235.0 289.0  289.0     Recent Labs   Lab 11/04/24  0528 11/05/24  0518 11/06/24  0540 11/06/24  1618 11/09/24  0352   * 98 104* 160* 99   BUN 16 17 21 23 24*   CREATSERUM 1.02 0.91 0.90 1.10* 1.03*   CA 9.5 9.2 9.1 9.7 9.1   ALB 3.4 3.0*  --   --  3.0*    140 138 137 139   K 4.2 3.7 3.9 4.5 4.0    106 105 103 104   CO2 23.0 26.0 27.0 23.0 27.0   ALKPHO 115 99  --   --  112   * 130*  --   --  153*   ALT 56* 46  --   --  51*   BILT 1.2* 1.0  --   --  1.1   TP 6.3 5.3*  --   --  5.3*     Lab Results   Component Value Date    PT 13.1  05/11/2016    PT 15.7 (H) 07/13/2015    PT 22.6 (H) 04/13/2015    INR 1.23 (H) 11/09/2024    INR 1.57 (H) 11/06/2024    INR 2.27 (H) 11/05/2024       Culture:  No results found for this visit on 11/01/24.    Imaging/EKG:   IR BIOPSY    Result Date: 11/8/2024  CONCLUSION:  1. Successful sonographic guided core right lobe liver lesion biopsy    Dictated by (CST): Fco Bloom MD on 11/08/2024 at 4:05 PM     Finalized by (CST): Fco Bloom MD on 11/08/2024 at 4:07 PM             Assessment and Plan:   Tara Lockwood is an 84-year-old female with hx of chronic diarrhea, chronic dyspnea, paroxysmal atrial fibrillation on warfarin, chronic hypotension on midodrine, SSS w/ PPM, who presents with black diarrhea.       # Anemia  # Melena  # Diarrhea due to C. difficile colitis, improved  - baseline hgb 12-14, around 11 on admission  - no melena since admission  - C Diff PCR positive, EIA also positive  -On oral vancomycin 4 times daily  - GI on consult  .  # Liver mass  - Liver ultrasound showed a large 16.7 cm solid mass of the inferior right hepatic lobe, most compatible with hepatocellular carcinoma.   - MRI showed a large centrally necrotic mass arising from the right hepatic lobe that measures 15.7 cm, highly concerning for malignancy.  - Onc on consult, d/w Dr. 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   - Oneal 2 V617 F+ polycythemia vera 11/2020, on hydroxyurea.  - patient follows Dr. Ceron  - continue home hydroxyurea     # History of ischemic stroke  - MRI of the brain 05/2024 showed lacunar stroke in the right cerebellum     Diet: regular  PT/OT: Patient has been evaluated and presents with no skilled Physical Therapy needs at this time.   DVT ppx: AC  Line: none  Code status: Full   Dispo: per clinical course, lives at home with dtr and grandson, plans for home with HH     MDM: high Shayla Quinterso MD   [facility]

## 2024-11-10 NOTE — PROGRESS NOTES
[facility]  part of [facility]    Progress Note    Tara Lockwood Patient Status:  Inpatient    1940 MRN [id number]   Location [facility] 5SW/SE Attending Loy Vences MD   Hosp Day # 9 PCP Robbie Hubbard MD       Subjective:   Tara Lockwood remains stable, resting in bed.   No new symptoms  normal BMs    Doing ok,  shortness of breath and orthopnea better, leg swelling ?same  No new symptoms    Review of Systems:   10 point ROS completed and was negative, except for pertinent positive and negatives stated in subjective.    Objective:     Vitals:    11/10/24 0525 11/10/24 0548 11/10/24 0800 11/10/24 1400   BP: 138/62  136/62 139/58   BP Location: Right arm  Right arm Right arm   Pulse: 80  82 80   Resp: 17  18 18   Temp: 98.1 °F (36.7 °C)  97.3 °F (36.3 °C) 98.3 °F (36.8 °C)   TempSrc: Oral  Oral Oral   SpO2: 96%  95% 95%   Weight:  224 lb 12.8 oz (102 kg)     Height:         Patient Weight for the past 72 hrs:   Weight   24 1520 233 lb (105.7 kg)   24 1959 230 lb 12.8 oz (104.7 kg)       Intake/Output Summary (Last 24 hours) at 11/10/2024 1519  Last data filed at 11/10/2024 1400  Gross per 24 hour   Intake 390 ml   Output 1700 ml   Net -1310 ml       GENERAL:  The patient appeared to be in no distress, conversant, smiling  SKIN:  Warm and hydrated  HEENT:  Head was atraumatic and normocephalic.    CHEST:  Symmetrical movement on inspiration  LUNGS:  No audible wheezing, breath sounds coarse  ABDOMEN: Non-distended  MUSCULOSKELETAL:  There was no deformity.   EXTREMITIES: +edema  NEUROLOGICAL:  There was no focal deficit.    PSYCHIATRIC: Calm and cooperative     Current Scheduled Inpatient Meds:      furosemide  40 mg Intravenous Daily    spironolactone  25 mg Oral Daily    empagliflozin  10 mg Oral Daily    vancomycin  125 mg Oral QID    melatonin  3 mg Oral Nightly    dilTIAZem ER  180 mg Oral Daily    atorvastatin  80 mg Oral Nightly    hydroxyurea  500 mg Oral  Daily    metoprolol succinate ER  25 mg Oral QAM    pantoprazole  40 mg Intravenous Daily       Current PRN Inpatient Meds:        gadoterate meglumine    acetaminophen    ondansetron    metoclopramide    influenza virus vaccine PF    Results:     Lab Results   Component Value Date    WBC 7.6 11/10/2024    HGB 11.7 (L) 11/10/2024    HCT 35.1 11/10/2024    .0 11/10/2024    CREATSERUM 1.20 (H) 11/10/2024    BUN 24 (H) 11/10/2024     11/10/2024    K 4.9 11/10/2024     11/10/2024    CO2 27.0 11/10/2024    GLU 98 11/10/2024    CA 9.2 11/10/2024    ALB 3.0 (L) 11/09/2024    ALKPHO 112 11/09/2024    BILT 1.1 11/09/2024    TP 5.3 (L) 11/09/2024     (H) 11/09/2024    ALT 51 (H) 11/09/2024    PTT 54.2 (H) 11/10/2024    INR 1.14 11/10/2024    PT 13.1 05/11/2016    T4F 1.0 05/03/2022    TSH 3.521 09/05/2024    CRP < 0.5 05/29/2014    MG 1.7 11/10/2024    B12 734 09/05/2024       Recent Labs   Lab 11/06/24  1618 11/07/24  0456 11/08/24  0655 11/09/24  0352 11/10/24  0402   RBC 3.70*  --   --  3.17* 3.17*   HGB 13.1  --   --  11.3* 11.7*   HCT 40.9  --   --  34.4* 35.1   .5*  --   --  108.5* 110.7*   MCH 35.4*  --   --  35.6* 36.9*   MCHC 32.0  --   --  32.8 33.3   RDW 13.8  --   --  14.1 14.2   NEPRELIM  --   --   --  4.96 5.47   WBC 8.9  --   --  6.5 7.6   .0   < > 235.0 289.0  289.0 252.0    < > = values in this interval not displayed.     Recent Labs   Lab 11/04/24  0528 11/05/24  0518 11/06/24  0540 11/06/24  1618 11/09/24  0352 11/10/24  0534   * 98   < > 160* 99 98   BUN 16 17   < > 23 24* 24*   CREATSERUM 1.02 0.91   < > 1.10* 1.03* 1.20*   CA 9.5 9.2   < > 9.7 9.1 9.2   ALB 3.4 3.0*  --   --  3.0*  --     140   < > 137 139 136   K 4.2 3.7   < > 4.5 4.0 4.9    106   < > 103 104 101   CO2 23.0 26.0   < > 23.0 27.0 27.0   ALKPHO 115 99  --   --  112  --    * 130*  --   --  153*  --    ALT 56* 46  --   --  51*  --    BILT 1.2* 1.0  --   --  1.1  --    TP 6.3  5.3*  --   --  5.3*  --     < > = values in this interval not displayed.     Lab Results   Component Value Date    PT 13.1 05/11/2016    PT 15.7 (H) 07/13/2015    PT 22.6 (H) 04/13/2015    INR 1.14 11/10/2024    INR 1.23 (H) 11/09/2024    INR 1.57 (H) 11/06/2024       Culture:  No results found for this visit on 11/01/24.    Imaging/EKG:   No results found.      Assessment and Plan:   Tara Lockwood is an 84-year-old female with hx of chronic diarrhea, chronic dyspnea, paroxysmal atrial fibrillation on warfarin, chronic hypotension on midodrine, SSS w/ PPM, who presents with black diarrhea.       # Anemia  # Melena  # Diarrhea due to C. difficile colitis, improved  - baseline hgb 12-14, around 11 on admission  - no melena since admission  - C Diff PCR positive, EIA also positive  -On oral vancomycin 4 times daily  - GI on consult  .  # Liver mass  - Liver ultrasound showed a large 16.7 cm solid mass of the inferior right hepatic lobe, most compatible with hepatocellular carcinoma.   - MRI showed a large centrally necrotic mass arising from the right hepatic lobe that measures 15.7 cm, highly concerning for malignancy.  - Onc on consult, d/w Dr. 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   # Paroxysmal atrial fibrillation on warfarin  - INR 3.9 on admission, 2.3--> 1.57   - continue home cardiac meds, hold warfarin  - Cards on consult  --->bridging with heparin ggt-->restarted post biopsy-->probably restart Coumadin tomorrow, if hgb stable and no surgical plans    # Fluid overload due to acute HFpEF, also chronic lymphedema  proBNP 3,940  -IV Lasix-->decr dose b/c Cr slightly up  -monitor electrolytes  -compression stockings      # Polycythemia vera on hydroxyurea  # History of left upper lobe adenocarcinoma of the lung   # 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   - Oneal 2 V617 F+ polycythemia vera 11/2020, on hydroxyurea.  - patient follows Dr. Ceron  - continue home hydroxyurea     # History of ischemic stroke  - MRI of the brain 05/2024 showed lacunar stroke in the right cerebellum     Diet: regular  PT/OT: Patient has been evaluated and presents with no skilled Physical Therapy needs at this time.   DVT ppx: AC  Line: none  Code status: Full   Dispo: per clinical course, lives at home with dtr and grandson, plans for home with HH     MDM: high Shayla Quinteros MD   [facility]ist

## 2024-11-11 ENCOUNTER — APPOINTMENT (OUTPATIENT)
Dept: CT IMAGING | Facility: HOSPITAL | Age: 84
End: 2024-11-11
Attending: INTERNAL MEDICINE
Payer: MEDICARE

## 2024-11-11 PROBLEM — C22.0 HEPATOCELLULAR CARCINOMA (HCC): Status: ACTIVE | Noted: 2024-01-01

## 2024-11-11 PROBLEM — C22.0 HEPATOCELLULAR CARCINOMA (HCC): Status: ACTIVE | Noted: 2024-11-11

## 2024-11-11 LAB
ALBUMIN SERPL-MCNC: 3.2 G/DL (ref 3.2–4.8)
ANION GAP SERPL CALC-SCNC: 8 MMOL/L (ref 0–18)
APTT PPP: 47.6 SECONDS (ref 23–36)
APTT PPP: 48.9 SECONDS (ref 23–36)
APTT PPP: 61.4 SECONDS (ref 23–36)
BUN BLD-MCNC: 33 MG/DL (ref 9–23)
BUN/CREAT SERPL: 24.8 (ref 10–20)
CALCIUM BLD-MCNC: 9.3 MG/DL (ref 8.7–10.4)
CHLORIDE SERPL-SCNC: 101 MMOL/L (ref 98–112)
CO2 SERPL-SCNC: 28 MMOL/L (ref 21–32)
CREAT BLD-MCNC: 1.33 MG/DL
EGFRCR SERPLBLD CKD-EPI 2021: 39 ML/MIN/1.73M2 (ref 60–?)
GLUCOSE BLD-MCNC: 106 MG/DL (ref 70–99)
INR BLD: 1.18 (ref 0.8–1.2)
MAGNESIUM SERPL-MCNC: 1.7 MG/DL (ref 1.6–2.6)
OSMOLALITY SERPL CALC.SUM OF ELEC: 292 MOSM/KG (ref 275–295)
PHOSPHATE SERPL-MCNC: 3.5 MG/DL (ref 2.4–5.1)
PLATELET # BLD AUTO: 301 10(3)UL (ref 150–450)
POTASSIUM SERPL-SCNC: 4.3 MMOL/L (ref 3.5–5.1)
PROTHROMBIN TIME: 15.7 SECONDS (ref 11.6–14.8)
SODIUM SERPL-SCNC: 137 MMOL/L (ref 136–145)

## 2024-11-11 PROCEDURE — 99233 SBSQ HOSP IP/OBS HIGH 50: CPT | Performed by: HOSPITALIST

## 2024-11-11 PROCEDURE — 99233 SBSQ HOSP IP/OBS HIGH 50: CPT | Performed by: INTERNAL MEDICINE

## 2024-11-11 PROCEDURE — 74177 CT ABD & PELVIS W/CONTRAST: CPT | Performed by: INTERNAL MEDICINE

## 2024-11-11 RX ORDER — MAGNESIUM OXIDE 400 MG/1
400 TABLET ORAL ONCE
Status: COMPLETED | OUTPATIENT
Start: 2024-11-11 | End: 2024-11-11

## 2024-11-11 RX ORDER — WARFARIN SODIUM 5 MG/1
5 TABLET ORAL NIGHTLY
Status: DISCONTINUED | OUTPATIENT
Start: 2024-11-11 | End: 2024-11-13

## 2024-11-11 RX ORDER — FUROSEMIDE 20 MG/1
20 TABLET ORAL DAILY
Status: DISCONTINUED | OUTPATIENT
Start: 2024-11-12 | End: 2024-11-11

## 2024-11-11 RX ORDER — FUROSEMIDE 40 MG/1
40 TABLET ORAL DAILY
Status: DISCONTINUED | OUTPATIENT
Start: 2024-11-12 | End: 2024-11-13

## 2024-11-11 NOTE — CARDIAC REHAB
Order received and Chart review completed, followed by assessment as to appropriateness of patient to receive Cardiac Rehab. Patient is not appropriate for cardiac rehab intervention at this time.  Will continue to monitor patient and when appropriate, begin cardiac rehab education.

## 2024-11-11 NOTE — PLAN OF CARE
Problem: Patient Centered Care  Goal: Patient preferences are identified and integrated in the patient's plan of care  Description: Interventions:  - What would you like us to know as we care for you? Patient states one daughter is a registered nurse and lives with other daughter; patient has 8 children  - Provide timely, complete, and accurate information to patient/family  - Incorporate patient and family knowledge, values, beliefs, and cultural backgrounds into the planning and delivery of care  - Encourage patient/family to participate in care and decision-making at the level they choose  - Honor patient and family perspectives and choices  Outcome: Progressing     Problem: Patient/Family Goals  Goal: Patient/Family Long Term Goal  Description: Patient's Long Term Goal: To get stronger; minimize fatigue    Interventions:  -Monitor VSS  -Monitor Labs  -Cards consult  -PT/OT consult   - See additional Care Plan goals for specific interventions  Outcome: Progressing  Goal: Patient/Family Short Term Goal  Description: Patient's Short Term Goal: To manage and/or stop rectal bleeding     Interventions:   -Monitor VSS  -Monitor Labs  -GI consult  -Monitor stools   -See additional Care Plan goals for specific interventions  Outcome: Progressing     Problem: PAIN - ADULT  Goal: Verbalizes/displays adequate comfort level or patient's stated pain goal  Description: INTERVENTIONS:  - Encourage pt to monitor pain and request assistance  - Assess pain using appropriate pain scale  - Administer analgesics based on type and severity of pain and evaluate response  - Implement non-pharmacological measures as appropriate and evaluate response  - Consider cultural and social influences on pain and pain management  - Manage/alleviate anxiety  - Utilize distraction and/or relaxation techniques  - Monitor for opioid side effects  - Notify MD/LIP if interventions unsuccessful or patient reports new pain  - Anticipate increased pain  with activity and pre-medicate as appropriate  Outcome: Progressing     Problem: SAFETY ADULT - FALL  Goal: Free from fall injury  Description: INTERVENTIONS:  - Assess pt frequently for physical needs  - Identify cognitive and physical deficits and behaviors that affect risk of falls.  - [place] fall precautions as indicated by assessment.  - Educate pt/family on patient safety including physical limitations  - Instruct pt to call for assistance with activity based on assessment  - Modify environment to reduce risk of injury  - Provide assistive devices as appropriate  - Consider OT/PT consult to assist with strengthening/mobility  - Encourage toileting schedule  Outcome: Progressing     Problem: DISCHARGE PLANNING  Goal: Discharge to home or other facility with appropriate resources  Description: INTERVENTIONS:  - Identify barriers to discharge w/pt and caregiver  - Include patient/family/discharge partner in discharge planning  - Arrange for needed discharge resources and transportation as appropriate  - Identify discharge learning needs (meds, wound care, etc)  - Arrange for interpreters to assist at discharge as needed  - Consider post-discharge preferences of patient/family/discharge partner  - Complete POLST form as appropriate  - Assess patient's ability to be responsible for managing their own health  - Refer to Case Management Department for coordinating discharge planning if the patient needs post-hospital services based on physician/LIP order or complex needs related to functional status, cognitive ability or social support system  Outcome: Progressing     Problem: GASTROINTESTINAL - ADULT  Goal: Maintains or returns to baseline bowel function  Description: INTERVENTIONS:  - Assess bowel function  - Maintain adequate hydration with IV or PO as ordered and tolerated  - Evaluate effectiveness of GI medications  - Encourage mobilization and activity  - Obtain nutritional consult as needed  - Establish a  toileting routine/schedule  - Consider collaborating with pharmacy to review patient's medication profile  Outcome: Progressing     Problem: METABOLIC/FLUID AND ELECTROLYTES - ADULT  Goal: Electrolytes maintained within normal limits  Description: INTERVENTIONS:  - Monitor labs and rhythm and assess patient for signs and symptoms of electrolyte imbalances  - Administer electrolyte replacement as ordered  - Monitor response to electrolyte replacements, including rhythm and repeat lab results as appropriate  - Fluid restriction as ordered  - Instruct patient on fluid and nutrition restrictions as appropriate  Outcome: Progressing  Goal: Hemodynamic stability and optimal renal function maintained  Description: INTERVENTIONS:  - Monitor labs and assess for signs and symptoms of volume excess or deficit  - Monitor intake, output and patient weight  - Monitor urine specific gravity, serum osmolarity and serum sodium as indicated or ordered  - Monitor response to interventions for patient's volume status, including labs, urine output, blood pressure (other measures as available)  - Encourage oral intake as appropriate  - Instruct patient on fluid and nutrition restrictions as appropriate  Outcome: Progressing     Problem: SKIN/TISSUE INTEGRITY - ADULT  Goal: Skin integrity remains intact  Description: INTERVENTIONS  - Assess and document risk factors for pressure ulcer development  - Assess and document skin integrity  - Monitor for areas of redness and/or skin breakdown  - Initiate interventions, skin care algorithm/standards of care as needed  Outcome: Progressing  Goal: Oral mucous membranes remain intact  Description: INTERVENTIONS  - Assess oral mucosa and hygiene practices  - Implement preventative oral hygiene regimen  - Implement oral medicated treatments as ordered  Outcome: Progressing     Problem: HEMATOLOGIC - ADULT  Goal: Maintains hematologic stability  Description: INTERVENTIONS  - Assess for signs and  symptoms of bleeding or hemorrhage  - Monitor labs and vital signs for trends  - Administer supportive blood products/factors, fluids and medications as ordered and appropriate  - Administer supportive blood products/factors as ordered and appropriate  Outcome: Progressing  Goal: Free from bleeding injury  Description: (Example usage: patient with low platelets)  INTERVENTIONS:  - Avoid intramuscular injections, enemas and rectal medication administration  - Ensure safe mobilization of patient  - Hold pressure on venipuncture sites to achieve adequate hemostasis  - Assess for signs and symptoms of internal bleeding  - Monitor lab trends  - Patient is to report abnormal signs of bleeding to staff  - Avoid use of toothpicks and dental floss  - Use electric shaver for shaving  - Use soft bristle tooth brush  - Limit straining and forceful nose blowing  Outcome: Progressing     Problem: Impaired Functional Mobility  Goal: Achieve highest/safest level of mobility/gait  Description: Interventions:  - Assess patient's functional ability and stability  - Promote increasing activity/tolerance for mobility and gait  - Educate and engage patient/family in tolerated activity level and precautions  - Recommend use of  RW for transfers and ambulation  Outcome: Progressing     Problem: Impaired Activities of Daily Living  Goal: Achieve highest/safest level of independence in self care  Description: Interventions:  - Assess ability and encourage patient to participate in ADLs to maximize function  - Promote sitting position while performing ADLs such as feeding, grooming, and bathing  - Educate and encourage patient/family in tolerated functional activity level and precautions during self-care  - Encourage patient to incorporate impaired side during daily activities to promote function  Outcome: Progressing

## 2024-11-11 NOTE — PROGRESS NOTES
Hematology Oncology Progress Note    Patient Name: Tara Lockwood   YOB: 1940   Medical Record Number: [id number]   CSN: [id number]   Attending Physician: Yamilex Ceron MD     Subjective:  No acute events over the weekend. Feels ok, no new complaints.   Still with loose stools, normal color. No blood or black stools.   Poor appetite. Denies any pain.     Objective:  Vitals:  Vitals:    11/11/24 0511 11/11/24 0515 11/11/24 0800 11/11/24 1400   BP: 136/51  137/52 125/48   BP Location: Right arm  Right arm Right arm   Pulse: 85  84 79   Resp: 16  18 18   Temp: 98.5 °F (36.9 °C)  98 °F (36.7 °C) 98.1 °F (36.7 °C)   TempSrc: Oral  Oral Oral   SpO2: 95%  93% 95%   Weight:  102 kg (224 lb 14.4 oz)     Height:           Current Medications:    Current Facility-Administered Medications:     [START ON 11/12/2024] furosemide (Lasix) tab 40 mg, 40 mg, Oral, Daily    warfarin (Coumadin) tab 5 mg, 5 mg, Oral, Nightly    heparin (Porcine) 09292 units/250mL infusion ACS/AFIB CONTINUOUS, 200-3,000 Units/hr, Intravenous, Continuous    spironolactone (Aldactone) tab 25 mg, 25 mg, Oral, Daily    empagliflozin (Jardiance) tab 10 mg, 10 mg, Oral, Daily    vancomycin (Vancocin) cap 125 mg, 125 mg, Oral, QID    melatonin tab 3 mg, 3 mg, Oral, Nightly    gadoterate meglumine (Dotarem) 10 MMOL/20ML injection 20 mL, 20 mL, Intravenous, ONCE PRN    dilTIAZem ER (CardIZEM CD) 24 hr cap 180 mg, 180 mg, Oral, Daily    atorvastatin (Lipitor) tab 80 mg, 80 mg, Oral, Nightly    hydroxyurea (Hydrea) cap 500 mg, 500 mg, Oral, Daily    metoprolol succinate ER (Toprol XL) 24 hr tab 25 mg, 25 mg, Oral, QAM    acetaminophen (Tylenol Extra Strength) tab 500 mg, 500 mg, Oral, Q4H PRN    ondansetron (Zofran) 4 MG/2ML injection 4 mg, 4 mg, Intravenous, Q6H PRN    metoclopramide (Reglan) 5 mg/mL injection 5 mg, 5 mg, Intravenous, Q8H PRN    pantoprazole (Protonix) 40 mg in sodium chloride 0.9% PF 10 mL IV push, 40 mg, Intravenous, Daily     influenza virus trivalent high dose PF (Fluzone HD) 0.5 mL injection (ages >/= 65 years) 0.5 mL, 0.5 mL, Intramuscular, Prior to discharge    Physical Examination:  General: Patient is alert and oriented x 3, not in acute distress.  HEENT: Oropharynx is clear.   Chest: Clear to auscultation.  Heart: Regular   Abdomen: Soft, non tender  Extremities: Pedal pulses are present. No edema.  Neurological: Grossly intact.   Psych: Appropriate mood and affect.     Labs:  Recent Labs   Lab 11/06/24  1618 11/07/24  0456 11/09/24  0352 11/10/24  0402 11/11/24  0507   RBC 3.70*  --  3.17* 3.17*  --    HGB 13.1  --  11.3* 11.7*  --    HCT 40.9  --  34.4* 35.1  --    .5*  --  108.5* 110.7*  --    MCH 35.4*  --  35.6* 36.9*  --    MCHC 32.0  --  32.8 33.3  --    RDW 13.8  --  14.1 14.2  --    NEPRELIM  --   --  4.96 5.47  --    WBC 8.9  --  6.5 7.6  --    .0   < > 289.0  289.0 252.0 301.0    < > = values in this interval not displayed.     Recent Labs   Lab 11/05/24  0518 11/06/24  0540 11/09/24  0352 11/10/24  0534 11/11/24  0507   GLU 98   < > 99 98 106*   BUN 17   < > 24* 24* 33*   CREATSERUM 0.91   < > 1.03* 1.20* 1.33*   EGFRCR 62   < > 53* 44* 39*   CA 9.2   < > 9.1 9.2 9.3   ALB 3.0*  --  3.0*  --  3.2      < > 139 136 137   K 3.7   < > 4.0 4.9 4.3      < > 104 101 101   CO2 26.0   < > 27.0 27.0 28.0   ALKPHO 99  --  112  --   --    *  --  153*  --   --    ALT 46  --  51*  --   --    BILT 1.0  --  1.1  --   --    TP 5.3*  --  5.3*  --   --     < > = values in this interval not displayed.      Recent Labs   Lab 11/09/24  0352 11/09/24  0825 11/10/24  0534 11/10/24  0941 11/11/24  0507 11/11/24  1237   INR 1.23*  --  1.14  --  1.18  --    PTT 41.8*   < >  --  54.2* 47.6* 48.9*    < > = values in this interval not displayed.        Radiology:  IR BIOPSY    Result Date: 11/8/2024  CONCLUSION:  1. Successful sonographic guided core right lobe liver lesion biopsy    Dictated by (CST): Wolf  MD Fco on 11/08/2024 at 4:05 PM     Finalized by (CST): Fco Bloom MD on 11/08/2024 at 4:07 PM          MRI LIVER (W+WO) (CPT=74183)    Result Date: 11/4/2024  CONCLUSION:  1. Large centrally necrotic mass again noted arising from the right hepatic lobe that measures 15.7 cm with multiple similar appearing, more homogeneous enhancing smaller satellite lesions.  The constellation of these findings is highly concerning for a malignant process.  Differential considerations favor multifocal hepatocellular carcinoma over metastatic cholangiocarcinoma or other metastatic malignancy given the history of elevated alpha fetoprotein levels.  However, consider correlation with percutaneous biopsy if warranted. 2. Mild hepatic steatosis.  Note the liver is not grossly cirrhotic morphologically and there is no evidence of portal venous hypertension. 3. Small pleural effusions, increased in size on the left and new on the right, which probably relate to mild CHF or fluid overload in this patient with a cardiac pacemaker. 4. Stable small bilateral adrenal adenomas. 5. Status post cholecystectomy.  No biliary ductal dilatation.    [facility]m-remote  Dictated by (CST): Jordon Haney MD on 11/04/2024 at 2:18 PM     Finalized by (CST): Jordon Haney MD on 11/04/2024 at 2:34 PM          XR CHEST AP PORTABLE  (CPT=71045)    Result Date: 11/3/2024  CONCLUSION:   Small to moderate left pleural effusion, slightly increased from prior.  Adjacent left basilar opacity, also increased from prior, which may represent subsegmental atelectasis, edema, and/or pneumonia in the appropriate clinical setting.  Trace right pleural effusion with right basilar opacity, slightly increased from prior, may represent the same process.  Recommend radiographic follow-up to resolution.      Dictated by (CST): Estefani Martinez MD on 11/03/2024 at 10:38 AM     Finalized by (CST): Estefani Martinez MD on 11/03/2024 at 10:40 AM           LIVER  (CPT=76705)    Result Date: 11/3/2024  CONCLUSION:   Large 16.7 cm solid mass of the inferior right hepatic lobe, most compatible with hepatocellular carcinoma.  Recommend further evaluation with contrast enhanced MRI of the abdomen and/or staging scans.  Secure message regarding this finding sent to Antonia  Clay on 11/03/2024 at 9:09 a.m.   Status post cholecystectomy.  Mild extrahepatic common bile duct dilation, but with normal distal tapering, thought to be post operative/senescent appearance.  But no intrahepatic biliary ductal dilation.     Dictated by (CST): Estefani Martinez MD on 11/03/2024 at 9:02 AM     Finalized by (CST): Estefani Martinez MD on 11/03/2024 at 9:09 AM            Pathology:    Liver lesion; ultrasound-guided core biopsies:  Multiple fragments of liver parenchyma with moderately to poorly differentiated hepatocellular carcinoma with extensive necrosis.         Impression and Plan:    Liver mass  - large mass ~16 cm in the right hepatic lobe with multiple smaller satellite lesions highly concerning for malignancy. No liver cirrhosis. No lymphadenopathy. . CEA and  normal. HBV negative.   - US guided biopsy 11/8/24 consistent with hepatocellular carcinoma.   - discussed the path results with the patient and her daughter in law. Briefly discussed the prognosis and management approach HCC.   - CT abd/pelvis for complete staging.   - will review her case at GI tumor board tomorrow and discuss locoregional therapy options. Likely unresectable.   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 Recent CT chest with no evidence of disease.      History of recurrent DVT/PE  - on indefinite anticoagulation with warfarin- currently on hold for bx  - warfarin held prior to biopsy and patient kept on IV heparin  - ok to  resume warfarin with IV heparin bridge     Polycythemia vera  - high risk due to her age and history of thrombosis  - on hydrea     Mild anemia   - secondary to hydrea and recent GI bleed     C diff colitis   - po vancomycin, improved.       Ok to discharge from oncology standpoint if medically stable. Will follow up in clinic for further management.           Yamilex Ceron MD

## 2024-11-11 NOTE — CM/SW NOTE
Care Progression Note:  Length of stay: 10  GMLOS: 11  Avoidable Delays:   Code Status: FULL    Acute Medical Issue/Factors:   Rectal bleed, melena, anemia-No signs of bleeding since admission. Hgb 11.7 on 11/10, follow up lab tomorrow.    Diarrhea 2/2 CDIFF-Resolving, WBC 7.6 on 11/10. Vanco cap ordered 4x daily.    Tachy-kuldeep syndrome, AFIB, fluid overload 2/2 heart failure, hx DVT-CARDS consulted. PPM inserted 12/2023. HR and BP well controlled with CCB and BB. Pt transitioned from Heparin back to coumadin. PT and INR 15.7 and 1.18 respectively this morning. PO aldactone and lasix ordered, Cr* trending up to 1.33 this morning.    Polycythemia vera, lung CA s/p lobectomy-HEM/ONC consulted. Hydroxyurea 500mg cap ordered daily.    New hepatic mass-US showing 16.7cm solid mass consistent w/malignancy. Pt will see oncology MD outpatient.     Pt cleared by PT/OT, no home care needs anticipated at MO.    Discharge Barriers: Physical/emotional and/or cognitive functioning   Expected discharge date: 11/12/2024   Expected next site of care: Home.     Plan: Home w/grandson pending medical clearance.    / available for discharge planning.     SALONI Briceño    [phone]

## 2024-11-11 NOTE — PROGRESS NOTES
[facility]  part of [facility]    Progress Note    Tara Lockwood Patient Status:  Inpatient    1940 MRN [id number]   Location [facility] 5SW/SE Attending Loy Vences MD   Hosp Day # 10 PCP Robbie uHbbard MD       Subjective:   Tara Lockwood remains stable, resting in bed.   No new symptoms  normal BMs    Doing ok,  shortness of breath and orthopnea better, leg swelling ?same  No new symptoms    Review of Systems:   10 point ROS completed and was negative, except for pertinent positive and negatives stated in subjective.    Objective:     Vitals:    24 0511 24 0515 24 0800 24 1400   BP: 136/51  137/52 125/48   BP Location: Right arm  Right arm Right arm   Pulse: 85  84 79   Resp: 16   18   Temp: 98.5 °F (36.9 °C)  98 °F (36.7 °C) 98.1 °F (36.7 °C)   TempSrc: Oral  Oral Oral   SpO2: 95%  93% 95%   Weight:  224 lb 14.4 oz (102 kg)     Height:         Patient Weight for the past 72 hrs:   Weight   24 1520 233 lb (105.7 kg)   24 1959 230 lb 12.8 oz (104.7 kg)       Intake/Output Summary (Last 24 hours) at 2024 1507  Last data filed at 2024 1349  Gross per 24 hour   Intake 1112 ml   Output 300 ml   Net 812 ml       GENERAL:  The patient appeared to be in no distress, conversant, smiling  SKIN:  Warm and hydrated  HEENT:  Head was atraumatic and normocephalic.    CHEST:  Symmetrical movement on inspiration  LUNGS:  No audible wheezing, breath sounds coarse  ABDOMEN: Non-distended  MUSCULOSKELETAL:  There was no deformity.   EXTREMITIES: +edema  NEUROLOGICAL:  There was no focal deficit.    PSYCHIATRIC: Calm and cooperative     Current Scheduled Inpatient Meds:      [START ON 2024] furosemide  40 mg Oral Daily    warfarin  5 mg Oral Nightly    spironolactone  25 mg Oral Daily    empagliflozin  10 mg Oral Daily    vancomycin  125 mg Oral QID    melatonin  3 mg Oral Nightly    dilTIAZem ER  180 mg Oral Daily    atorvastatin  80 mg  Oral Nightly    hydroxyurea  500 mg Oral Daily    metoprolol succinate ER  25 mg Oral QAM    pantoprazole  40 mg Intravenous Daily       Current PRN Inpatient Meds:        gadoterate meglumine    acetaminophen    ondansetron    metoclopramide    influenza virus vaccine PF    Results:     Lab Results   Component Value Date    WBC 7.6 11/10/2024    HGB 11.7 (L) 11/10/2024    HCT 35.1 11/10/2024    .0 11/11/2024    CREATSERUM 1.33 (H) 11/11/2024    BUN 33 (H) 11/11/2024     11/11/2024    K 4.3 11/11/2024     11/11/2024    CO2 28.0 11/11/2024     (H) 11/11/2024    CA 9.3 11/11/2024    ALB 3.2 11/11/2024    ALKPHO 112 11/09/2024    BILT 1.1 11/09/2024    TP 5.3 (L) 11/09/2024     (H) 11/09/2024    ALT 51 (H) 11/09/2024    PTT 48.9 (H) 11/11/2024    INR 1.18 11/11/2024    PT 13.1 05/11/2016    T4F 1.0 05/03/2022    TSH 3.521 09/05/2024    CRP < 0.5 05/29/2014    MG 1.7 11/11/2024    PHOS 3.5 11/11/2024    B12 734 09/05/2024       Recent Labs   Lab 11/06/24  1618 11/07/24  0456 11/09/24  0352 11/10/24  0402 11/11/24  0507   RBC 3.70*  --  3.17* 3.17*  --    HGB 13.1  --  11.3* 11.7*  --    HCT 40.9  --  34.4* 35.1  --    .5*  --  108.5* 110.7*  --    MCH 35.4*  --  35.6* 36.9*  --    MCHC 32.0  --  32.8 33.3  --    RDW 13.8  --  14.1 14.2  --    NEPRELIM  --   --  4.96 5.47  --    WBC 8.9  --  6.5 7.6  --    .0   < > 289.0  289.0 252.0 301.0    < > = values in this interval not displayed.     Recent Labs   Lab 11/05/24  0518 11/06/24  0540 11/09/24  0352 11/10/24  0534 11/11/24  0507   GLU 98   < > 99 98 106*   BUN 17   < > 24* 24* 33*   CREATSERUM 0.91   < > 1.03* 1.20* 1.33*   CA 9.2   < > 9.1 9.2 9.3   ALB 3.0*  --  3.0*  --  3.2      < > 139 136 137   K 3.7   < > 4.0 4.9 4.3      < > 104 101 101   CO2 26.0   < > 27.0 27.0 28.0   ALKPHO 99  --  112  --   --    *  --  153*  --   --    ALT 46  --  51*  --   --    BILT 1.0  --  1.1  --   --    TP 5.3*  --   5.3*  --   --     < > = values in this interval not displayed.     Lab Results   Component Value Date    PT 13.1 05/11/2016    PT 15.7 (H) 07/13/2015    PT 22.6 (H) 04/13/2015    INR 1.18 11/11/2024    INR 1.14 11/10/2024    INR 1.23 (H) 11/09/2024       Culture:  No results found for this visit on 11/01/24.    Imaging/EKG:   No results found.      Assessment and Plan:   Tara Lockwood is an 84-year-old female with hx of chronic diarrhea, chronic dyspnea, paroxysmal atrial fibrillation on warfarin, chronic hypotension on midodrine, SSS w/ PPM, who presents with black diarrhea.       # Anemia  # Melena  # Diarrhea due to C. difficile colitis, improved  - baseline hgb 12-14, around 11 on admission  - no melena since admission  - C Diff PCR positive, EIA also positive  -On oral vancomycin 4 times daily  - GI on consult  .  # Liver mass  - Liver ultrasound showed a large 16.7 cm solid mass of the inferior right hepatic lobe, most compatible with hepatocellular carcinoma.   - MRI showed a large centrally necrotic mass arising from the right hepatic lobe that measures 15.7 cm, highly concerning for malignancy.  - Onc on consult, d/w Dr. Ceron  - s/p biopsy with IR on Friday, pathology c/w malignancy  - seen by Dr. 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   # Paroxysmal atrial fibrillation on warfarin  - INR 3.9 on admission, 2.3--> 1.57   - continue home cardiac meds, hold warfarin  - Cards on consult  --->bridging with heparin ggt-->restarted Coumadin      # Fluid overload due to acute HFpEF, also chronic lymphedema  proBNP 3,940  -IV Lasix-->decr dose b/c Cr slightly up  -monitor electrolytes  -compression stockings      # Polycythemia vera on hydroxyurea  # History of left upper lobe adenocarcinoma of the lung   # History of recurrent DVT/PE and is on lifelong anticoagulation with warfarin  - s/p left lateral thoracotomy with left upper  lobectomy and mediastinal lymph node dissection 6/14/16.   - Oneal 2 V617 F+ polycythemia vera 11/2020, on hydroxyurea.  - patient follows Dr. Ceron  - continue home hydroxyurea     # History of ischemic stroke  - MRI of the brain 05/2024 showed lacunar stroke in the right cerebellum     Diet: regular  PT/OT: Patient has been evaluated and presents with no skilled Physical Therapy needs at this time.   DVT ppx: AC  Line: none  Code status: Full   Dispo: per clinical course, lives at home with dtr and grandson, plans for home with HH, anticipate 1-2 days pending cardiology evaluation/anticoagulation.     MDM: high Shayla Quinteros MD   [facility]

## 2024-11-11 NOTE — PROGRESS NOTES
Progress Note  Tara Lockwood Patient Status:  Inpatient    1940 MRN [id number]   Location [facility] 5SW/SE Attending Shalya Quinteros MD   Hosp Day # 10 PCP Robbie Hubbard MD     Subjective:  Mrs. Lockwood reports feeling improved, but not yet back to normal. No chest pain, dyspnea, or orthopnea. Continued edema-reportedly improved.    Objective:  Physical Exam:   /52 (BP Location: Right arm)   Pulse 84   Temp 98 °F (36.7 °C) (Oral)   Resp 18   Ht 5' 7\" (1.702 m)   Wt 224 lb 14.4 oz (102 kg)   SpO2 93%   BMI 35.22 kg/m²   Temp (24hrs), Av.3 °F (36.8 °C), Min:98 °F (36.7 °C), Max:98.5 °F (36.9 °C)       Intake/Output Summary (Last 24 hours) at 2024 1149  Last data filed at 2024 1029  Gross per 24 hour   Intake 1130 ml   Output 1400 ml   Net -270 ml     Wt Readings from Last 3 Encounters:   24 224 lb 14.4 oz (102 kg)   10/25/24 233 lb (105.7 kg)   24 231 lb (104.8 kg)     Telemetry: NSR  General: Alert and oriented in no apparent distress sitting in her bedside chair on room air working on a word search.  HEENT: No focal deficits.  Neck: No JVD, carotids 2+ no bruits.  Cardiac: Regular rate and rhythm, S1, S2 normal, no murmur, rub or gallop.  Lungs: Clear without wheezes, rales, rhonchi or dullness.  Normal excursions and effort.  Abdomen: Soft, non-tender.   Extremities: Without clubbing, cyanosis or edema.  Peripheral pulses are 2+.  Neurologic: Alert and oriented, normal affect.  Skin: Warm and dry.      Intake/Output:    Intake/Output Summary (Last 24 hours) at 2024 1149  Last data filed at 2024 1029  Gross per 24 hour   Intake 1130 ml   Output 1400 ml   Net -270 ml       Last 3 Weights   24 0515 224 lb 14.4 oz (102 kg)   11/10/24 0548 224 lb 12.8 oz (102 kg)   24 0500 228 lb 8 oz (103.6 kg)   24 1959 230 lb 12.8 oz (104.7 kg)   24 1520 233 lb (105.7 kg)   10/25/24 1008 233 lb (105.7 kg)   24 1528 231 lb (104.8  kg)       Labs:  Recent Labs   Lab 11/09/24  0352 11/10/24  0534 11/11/24  0507   GLU 99 98 106*   BUN 24* 24* 33*   CREATSERUM 1.03* 1.20* 1.33*   EGFRCR 53* 44* 39*   CA 9.1 9.2 9.3    136 137   K 4.0 4.9 4.3    101 101   CO2 27.0 27.0 28.0     Recent Labs   Lab 11/06/24  1618 11/07/24  0456 11/09/24  0352 11/10/24  0402 11/11/24  0507   RBC 3.70*  --  3.17* 3.17*  --    HGB 13.1  --  11.3* 11.7*  --    HCT 40.9  --  34.4* 35.1  --    .5*  --  108.5* 110.7*  --    MCH 35.4*  --  35.6* 36.9*  --    MCHC 32.0  --  32.8 33.3  --    RDW 13.8  --  14.1 14.2  --    NEPRELIM  --   --  4.96 5.47  --    WBC 8.9  --  6.5 7.6  --    .0   < > 289.0  289.0 252.0 301.0    < > = values in this interval not displayed.         No results for input(s): \"TROP\", \"TROPHS\", \"CK\" in the last 168 hours.    Diagnostics:   ECHOCARDIOGRAM 5/14/2024:  1. Left ventricle: The cavity size was normal. Wall thickness was mildly      increased. Systolic function was normal. The estimated ejection fraction      was 55-60%, by visual assessment. Doppler parameters are consistent with      abnormal left ventricular relaxation - grade 1 diastolic dysfunction.   2. Right ventricle: The cavity size was increased. Pacer wire noted in the      right ventricle.   3. Left atrium: The atrium was mildly dilated.   4. Aortic root: The aortic root was at the upper limits of normal.           Medications:   furosemide  40 mg Intravenous Daily    spironolactone  25 mg Oral Daily    empagliflozin  10 mg Oral Daily    vancomycin  125 mg Oral QID    melatonin  3 mg Oral Nightly    dilTIAZem ER  180 mg Oral Daily    atorvastatin  80 mg Oral Nightly    hydroxyurea  500 mg Oral Daily    metoprolol succinate ER  25 mg Oral QAM    pantoprazole  40 mg Intravenous Daily      continuous dose heparin 1,300 Units/hr (11/11/24 0818)       Assessment/Plan:    Paroxysmal Atrial Fibrillation, Hx SSS s/p Biotronik DC PPM 12/2023  Maintaining NSR  On  toprol, diltiiazem  10/2023 Nuc stress normal perfusion  [id number] 6 -warfarin on hold; on IV heparin bridge  Acute HFpEF  Echo 5/14/2024  LVEF 58%, G1DD, no RWMA, mild-mod TR  IV lasix 40 mg daily this AM having been on BID Saturday  Appears edematous still although reportedly improved and LE edema multifactorial w/ known lymphedema  Weight reduced from admit with up trending Cr to 1.33   C Diff Infection, Melena  On Po vanco, GI following  15.7 cm hepatic Mass  S/p biopsy 11/8, confirmed HCC on pathology  GI, Heme/onc following  Anemia  Likely GIB/supra therapeutic INR, stable now  Hx Recurrent DVT/PE  Historically on warfarin outpatient  Hx Polycythemia Vera  Hx on warfarin - on hold; IV heparin bridge - INR 1.23  Hematology following  Hx Lung Cancer s/p WILL lobectomy  Hx Lacunar CVA - 5/2024  Chronic Venous Insufficiency, Chronic LE edema  Was recently holding diuretics as OP d/t orthostatic hypotension  Hypoalbuminemia   Hypertension  At goal    PLAN  Restart warfarin once OK from oncology/surgical/GI standpoint  Continue IV heparin bridge while subtherapeutic  Adjust to PO lasix outpatient dosing  Daily weights, I&O's  Continue outpatient cardiac regime  Hematology/oncology following for HCC      Casey Ochoa PA-C  11/11/2024  11:49 AM     Cardiology Attending    The patient was examined independently and the chart reviewed.    The above note was reviewed and discussed directly with Casey Ochoa PA-C  I agree with the assessment and plan as outlined above.

## 2024-11-11 NOTE — PLAN OF CARE
Continued Heparin drip per order, restarted Coumadin. Isolation maintained per protocol. All needs completed in the room.    Problem: Patient Centered Care  Goal: Patient preferences are identified and integrated in the patient's plan of care  Description: Interventions:  - What would you like us to know as we care for you? Patient states one daughter is a registered nurse and lives with other daughter; patient has 8 children  - Provide timely, complete, and accurate information to patient/family  - Incorporate patient and family knowledge, values, beliefs, and cultural backgrounds into the planning and delivery of care  - Encourage patient/family to participate in care and decision-making at the level they choose  - Honor patient and family perspectives and choices  Outcome: Progressing     Problem: Patient/Family Goals  Goal: Patient/Family Long Term Goal  Description: Patient's Long Term Goal: To get stronger; minimize fatigue    Interventions:  -Monitor VSS  -Monitor Labs  -Cards consult  -PT/OT consult   - See additional Care Plan goals for specific interventions  Outcome: Progressing  Goal: Patient/Family Short Term Goal  Description: Patient's Short Term Goal: To manage and/or stop rectal bleeding     Interventions:   -Monitor VSS  -Monitor Labs  -GI consult  -Monitor stools   -See additional Care Plan goals for specific interventions  Outcome: Progressing     Problem: PAIN - ADULT  Goal: Verbalizes/displays adequate comfort level or patient's stated pain goal  Description: INTERVENTIONS:  - Encourage pt to monitor pain and request assistance  - Assess pain using appropriate pain scale  - Administer analgesics based on type and severity of pain and evaluate response  - Implement non-pharmacological measures as appropriate and evaluate response  - Consider cultural and social influences on pain and pain management  - Manage/alleviate anxiety  - Utilize distraction and/or relaxation techniques  - Monitor for  opioid side effects  - Notify MD/LIP if interventions unsuccessful or patient reports new pain  - Anticipate increased pain with activity and pre-medicate as appropriate  Outcome: Progressing     Problem: SAFETY ADULT - FALL  Goal: Free from fall injury  Description: INTERVENTIONS:  - Assess pt frequently for physical needs  - Identify cognitive and physical deficits and behaviors that affect risk of falls.  - [place] fall precautions as indicated by assessment.  - Educate pt/family on patient safety including physical limitations  - Instruct pt to call for assistance with activity based on assessment  - Modify environment to reduce risk of injury  - Provide assistive devices as appropriate  - Consider OT/PT consult to assist with strengthening/mobility  - Encourage toileting schedule  Outcome: Progressing     Problem: DISCHARGE PLANNING  Goal: Discharge to home or other facility with appropriate resources  Description: INTERVENTIONS:  - Identify barriers to discharge w/pt and caregiver  - Include patient/family/discharge partner in discharge planning  - Arrange for needed discharge resources and transportation as appropriate  - Identify discharge learning needs (meds, wound care, etc)  - Arrange for interpreters to assist at discharge as needed  - Consider post-discharge preferences of patient/family/discharge partner  - Complete POLST form as appropriate  - Assess patient's ability to be responsible for managing their own health  - Refer to Case Management Department for coordinating discharge planning if the patient needs post-hospital services based on physician/LIP order or complex needs related to functional status, cognitive ability or social support system  Outcome: Progressing     Problem: GASTROINTESTINAL - ADULT  Goal: Maintains or returns to baseline bowel function  Description: INTERVENTIONS:  - Assess bowel function  - Maintain adequate hydration with IV or PO as ordered and tolerated  - Evaluate  effectiveness of GI medications  - Encourage mobilization and activity  - Obtain nutritional consult as needed  - Establish a toileting routine/schedule  - Consider collaborating with pharmacy to review patient's medication profile  Outcome: Progressing     Problem: METABOLIC/FLUID AND ELECTROLYTES - ADULT  Goal: Electrolytes maintained within normal limits  Description: INTERVENTIONS:  - Monitor labs and rhythm and assess patient for signs and symptoms of electrolyte imbalances  - Administer electrolyte replacement as ordered  - Monitor response to electrolyte replacements, including rhythm and repeat lab results as appropriate  - Fluid restriction as ordered  - Instruct patient on fluid and nutrition restrictions as appropriate  Outcome: Progressing  Goal: Hemodynamic stability and optimal renal function maintained  Description: INTERVENTIONS:  - Monitor labs and assess for signs and symptoms of volume excess or deficit  - Monitor intake, output and patient weight  - Monitor urine specific gravity, serum osmolarity and serum sodium as indicated or ordered  - Monitor response to interventions for patient's volume status, including labs, urine output, blood pressure (other measures as available)  - Encourage oral intake as appropriate  - Instruct patient on fluid and nutrition restrictions as appropriate  Outcome: Progressing     Problem: SKIN/TISSUE INTEGRITY - ADULT  Goal: Skin integrity remains intact  Description: INTERVENTIONS  - Assess and document risk factors for pressure ulcer development  - Assess and document skin integrity  - Monitor for areas of redness and/or skin breakdown  - Initiate interventions, skin care algorithm/standards of care as needed  Outcome: Progressing  Goal: Oral mucous membranes remain intact  Description: INTERVENTIONS  - Assess oral mucosa and hygiene practices  - Implement preventative oral hygiene regimen  - Implement oral medicated treatments as ordered  Outcome: Progressing      Problem: HEMATOLOGIC - ADULT  Goal: Maintains hematologic stability  Description: INTERVENTIONS  - Assess for signs and symptoms of bleeding or hemorrhage  - Monitor labs and vital signs for trends  - Administer supportive blood products/factors, fluids and medications as ordered and appropriate  - Administer supportive blood products/factors as ordered and appropriate  Outcome: Progressing  Goal: Free from bleeding injury  Description: (Example usage: patient with low platelets)  INTERVENTIONS:  - Avoid intramuscular injections, enemas and rectal medication administration  - Ensure safe mobilization of patient  - Hold pressure on venipuncture sites to achieve adequate hemostasis  - Assess for signs and symptoms of internal bleeding  - Monitor lab trends  - Patient is to report abnormal signs of bleeding to staff  - Avoid use of toothpicks and dental floss  - Use electric shaver for shaving  - Use soft bristle tooth brush  - Limit straining and forceful nose blowing  Outcome: Progressing     Problem: Impaired Functional Mobility  Goal: Achieve highest/safest level of mobility/gait  Description: Interventions:  - Assess patient's functional ability and stability  - Promote increasing activity/tolerance for mobility and gait  - Educate and engage patient/family in tolerated activity level and precautions  - Recommend use of  RW for transfers and ambulation  Outcome: Progressing     Problem: Impaired Activities of Daily Living  Goal: Achieve highest/safest level of independence in self care  Description: Interventions:  - Assess ability and encourage patient to participate in ADLs to maximize function  - Promote sitting position while performing ADLs such as feeding, grooming, and bathing  - Educate and encourage patient/family in tolerated functional activity level and precautions during self-care  - Encourage patient to incorporate impaired side during daily activities to promote function  Outcome: Progressing

## 2024-11-12 ENCOUNTER — ANTI-COAG VISIT (OUTPATIENT)
Dept: ANTICOAGULATION | Facility: CLINIC | Age: 84
End: 2024-11-12

## 2024-11-12 DIAGNOSIS — Z79.01 MONITORING FOR LONG-TERM ANTICOAGULANT USE: ICD-10-CM

## 2024-11-12 DIAGNOSIS — T81.718A IATROGENIC PULMONARY EMBOLISM AND INFARCTION, INITIAL ENCOUNTER (HCC): ICD-10-CM

## 2024-11-12 DIAGNOSIS — I48.91 CONTROL OF ATRIAL FIBRILLATION WITH PACEMAKER (HCC): Primary | ICD-10-CM

## 2024-11-12 DIAGNOSIS — I26.99 IATROGENIC PULMONARY EMBOLISM AND INFARCTION, INITIAL ENCOUNTER (HCC): ICD-10-CM

## 2024-11-12 DIAGNOSIS — C22.0 HEPATOCELLULAR CARCINOMA (HCC): Primary | ICD-10-CM

## 2024-11-12 DIAGNOSIS — Z95.0 CONTROL OF ATRIAL FIBRILLATION WITH PACEMAKER (HCC): Primary | ICD-10-CM

## 2024-11-12 DIAGNOSIS — Z51.81 MONITORING FOR LONG-TERM ANTICOAGULANT USE: ICD-10-CM

## 2024-11-12 LAB
ALBUMIN SERPL-MCNC: 3.1 G/DL (ref 3.2–4.8)
ANION GAP SERPL CALC-SCNC: 6 MMOL/L (ref 0–18)
APTT PPP: 69.1 SECONDS (ref 23–36)
BUN BLD-MCNC: 35 MG/DL (ref 9–23)
BUN/CREAT SERPL: 27.1 (ref 10–20)
CALCIUM BLD-MCNC: 9.2 MG/DL (ref 8.7–10.4)
CHLORIDE SERPL-SCNC: 100 MMOL/L (ref 98–112)
CO2 SERPL-SCNC: 28 MMOL/L (ref 21–32)
CREAT BLD-MCNC: 1.29 MG/DL
DEPRECATED RDW RBC AUTO: 56.3 FL (ref 35.1–46.3)
EGFRCR SERPLBLD CKD-EPI 2021: 41 ML/MIN/1.73M2 (ref 60–?)
ERYTHROCYTE [DISTWIDTH] IN BLOOD BY AUTOMATED COUNT: 14.4 % (ref 11–15)
GLUCOSE BLD-MCNC: 93 MG/DL (ref 70–99)
HCT VFR BLD AUTO: 34.4 %
HGB BLD-MCNC: 11.3 G/DL
INR BLD: 1.12 (ref 0.8–1.2)
MAGNESIUM SERPL-MCNC: 1.8 MG/DL (ref 1.6–2.6)
MCH RBC QN AUTO: 35.4 PG (ref 26–34)
MCHC RBC AUTO-ENTMCNC: 32.8 G/DL (ref 31–37)
MCV RBC AUTO: 107.8 FL
OSMOLALITY SERPL CALC.SUM OF ELEC: 286 MOSM/KG (ref 275–295)
PHOSPHATE SERPL-MCNC: 3.2 MG/DL (ref 2.4–5.1)
PLATELET # BLD AUTO: 291 10(3)UL (ref 150–450)
PLATELET # BLD AUTO: 291 10(3)UL (ref 150–450)
POTASSIUM SERPL-SCNC: 4.3 MMOL/L (ref 3.5–5.1)
PROTHROMBIN TIME: 15.1 SECONDS (ref 11.6–14.8)
RBC # BLD AUTO: 3.19 X10(6)UL
SODIUM SERPL-SCNC: 134 MMOL/L (ref 136–145)
WBC # BLD AUTO: 9.5 X10(3) UL (ref 4–11)

## 2024-11-12 PROCEDURE — 99233 SBSQ HOSP IP/OBS HIGH 50: CPT | Performed by: HOSPITALIST

## 2024-11-12 PROCEDURE — 99233 SBSQ HOSP IP/OBS HIGH 50: CPT | Performed by: INTERNAL MEDICINE

## 2024-11-12 RX ORDER — MAGNESIUM SULFATE HEPTAHYDRATE 40 MG/ML
2 INJECTION, SOLUTION INTRAVENOUS ONCE
Status: COMPLETED | OUTPATIENT
Start: 2024-11-12 | End: 2024-11-12

## 2024-11-12 RX ORDER — SPIRONOLACTONE 25 MG/1
25 TABLET ORAL DAILY
Qty: 30 TABLET | Refills: 2 | Status: SHIPPED | OUTPATIENT
Start: 2024-11-13

## 2024-11-12 RX ORDER — FUROSEMIDE 40 MG/1
40 TABLET ORAL DAILY
Qty: 30 TABLET | Refills: 0 | Status: SHIPPED | OUTPATIENT
Start: 2024-11-13

## 2024-11-12 RX ORDER — MAGNESIUM OXIDE 400 MG/1
400 TABLET ORAL ONCE
Status: COMPLETED | OUTPATIENT
Start: 2024-11-12 | End: 2024-11-12

## 2024-11-12 NOTE — PROGRESS NOTES
Progress Note  Tara Lockwood Patient Status:  Inpatient    1940 MRN [id number]   Location [facility] 5SW/SE Attending Shayla Quinteros MD   Hosp Day # 11 PCP Robbie Hubbard MD     Subjective:  She feels improved today. No chest pain, dyspnea, or palpitations.  No cardiac events overnight.    Objective:  Physical Exam:   /51 (BP Location: Right arm)   Pulse 84   Temp 99.2 °F (37.3 °C) (Oral)   Resp 16   Ht 5' 7\" (1.702 m)   Wt 223 lb 9.6 oz (101.4 kg)   SpO2 92%   BMI 35.02 kg/m²   Temp (24hrs), Av.4 °F (36.9 °C), Min:98 °F (36.7 °C), Max:99.2 °F (37.3 °C)       Intake/Output Summary (Last 24 hours) at 2024 07  Last data filed at 2024 0536  Gross per 24 hour   Intake 822 ml   Output 800 ml   Net 22 ml     Wt Readings from Last 3 Encounters:   24 223 lb 9.6 oz (101.4 kg)   10/25/24 233 lb (105.7 kg)   24 231 lb (104.8 kg)     Telemetry: NSR  General: Alert and oriented in no apparent distress sitting in her bedside chair on room air  HEENT: No focal deficits.  Neck: No JVD, carotids 2+ no bruits.  Cardiac: Regular rate and rhythm, S1, S2 normal, no murmur, rub or gallop.  Lungs: Clear without wheezes, rales, rhonchi or dullness.  Normal excursions and effort.  Abdomen: Soft, non-tender.   Extremities: Without clubbing, cyanosis or edema.  Peripheral pulses are 2+.  Neurologic: Alert and oriented, normal affect.  Skin: Warm and dry.      Intake/Output:    Intake/Output Summary (Last 24 hours) at 2024 07  Last data filed at 2024 0536  Gross per 24 hour   Intake 822 ml   Output 800 ml   Net 22 ml       Last 3 Weights   24 0536 223 lb 9.6 oz (101.4 kg)   24 0515 224 lb 14.4 oz (102 kg)   11/10/24 0548 224 lb 12.8 oz (102 kg)   24 0500 228 lb 8 oz (103.6 kg)   24 1959 230 lb 12.8 oz (104.7 kg)   24 1520 233 lb (105.7 kg)   10/25/24 1008 233 lb (105.7 kg)   24 1528 231 lb (104.8 kg)       Labs:  Recent Labs    Lab 11/10/24  0534 11/11/24  0507 11/12/24  0506   GLU 98 106* 93   BUN 24* 33* 35*   CREATSERUM 1.20* 1.33* 1.29*   EGFRCR 44* 39* 41*   CA 9.2 9.3 9.2    137 134*   K 4.9 4.3 4.3    101 100   CO2 27.0 28.0 28.0     Recent Labs   Lab 11/09/24  0352 11/10/24  0402 11/11/24  0507 11/12/24  0506   RBC 3.17* 3.17*  --  3.19*   HGB 11.3* 11.7*  --  11.3*   HCT 34.4* 35.1  --  34.4*   .5* 110.7*  --  107.8*   MCH 35.6* 36.9*  --  35.4*   MCHC 32.8 33.3  --  32.8   RDW 14.1 14.2  --  14.4   NEPRELIM 4.96 5.47  --   --    WBC 6.5 7.6  --  9.5   .0  289.0 252.0 301.0 291.0  291.0         No results for input(s): \"TROP\", \"TROPHS\", \"CK\" in the last 168 hours.    Diagnostics:   ECHOCARDIOGRAM 5/14/2024:  1. Left ventricle: The cavity size was normal. Wall thickness was mildly      increased. Systolic function was normal. The estimated ejection fraction      was 55-60%, by visual assessment. Doppler parameters are consistent with      abnormal left ventricular relaxation - grade 1 diastolic dysfunction.   2. Right ventricle: The cavity size was increased. Pacer wire noted in the      right ventricle.   3. Left atrium: The atrium was mildly dilated.   4. Aortic root: The aortic root was at the upper limits of normal.           Medications:   magnesium oxide  400 mg Oral Once    furosemide  40 mg Oral Daily    warfarin  5 mg Oral Nightly    spironolactone  25 mg Oral Daily    empagliflozin  10 mg Oral Daily    vancomycin  125 mg Oral QID    melatonin  3 mg Oral Nightly    dilTIAZem ER  180 mg Oral Daily    atorvastatin  80 mg Oral Nightly    hydroxyurea  500 mg Oral Daily    metoprolol succinate ER  25 mg Oral QAM    pantoprazole  40 mg Intravenous Daily      continuous dose heparin 1,500 Units/hr (11/12/24 0323)       Assessment/Plan:    Paroxysmal Atrial Fibrillation, Hx SSS s/p Biotronik DC PPM 12/2023  Maintaining NSR  On toprol, diltiiazem  10/2023 Nuc stress normal perfusion  [id number]  -Warfarin resumed 11/11 - Heparin until therapeutic   Acute HFpEF  Echo 5/14/2024  LVEF 58%, G1DD, no RWMA, mild-mod TR  S/p IV lasix now improved - Started PO 11/11  Appears edematous still although reportedly improved and LE edema multifactorial w/ known lymphedema  Weight reduced from admit, on RA  GMT: BB, aldactone, SGLT2i. Could consider ARB if BP and renal function allow.   C Diff Infection, Melena  On Po vanco, GI following  15.7 cm hepatic Mass  S/p biopsy 11/8, confirmed HCC on pathology  GI, Heme/onc following  Anemia  Likely GIB w/ supra therapeutic INR, stable now  Hx Recurrent DVT/PE  Historically on warfarin outpatient  Hx Polycythemia Vera  Hx on warfarin -resumed 11/11 - IV heparin bridge  Hematology following  Hx Lung Cancer s/p WILL lobectomy 2016  Hx Lacunar CVA  5/2024  Chronic Venous Insufficiency, Chronic LE edema  Hypoalbuminemia   Hypertension  At goal    PLAN  Warfarin per oncology/hematology now on IV heparin pending INR reaching goal  Continue PO lasix  Daily weights, I&O's  Continue outpatient cardiac regime  Hematology/oncology following for HCC  Discussed with hematology and internal medicine team  Reasonable for discharge from a cardiology perspective with outpatient follow up with Dr. Trudy Ochoa PA-C  11/12/2024  07:26 AM       Cardiologist Addendum:  Tara Lockwood was seen and examined independently and I agree with the above documentation provided by Casey Ochoa PA-C.  Patient appears stable from cardiac standpoint, continue heparin fusion till INR is therapeutic.  Hepatocarcinoma, new diagnosis-management per GI and oncology.  No further cardiac workup at this time, will sign off.    Cira Dumont DO  [facility] [place]   Interventional Cardiac and Vascular Services      November 12, 2024  12:42 PM

## 2024-11-12 NOTE — PLAN OF CARE
Problem: Patient Centered Care  Goal: Patient preferences are identified and integrated in the patient's plan of care  Description: Interventions:  - What would you like us to know as we care for you? Patient states one daughter is a registered nurse and lives with other daughter; patient has 8 children  - Provide timely, complete, and accurate information to patient/family  - Incorporate patient and family knowledge, values, beliefs, and cultural backgrounds into the planning and delivery of care  - Encourage patient/family to participate in care and decision-making at the level they choose  - Honor patient and family perspectives and choices  Outcome: Progressing     Problem: Patient/Family Goals  Goal: Patient/Family Long Term Goal  Description: Patient's Long Term Goal: To get stronger; minimize fatigue    Interventions:  -Monitor VSS  -Monitor Labs  -Cards consult  -PT/OT consult   - See additional Care Plan goals for specific interventions  Outcome: Progressing  Goal: Patient/Family Short Term Goal  Description: Patient's Short Term Goal: To manage and/or stop rectal bleeding     Interventions:   -Monitor VSS  -Monitor Labs  -GI consult  -Monitor stools   -See additional Care Plan goals for specific interventions  Outcome: Progressing     Problem: PAIN - ADULT  Goal: Verbalizes/displays adequate comfort level or patient's stated pain goal  Description: INTERVENTIONS:  - Encourage pt to monitor pain and request assistance  - Assess pain using appropriate pain scale  - Administer analgesics based on type and severity of pain and evaluate response  - Implement non-pharmacological measures as appropriate and evaluate response  - Consider cultural and social influences on pain and pain management  - Manage/alleviate anxiety  - Utilize distraction and/or relaxation techniques  - Monitor for opioid side effects  - Notify MD/LIP if interventions unsuccessful or patient reports new pain  - Anticipate increased pain  with activity and pre-medicate as appropriate  Outcome: Progressing     Problem: SAFETY ADULT - FALL  Goal: Free from fall injury  Description: INTERVENTIONS:  - Assess pt frequently for physical needs  - Identify cognitive and physical deficits and behaviors that affect risk of falls.  - [place] fall precautions as indicated by assessment.  - Educate pt/family on patient safety including physical limitations  - Instruct pt to call for assistance with activity based on assessment  - Modify environment to reduce risk of injury  - Provide assistive devices as appropriate  - Consider OT/PT consult to assist with strengthening/mobility  - Encourage toileting schedule  Outcome: Progressing     Problem: DISCHARGE PLANNING  Goal: Discharge to home or other facility with appropriate resources  Description: INTERVENTIONS:  - Identify barriers to discharge w/pt and caregiver  - Include patient/family/discharge partner in discharge planning  - Arrange for needed discharge resources and transportation as appropriate  - Identify discharge learning needs (meds, wound care, etc)  - Arrange for interpreters to assist at discharge as needed  - Consider post-discharge preferences of patient/family/discharge partner  - Complete POLST form as appropriate  - Assess patient's ability to be responsible for managing their own health  - Refer to Case Management Department for coordinating discharge planning if the patient needs post-hospital services based on physician/LIP order or complex needs related to functional status, cognitive ability or social support system  Outcome: Progressing     Problem: GASTROINTESTINAL - ADULT  Goal: Maintains or returns to baseline bowel function  Description: INTERVENTIONS:  - Assess bowel function  - Maintain adequate hydration with IV or PO as ordered and tolerated  - Evaluate effectiveness of GI medications  - Encourage mobilization and activity  - Obtain nutritional consult as needed  - Establish a  toileting routine/schedule  - Consider collaborating with pharmacy to review patient's medication profile  Outcome: Progressing     Problem: METABOLIC/FLUID AND ELECTROLYTES - ADULT  Goal: Electrolytes maintained within normal limits  Description: INTERVENTIONS:  - Monitor labs and rhythm and assess patient for signs and symptoms of electrolyte imbalances  - Administer electrolyte replacement as ordered  - Monitor response to electrolyte replacements, including rhythm and repeat lab results as appropriate  - Fluid restriction as ordered  - Instruct patient on fluid and nutrition restrictions as appropriate  Outcome: Progressing  Goal: Hemodynamic stability and optimal renal function maintained  Description: INTERVENTIONS:  - Monitor labs and assess for signs and symptoms of volume excess or deficit  - Monitor intake, output and patient weight  - Monitor urine specific gravity, serum osmolarity and serum sodium as indicated or ordered  - Monitor response to interventions for patient's volume status, including labs, urine output, blood pressure (other measures as available)  - Encourage oral intake as appropriate  - Instruct patient on fluid and nutrition restrictions as appropriate  Outcome: Progressing     Problem: SKIN/TISSUE INTEGRITY - ADULT  Goal: Skin integrity remains intact  Description: INTERVENTIONS  - Assess and document risk factors for pressure ulcer development  - Assess and document skin integrity  - Monitor for areas of redness and/or skin breakdown  - Initiate interventions, skin care algorithm/standards of care as needed  Outcome: Progressing  Goal: Oral mucous membranes remain intact  Description: INTERVENTIONS  - Assess oral mucosa and hygiene practices  - Implement preventative oral hygiene regimen  - Implement oral medicated treatments as ordered  Outcome: Progressing     Problem: HEMATOLOGIC - ADULT  Goal: Maintains hematologic stability  Description: INTERVENTIONS  - Assess for signs and  symptoms of bleeding or hemorrhage  - Monitor labs and vital signs for trends  - Administer supportive blood products/factors, fluids and medications as ordered and appropriate  - Administer supportive blood products/factors as ordered and appropriate  Outcome: Progressing  Goal: Free from bleeding injury  Description: (Example usage: patient with low platelets)  INTERVENTIONS:  - Avoid intramuscular injections, enemas and rectal medication administration  - Ensure safe mobilization of patient  - Hold pressure on venipuncture sites to achieve adequate hemostasis  - Assess for signs and symptoms of internal bleeding  - Monitor lab trends  - Patient is to report abnormal signs of bleeding to staff  - Avoid use of toothpicks and dental floss  - Use electric shaver for shaving  - Use soft bristle tooth brush  - Limit straining and forceful nose blowing  Outcome: Progressing     Problem: Impaired Functional Mobility  Goal: Achieve highest/safest level of mobility/gait  Description: Interventions:  - Assess patient's functional ability and stability  - Promote increasing activity/tolerance for mobility and gait  - Educate and engage patient/family in tolerated activity level and precautions  - Recommend use of  RW for transfers and ambulation  Outcome: Progressing     Problem: Impaired Activities of Daily Living  Goal: Achieve highest/safest level of independence in self care  Description: Interventions:  - Assess ability and encourage patient to participate in ADLs to maximize function  - Promote sitting position while performing ADLs such as feeding, grooming, and bathing  - Educate and encourage patient/family in tolerated functional activity level and precautions during self-care  - Encourage patient to incorporate impaired side during daily activities to promote function  Outcome: Progressing

## 2024-11-12 NOTE — DIETARY NOTE
BRIEF NUTRITION NOTE      Patient Status 11/05/24: Pt screened for RDN f/u per nutrition screening. Initially screened per MST of 2, but wt reviewd and no wt loss found. Found to be at no/low nutrition risk at this time. Pt admitted for rectal bleeding. PMHx: chronic diarrhea, chronic dyspnea, paroxysmal atrial fibrillation on warfarin, chronic hypotension. Visited pt today. Diet Hx: Pt reported poor appetite. Pt with improved  PO intakes, consuming 40-70% of CLD in the past few days, now 50 and 100% of two meals of Cardiac diet, per documentation. Pt reported fair po PTA. Weight Hx: Weight relatively stable, pt UBW: 220-23# per Wt Hx on EHR.  CBW: 228#  Plan: Noted MRI showing malignancy of hepatic lobe, per MD note. Added ONS to help pt meet nutrient needs while appetite continues to improve. Pt agreeable to Ensure Enlive BID strawberry. Will follow per protocol. Please consult RD if earlier nutrition intervention is needed.      11/12/2024 Update:  ReScreened. Remains in fair to good po intake ( see below for po). Fluid wt loss noted since pt on diuretics now. Drunk ONS (oral nutrition supplement) yesterday ( Ensure Enlive 100% x 1). Skin eval noted, no pressure injuries. Currently pt is on 2000 ml fluid restriction, hence will  keep Ensure Enlive 8 oz each BID for now but if needed to further restrict fluids --will change to  Ensure compact 4 oz each.  Pt not currently at nutrition risk.     Will continue to monitor po intake.   Will follow up at length of stay ( LOS) per protocol. Otherwise, please consult RD if patient Nutrition status change or nutrition issues arise.     Diet: Soft , Low fiber, Cardiac, 2 g Na, 2000 ml fluid restrictions,   Oral Nutrition Supplement ( ONS ) : Ensure Enlive (350 calories/ 20 g protein each) BID Strawberry    Percent Meals Eaten (last 6 days)       Date/Time Percent Meals Eaten (%)    11/06/24 1555 25 %    11/07/24 1821 75 %    11/08/24 1530 100 %     Percent Meals Eaten (%):  3 jello cups at 11/08/24 1530    11/09/24 0920 90 %    11/09/24 1352 90 %    11/10/24 0900 80 %    11/10/24 1400 75 %    11/10/24 1900 95 %    11/11/24 1029 90 %    11/11/24 1349 15 %    11/11/24 1853 100 %           Wt down 10# since admission but on diuretics.   Patient Weight(s) for the past 336 hrs:   Weight   11/12/24 0536 101.4 kg (223 lb 9.6 oz)   11/11/24 0515 102 kg (224 lb 14.4 oz)   11/10/24 0548 102 kg (224 lb 12.8 oz)   11/04/24 0500 103.6 kg (228 lb 8 oz)   11/01/24 1959 104.7 kg (230 lb 12.8 oz)   11/01/24 1520 105.7 kg (233 lb)        Wt Readings from Last 6 Encounters:   11/12/24 101.4 kg (223 lb 9.6 oz)   10/25/24 105.7 kg (233 lb)   09/16/24 104.8 kg (231 lb)   09/04/24 104.8 kg (231 lb)   06/25/24 107.8 kg (237 lb 9.6 oz)   05/20/24 109.8 kg (242 lb)        Allyson Smith, RD, LDN, [facility]  Clinical Dietitian  [phone]

## 2024-11-12 NOTE — PLAN OF CARE
Problem: Patient Centered Care  Goal: Patient preferences are identified and integrated in the patient's plan of care  Description: Interventions:  - What would you like us to know as we care for you? Patient states one daughter is a registered nurse and lives with other daughter; patient has 8 children  - Provide timely, complete, and accurate information to patient/family  - Incorporate patient and family knowledge, values, beliefs, and cultural backgrounds into the planning and delivery of care  - Encourage patient/family to participate in care and decision-making at the level they choose  - Honor patient and family perspectives and choices  Outcome: Progressing     Problem: Patient/Family Goals  Goal: Patient/Family Long Term Goal  Description: Patient's Long Term Goal: To get stronger; minimize fatigue    Interventions:  -Monitor VSS  -Monitor Labs  -Cards consult  -PT/OT consult   - See additional Care Plan goals for specific interventions  Outcome: Progressing  Goal: Patient/Family Short Term Goal  Description: Patient's Short Term Goal: To manage and/or stop rectal bleeding     Interventions:   -Monitor VSS  -Monitor Labs  -GI consult  -Monitor stools   -See additional Care Plan goals for specific interventions  Outcome: Progressing     Problem: PAIN - ADULT  Goal: Verbalizes/displays adequate comfort level or patient's stated pain goal  Description: INTERVENTIONS:  - Encourage pt to monitor pain and request assistance  - Assess pain using appropriate pain scale  - Administer analgesics based on type and severity of pain and evaluate response  - Implement non-pharmacological measures as appropriate and evaluate response  - Consider cultural and social influences on pain and pain management  - Manage/alleviate anxiety  - Utilize distraction and/or relaxation techniques  - Monitor for opioid side effects  - Notify MD/LIP if interventions unsuccessful or patient reports new pain  - Anticipate increased pain  with activity and pre-medicate as appropriate  Outcome: Progressing     Problem: SAFETY ADULT - FALL  Goal: Free from fall injury  Description: INTERVENTIONS:  - Assess pt frequently for physical needs  - Identify cognitive and physical deficits and behaviors that affect risk of falls.  - [place] fall precautions as indicated by assessment.  - Educate pt/family on patient safety including physical limitations  - Instruct pt to call for assistance with activity based on assessment  - Modify environment to reduce risk of injury  - Provide assistive devices as appropriate  - Consider OT/PT consult to assist with strengthening/mobility  - Encourage toileting schedule  Outcome: Progressing     Problem: DISCHARGE PLANNING  Goal: Discharge to home or other facility with appropriate resources  Description: INTERVENTIONS:  - Identify barriers to discharge w/pt and caregiver  - Include patient/family/discharge partner in discharge planning  - Arrange for needed discharge resources and transportation as appropriate  - Identify discharge learning needs (meds, wound care, etc)  - Arrange for interpreters to assist at discharge as needed  - Consider post-discharge preferences of patient/family/discharge partner  - Complete POLST form as appropriate  - Assess patient's ability to be responsible for managing their own health  - Refer to Case Management Department for coordinating discharge planning if the patient needs post-hospital services based on physician/LIP order or complex needs related to functional status, cognitive ability or social support system  Outcome: Progressing     Problem: GASTROINTESTINAL - ADULT  Goal: Maintains or returns to baseline bowel function  Description: INTERVENTIONS:  - Assess bowel function  - Maintain adequate hydration with IV or PO as ordered and tolerated  - Evaluate effectiveness of GI medications  - Encourage mobilization and activity  - Obtain nutritional consult as needed  - Establish a  toileting routine/schedule  - Consider collaborating with pharmacy to review patient's medication profile  Outcome: Progressing     Problem: METABOLIC/FLUID AND ELECTROLYTES - ADULT  Goal: Electrolytes maintained within normal limits  Description: INTERVENTIONS:  - Monitor labs and rhythm and assess patient for signs and symptoms of electrolyte imbalances  - Administer electrolyte replacement as ordered  - Monitor response to electrolyte replacements, including rhythm and repeat lab results as appropriate  - Fluid restriction as ordered  - Instruct patient on fluid and nutrition restrictions as appropriate  Outcome: Progressing  Goal: Hemodynamic stability and optimal renal function maintained  Description: INTERVENTIONS:  - Monitor labs and assess for signs and symptoms of volume excess or deficit  - Monitor intake, output and patient weight  - Monitor urine specific gravity, serum osmolarity and serum sodium as indicated or ordered  - Monitor response to interventions for patient's volume status, including labs, urine output, blood pressure (other measures as available)  - Encourage oral intake as appropriate  - Instruct patient on fluid and nutrition restrictions as appropriate  Outcome: Progressing     Problem: SKIN/TISSUE INTEGRITY - ADULT  Goal: Skin integrity remains intact  Description: INTERVENTIONS  - Assess and document risk factors for pressure ulcer development  - Assess and document skin integrity  - Monitor for areas of redness and/or skin breakdown  - Initiate interventions, skin care algorithm/standards of care as needed  Outcome: Progressing  Goal: Oral mucous membranes remain intact  Description: INTERVENTIONS  - Assess oral mucosa and hygiene practices  - Implement preventative oral hygiene regimen  - Implement oral medicated treatments as ordered  Outcome: Progressing     Problem: HEMATOLOGIC - ADULT  Goal: Maintains hematologic stability  Description: INTERVENTIONS  - Assess for signs and  symptoms of bleeding or hemorrhage  - Monitor labs and vital signs for trends  - Administer supportive blood products/factors, fluids and medications as ordered and appropriate  - Administer supportive blood products/factors as ordered and appropriate  Outcome: Progressing  Goal: Free from bleeding injury  Description: (Example usage: patient with low platelets)  INTERVENTIONS:  - Avoid intramuscular injections, enemas and rectal medication administration  - Ensure safe mobilization of patient  - Hold pressure on venipuncture sites to achieve adequate hemostasis  - Assess for signs and symptoms of internal bleeding  - Monitor lab trends  - Patient is to report abnormal signs of bleeding to staff  - Avoid use of toothpicks and dental floss  - Use electric shaver for shaving  - Use soft bristle tooth brush  - Limit straining and forceful nose blowing  Outcome: Progressing     Problem: Impaired Functional Mobility  Goal: Achieve highest/safest level of mobility/gait  Description: Interventions:  - Assess patient's functional ability and stability  - Promote increasing activity/tolerance for mobility and gait  - Educate and engage patient/family in tolerated activity level and precautions  - Recommend use of  RW for transfers and ambulation  Outcome: Progressing     Problem: Impaired Activities of Daily Living  Goal: Achieve highest/safest level of independence in self care  Description: Interventions:  - Assess ability and encourage patient to participate in ADLs to maximize function  - Promote sitting position while performing ADLs such as feeding, grooming, and bathing  - Educate and encourage patient/family in tolerated functional activity level and precautions during self-care  - Encourage patient to incorporate impaired side during daily activities to promote function  Outcome: Progressing

## 2024-11-13 VITALS
HEIGHT: 67 IN | WEIGHT: 226.31 LBS | DIASTOLIC BLOOD PRESSURE: 46 MMHG | TEMPERATURE: 98 F | HEART RATE: 76 BPM | SYSTOLIC BLOOD PRESSURE: 119 MMHG | RESPIRATION RATE: 20 BRPM | OXYGEN SATURATION: 96 % | BODY MASS INDEX: 35.52 KG/M2

## 2024-11-13 LAB
APTT PPP: 24.1 SECONDS (ref 23–36)
APTT PPP: 30.7 SECONDS (ref 23–36)
INR BLD: 1.13 (ref 0.8–1.2)
PROTHROMBIN TIME: 15.2 SECONDS (ref 11.6–14.8)

## 2024-11-13 PROCEDURE — 99239 HOSP IP/OBS DSCHRG MGMT >30: CPT | Performed by: HOSPITALIST

## 2024-11-13 RX ORDER — HEPARIN SODIUM 1000 [USP'U]/ML
3000 INJECTION, SOLUTION INTRAVENOUS; SUBCUTANEOUS ONCE
Status: COMPLETED | OUTPATIENT
Start: 2024-11-13 | End: 2024-11-13

## 2024-11-13 RX ORDER — VANCOMYCIN HYDROCHLORIDE 125 MG/1
125 CAPSULE ORAL 4 TIMES DAILY
Qty: 24 CAPSULE | Refills: 0 | Status: SHIPPED | OUTPATIENT
Start: 2024-11-13 | End: 2024-11-19

## 2024-11-13 NOTE — DISCHARGE SUMMARY
[facility]  part of [facility]     DISCHARGE SUMMARY     Tara Lockwood Patient Status:  Inpatient    1940 MRN [id number]   Location [facility] 5SW/SE Attending No att. providers found   Hosp Day # 12 PCP Robbie Hubbard MD     DATE OF ADMISSION: 2024  DATE OF DISCHARGE: 2024   DISPOSITION: home  CONDITION ON DISCHARGE: good    DISCHARGE DIAGNOSES:  Hepatocellular carcinoma, new diagnosis  GI bleed  C. difficile colitis  Tachybradycardia syndrome  Acute diastolic congestive heart failure  Chronic lymphedema  Polycythemia vera  History of stroke    HISTORY OF PRESENT ILLNESS (COPIED FROM ADMISSION H&P)  Patient is an 84-year-old  female who was sent today to the emergency department for evaluation of progressive dizziness, fatigue, and dark melanotic bowel movements. CBC today showed hemoglobin of 11.4 which has dropped from 12.5 at baseline around a month ago. Chemistry showed BUN of 21 and creatinine of 1.19. BUN and creatinine ratio is elevated but within normal range. GFR at baseline 45. Liver function tests were unremarkable. .4. Patient takes hydroxyurea. Rectal exam done by the emergency room physician showed dark brown to dark bowel movement. Positive for Hemoccult blood.     HOSPITAL COURSE:  Patient was admitted found to have C. difficile colitis.  Treated appropriately with this with oral vancomycin.  She will be discharged to finish a 14-day course of this.  Incidentally noted to have elevated transaminases, followed up by ultrasound and other imaging that confirmed mass suspicious for malignancy.  Ultimately biopsy showed HCC.  Seen by oncology.  Presented at tumor board felt not to be a candidate for surgery.  She will be set up with outpatient Y90 followed by probable immunotherapy.    Patient understands return to the emergency room for increased pain, fever, discharge, shortness of breath, chest pain, new neurologic symptoms, other  concerning symptoms.    PHYSICAL EXAM:  Temp:  [98.1 °F (36.7 °C)-98.9 °F (37.2 °C)] 98.3 °F (36.8 °C)  Pulse:  [76-84] 76  Resp:  [18-22] 20  BP: (119-143)/(46-52) 119/46  SpO2:  [94 %-96 %] 96 %  Gen: A+Ox3.  No distress.   HEENT: NCAT, neck supple, no carotid bruit.  CV: RRR, S1S2, and intact distal pulses. No gallop, rub, murmur.  Pulm: Effort and breath sounds normal. No distress, wheezes, rales, rhonchi.  Abd: Soft, NTND, BS normal, no mass, no HSM, no rebound/guarding.   Neuro: Normal reflexes, CN. Sensory/motor exams grossly normal deficit. Coordination  and gait normal.   MS: No joint effusions.  1+ bilateral lower extremity peripheral edema.  At baseline per patient  Skin: Skin is warm and dry. No rashes, erythema, diaphoresis.   Psych: Normal mood and affect. Behavior and judgment normal.     DISCHARGE MEDICATIONS     Discharge Medications        START taking these medications        Instructions Prescription details   empagliflozin 10 MG Tabs  Commonly known as: Jardiance      Take 1 tablet (10 mg total) by mouth daily.   Quantity: 30 tablet  Refills: 0     furosemide 40 MG Tabs  Commonly known as: Lasix      Take 1 tablet (40 mg total) by mouth daily.   Quantity: 30 tablet  Refills: 0     spironolactone 25 MG Tabs  Commonly known as: Aldactone      Take 1 tablet (25 mg total) by mouth daily.   Quantity: 30 tablet  Refills: 2     vancomycin 125 MG Caps  Commonly known as: Vancocin      Take 1 capsule (125 mg total) by mouth 4 (four) times daily for 6 days.   Stop taking on: November 19, 2024  Quantity: 24 capsule  Refills: 0            CONTINUE taking these medications        Instructions Prescription details   acetaminophen 325 MG Tabs  Commonly known as: Tylenol      Take 500 mg by mouth every 6 (six) hours as needed for Pain.   Refills: 0     dilTIAZem  MG Cp24  Commonly known as: CardIZEM CD      Take 1 capsule (180 mg total) by mouth daily.   Refills: 0     Fluticasone-Salmeterol 113-14  MCG/ACT Aepb      Inhale 1 Inhalation into the lungs daily.   Quantity: 1 each  Refills: 3     hydroxyurea 500 MG Caps  Commonly known as: Hydrea      Take 1 capsule (500 mg total) by mouth daily.   Quantity: 90 capsule  Refills: 3     metoprolol succinate ER 25 MG Tb24  Commonly known as: Toprol XL      Take 1 tablet (25 mg total) by mouth every morning.   Refills: 0     midodrine 5 MG Tabs  Commonly known as: ProAmatine      Take 1 tablet (5 mg total) by mouth in the morning and 1 tablet (5 mg total) at noon and 1 tablet (5 mg total) in the evening.   Refills: 0     MULTIVITAMIN ADULT OR      Take 1 tablet by mouth daily.   Refills: 0     oxybutynin ER 5 MG Tb24  Commonly known as: Ditropan-XL      Take 1 tablet (5 mg total) by mouth daily.   Quantity: 90 tablet  Refills: 1     warfarin 5 MG Tabs  Commonly known as: Coumadin      Take as directed. If you are unsure how to take this medication, talk to your nurse or doctor.  Original instructions: Take one tablet (5 mg) by mouth daily on Mondays, Wednesdays, Fridays, and Sundays.   Refills: 0     warfarin 5 MG Tabs  Commonly known as: Coumadin      Take as directed. If you are unsure how to take this medication, talk to your nurse or doctor.  Original instructions: Take 1.5 tablets (7.5 mg) by mouth once daily on Tuesdays, Thursdays and Saturdays.   Refills: 0               Where to Get Your Medications        These medications were sent to [id number] - [place], [place] - [phone], [phone]  [address] [facility] [address]      Phone: [phone]   empagliflozin 10 MG Tabs  furosemide 40 MG Tabs  spironolactone 25 MG Tabs  vancomycin 125 MG Caps         CONSULTANTS  Consultants         Provider   Role Specialty     Terrell Gregory MD      Consulting Physician INTERVENTIONAL, RADIOLOGY     Yamilex Ceron MD      Consulting Physician Hematology and Oncology     Keren Azul MD      Consulting Physician  Hematology and Oncology     Casey Ochoa PA-C      Consulting Physician Physician Assistant     Blanco Dominguez MD      Consulting Physician GASTROENTEROLOGY            FOLLOW UP:  Max Yates MD  [address] [facility]  [address]  [place] [address]  [phone]    Follow up in 1 week(s)      Robbie Hubbard MD  [address] [facility] [address]  [phone]    Follow up in 1 week(s)  Post Discharge Followup    Yamilex Ceron MD  [address] [facility] [address]  [phone]    Follow up in 1 week(s)  Post Discharge Followup    The above plan and follow-up instructions were reviewed with the patient and they verbalized understanding and agreement.  They understand to return to the emergency room for any concerning signs or symptoms.  Greater than 30 minutes spent on discharge.  -----------------------    Hospital Discharge Diagnoses: Hepatocellular carcinoma, new diagnosis    Lace+ Score: 83  59-90 High Risk  29-58 Medium Risk  0-28   Low Risk.    TCM Follow-Up Recommendation:  LACE > 58: High Risk of readmission after discharge from the hospital.

## 2024-11-13 NOTE — PLAN OF CARE
Problem: Patient Centered Care  Goal: Patient preferences are identified and integrated in the patient's plan of care  Description: Interventions:  - What would you like us to know as we care for you? Patient states one daughter is a registered nurse and lives with other daughter; patient has 8 children  - Provide timely, complete, and accurate information to patient/family  - Incorporate patient and family knowledge, values, beliefs, and cultural backgrounds into the planning and delivery of care  - Encourage patient/family to participate in care and decision-making at the level they choose  - Honor patient and family perspectives and choices  Outcome: Adequate for Discharge     Problem: Patient/Family Goals  Goal: Patient/Family Long Term Goal  Description: Patient's Long Term Goal: To get stronger; minimize fatigue    Interventions:  -Monitor VSS  -Monitor Labs  -Cards consult  -PT/OT consult   - See additional Care Plan goals for specific interventions  Outcome: Adequate for Discharge  Goal: Patient/Family Short Term Goal  Description: Patient's Short Term Goal: To manage and/or stop rectal bleeding     Interventions:   -Monitor VSS  -Monitor Labs  -GI consult  -Monitor stools   -See additional Care Plan goals for specific interventions  Outcome: Adequate for Discharge     Problem: PAIN - ADULT  Goal: Verbalizes/displays adequate comfort level or patient's stated pain goal  Description: INTERVENTIONS:  - Encourage pt to monitor pain and request assistance  - Assess pain using appropriate pain scale  - Administer analgesics based on type and severity of pain and evaluate response  - Implement non-pharmacological measures as appropriate and evaluate response  - Consider cultural and social influences on pain and pain management  - Manage/alleviate anxiety  - Utilize distraction and/or relaxation techniques  - Monitor for opioid side effects  - Notify MD/LIP if interventions unsuccessful or patient reports new  pain  - Anticipate increased pain with activity and pre-medicate as appropriate  Outcome: Adequate for Discharge     Problem: SAFETY ADULT - FALL  Goal: Free from fall injury  Description: INTERVENTIONS:  - Assess pt frequently for physical needs  - Identify cognitive and physical deficits and behaviors that affect risk of falls.  - [place] fall precautions as indicated by assessment.  - Educate pt/family on patient safety including physical limitations  - Instruct pt to call for assistance with activity based on assessment  - Modify environment to reduce risk of injury  - Provide assistive devices as appropriate  - Consider OT/PT consult to assist with strengthening/mobility  - Encourage toileting schedule  Outcome: Adequate for Discharge     Problem: DISCHARGE PLANNING  Goal: Discharge to home or other facility with appropriate resources  Description: INTERVENTIONS:  - Identify barriers to discharge w/pt and caregiver  - Include patient/family/discharge partner in discharge planning  - Arrange for needed discharge resources and transportation as appropriate  - Identify discharge learning needs (meds, wound care, etc)  - Arrange for interpreters to assist at discharge as needed  - Consider post-discharge preferences of patient/family/discharge partner  - Complete POLST form as appropriate  - Assess patient's ability to be responsible for managing their own health  - Refer to Case Management Department for coordinating discharge planning if the patient needs post-hospital services based on physician/LIP order or complex needs related to functional status, cognitive ability or social support system  Outcome: Adequate for Discharge     Problem: GASTROINTESTINAL - ADULT  Goal: Maintains or returns to baseline bowel function  Description: INTERVENTIONS:  - Assess bowel function  - Maintain adequate hydration with IV or PO as ordered and tolerated  - Evaluate effectiveness of GI medications  - Encourage mobilization  and activity  - Obtain nutritional consult as needed  - Establish a toileting routine/schedule  - Consider collaborating with pharmacy to review patient's medication profile  Outcome: Adequate for Discharge     Problem: METABOLIC/FLUID AND ELECTROLYTES - ADULT  Goal: Electrolytes maintained within normal limits  Description: INTERVENTIONS:  - Monitor labs and rhythm and assess patient for signs and symptoms of electrolyte imbalances  - Administer electrolyte replacement as ordered  - Monitor response to electrolyte replacements, including rhythm and repeat lab results as appropriate  - Fluid restriction as ordered  - Instruct patient on fluid and nutrition restrictions as appropriate  Outcome: Adequate for Discharge  Goal: Hemodynamic stability and optimal renal function maintained  Description: INTERVENTIONS:  - Monitor labs and assess for signs and symptoms of volume excess or deficit  - Monitor intake, output and patient weight  - Monitor urine specific gravity, serum osmolarity and serum sodium as indicated or ordered  - Monitor response to interventions for patient's volume status, including labs, urine output, blood pressure (other measures as available)  - Encourage oral intake as appropriate  - Instruct patient on fluid and nutrition restrictions as appropriate  Outcome: Adequate for Discharge     Problem: SKIN/TISSUE INTEGRITY - ADULT  Goal: Skin integrity remains intact  Description: INTERVENTIONS  - Assess and document risk factors for pressure ulcer development  - Assess and document skin integrity  - Monitor for areas of redness and/or skin breakdown  - Initiate interventions, skin care algorithm/standards of care as needed  Outcome: Adequate for Discharge  Goal: Oral mucous membranes remain intact  Description: INTERVENTIONS  - Assess oral mucosa and hygiene practices  - Implement preventative oral hygiene regimen  - Implement oral medicated treatments as ordered  Outcome: Adequate for Discharge      Problem: HEMATOLOGIC - ADULT  Goal: Maintains hematologic stability  Description: INTERVENTIONS  - Assess for signs and symptoms of bleeding or hemorrhage  - Monitor labs and vital signs for trends  - Administer supportive blood products/factors, fluids and medications as ordered and appropriate  - Administer supportive blood products/factors as ordered and appropriate  Outcome: Adequate for Discharge  Goal: Free from bleeding injury  Description: (Example usage: patient with low platelets)  INTERVENTIONS:  - Avoid intramuscular injections, enemas and rectal medication administration  - Ensure safe mobilization of patient  - Hold pressure on venipuncture sites to achieve adequate hemostasis  - Assess for signs and symptoms of internal bleeding  - Monitor lab trends  - Patient is to report abnormal signs of bleeding to staff  - Avoid use of toothpicks and dental floss  - Use electric shaver for shaving  - Use soft bristle tooth brush  - Limit straining and forceful nose blowing  Outcome: Adequate for Discharge     Problem: Impaired Functional Mobility  Goal: Achieve highest/safest level of mobility/gait  Description: Interventions:  - Assess patient's functional ability and stability  - Promote increasing activity/tolerance for mobility and gait  - Educate and engage patient/family in tolerated activity level and precautions  - Recommend use of  RW for transfers and ambulation  Outcome: Adequate for Discharge     Problem: Impaired Activities of Daily Living  Goal: Achieve highest/safest level of independence in self care  Description: Interventions:  - Assess ability and encourage patient to participate in ADLs to maximize function  - Promote sitting position while performing ADLs such as feeding, grooming, and bathing  - Educate and encourage patient/family in tolerated functional activity level and precautions during self-care  - Encourage patient to incorporate impaired side during daily activities to promote  function  Outcome: Adequate for Discharge

## 2024-11-13 NOTE — PROGRESS NOTES
[facility]  part of [facility]erg[facility] with [facility]    Progress Note    Tara Lockwood Patient Status:  Inpatient    1940 MRN [id number]   Location [facility] 5SW/SE Attending Loy Vences MD   Hosp Day # 11 PCP Robbie Hubbard MD       Subjective:   Tara Lockwood remains stable, up in chair  Reports a large loose stool yesterday, then 2 normal BMs today   Family concern about some legs swelling (chronic)  Couldn't tolerate TEDs, will order and try tubigrips 1-2 layers and cont elevation  Fatique  Understandably, anxious    One daughter at the bedside and a second daughter on speaker phone  Overwhelmed by diagnosis    Review of Systems:   10 point ROS completed and was negative, except for pertinent positive and negatives stated in subjective.    Objective:     Vitals:    24 0324 24 0536 24 0944 24 1759   BP: 144/51  122/50 143/52   BP Location: Right arm  Right arm Left arm   Pulse: 84  86 84   Resp:  18   Temp: 99.2 °F (37.3 °C)  98.4 °F (36.9 °C) 98.1 °F (36.7 °C)   TempSrc: Oral  Oral Oral   SpO2: 92%  96% 95%   Weight:  223 lb 9.6 oz (101.4 kg)     Height:         Patient Weight for the past 72 hrs:   Weight   24 1520 233 lb (105.7 kg)   24 1959 230 lb 12.8 oz (104.7 kg)       Intake/Output Summary (Last 24 hours) at 2024  Last data filed at 2024 0936  Gross per 24 hour   Intake 250 ml   Output 400 ml   Net -150 ml       GENERAL:  The patient appeared to be in no distress, conversant, smiling  SKIN:  Warm and hydrated  HEENT:  Head was atraumatic and normocephalic.    CHEST:  Symmetrical movement on inspiration  LUNGS:  No audible wheezing, breath sounds coarse  ABDOMEN: Non-distended  MUSCULOSKELETAL:  There was no deformity.   EXTREMITIES: +edema  NEUROLOGICAL:  There was no focal deficit.    PSYCHIATRIC: Calm and cooperative     Current Scheduled Inpatient Meds:      magnesium sulfate  2 g Intravenous Once    furosemide  40 mg Oral  Daily    warfarin  5 mg Oral Nightly    spironolactone  25 mg Oral Daily    empagliflozin  10 mg Oral Daily    vancomycin  125 mg Oral QID    melatonin  3 mg Oral Nightly    dilTIAZem ER  180 mg Oral Daily    atorvastatin  80 mg Oral Nightly    hydroxyurea  500 mg Oral Daily    metoprolol succinate ER  25 mg Oral QAM    pantoprazole  40 mg Intravenous Daily       Current PRN Inpatient Meds:        gadoterate meglumine    acetaminophen    ondansetron    metoclopramide    influenza virus vaccine PF    Results:     Lab Results   Component Value Date    WBC 9.5 11/12/2024    HGB 11.3 (L) 11/12/2024    HCT 34.4 (L) 11/12/2024    .0 11/12/2024    .0 11/12/2024    CREATSERUM 1.29 (H) 11/12/2024    BUN 35 (H) 11/12/2024     (L) 11/12/2024    K 4.3 11/12/2024     11/12/2024    CO2 28.0 11/12/2024    GLU 93 11/12/2024    CA 9.2 11/12/2024    ALB 3.1 (L) 11/12/2024    ALKPHO 112 11/09/2024    BILT 1.1 11/09/2024    TP 5.3 (L) 11/09/2024     (H) 11/09/2024    ALT 51 (H) 11/09/2024    PTT 69.1 (H) 11/12/2024    INR 1.12 11/12/2024    PT 13.1 05/11/2016    T4F 1.0 05/03/2022    TSH 3.521 09/05/2024    CRP < 0.5 05/29/2014    MG 1.8 11/12/2024    PHOS 3.2 11/12/2024    B12 734 09/05/2024       Recent Labs   Lab 11/09/24  0352 11/10/24  0402 11/11/24  0507 11/12/24  0506   RBC 3.17* 3.17*  --  3.19*   HGB 11.3* 11.7*  --  11.3*   HCT 34.4* 35.1  --  34.4*   .5* 110.7*  --  107.8*   MCH 35.6* 36.9*  --  35.4*   MCHC 32.8 33.3  --  32.8   RDW 14.1 14.2  --  14.4   NEPRELIM 4.96 5.47  --   --    WBC 6.5 7.6  --  9.5   .0  289.0 252.0 301.0 291.0  291.0     Recent Labs   Lab 11/09/24  0352 11/10/24  0534 11/11/24  0507 11/12/24  0506   GLU 99 98 106* 93   BUN 24* 24* 33* 35*   CREATSERUM 1.03* 1.20* 1.33* 1.29*   CA 9.1 9.2 9.3 9.2   ALB 3.0*  --  3.2 3.1*    136 137 134*   K 4.0 4.9 4.3 4.3    101 101 100   CO2 27.0 27.0 28.0 28.0   ALKPHO 112  --   --   --    *  --    --   --    ALT 51*  --   --   --    BILT 1.1  --   --   --    TP 5.3*  --   --   --      Lab Results   Component Value Date    PT 13.1 05/11/2016    PT 15.7 (H) 07/13/2015    PT 22.6 (H) 04/13/2015    INR 1.12 11/12/2024    INR 1.18 11/11/2024    INR 1.14 11/10/2024       Culture:  No results found for this visit on 11/01/24.    Imaging/EKG:   CT ABDOMEN+PELVIS(CONTRAST ONLY)(CPT=74177)    Result Date: 11/12/2024  CONCLUSION:     16.5 cm right hepatic mass consistent with patient's biopsy-proven hepatocellular carcinoma.  No metastatic disease within the abdomen or pelvis.  Diverticulosis without diverticulitis.  Multiple other incidental findings as described in the body of the report.  Preliminary report was submitted by the [employer] teleradiologist and there are no significant discrepancies.   Dictated by (CST): Trent Alcala MD on 11/12/2024 at 8:33 AM     Finalized by (CST): Trent Alcala MD on 11/12/2024 at 8:44 AM             Assessment and Plan:   Tara Lockwood is an 84-year-old female with hx of chronic diarrhea, chronic dyspnea, paroxysmal atrial fibrillation on warfarin, chronic hypotension on midodrine, SSS w/ PPM, who presents with black diarrhea.       # Anemia  # Melena  # Diarrhea due to C. difficile colitis, improved  - baseline hgb 12-14, around 11 on admission  - no melena since admission  - C Diff PCR positive, EIA also positive  -On oral vancomycin 4 times daily  - GI on consult  -Episode of diarrhea yesterday, possibly related to magnesium supplements, 2 normal soft bowel movements today --> finish course of vancomycin  .  # Liver mass  - Liver ultrasound showed a large 16.7 cm solid mass of the inferior right hepatic lobe, most compatible with hepatocellular carcinoma.   - MRI showed a large centrally necrotic mass arising from the right hepatic lobe that measures 15.7 cm, highly concerning for malignancy.  - Onc on consult, d/w Dr. Ceron  - s/p biopsy with IR on Friday, pathology c/w  malignancy/HCC  - seen by Dr. 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   - Oneal 2 V617 F+ polycythemia vera 11/2020, on hydroxyurea.  - patient follows Dr. Ceron  - continue home hydroxyurea     # History of ischemic stroke  - MRI of the brain 05/2024 showed lacunar stroke in the right cerebellum     Diet: regular  PT/OT: Patient has been evaluated and presents with no skilled Physical Therapy needs at this time.   DVT ppx: AC  Line: none  Code status: Full   Dispo: per clinical course, lives at home with dtr and grandson, plans for home with HH, likely tomorrow     --->d/w cardiology and oncology today, continue heparin drip as long as hospitalization is required, however no need to wait until therapeutic INR      MDM: high  D/w pt and family at bedside    Shayla Quinteros MD   [facility] Hospitalist

## 2024-11-13 NOTE — CM/SW NOTE
11/13/24 1000   Discharge disposition   Expected discharge disposition Home or Self   Patient Declines Recommended Services Yes   Discharge transportation Private car     SW confirmed with RN Idania who stated pt is medically ready for discharge today.  DC order placed.    Pt declined home health care.  Plan is to follow up with PCP and oncology outpatient.    Pt's daughter to transport home.    PLAN: DC home declining skilled services    Barbara Elizalde MSW, LSW [id number]

## 2024-11-13 NOTE — OCCUPATIONAL THERAPY NOTE
Chart reviewed. RN cleared pt for participation in treatment session. Pt received in laying in bed. Pt refusing therapy at this time. Pt offered therapy to prepare for discharge (transfer training, adaptive equipment ed, etc.), pt refusing stating she does not want to participate right now. Will follow-up as able and appropriate.

## 2024-11-13 NOTE — PLAN OF CARE
Problem: Patient Centered Care  Goal: Patient preferences are identified and integrated in the patient's plan of care  Description: Interventions:  - What would you like us to know as we care for you? Patient states one daughter is a registered nurse and lives with other daughter; patient has 8 children  - Provide timely, complete, and accurate information to patient/family  - Incorporate patient and family knowledge, values, beliefs, and cultural backgrounds into the planning and delivery of care  - Encourage patient/family to participate in care and decision-making at the level they choose  - Honor patient and family perspectives and choices  Outcome: Progressing     Problem: Patient/Family Goals  Goal: Patient/Family Long Term Goal  Description: Patient's Long Term Goal: To get stronger; minimize fatigue    Interventions:  -Monitor VSS  -Monitor Labs  -Cards consult  -PT/OT consult   - See additional Care Plan goals for specific interventions  Outcome: Progressing  Goal: Patient/Family Short Term Goal  Description: Patient's Short Term Goal: To manage and/or stop rectal bleeding     Interventions:   -Monitor VSS  -Monitor Labs  -GI consult  -Monitor stools   -See additional Care Plan goals for specific interventions  Outcome: Progressing     Problem: PAIN - ADULT  Goal: Verbalizes/displays adequate comfort level or patient's stated pain goal  Description: INTERVENTIONS:  - Encourage pt to monitor pain and request assistance  - Assess pain using appropriate pain scale  - Administer analgesics based on type and severity of pain and evaluate response  - Implement non-pharmacological measures as appropriate and evaluate response  - Consider cultural and social influences on pain and pain management  - Manage/alleviate anxiety  - Utilize distraction and/or relaxation techniques  - Monitor for opioid side effects  - Notify MD/LIP if interventions unsuccessful or patient reports new pain  - Anticipate increased pain  with activity and pre-medicate as appropriate  Outcome: Progressing     Problem: SAFETY ADULT - FALL  Goal: Free from fall injury  Description: INTERVENTIONS:  - Assess pt frequently for physical needs  - Identify cognitive and physical deficits and behaviors that affect risk of falls.  - [place] fall precautions as indicated by assessment.  - Educate pt/family on patient safety including physical limitations  - Instruct pt to call for assistance with activity based on assessment  - Modify environment to reduce risk of injury  - Provide assistive devices as appropriate  - Consider OT/PT consult to assist with strengthening/mobility  - Encourage toileting schedule  Outcome: Progressing     Problem: DISCHARGE PLANNING  Goal: Discharge to home or other facility with appropriate resources  Description: INTERVENTIONS:  - Identify barriers to discharge w/pt and caregiver  - Include patient/family/discharge partner in discharge planning  - Arrange for needed discharge resources and transportation as appropriate  - Identify discharge learning needs (meds, wound care, etc)  - Arrange for interpreters to assist at discharge as needed  - Consider post-discharge preferences of patient/family/discharge partner  - Complete POLST form as appropriate  - Assess patient's ability to be responsible for managing their own health  - Refer to Case Management Department for coordinating discharge planning if the patient needs post-hospital services based on physician/LIP order or complex needs related to functional status, cognitive ability or social support system  Outcome: Progressing     Problem: GASTROINTESTINAL - ADULT  Goal: Maintains or returns to baseline bowel function  Description: INTERVENTIONS:  - Assess bowel function  - Maintain adequate hydration with IV or PO as ordered and tolerated  - Evaluate effectiveness of GI medications  - Encourage mobilization and activity  - Obtain nutritional consult as needed  - Establish a  toileting routine/schedule  - Consider collaborating with pharmacy to review patient's medication profile  Outcome: Progressing     Problem: METABOLIC/FLUID AND ELECTROLYTES - ADULT  Goal: Electrolytes maintained within normal limits  Description: INTERVENTIONS:  - Monitor labs and rhythm and assess patient for signs and symptoms of electrolyte imbalances  - Administer electrolyte replacement as ordered  - Monitor response to electrolyte replacements, including rhythm and repeat lab results as appropriate  - Fluid restriction as ordered  - Instruct patient on fluid and nutrition restrictions as appropriate  Outcome: Progressing  Goal: Hemodynamic stability and optimal renal function maintained  Description: INTERVENTIONS:  - Monitor labs and assess for signs and symptoms of volume excess or deficit  - Monitor intake, output and patient weight  - Monitor urine specific gravity, serum osmolarity and serum sodium as indicated or ordered  - Monitor response to interventions for patient's volume status, including labs, urine output, blood pressure (other measures as available)  - Encourage oral intake as appropriate  - Instruct patient on fluid and nutrition restrictions as appropriate  Outcome: Progressing     Problem: SKIN/TISSUE INTEGRITY - ADULT  Goal: Skin integrity remains intact  Description: INTERVENTIONS  - Assess and document risk factors for pressure ulcer development  - Assess and document skin integrity  - Monitor for areas of redness and/or skin breakdown  - Initiate interventions, skin care algorithm/standards of care as needed  Outcome: Progressing  Goal: Oral mucous membranes remain intact  Description: INTERVENTIONS  - Assess oral mucosa and hygiene practices  - Implement preventative oral hygiene regimen  - Implement oral medicated treatments as ordered  Outcome: Progressing     Problem: HEMATOLOGIC - ADULT  Goal: Maintains hematologic stability  Description: INTERVENTIONS  - Assess for signs and  symptoms of bleeding or hemorrhage  - Monitor labs and vital signs for trends  - Administer supportive blood products/factors, fluids and medications as ordered and appropriate  - Administer supportive blood products/factors as ordered and appropriate  Outcome: Progressing  Goal: Free from bleeding injury  Description: (Example usage: patient with low platelets)  INTERVENTIONS:  - Avoid intramuscular injections, enemas and rectal medication administration  - Ensure safe mobilization of patient  - Hold pressure on venipuncture sites to achieve adequate hemostasis  - Assess for signs and symptoms of internal bleeding  - Monitor lab trends  - Patient is to report abnormal signs of bleeding to staff  - Avoid use of toothpicks and dental floss  - Use electric shaver for shaving  - Use soft bristle tooth brush  - Limit straining and forceful nose blowing  Outcome: Progressing     Problem: Impaired Functional Mobility  Goal: Achieve highest/safest level of mobility/gait  Description: Interventions:  - Assess patient's functional ability and stability  - Promote increasing activity/tolerance for mobility and gait  - Educate and engage patient/family in tolerated activity level and precautions  - Recommend use of  RW for transfers and ambulation  Outcome: Progressing     Problem: Impaired Activities of Daily Living  Goal: Achieve highest/safest level of independence in self care  Description: Interventions:  - Assess ability and encourage patient to participate in ADLs to maximize function  - Promote sitting position while performing ADLs such as feeding, grooming, and bathing  - Educate and encourage patient/family in tolerated functional activity level and precautions during self-care  - Encourage patient to incorporate impaired side during daily activities to promote function  Outcome: Progressing

## 2024-11-14 ENCOUNTER — PATIENT OUTREACH (OUTPATIENT)
Dept: CASE MANAGEMENT | Age: 84
End: 2024-11-14

## 2024-11-14 ENCOUNTER — ANTI-COAG VISIT (OUTPATIENT)
Dept: ANTICOAGULATION | Facility: CLINIC | Age: 84
End: 2024-11-14

## 2024-11-14 ENCOUNTER — TELEPHONE (OUTPATIENT)
Dept: INTERNAL MEDICINE CLINIC | Facility: CLINIC | Age: 84
End: 2024-11-14

## 2024-11-14 DIAGNOSIS — Z95.0 CONTROL OF ATRIAL FIBRILLATION WITH PACEMAKER (HCC): Primary | ICD-10-CM

## 2024-11-14 DIAGNOSIS — I26.99 IATROGENIC PULMONARY EMBOLISM AND INFARCTION, INITIAL ENCOUNTER (HCC): ICD-10-CM

## 2024-11-14 DIAGNOSIS — T81.718A IATROGENIC PULMONARY EMBOLISM AND INFARCTION, INITIAL ENCOUNTER (HCC): ICD-10-CM

## 2024-11-14 DIAGNOSIS — I48.91 CONTROL OF ATRIAL FIBRILLATION WITH PACEMAKER (HCC): Primary | ICD-10-CM

## 2024-11-14 DIAGNOSIS — Z79.01 MONITORING FOR LONG-TERM ANTICOAGULANT USE: ICD-10-CM

## 2024-11-14 DIAGNOSIS — Z51.81 MONITORING FOR LONG-TERM ANTICOAGULANT USE: ICD-10-CM

## 2024-11-14 NOTE — TELEPHONE ENCOUNTER
Daughter Park called. Stating patient was Discharged last night from hospital.   That evening patient fell at home. Family called EMS. Placed patient back in chair.   Did not hit head.   Patient on coumadin.   Daughter wants patient in a rehab center today. Patient is not strong enough to stay at home; appears weak.     Patient daughter advised to take patient to ER to re-evaluate. Daughter verbalized understanding.

## 2024-11-14 NOTE — PROGRESS NOTES
Per TE to PCP today, dtr would like pt in a rehab facility, PCP office recommended ED. LM for pt to call [facility] for TCM since discharge. [facility] phone number was provided for pt to call back.

## 2024-11-14 NOTE — PROGRESS NOTES
DOCUMENTATION ONLY:    Tara Lockwood  9/9/1940     A patient of your clinic was recently seen by the hospitalists at [facility], and will be following up with you as instructed.     The patient's last INR values were, as follows:     Date INR Value Comments   11/13/24 1.13     11/12/24 1.12     11/11/24 1.18        The patient's discharge Coumadin dosing is as follows:  Take 5mg of Coumadin orally on the evenings of Monday, Wednesday, Friday, and Sunday.     While in the hospital patient recived 5mg of Coumadin on the evening of 11/11 and 11/12.     Please contact us if you have any questions.     Janell RAMIREZ RN  11/14/24

## 2024-11-14 NOTE — PROGRESS NOTES
Hospital follow up.    Robbie Hubbard MD  Internal Medicine; Geriatrics  [facility]  [address] [place], [place] [id number] [phone]    Max Yates MD  Interventional Cardiology; Cardiovascular Disease  [facility]  [address]  [address]  [place] [address]  [phone]    Yamilex Ceron MD  Hematology & Oncology  [address] [place] [address]   Friday 1/24 @11:40  Existing appointment.    Attempt #1:  Left message on voicemail for patient to call transitions specialist back to schedule follow up appointments. Provided Transitions specialist scheduling phone number [phone].

## 2024-11-15 ENCOUNTER — TELEPHONE (OUTPATIENT)
Dept: HEMATOLOGY/ONCOLOGY | Facility: HOSPITAL | Age: 84
End: 2024-11-15

## 2024-11-15 NOTE — PROGRESS NOTES
See other patient outreach encounter from 11/14/24, patient admitted into skilled nursing facility.  Encounter closing.

## 2024-11-15 NOTE — TELEPHONE ENCOUNTER
[facility]       Per [facility] Telephone Encounter Patient Outreach, 11/15/24. Note Form This morning, 11/15/24:     \"Patient was admitted to [facility]. Patient is now admitted to [facility] for rehab.  Confirmed with patient's daughter.\"     Closing encounter.

## 2024-11-15 NOTE — TELEPHONE ENCOUNTER
Pt's daughter called and advised the pt isn't able to attend the consult on 11/18/24 Interventional Radiology. Pt is also in Rehab at [facility] due to a fall and pt can't walk at this time

## 2024-11-15 NOTE — PROGRESS NOTES
Phone call to Patients home number. Message left to call so we can make a plan regarding next INR check.     Reply:    Per [facility] Telephone Encounter Patient Outreach, 11/15/24. Note Form This morning, 11/15/24:      \"Patient was admitted to [facility]. Patient is now admitted to [facility] for rehab.  Confirmed with patient's daughter.\"

## 2024-11-15 NOTE — PROGRESS NOTES
Hospital follow up.    Robbie Hubbard MD  Internal Medicine; Geriatrics  [facility]  [address] [place], [place] [id number] [phone]    Max Yates MD  Interventional Cardiology; Cardiovascular Disease  [facility]  [address]  [address]  [place] [address]  [phone]    Yamilex Ceron MD  Hematology & Oncology  [address] [place] [address]   Friday 1/24 @11:40  Existing appointment.    Patient was admitted to [facility]. Patient is now admitted to [facility] for rehab.  Confirmed with patient's daughter.    Closing encounter.

## 2024-11-18 ENCOUNTER — TELEPHONE (OUTPATIENT)
Dept: HEMATOLOGY/ONCOLOGY | Facility: HOSPITAL | Age: 84
End: 2024-11-18

## 2024-11-20 ENCOUNTER — TELEPHONE (OUTPATIENT)
Dept: INTERNAL MEDICINE CLINIC | Facility: CLINIC | Age: 84
End: 2024-11-20

## 2024-11-21 NOTE — TELEPHONE ENCOUNTER
Vancomycin  MG Capsules    [employer]/login  Key: [id number]    I DID not see this medication on current list of medications.    Request for Medicare Prescription Drug coverage determination.

## 2024-11-22 NOTE — TELEPHONE ENCOUNTER
Per discharge summary 11/13/24, patient to stop taking 11/19/24.  Called pharmacy and was advised patient was given partial supply as that was all that they had in stock.   Patient did pay out of pocket, nothing further needed at this time.

## 2024-12-09 ENCOUNTER — TELEPHONE (OUTPATIENT)
Dept: ANTICOAGULATION | Facility: CLINIC | Age: 84
End: 2024-12-09

## 2024-12-09 NOTE — TELEPHONE ENCOUNTER
Chart reviewed- inpatient in November and then went to [facility] for rehab.     I left a message for Tara on her mobile number 1 week ago- no response.     Today I spoke with her daughter Qing- reports that she can text but has trouble retrieving her voicemail messages. Reports that she remains at [facility] for rehab- no timeframe for discharge yet- though the plan is for her to return home with home health or perhaps a caregiver.     I advised that we can work with home health or we can send a phlebotomist to patient home for blood draws and then we can continue to manage anticoagulation.     Plan- continue enrollment in coumadin clinic. Qing will give us a call when there is a plan for discharge to home. Coumadin Clinic RN will review the chart in 2 weeks and contact Qing for an update if we have not heard from her.

## 2024-12-11 ENCOUNTER — TELEPHONE (OUTPATIENT)
Dept: HEMATOLOGY/ONCOLOGY | Facility: HOSPITAL | Age: 84
End: 2024-12-11

## 2024-12-11 NOTE — TELEPHONE ENCOUNTER
Qing called with a question for Dr Ceron. She reports her Mom is in rehab. Dr Ceron told her to call when she is discharged so the can schedule her treatment with Interventional Radiology for her liver cancer. She reports that she still does not know when Tara will be discharged. She was told today that the rehab facility can arrange a ride to and from her treatment. She wants to speak with Dr Ceron to see if this is an option so they can get her treatment started. Qing is requesting a call back at [phone].    I explained that Dr Ceron is out of the office today. I will forward her message to Dr Ceron and ARMOND Galaviz and JULIAN Sue.

## 2024-12-17 ENCOUNTER — TELEPHONE (OUTPATIENT)
Dept: INTERVENTIONAL RADIOLOGY/VASCULAR | Facility: HOSPITAL | Age: 84
End: 2024-12-17

## 2024-12-20 NOTE — TELEPHONE ENCOUNTER
[facility]   [phone]   Confidential line    Call her to confirm Dr Hubbard will sign/follow for home health.  Fax# for Dr Hubbard given.

## 2024-12-23 NOTE — TELEPHONE ENCOUNTER
Patient's daughterQing is following up on patient's home health orders.    Calling also to advise the patient needs a bed that is fully electric so the caregiver may care for the patient.    Also requesting a home health Doctor.   Patient's daughter would like to confirm if provider needs to see patient as the patient is presently not able to and barely made it it home.       Patient's daughter is requesting a call back [phone] to go over orders.

## 2024-12-27 ENCOUNTER — TELEPHONE (OUTPATIENT)
Dept: INTERNAL MEDICINE CLINIC | Facility: CLINIC | Age: 84
End: 2024-12-27

## 2024-12-28 NOTE — TELEPHONE ENCOUNTER
Left message to call back from Rebecca with [facility] at [phone] regarding critical lab results.  Nothing seen in the system not sure if it is an INR.  Message also sent to PCP.

## 2024-12-28 NOTE — TELEPHONE ENCOUNTER
Rebecca called back gave me the results.  Potassium at 6.8 Cr was 5.8. Pt. Currently in ER with hemoptysis. Recently admitted to [facility] for c. Diff. Back in rehab. Pt. Is bedbound and loose and dark stools when RN went to visit patient. Just leonora labs today.  Hemoglobin is down to 9.3 approximately.  She will fax the results to the daughter and daughter will make sure she send it through [employer] to PCP.  She will also tell me which ER she is in I will talk to the ER physician.Patito Osorio. she will also fax the records to Dr. Hubbard's office.

## 2024-12-30 ENCOUNTER — MED REC SCAN ONLY (OUTPATIENT)
Dept: INTERNAL MEDICINE CLINIC | Facility: CLINIC | Age: 84
End: 2024-12-30

## 2024-12-30 NOTE — TELEPHONE ENCOUNTER
Called patients daughter keith to update and the patient is currently being admitted onto hospice care. They no longer need orders.

## 2024-12-30 NOTE — TELEPHONE ENCOUNTER
Noted  I am currently out of the office starting 12/16/24  returning 1/7/2025.    I think pt is in the hosptial or rehab  Please follow on her current conditon

## 2024-12-30 NOTE — TELEPHONE ENCOUNTER
Spoke with patient's daughter Qing, on SHREYA, verified patient's name/.  Patient had gone to [facility] per ambulance as she was vomiting blood.   She was found to be in kidney failure. Decision was made to seek hospice care and so she transferred to [facility] on [address], close to the hospital.    FYI to Dr Arredondo and Dr Hubbard.

## 2025-01-01 RX ORDER — WARFARIN SODIUM 5 MG/1
TABLET ORAL
Qty: 120 TABLET | Refills: 3 | Status: CANCELLED | OUTPATIENT
Start: 2025-01-01

## 2025-01-16 ENCOUNTER — MED REC SCAN ONLY (OUTPATIENT)
Dept: INTERNAL MEDICINE CLINIC | Facility: CLINIC | Age: 85
End: 2025-01-16

## 2025-01-24 ENCOUNTER — APPOINTMENT (OUTPATIENT)
Age: 85
End: 2025-01-24
Attending: INTERNAL MEDICINE
Payer: MEDICARE

## 2025-05-30 NOTE — ED NOTES
Became very dizzy while irrigating ears. Dr. Lenz Appl notified no further irrigation. stts always gets dizzy with ear irrigations.  discharge inst review No

## (undated) DIAGNOSIS — Z51.11 CHEMOTHERAPY MANAGEMENT, ENCOUNTER FOR: ICD-10-CM

## (undated) DIAGNOSIS — R91.1 PULMONARY NODULE: Primary | ICD-10-CM

## (undated) DIAGNOSIS — D45 POLYCYTHEMIA VERA (HCC): Primary | ICD-10-CM

## (undated) DEVICE — FORCEP RADIAL JAW 4

## (undated) DEVICE — Device: Brand: DEFENDO AIR/WATER/SUCTION AND BIOPSY VALVE

## (undated) DEVICE — ENDOSCOPY PACK UPPER: Brand: MEDLINE INDUSTRIES, INC.

## (undated) DEVICE — ENDOSCOPY PACK - LOWER: Brand: MEDLINE INDUSTRIES, INC.

## (undated) NOTE — Clinical Note
Spoke with pt today for TCM--please see notes. Confirmed 5/17/2024 TCM appt--thank you.  Future Appointments 5/17/2024  12:00 PM   Taylor Bagley APRN         ECSCHIM             [facility] 5/31/2024  1:00 PM    Aranza Coleman RN    ECSCHCOUM           [facility] 6/6/2024   11:30 AM   [facility]faro [facility]            [facility] 6/25/2024  10:15 AM   [facility] RESOURCE               [facility] HEM ONC         EMO 6/25/2024  11:15 AM   Herman Bhandari MD          [facility] HEM ONC         EMO 8/19/2024  2:20 PM    Robbie Hubbard MD        ECADOIM             EC ADO 9/16/2024  3:15 PM    Nathan Reyes MD [facility]

## (undated) NOTE — LETTER
10/26/2023          To Whom It May Concern:    Gary Venegas is currently under my medical care. Please allow her to re-apply for Drivers License. She is clear to apply from Orthopedics standpoint. If you require additional information please contact our office.         Sincerely,    Jake Andrew MD

## (undated) NOTE — LETTER
[address] [facility] [address] [place] [id number]      1/28/2022    Dear Caesar Brunner,    It has come to our attention that you are due for: Ct Chest  in 3/2022    This test was ordered by Dr. Shruti Small.         If you are allergic to iodine or have any

## (undated) NOTE — Clinical Note
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 I've recommended bladder testing. I will work to manage her sx. I appreciate the opportunity to participate in her care.  Thanks, Yudith Garcia

## (undated) NOTE — LETTER
[address]  [address] [address]      5/23/2018    Dear Deysi Alonzo,    It has come to our attention that you are due for: CT in June. This test was ordered by Dr. Sofie Carvalho. Please call for an appointment at [phone].     If you ar

## (undated) NOTE — MR AVS SNAPSHOT
[address], [address]  [address] [facility]  [phone]               Thank you for choosing us for your health care visit with Mayank Goodson MD.  We are glad to serve you and happy to provide you with this summar Assoc Dx:  Essential hypertension with goal blood pressure less than 130/80 [I10]           Urinalysis, Routine    Complete by:   Mar 01, 2017    Assoc Dx:  Essential hypertension with goal blood pressure less than 130/80 [I10]           XR HUMERUS (MIN 2 Instructions:   To schedule a test at any [facility], call Central Scheduling at [phone], Monday through Friday between 7:30am to 6pm and on Saturday between 8am and 1pm. Evening and weekend appointments for your exam are a Your physician has referred you to a specialist.  Your physician or the clinic staff will provide you with the phone number you should call to schedule your appointment.      If you are confident that your benefit plan will not require a referral or authori schedule your appointment. If you are confident that your benefit plan will not require a referral or authorization, such as [place] Medicaid, please feel free to schedule your appointment immediately.  However, if you are unsure about the requirements PROBIOTIC DAILY OR   Take 1 capsule by mouth daily. Warfarin Sodium 5 MG Tabs   TAKE AS DIRECTED BY ANTICOAGULATION CLINIC: 1.5 tab Tue, Sat.& 1 tab the rest of the week, PO in Evening.  PO   Commonly known as:  COUMADIN                   MyChart

## (undated) NOTE — LETTER
May 9, 2022     Joseluis Centeno  [address]  [place] [address]      Dear Ora Mccall:    Below are the results from your recent visit:  Urine culture negative     Resulted Orders   URINALYSIS NONAUTO W/O SCOPE   Result Value Ref Range    Glucose Urine Negative Negative mg/dL    Bilirubin Urine Negative Negative    Ketones, UA Negative Negative - Trace mg/dL    Spec Gravity 1.015 1.005 - 1.030    Blood Urine Trace (A) Negative    PH Urine 6.0 5.0 - 8.0    Protein Urine Negative Negative - Trace mg/dL    Urobilinogen Urine 0.2 0.2 - 1.0 mg/dL    Nitrite Urine Negative Negative    Leukocyte Esterase Urine Negative Negative    APPEARANCE cloudy Clear    Color Urine yellow Yellow    Multistix Lot# [id number] Numeric    Multistix Expiration Date 7/31/22 Date   URINE CULTURE, ROUTINE   Result Value Ref Range    Urine Culture No Growth 2 Days          If you have any questions or concerns, please don't hesitate to call.     Kiley Traylor MD

## (undated) NOTE — LETTER
[address]  [facility]: [phone]   Consent to Procedure/Sedation    Date: __1/15/2021_____    Time: ___7:39 AM ___    1.  I authorize the performance upon Tacey Marking the foll Signature of person authorized to consent for patient: Relationship to patient:  ___________________________    ___________________    Witness: ____________________     Date: ______________    Printed: 1/15/2021   7:39 AM    Patient Name: Jeannette Jackson

## (undated) NOTE — LETTER
[address]  [facility]: [phone]   Consent to Procedure/Sedation    Date: __1/7/2021_____    Time: ___8:38 AM ___    1.  I authorize the performance upon Nobie Ailin Signature of person authorized to consent for patient: Relationship to patient:  ___________________________    ___________________    Witness: ____________________     Date: ______________    Printed: 1/7/2021   8:38 AM    Patient Name: Winston Molina

## (undated) NOTE — LETTER
11/27/2018    Patient: Walter Power   MR Number: [id number]   YOB: 1940   Date of Visit: 11/27/2018   Physician: Bill Valadez MD     Dear Medicare Patient:  Mariely Moore TO BENEFICIARY:  Please know that while a refraction is i

## (undated) NOTE — Clinical Note
Hi Dr. Reyes, you used to see this patient for many years. Do you mind taking a look at her chart? She'll be seeing you in Sept. Had new onset afib and got a pacemaker, echo is okay and recent CT chest ok but still having dyspnea on exertion. Was considering adding a controller inhaler for her mild COPD and wheezing with exertion. Thank you!

## (undated) NOTE — LETTER
AUTHORIZATION FOR SURGICAL OPERATION OR OTHER PROCEDURE    1. I hereby [facility] Dr. Bonny Braun, [facility] staff assigned to my case to perform the following operation and/or procedure at the [facility], [facility]:    _______________________________________________________________________________________________    Bilateral knee Cortisone injections  _______________________________________________________________________________________________    2. My physician has explained the nature and purpose of the operation or other procedure, possible alternative methods of treatment, the risks involved, and the possibility of complication to me. I acknowledge that no guarantee has been made as to the result that may be obtained. 3.  I recognize that, during the course of this operation, or other procedure, unforseen conditions may necessitate additional or different procedure than those listed above. I, therefore, further authorize and request that the above named physician, his/her physician assistants or designees perform such procedures as are, in his/her professional opinion, necessary and desirable. 4.  Any tissue or organs removed in the operation or other procedure may be disposed of by and at the discretion of the [facility], [facility] and [facility] AT [facility]. 5.  I understand that in the event of a medical emergency, I will be transported by local paramedics to [facility] or other hospital emergency department. 6.  I certify that I have read and fully understand the above consent to operation and/or other procedure. 7.  I acknowledge that my physician has explained sedation/analgesia administration to me including the risks and benefits. I consent to the administration of sedation/analgesia as may be necessary or desirable in the judgement of my physician.     Witness signature: ___________________________________________________ Date:  ______/______/_____ Time:  ________ A. M.  P.M. Patient Name:  ______________________________________________________  (please print)      Patient signature:  ___________________________________________________             Relationship to Patient:           []  Parent    Responsible person                          []  Spouse  In case of minor or                    [] Other  _____________   Incompetent name:  __________________________________________________                               (please print)      _____________      Responsible person  In case of minor or  Incompetent signature:  _______________________________________________    Statement of Physician  My signature below affirms that prior to the time of the procedure, I have explained to the patient and/or his/her guardian, the risks and benefits involved in the proposed treatment and any reasonable alternative to the proposed treatment. I have also explained the risks and benefits involved in the refusal of the proposed treatment and have answered the patient's questions.                         Date:  ______/______/_______  Provider                      Signature:  __________________________________________________________       Time:  ___________ A.M    P.M.

## (undated) NOTE — LETTER
Tara Lockwood  9/9/1940    A patient of your clinic was recently seen by the hospitalists at [facility], and will be following up with [facility] on 5/15/24 and 5/21/24.     The patient's last INR values were, as follows:  Lab Results   Component Value Date    INR 2.28 (H) 05/14/2024    INR 2.24 (H) 05/13/2024    INR 2.36 (H) 05/07/2024         The patient's discharge Coumadin dosing is as follows:    warfarin 5 MG Tabs  Commonly known as: Coumadin       Take as directed. If you are unsure how to take this medication, talk to your nurse or doctor.  Original instructions: Take 1-1.5 tablets (5-7.5 mg total) by mouth nightly. Take as directed by the coumadin clinic. Take 1.5 tabs (7.5mg) by mouth Tues, Thurs and 1 tab (5mg) all other days per Coumadin Clinic    Quantity: 105 tablet  Refills: 1      warfarin 5 MG Tabs  Commonly known as: Coumadin       Take as directed. If you are unsure how to take this medication, talk to your nurse or doctor.  Original instructions: Take as directed by INR clinic or take one & 1/2 tabs Tuesdays, Thursdays, Saturdays. Take one tab all other days        Please contact us if you have any questions.    Reina LING RN  05/15/24

## (undated) NOTE — LETTER
[place] [address]  Authorization for Invasive Procedures  Date: 11/07/2024           Time: 0900    I hereby authorize , my physician and his/her assistants (if applicable), which may include medical students, residents, and/or fellows, to perform the following surgical operation/ procedure and administer such anesthesia as may be determined necessary by my physician: ULTRASOUND GUIDED LIVER MASS BIOPSY on Tara Lockwood  2.   I recognize that during the surgical operation/procedure, unforeseen conditions may necessitate additional or different procedures than those listed above.  I, therefore, further authorize and request that the above-named surgeon, assistants, or designees perform such procedures as are, in their judgment, necessary and desirable.    3.   My surgeon/physician has discussed prior to my surgery the potential benefits, risks and side effects of this procedure; the likelihood of achieving goals; and potential problems that might occur during recuperation.  They also discussed reasonable alternatives to the procedure, including risks, benefits, and side effects related to the alternatives and risks related to not receiving this procedure.  I have had all my questions answered and I acknowledge that no guarantee has been made as to the result that may be obtained.    4.   Should the need arise during my operation/procedure, which includes change of level of care prior to discharge, I also consent to the administration of blood and/or blood products.  Further, I understand that despite careful testing and screening of blood or blood products by collecting agencies, I may still be subject to ill effects as a result of receiving a blood transfusion and/or blood products.  The following are some, but not all, of the potential risks that can occur: fever and allergic reactions, hemolytic reactions, transmission of diseases such as Hepatitis, AIDS and Cytomegalovirus  (CMV) and fluid overload.  In the event that I wish to have an autologous transfusion of my own blood, or a directed donor transfusion, I will discuss this with my physician.   Check only if Refusing Blood or Blood Products  I understand refusal of blood or blood products as deemed necessary by my physician may have serious consequences to my condition to include possible death. I hereby assume responsibility for my refusal and release the hospital, its personnel, and my physicians from any responsibility for the consequences of my refusal.         o  Refuse         5.   I authorize the use of any specimen, organs, tissues, body parts or foreign objects that may be removed from my body during the operation/procedure for diagnosis, research or teaching purposes and their subsequent disposal by hospital authorities.  I also authorize the release of specimen test results and/or written reports to my treating physician on the hospital medical staff or other referring or consulting physicians involved in my care, at the discretion of the Pathologist or my treating physician.    6.   I consent to the photographing or videotaping of the operations or procedures to be performed, including appropriate portions of my body for medical, scientific, or educational purposes, provided my identity is not revealed by the pictures or by descriptive texts accompanying them.  If the procedure has been photographed/videotaped, the surgeon will obtain the original picture, image, videotape or CD.  The hospital will not be responsible for storage, release or maintenance of the picture, image, tape or CD.    7.   I consent to the presence of a  or observers in the operating room as deemed necessary by my physician or their designees.    8.   I recognize that in the event my procedure results in extended X-Ray/fluoroscopy time, I may develop a skin reaction.    9. If I have a Do Not Attempt Resuscitation (DNAR) order in  place, that status will be suspended while in the operating room, procedural suite, and during the recovery period unless otherwise explicitly stated by me (or a person authorized to consent on my behalf). The surgeon or my attending physician will determine when the applicable recovery period ends for purposes of reinstating the DNAR order.  10. Patients having a sterilization procedure: I understand that if the procedure is successful the results will be permanent and it will therefore be impossible for me to inseminate, conceive, or bear children.  I also understand that the procedure is intended to result in sterility, although the result has not been guaranteed.   11. I acknowledge that my physician has explained sedation/analgesia administration to me including the risk and benefits I consent to the administration of sedation/analgesia as may be necessary or desirable in the judgment of my physician.    I CERTIFY THAT I HAVE READ AND FULLY UNDERSTAND THE ABOVE CONSENT TO OPERATION and/or OTHER PROCEDURE.        ____________________________________       _________________________________      ______________________________  Signature of Patient         Signature of Responsible Person        Printed Name of Responsible Person        ____________________________________      _________________________________      ______________________________       Signature of Witness          Relationship to Patient                       Date                                       Time  Patient Name: Tara Lockwood  : 1940    Reviewed: 2024   Printed: 2024  Medical Record #: [id number] Page 1 of 2             STATEMENT OF PHYSICIAN My signature below affirms that prior to the time of the procedure; I have explained to the patient and/or his/her legal representative, the risks and benefits involved in the proposed treatment and any reasonable alternative to the proposed treatment. I have also explained  the risks and benefits involved in refusal of the proposed treatment and alternatives to the proposed treatment and have answered the patient's questions. If I have a significant financial interest in a co-management agreement or a significant financial interest in any product or implant, or other significant relationship used in this procedure/surgery, I have disclosed this and had a discussion with my patient.     _______________________________________________________________ _____________________________  (Signature of Physician)                                                                                         (Date)                                   (Time)  Patient Name: Tara Bustosmm  : 1940    Reviewed: 2024   Printed: 2024  Medical Record #: [id number] Page 2 of 2

## (undated) NOTE — MR AVS SNAPSHOT
Jeaneth Townsend   2017 1:45 PM   Office Visit   MRN:  [id number]    Description:  Female : 1940   Provider:  Kathy Flynn   Department:  [facility] Hematology Oncology              Visit Summary      Primary Visit Diagnosis LAB:  CBC WITH DIFFERENTIAL WITH PLATELET        Thursday June 01, 2017     LAB:  COMP METABOLIC PANEL (14)        Tuesday June 06, 2017 12:45 PM     Appointment with Jacky Lazo at [address]

## (undated) NOTE — LETTER
10/26/2023              Tara PRESSLEY [address] [place] [address] [address]        [facility] [address]         Dear Emmett Jaime,    It was a pleasure to see you. Your {EM NORMAL LETTER:[id number]} was normal.  There is no need for further testing at this time. I look forward to seeing you at your next scheduled appointment.       Sincerely,    Leobardo Pitts MD  [facility], [place], [facility] [address]  [facility] [address] [phone]        Document electronically generated by:  Anjelica San MA

## (undated) NOTE — LETTER
[address] [place] [address]  Authorization for Surgical Operation or Procedure  Date: ______________       Time: _______________  1.  I hereby authorize Dr. Aquiles Gomez, my physician and the assistant, to perform the following operation and/or 5. I consent to the photographing of the operations or procedures to be performed for the purposes of advancing medicine, science, and/or education, provided my identity is not revealed.  If the procedure has been videotaped, the physician/surgeon will obta risks and benefits involved in the proposed treatment and any reasonable alternative to the proposed treatment. I have also explained the risks and benefits involved in the refusal of the proposed treatment and have answered the patient's questions.  If I h

## (undated) NOTE — LETTER
4/4/2023          [facility]        [address]        [place] [address]         Dear Heath Gonzalez,    It has come to our attention that a required CT Chest test is due in May. Please keep your upcoming appointment on 5/18/23       If you have any other questions, please contact our office at [phone].          Sincerely,    Valentina Jackson MD  [facility], [address] [place] [address]  [address] [address] [facility] [address] [phone]

## (undated) NOTE — LETTER
AUTHORIZATION FOR SURGICAL OPERATION OR OTHER PROCEDURE    1. I hereby authorize Dr. Myke Coppola, and [place] [facility] [place] [facility] staff assigned to my case to perform the following operation and/or procedure at the [facility], [facility]:    _______________________________________________________________________________________________    Bilateral Knee Cortisone njection  _______________________________________________________________________________________________    2. My physician has explained the nature and purpose of the operation or other procedure, possible alternative methods of treatment, the risks involved, and the possibility of complication to me. I acknowledge that no guarantee has been made as to the result that may be obtained. 3.  I recognize that, during the course of this operation, or other procedure, unforseen conditions may necessitate additional or different procedure than those listed above. I, therefore, further authorize and request that the above named physician, his/her physician assistants or designees perform such procedures as are, in his/her professional opinion, necessary and desirable. 4.  Any tissue or organs removed in the operation or other procedure may be disposed of by and at the discretion of the [facility], [facility] and [facility]. 5.  I understand that in the event of a medical emergency, I will be transported by local paramedics to [facility] or other hospital emergency department. 6.  I certify that I have read and fully understand the above consent to operation and/or other procedure. 7.  I acknowledge that my physician has explained sedation/analgesia administration to me including the risks and benefits. I consent to the administration of sedation/analgesia as may be necessary or desirable in the judgement of my physician.     Witness signature: ___________________________________________________ Date:  ______/______/_____ Time:  ________ A. M.  P.M. Patient Name:  ______________________________________________________  (please print)      Patient signature:  ___________________________________________________             Relationship to Patient:           []  Parent    Responsible person                          []  Spouse  In case of minor or                    [] Other  _____________   Incompetent name:  __________________________________________________                               (please print)      _____________      Responsible person  In case of minor or  Incompetent signature:  _______________________________________________    Statement of Physician  My signature below affirms that prior to the time of the procedure, I have explained to the patient and/or his/her guardian, the risks and benefits involved in the proposed treatment and any reasonable alternative to the proposed treatment. I have also explained the risks and benefits involved in the refusal of the proposed treatment and have answered the patient's questions.                         Date:  ______/______/_______  Provider                      Signature:  __________________________________________________________       Time:  ___________ A.M    P.M.

## (undated) NOTE — LETTER
AUTHORIZATION FOR SURGICAL OPERATION OR OTHER PROCEDURE    1. I hereby authorize Dr. Hawkins, and [facility] staff assigned to my case to perform the following operation and/or procedure at the [facility]:    _______________________________________________________________________________________________    Cortisone Injection Bilateral Knee  _______________________________________________________________________________________________    2.  My physician has explained the nature and purpose of the operation or other procedure, possible alternative methods of treatment, the risks involved, and the possibility of complication to me.  I acknowledge that no guarantee has been made as to the result that may be obtained.  3.  I recognize that, during the course of this operation, or other procedure, unforseen conditions may necessitate additional or different procedure than those listed above.  I, therefore, further authorize and request that the above named physician, his/her physician assistants or designees perform such procedures as are, in his/her professional opinion, necessary and desirable.  4.  Any tissue or organs removed in the operation or other procedure may be disposed of by and at the discretion of the [facility] and [facility].  5.  I understand that in the event of a medical emergency, I will be transported by local paramedics to [facility] or other hospital emergency department.  6.  I certify that I have read and fully understand the above consent to operation and/or other procedure.    7.  I acknowledge that my physician has explained sedation/analgesia administration to me including the risks and benefits.  I consent to the administration of sedation/analgesia as may be necessary or desirable in the judgement of my physician.    Witness signature: ___________________________________________________ Date:  ______/______/_____                     Time:  ________ A.M.  P.M.       Patient Name:  ______________________________________________________  (please print)      Patient signature:  ___________________________________________________             Relationship to Patient:           []  Parent    Responsible person                          []  Spouse  In case of minor or                    [] Other  _____________   Incompetent name:  __________________________________________________                               (please print)      _____________      Responsible person  In case of minor or  Incompetent signature:  _______________________________________________    Statement of Physician  My signature below affirms that prior to the time of the procedure, I have explained to the patient and/or his/her guardian, the risks and benefits involved in the proposed treatment and any reasonable alternative to the proposed treatment.  I have also explained the risks and benefits involved in the refusal of the proposed treatment and have answered the patient's questions.                        Date:  ______/______/_______  Provider                      Signature:  __________________________________________________________       Time:  ___________ A.M    P.M.

## (undated) NOTE — MR AVS SNAPSHOT
[facility] (formerly [facility])[3]  [address] [place] [id number] [phone]               Thank you for choosing us for your health care visit with Alexys Fernandes MD.  We are glad to serve you and happy to provide you with this summary of your visit. Today's Vital Signs     BP Pulse Height Weight BMI    114/70 mmHg 67 5' 10\" (1.778 m) 241 lb (109.317 kg) 34.58 kg/m2         Current Medications          This list is accurate as of: 6/6/17  1:19 PM.  Always use your most recent med list. [employer] [address] [place], [place] [address] for making healthy food choices  -   Enjoy your food, but eat less. Fully enjoy your food when eating. Don’t eat while distracted and slow down. Avoid over sized portions. Don’t eat while when you’re bored.

## (undated) NOTE — LETTER
[facility] [id number]      11/28/2018    Dear Mavis Graham,    It has come to our attention that you are due for: CT in December. This test was ordered by Dr. Corinne Vargas. Please call for an appointment at [phone].

## (undated) NOTE — LETTER
Tara Lockwood  9/9/1940    A patient of your clinic was recently seen by the hospitalists at [facility], and will be following up with you as instructed.    The patient's last INR values were, as follows:    Date INR Value Comments   11/13/24 1.13    11/12/24 1.12    11/11/24 1.18      The patient's discharge Coumadin dosing is as follows:  Take 5mg of Coumadin orally on the evenings of Monday, Wednesday, Friday, and Sunday.    While in the hospital patient recived 5mg of Coumadin on the evening of 11/11 and 11/12.    Please contact us if you have any questions.    Janell RAMIREZ RN  11/14/24